# Patient Record
Sex: FEMALE | Race: BLACK OR AFRICAN AMERICAN | NOT HISPANIC OR LATINO | Employment: FULL TIME | URBAN - METROPOLITAN AREA
[De-identification: names, ages, dates, MRNs, and addresses within clinical notes are randomized per-mention and may not be internally consistent; named-entity substitution may affect disease eponyms.]

---

## 2017-01-03 ENCOUNTER — ROUTINE PRENATAL (OUTPATIENT)
Dept: OBSTETRICS AND GYNECOLOGY | Facility: CLINIC | Age: 27
End: 2017-01-03
Payer: MEDICAID

## 2017-01-03 VITALS — DIASTOLIC BLOOD PRESSURE: 62 MMHG | BODY MASS INDEX: 24.25 KG/M2 | SYSTOLIC BLOOD PRESSURE: 99 MMHG | WEIGHT: 145.75 LBS

## 2017-01-03 DIAGNOSIS — Z3A.36 36 WEEKS GESTATION OF PREGNANCY: Primary | ICD-10-CM

## 2017-01-03 PROCEDURE — 99212 OFFICE O/P EST SF 10 MIN: CPT | Mod: S$PBB,TH,, | Performed by: OBSTETRICS & GYNECOLOGY

## 2017-01-03 PROCEDURE — 99999 PR PBB SHADOW E&M-EST. PATIENT-LVL II: CPT | Mod: PBBFAC,,, | Performed by: OBSTETRICS & GYNECOLOGY

## 2017-01-03 PROCEDURE — 99212 OFFICE O/P EST SF 10 MIN: CPT | Mod: PBBFAC,PO | Performed by: OBSTETRICS & GYNECOLOGY

## 2017-01-03 NOTE — PROGRESS NOTES
HERE for routine OB visit at 36 0/7 wks, with NO complaints.  Denies vaginal bleeding, cramping/ OCC ctx, NO  LOF.  + FM.  [facility] BID.   Increase water.  F/U 1 wk.

## 2017-01-03 NOTE — MR AVS SNAPSHOT
[place] - OB/ GYN   [facility]  [place] [address]  Phone: [phone]                  Jose Luis Crowder   1/3/2017 2:00 PM   Routine Prenatal    Description:  Female : 1990   Provider:  Charleen Rizzo MD   Department:  [place] - OB/ GYN           Reason for Visit     Routine Prenatal Visit                To Do List           Future Appointments        Provider Department Dept Phone    1/3/2017 2:00 PM MD Joi Ferguson - OB/ -[phone]    1/10/2017 10:00 AM MD Joi Ferguson - OB/ -[phone]    2017 10:15 AM MD Joi Ferguson - OB/ -[phone]    2017 10:30 AM MD Joi Ferguson - OB/ -[phone]    2017 10:30 AM MD [place] - OB/ -[phone]      Goals (5 Years of Data)     None      [facility]     [facility] On Call Nurse [facility] -  Assistance  Registered nurses in the [facility] On Call Center provide clinical advisement, health education, appointment booking, and other advisory services.  Call for this free service at [phone].             Medications           Message regarding Medications     Verify the changes and/or additions to your medication regime listed below are the same as discussed with your clinician today.  If any of these changes or additions are incorrect, please notify your healthcare provider.             Verify that the below list of medications is an accurate representation of the medications you are currently taking.  If none reported, the list may be blank. If incorrect, please contact your healthcare provider. Carry this list with you in case of emergency.           Current Medications     butalbital-acetaminophen-caffeine -40 mg (FIORICET) -40 mg per tablet Take 1-2 tablets by mouth every 6 (six) hours as needed for Headaches.    CITRANATAL 90 DHA, ALGAL OIL, 90 mg iron-1 mg -50 mg-300 mg Cmpk TK ONE C AND ONE T PO D     mv-ferrous fumarate-Ca-E-FA (VITAFOL) 65-1 mg Tab Take 1 tablet by mouth once daily.    ondansetron (ZOFRAN-ODT) 4 MG TbDL Take 2 tablets (8 mg total) by mouth every 8 (eight) hours.    PNV64-iron cbn,gluc-FA-DSS-dha (CITRANATAL 90 DHA, NEW FORMULA) 90 mg iron-1 mg -50 mg-300 mg Cmpk Take 1 tablet by mouth once daily.    PRENATAL VIT W-CA,FE,FA,<1 MG, (PRENATAL VITAMIN ORAL) Take by mouth.    promethazine (PHENERGAN) 25 MG tablet Take 1 tablet (25 mg total) by mouth every 4 (four) hours.           Clinical Reference Information           Prenatal Vitals     Enc. Date GA Prenatal Vitals Prenatal Pulse Pain Level Urine Albumin/Glucose Edema Presentation Dilation/Effacement/Station    1/3/17 36w0d 99/62 / 66.1 kg (145 lb 11.6 oz)   0 Negative / Negative       12/27/16 35w0d 96/78 / 66.1 kg (145 lb 11.6 oz) 35 cm / 145 / Present  0 Negative / Negative None / None  0    12/15/16 33w2d 90/60 / 64.9 kg (143 lb 1.3 oz)  / 140 / Present  0 Negative / Negative None / None Vertex     11/28/16 30w6d 90/60 / 64.1 kg (141 lb 5 oz) 31 cm / 140 / Present  0 Negative / Negative None / None      10/24/16 25w6d 100/60 / 61.7 kg (136 lb 0.4 oz)  / 150 / Present  0 Negative / Negative None / None      9/29/16 22w2d 90/52 (A) / 60.9 kg (134 lb 4.2 oz)  / 150 / Present  0 Negative / Negative None / None      9/21/16 21w1d Admission Dx: Nausea and vomiting during pregnancy prior to 22 weeks gestation Dept: [facility]    9/19/16 20w6d Admission Dx: Nausea & vomiting Dept: [facility]    9/8/16 19w2d 110/62 / 58 kg (127 lb 13.9 oz)  / 150 / Present  0 Negative / Negative None / None      8/11/16 15w2d 100/62 / 58.2 kg (128 lb 4.9 oz)  / 150  4 Negative / Negative None / None      7/21/16 12w2d 102/62 / 51.2 kg (112 lb 14 oz)  / 150  0 Negative / Negative None / None        Vital Signs - Last Recorded  Most recent update: 1/3/2017  1:25 PM by Sandy Aguilar MA    BP Wt LMP BMI       99/62 66.1 kg (145 lb 11.6 oz) 04/26/2016 24.25 kg/m2        Allergies as of 1/3/2017     No Known Allergies      Immunizations Administered on Date of Encounter - 1/3/2017     None

## 2017-01-06 ENCOUNTER — TELEPHONE (OUTPATIENT)
Dept: OBSTETRICS AND GYNECOLOGY | Facility: CLINIC | Age: 27
End: 2017-01-06

## 2017-01-06 NOTE — TELEPHONE ENCOUNTER
----- Message from Sylvie Pierre sent at 1/6/2017 12:48 PM CST -----  Contact: pt    x_  1st Request  _  2nd Request  _  3rd Request        Who: pt    Why: pt states that she 36 weeks ob and she is vomiting, Constant urination dizziness,dydration and what like  To know what to  do     What Number to Call Back: [phone]    When to Expect a call back: (Before the end of the day)   -- if call after 3:00 call back will be tomorrow.

## 2017-01-06 NOTE — TELEPHONE ENCOUNTER
----- Message from Thierry Saldana sent at 1/6/2017  2:06 PM CST -----  Contact: pt  x_ 1st Request   _ 2nd Request   _ 3rd Request     Who: SHRUTHI JARAMILLO [[id number]]    Why: Pt missed your call is requesting a call back    What Number to Call Back: [phone]    When to Expect a call back: (Before the end of the day)   -- if call after 3:00 call back will be tomorrow.

## 2017-01-06 NOTE — TELEPHONE ENCOUNTER
"Spoke with  prior to tel call. " It sounds like the pt may have a virus. Advise pt to stay hydrated and rest. Any changes report to triage. "    Called pt. Patient provided rec, advised to stay hydrated, rest, try small frequent meals throughout the day to help with N/V sx. Especially crackers. Pt advised virus can take 7-10 days any changes report to L&D. Pt verbalized understanding  "

## 2017-01-06 NOTE — TELEPHONE ENCOUNTER
----- Message from Ericka Snider sent at 1/6/2017  2:17 PM CST -----  Contact: pt   X_  1st Request  _  2nd Request  _  3rd Request    Who:SHRUTHI JARAMILLO [[id number]]    Why: Patient is returning a call     What Number to Call Back: [phone]    When to Expect a call back: (Before the end of the day)   -- if call after 3:00 call back will be tomorrow.

## 2017-01-06 NOTE — TELEPHONE ENCOUNTER
Spoke with patient. Patient thinks she may have a virus. Patient states she is feeling really lightheaded. She has vomiting.Vomiting started yesterday and once today. Feeling extremely lightheaded. Loss of appetite. Having irregular contractions - nothing that she thinks she needs to go to L&D for. Pt states when she came in on Tues  told her she looked a little pale. And she does not feel well at all. She is very concerned, she does not think she is dehydrated she has been drinking a lot of water and power-jimmy/gatorade. Denies any loss of fluids, bleeding. She is uncertain what she should do. Pt advised I will speak with  for rec and give her a CB. Pt verbalized understanding

## 2017-01-10 ENCOUNTER — ROUTINE PRENATAL (OUTPATIENT)
Dept: OBSTETRICS AND GYNECOLOGY | Facility: CLINIC | Age: 27
End: 2017-01-10
Payer: MEDICAID

## 2017-01-10 VITALS — WEIGHT: 147.5 LBS | SYSTOLIC BLOOD PRESSURE: 90 MMHG | DIASTOLIC BLOOD PRESSURE: 60 MMHG | BODY MASS INDEX: 24.54 KG/M2

## 2017-01-10 DIAGNOSIS — Z3A.37 37 WEEKS GESTATION OF PREGNANCY: Primary | ICD-10-CM

## 2017-01-10 PROCEDURE — 99999 PR PBB SHADOW E&M-EST. PATIENT-LVL II: CPT | Mod: PBBFAC,,, | Performed by: OBSTETRICS & GYNECOLOGY

## 2017-01-10 PROCEDURE — 99212 OFFICE O/P EST SF 10 MIN: CPT | Mod: PBBFAC,PO | Performed by: OBSTETRICS & GYNECOLOGY

## 2017-01-10 PROCEDURE — 99213 OFFICE O/P EST LOW 20 MIN: CPT | Mod: TH,S$GLB,, | Performed by: OBSTETRICS & GYNECOLOGY

## 2017-01-10 NOTE — PROGRESS NOTES
HERE for routine OB visit at 37 o/7 wks, with NO complaints.  Denies vaginal bleeding, cramping/ ctx, or LOF.  + FM.  [facility] BID.   Labor precautions given.  F/U weekly

## 2017-01-17 ENCOUNTER — ROUTINE PRENATAL (OUTPATIENT)
Dept: OBSTETRICS AND GYNECOLOGY | Facility: CLINIC | Age: 27
End: 2017-01-17
Payer: MEDICAID

## 2017-01-17 VITALS — DIASTOLIC BLOOD PRESSURE: 62 MMHG | WEIGHT: 145.5 LBS | BODY MASS INDEX: 24.21 KG/M2 | SYSTOLIC BLOOD PRESSURE: 100 MMHG

## 2017-01-17 DIAGNOSIS — Z3A.38 38 WEEKS GESTATION OF PREGNANCY: Primary | ICD-10-CM

## 2017-01-17 PROCEDURE — 99999 PR PBB SHADOW E&M-EST. PATIENT-LVL II: CPT | Mod: PBBFAC,,, | Performed by: OBSTETRICS & GYNECOLOGY

## 2017-01-17 PROCEDURE — 99212 OFFICE O/P EST SF 10 MIN: CPT | Mod: PBBFAC,PO | Performed by: OBSTETRICS & GYNECOLOGY

## 2017-01-17 PROCEDURE — 99213 OFFICE O/P EST LOW 20 MIN: CPT | Mod: TH,S$PBB,, | Performed by: OBSTETRICS & GYNECOLOGY

## 2017-01-17 NOTE — PROGRESS NOTES
HERE for routine OB visit at 38 0/7 wks, with NO complaints.  Denies vaginal bleeding, some  cramping/ ctx, or LOF.  + FM.  [facility] BID. Not sleeping  Discussed induction.  F/U in one week.

## 2017-01-17 NOTE — MR AVS SNAPSHOT
[place] - OB/ GYN   [facility]  [place] [address]  Phone: [phone]                  Jose Luis Crowder   2017 10:15 AM   Routine Prenatal    Description:  Female : 1990   Provider:  Charleen Rizzo MD   Department:  [place] - OB/ GYN                To Do List           Future Appointments        Provider Department Dept Phone    2017 10:15 AM MD Joi Ferguson - OB/ -[phone]    2017 10:30 AM MD Joi Ferguson - OB/ -[phone]    2017 10:30 AM MD Jo[place] Ferguson - OB/ -[phone]      Goals (5 Years of Data)     None      [facility]     [facility] On Call Nurse Care Line -  Assistance  Registered nurses in the [facility] On Call Center provide clinical advisement, health education, appointment booking, and other advisory services.  Call for this free service at [phone].             Medications           Message regarding Medications     Verify the changes and/or additions to your medication regime listed below are the same as discussed with your clinician today.  If any of these changes or additions are incorrect, please notify your healthcare provider.             Verify that the below list of medications is an accurate representation of the medications you are currently taking.  If none reported, the list may be blank. If incorrect, please contact your healthcare provider. Carry this list with you in case of emergency.           Current Medications     butalbital-acetaminophen-caffeine -40 mg (FIORICET) -40 mg per tablet Take 1-2 tablets by mouth every 6 (six) hours as needed for Headaches.    CITRANATAL 90 DHA, ALGAL OIL, 90 mg iron-1 mg -50 mg-300 mg Cmpk TK ONE C AND ONE T PO D    mv-ferrous fumarate-Ca-E-FA (VITAFOL) 65-1 mg Tab Take 1 tablet by mouth once daily.    ondansetron (ZOFRAN-ODT) 4 MG TbDL Take 2 tablets (8 mg total) by mouth every 8 (eight) hours.    PNV64-iron  cbn,gluc-FA-DSS-dha (CITRANATAL 90 DHA, NEW FORMULA) 90 mg iron-1 mg -50 mg-300 mg Cmpk Take 1 tablet by mouth once daily.    PRENATAL VIT W-CA,FE,FA,<1 MG, (PRENATAL VITAMIN ORAL) Take by mouth.    promethazine (PHENERGAN) 25 MG tablet Take 1 tablet (25 mg total) by mouth every 4 (four) hours.           Clinical Reference Information           Prenatal Vitals     Enc. Date GA Prenatal Vitals Prenatal Pulse Pain Level Urine Albumin/Glucose Edema Presentation Dilation/Effacement/Station    1/17/17 38w0d 100/62 / 66 kg (145 lb 8.1 oz)  /  / Present   Negative / Negative       1/10/17 37w0d 90/60 / 66.9 kg (147 lb 7.8 oz) 37 cm / 140 / Present  0 Negative / Negative +1 / +1 Vertex .5 / 50 / -3    1/3/17 36w0d 99/62 / 66.1 kg (145 lb 11.6 oz)   0 Negative / Negative None / None Vertex .5 / 50 / -4    12/27/16 35w0d 96/78 / 66.1 kg (145 lb 11.6 oz) 35 cm / 145 / Present  0 Negative / Negative None / None  0    12/15/16 33w2d 90/60 / 64.9 kg (143 lb 1.3 oz)  / 140 / Present  0 Negative / Negative None / None Vertex     11/28/16 30w6d 90/60 / 64.1 kg (141 lb 5 oz) 31 cm / 140 / Present  0 Negative / Negative None / None      10/24/16 25w6d 100/60 / 61.7 kg (136 lb 0.4 oz)  / 150 / Present  0 Negative / Negative None / None      9/29/16 22w2d 90/52 (A) / 60.9 kg (134 lb 4.2 oz)  / 150 / Present  0 Negative / Negative None / None      9/21/16 21w1d Admission Dx: Nausea and vomiting during pregnancy prior to 22 weeks gestation Dept: [facility] [place]    9/19/16 20w6d Admission Dx: Nausea & vomiting Dept: [facility] [place]    9/8/16 19w2d 110/62 / 58 kg (127 lb 13.9 oz)  / 150 / Present  0 Negative / Negative None / None      8/11/16 15w2d 100/62 / 58.2 kg (128 lb 4.9 oz)  / 150  4 Negative / Negative None / None      7/21/16 12w2d 102/62 / 51.2 kg (112 lb 14 oz)  / 150  0 Negative / Negative None / None        Vital Signs - Last Recorded  Most recent update: 1/17/2017 10:13 AM by Lo Avitia, KARENN    BP Wt LMP BMI       100/62 66 kg  (145 lb 8.1 oz) 04/26/2016 24.21 kg/m2       Allergies as of 1/17/2017     No Known Allergies      Immunizations Administered on Date of Encounter - 1/17/2017     None

## 2017-01-24 ENCOUNTER — ROUTINE PRENATAL (OUTPATIENT)
Dept: OBSTETRICS AND GYNECOLOGY | Facility: CLINIC | Age: 27
End: 2017-01-24
Payer: MEDICAID

## 2017-01-24 VITALS — SYSTOLIC BLOOD PRESSURE: 100 MMHG | DIASTOLIC BLOOD PRESSURE: 58 MMHG | WEIGHT: 147.5 LBS | BODY MASS INDEX: 24.54 KG/M2

## 2017-01-24 DIAGNOSIS — Z3A.39 39 WEEKS GESTATION OF PREGNANCY: Primary | ICD-10-CM

## 2017-01-24 PROCEDURE — 99999 PR PBB SHADOW E&M-EST. PATIENT-LVL II: CPT | Mod: PBBFAC,,, | Performed by: OBSTETRICS & GYNECOLOGY

## 2017-01-24 PROCEDURE — 99212 OFFICE O/P EST SF 10 MIN: CPT | Mod: PBBFAC,PO | Performed by: OBSTETRICS & GYNECOLOGY

## 2017-01-24 PROCEDURE — 99213 OFFICE O/P EST LOW 20 MIN: CPT | Mod: TH,S$PBB,, | Performed by: OBSTETRICS & GYNECOLOGY

## 2017-01-24 NOTE — PROGRESS NOTES
HERE for routine OB visit at 39 0/7 wks, with lower pelvic pain, pressure.   Denies vaginal bleeding, cramping/ ctx, or LOF.  + FM.  [facility] BID.   Wants induction , understands the risks of c section. In distress.  States she wants an induction- discussed process.

## 2017-01-24 NOTE — MR AVS SNAPSHOT
[place] - OB/ GYN   [facility]  [place] [address]  Phone: [phone]                  Jose Luis Crowder   2017 10:30 AM   Routine Prenatal    Description:  Female : 1990   Provider:  Charleen Rizzo MD   Department:  [place] - OB/ GYN           Reason for Visit     Routine Prenatal Visit                To Do List           Future Appointments        Provider Department Dept Phone    2017 10:30 AM MD Jo[place] Ferguson - OB/ -[phone]    2017 10:30 AM MD Jo[place] Ferguson - OB/ -[phone]      Goals (5 Years of Data)     None      [facility] On [facility]     [facility] On Call Nurse Care Line -  Assistance  Registered nurses in the [facility] On Call Center provide clinical advisement, health education, appointment booking, and other advisory services.  Call for this free service at [phone].             Medications           Message regarding Medications     Verify the changes and/or additions to your medication regime listed below are the same as discussed with your clinician today.  If any of these changes or additions are incorrect, please notify your healthcare provider.             Verify that the below list of medications is an accurate representation of the medications you are currently taking.  If none reported, the list may be blank. If incorrect, please contact your healthcare provider. Carry this list with you in case of emergency.           Current Medications     PRENATAL VIT W-CA,FE,FA,<1 MG, (PRENATAL VITAMIN ORAL) Take by mouth.    butalbital-acetaminophen-caffeine -40 mg (FIORICET) -40 mg per tablet Take 1-2 tablets by mouth every 6 (six) hours as needed for Headaches.    CITRANATAL 90 DHA, ALGAL OIL, 90 mg iron-1 mg -50 mg-300 mg Cmpk TK ONE C AND ONE T PO D    mv-ferrous fumarate-Ca-E-FA (VITAFOL) 65-1 mg Tab Take 1 tablet by mouth once daily.    ondansetron (ZOFRAN-ODT) 4 MG TbDL Take 2 tablets (8 mg total) by  mouth every 8 (eight) hours.    PNV64-iron cbn,gluc-FA-DSS-dha (CITRANATAL 90 DHA, NEW FORMULA) 90 mg iron-1 mg -50 mg-300 mg Cmpk Take 1 tablet by mouth once daily.    promethazine (PHENERGAN) 25 MG tablet Take 1 tablet (25 mg total) by mouth every 4 (four) hours.           Clinical Reference Information           Prenatal Vitals     Enc. Date GA Prenatal Vitals Prenatal Pulse Pain Level Urine Albumin/Glucose Edema Presentation Dilation/Effacement/Station    1/24/17 39w0d 100/58 (A)   0 Negative / Negative       1/17/17 38w0d 100/62 / 66 kg (145 lb 8.1 oz) 38 cm /  / Present  0 Negative / Negative +1 / +1 Vertex 1 / 50 / -3    1/10/17 37w0d 90/60 / 66.9 kg (147 lb 7.8 oz) 37 cm / 140 / Present  0 Negative / Negative +1 / +1 Vertex .5 / 50 / -3    1/3/17 36w0d 99/62 / 66.1 kg (145 lb 11.6 oz)   0 Negative / Negative None / None Vertex .5 / 50 / -4    12/27/16 35w0d 96/78 / 66.1 kg (145 lb 11.6 oz) 35 cm / 145 / Present  0 Negative / Negative None / None  0    12/15/16 33w2d 90/60 / 64.9 kg (143 lb 1.3 oz)  / 140 / Present  0 Negative / Negative None / None Vertex     11/28/16 30w6d 90/60 / 64.1 kg (141 lb 5 oz) 31 cm / 140 / Present  0 Negative / Negative None / None      10/24/16 25w6d 100/60 / 61.7 kg (136 lb 0.4 oz)  / 150 / Present  0 Negative / Negative None / None      9/29/16 22w2d 90/52 (A) / 60.9 kg (134 lb 4.2 oz)  / 150 / Present  0 Negative / Negative None / None      9/21/16 21w1d Admission Dx: Nausea and vomiting during pregnancy prior to 22 weeks gestation Dept: [facility]    9/19/16 20w6d Admission Dx: Nausea & vomiting Dept: [facility]    9/8/16 19w2d 110/62 / 58 kg (127 lb 13.9 oz)  / 150 / Present  0 Negative / Negative None / None      8/11/16 15w2d 100/62 / 58.2 kg (128 lb 4.9 oz)  / 150  4 Negative / Negative None / None      7/21/16 12w2d 102/62 / 51.2 kg (112 lb 14 oz)  / 150  0 Negative / Negative None / None        Vital Signs - Last Recorded  Most recent update: 1/24/2017 10:14 AM by  Divya Casiano MA    BP LMP                (!) 100/58 04/26/2016          Allergies as of 1/24/2017     No Known Allergies      Immunizations Administered on Date of Encounter - 1/24/2017     None

## 2017-01-25 PROBLEM — Z3A.39 39 WEEKS GESTATION OF PREGNANCY: Status: ACTIVE | Noted: 2017-01-25

## 2017-01-26 ENCOUNTER — HOSPITAL ENCOUNTER (INPATIENT)
Facility: OTHER | Age: 27
LOS: 3 days | Discharge: HOME | End: 2017-01-29
Attending: OBSTETRICS & GYNECOLOGY | Admitting: OBSTETRICS & GYNECOLOGY
Payer: MEDICAID

## 2017-01-26 DIAGNOSIS — Z3A.39 39 WEEKS GESTATION OF PREGNANCY: Primary | ICD-10-CM

## 2017-01-26 LAB
ABO + RH BLD: NORMAL
BASOPHILS # BLD AUTO: 0.03 K/UL
BASOPHILS NFR BLD: 0.3 %
BLD GP AB SCN CELLS X3 SERPL QL: NORMAL
DIFFERENTIAL METHOD: ABNORMAL
EOSINOPHIL # BLD AUTO: 0.1 K/UL
EOSINOPHIL NFR BLD: 0.9 %
ERYTHROCYTE [DISTWIDTH] IN BLOOD BY AUTOMATED COUNT: 13.3 %
HCT VFR BLD AUTO: 32.9 %
HGB BLD-MCNC: 10.8 G/DL
LYMPHOCYTES # BLD AUTO: 2 K/UL
LYMPHOCYTES NFR BLD: 19.5 %
MCH RBC QN AUTO: 27.6 PG
MCHC RBC AUTO-ENTMCNC: 32.8 %
MCV RBC AUTO: 84 FL
MONOCYTES # BLD AUTO: 1.5 K/UL
MONOCYTES NFR BLD: 14.4 %
NEUTROPHILS # BLD AUTO: 6.5 K/UL
NEUTROPHILS NFR BLD: 62.9 %
PLATELET # BLD AUTO: 276 K/UL
PMV BLD AUTO: 11.4 FL
RBC # BLD AUTO: 3.91 M/UL
WBC # BLD AUTO: 10.4 K/UL

## 2017-01-26 PROCEDURE — 72100002 HC LABOR CARE, 1ST 8 HOURS

## 2017-01-26 PROCEDURE — 25000003 PHARM REV CODE 250: Performed by: OBSTETRICS & GYNECOLOGY

## 2017-01-26 PROCEDURE — 86900 BLOOD TYPING SEROLOGIC ABO: CPT

## 2017-01-26 PROCEDURE — 11000001 HC ACUTE MED/SURG PRIVATE ROOM

## 2017-01-26 PROCEDURE — 86901 BLOOD TYPING SEROLOGIC RH(D): CPT

## 2017-01-26 PROCEDURE — 85025 COMPLETE CBC W/AUTO DIFF WBC: CPT

## 2017-01-26 RX ORDER — TERBUTALINE SULFATE 1 MG/ML
0.25 INJECTION SUBCUTANEOUS
Status: DISCONTINUED | OUTPATIENT
Start: 2017-01-26 | End: 2017-01-29

## 2017-01-26 RX ORDER — METHYLERGONOVINE MALEATE 0.2 MG/ML
200 INJECTION INTRAVENOUS
Status: DISCONTINUED | OUTPATIENT
Start: 2017-01-26 | End: 2017-01-29

## 2017-01-26 RX ORDER — ONDANSETRON 8 MG/1
8 TABLET, ORALLY DISINTEGRATING ORAL EVERY 8 HOURS PRN
Status: DISCONTINUED | OUTPATIENT
Start: 2017-01-26 | End: 2017-01-27

## 2017-01-26 RX ORDER — MISOPROSTOL 200 UG/1
800 TABLET ORAL
Status: DISCONTINUED | OUTPATIENT
Start: 2017-01-26 | End: 2017-01-29

## 2017-01-26 RX ORDER — SODIUM CHLORIDE, SODIUM LACTATE, POTASSIUM CHLORIDE, CALCIUM CHLORIDE 600; 310; 30; 20 MG/100ML; MG/100ML; MG/100ML; MG/100ML
INJECTION, SOLUTION INTRAVENOUS CONTINUOUS
Status: DISCONTINUED | OUTPATIENT
Start: 2017-01-26 | End: 2017-01-29

## 2017-01-26 RX ORDER — MISOPROSTOL 100 MCG
25 TABLET ORAL EVERY 4 HOURS
Status: DISCONTINUED | OUTPATIENT
Start: 2017-01-26 | End: 2017-01-26

## 2017-01-26 RX ORDER — OXYTOCIN/RINGER'S LACTATE 20/1000 ML
41 PLASTIC BAG, INJECTION (ML) INTRAVENOUS ONCE AS NEEDED
Status: ACTIVE | OUTPATIENT
Start: 2017-01-26 | End: 2017-01-26

## 2017-01-26 RX ORDER — CARBOPROST TROMETHAMINE 250 UG/ML
250 INJECTION, SOLUTION INTRAMUSCULAR
Status: DISCONTINUED | OUTPATIENT
Start: 2017-01-26 | End: 2017-01-29

## 2017-01-26 RX ORDER — MISOPROSTOL 100 MCG
25 TABLET ORAL EVERY 4 HOURS
Status: DISPENSED | OUTPATIENT
Start: 2017-01-26 | End: 2017-01-27

## 2017-01-26 RX ORDER — TERBUTALINE SULFATE 1 MG/ML
0.25 INJECTION SUBCUTANEOUS
Status: DISCONTINUED | OUTPATIENT
Start: 2017-01-26 | End: 2017-01-26 | Stop reason: SDUPTHER

## 2017-01-26 RX ADMIN — Medication 25 MCG: at 08:01

## 2017-01-26 RX ADMIN — SODIUM CHLORIDE, SODIUM LACTATE, POTASSIUM CHLORIDE, AND CALCIUM CHLORIDE: .6; .31; .03; .02 INJECTION, SOLUTION INTRAVENOUS at 08:01

## 2017-01-26 NOTE — IP AVS SNAPSHOT
[facility] Location ([facility])  [address] [place], [place] [address]  Phone: [phone]           Patient Discharge Instructions     Our goal is to set you up for success. This packet includes information on your condition, medications, and your home care. It will help you to care for yourself so you don't get sicker and need to go back to the hospital.     Please ask your nurse if you have any questions.        There are many details to remember when preparing to leave the hospital. Here is what you will need to do:    1. Take your medicine. If you are prescribed medications, review your Medication List in the following pages. You may have new medications to  at the pharmacy and others that you'll need to stop taking. Review the instructions for how and when to take your medications. Talk with your doctor or nurses if you are unsure of what to do.     2. Go to your follow-up appointments. Specific follow-up information is listed in the following pages. Your may be contacted by a transition nurse or clinical provider about future appointments. Be sure we have all of the phone numbers to reach you, if needed. Please contact your provider's office if you are unable to make an appointment.     3. Watch for warning signs. Your doctor or nurse will give you detailed warning signs to watch for and when to call for assistance. These instructions may also include educational information about your condition. If you experience any of warning signs to your health, call your doctor.               [facility]  Unless otherwise directed by your provider, please contact [facility], our nurse care line that is available for 24/7 assistance.     [phone] (toll-free)    Registered nurses in the [facility] provide clinical advisement, health education, appointment booking, and other advisory services.                    ** Verify the list of medication(s) below is accurate and up to  date. Carry this with you in case of emergency. If your medications have changed, please notify your healthcare provider.             Medication List      START taking these medications        Additional Info                      ibuprofen 600 MG tablet   Commonly known as:  ADVIL,MOTRIN   Quantity:  30 tablet   Refills:  0   Dose:  600 mg    Last time this was given:  600 mg on 1/29/2017  2:13 AM   Instructions:  Take 1 tablet (600 mg total) by mouth every 6 (six) hours as needed for Pain (cramping).     Begin Date    AM    Noon    PM    Bedtime       oxycodone-acetaminophen 5-325 mg per tablet   Commonly known as:  PERCOCET   Quantity:  30 tablet   Refills:  0   Dose:  1 tablet    Last time this was given:  1 tablet on 1/27/2017 10:14 PM   Instructions:  Take 1 tablet by mouth every 4 (four) hours as needed for Pain.     Begin Date    AM    Noon    PM    Bedtime         CONTINUE taking these medications        Additional Info                      butalbital-acetaminophen-caffeine -40 mg -40 mg per tablet   Commonly known as:  FIORICET   Quantity:  20 tablet   Refills:  0   Dose:  1-2 tablet    Instructions:  Take 1-2 tablets by mouth every 6 (six) hours as needed for Headaches.     Begin Date    AM    Noon    PM    Bedtime       CITRANATAL 90 DHA (ALGAL OIL) 90 mg iron-1 mg -50 mg-300 mg Cmpk   Refills:  12   Generic drug:  PNV72-iron carb,glu-FA-DSS-dha    Instructions:  TK ONE C AND ONE T PO D     Begin Date    AM    Noon    PM    Bedtime       PNV64-iron cbn,gluc-FA-DSS-dha 90 mg iron-1 mg -50 mg-300 mg Cmpk   Commonly known as:  CITRANATAL 90 DHA (NEW FORMULA   Quantity:  30 each   Refills:  12   Dose:  1 tablet    Instructions:  Take 1 tablet by mouth once daily.     Begin Date    AM    Noon    PM    Bedtime         STOP taking these medications     mv-ferrous fumarate-Ca-E-FA 65-1 mg Tab   Commonly known as:  VITAFOL       ondansetron 4 MG Tbdl   Commonly known as:  ZOFRAN-ODT       PRENATAL  VITAMIN ORAL       promethazine 25 MG tablet   Commonly known as:  PHENERGAN            Where to Get Your Medications      These medications were sent to [facility] - [place], [place] - [address]  [address] [facility] [address]     Phone:  [phone]     ibuprofen 600 MG tablet         You can get these medications from any pharmacy     Bring a paper prescription for each of these medications     oxycodone-acetaminophen 5-325 mg per tablet                  Please bring to all follow up appointments:    1. A copy of your discharge instructions.  2. All medicines you are currently taking in their original bottles.  3. Identification and insurance card.    Please arrive 15 minutes ahead of scheduled appointment time.    Please call 24 hours in advance if you must reschedule your appointment and/or time.        Follow-up Information     Follow up with Charleen Rizzo MD. Schedule an appointment as soon as possible for a visit in 6 weeks.    Specialties:  Obstetrics, Obstetrics and Gynecology    Why:  post-partum visit    Contact information:    [address] [place] [address] [phone]          Discharge Instructions     Future Orders    Activity as tolerated     Call MD for:  difficulty breathing or increased cough     Call MD for:  increased confusion or weakness     Call MD for:  persistent dizziness, light-headedness, or visual disturbances     Call MD for:  persistent nausea and vomiting or diarrhea     Call MD for:  severe persistent headache     Call MD for:  severe uncontrolled pain     Call MD for:  temperature >100.4     Call MD for:     Scheduling Instructions:    Vaginal bleeding through one or more pads each hour for 2 hours    Diet general     Questions:    Total calories:      Fat restriction, if any:      Protein restriction, if any:      Na restriction, if any:      Fluid restriction:      Additional  restrictions:      Lifting restrictions     Other restrictions (specify):     Scheduling Instructions:    Pelvic rest until post partum visit. (No sex, no tampons, etc.)        Discharge Instructions       Breastfeeding Discharge Instructions       Feed the baby at the earliest sign of hunger or comfort  o Hands to mouth, sucking motions  o Rooting or searching for something to suck on  o Dont wait for crying - it is a late sign of hunger and comfort.     The feedings may be 8-12 times per 24hrs and will not follow a schedule   Avoid pacifiers and bottles for the first 4 weeks   Alternate the breast you start the feeding with, or start with the breast that feels the fullest   Switch breasts when the baby takes himself off the breast or falls asleep   Keep offering breasts until the baby looks full, no longer gives hunger signs, and stays asleep when placed on his back in the crib   If the baby is sleepy and wont wake for a feeding, put the baby skin-to-skin dressed in a diaper against the mothers bare chest   Sleep near your baby   The baby should be positioned and latched on to the breast correctly  o Chest-to-chest, chin in the breast  o Babys lips are flipped outward  o Babys mouth is stretched open wide like a shout  o Babys sucking should feel like tugging to the mother  - The baby should be drinking at the breast:  o You should hear swallowing or gulping throughout the feeding  o You should see milk on the babys lips when he comes off the breast  o Your breasts should be softer when the baby is finished feeding  o The baby should look relaxed at the end of feedings  o After the 4th day and your milk is in:  o The babys poop should turn bright yellow and be loose, watery, and seedy  o The baby should have at least 3-4 poops the size of the palm of your hand per day  o The baby should have at least 6-8 wet diapers per day  o The urine should be light yellow in color  You should drink when  you are thirsty and eat a healthy diet when you are    hungry.     Take naps to get the rest you need.   Take medications and/or drink alcohol only with permission of your obstetrician    or the babys pediatrician.  You can also call the Infant Risk Center,   ([phone]), Monday-Friday, 8am-5pm Central time, to get the most   up-to-date evidence-based information on the use of medications during   pregnancy and breastfeeding.      The baby should be examined by a pediatrician at 3-5 days of age.  Once your   milk comes in, the baby should be gaining at least ½ - 1oz each day and should be back to birthweight no later than 10-14 days of age.          Community Resources    [facility] Breastfeeding Warmline:  [phone]  Local [facility] clinics: provide incentives and breastpumps to eligible mothers  La Leche League International (LLLI):  mother-to-mother support group website        www."[employer]  Local La Leche League mother-to-mother support groups:        www.EnergenobrendanBlend        La Leche League [facility]         www.arcelia@[employer].com  Dr. Bert Hogan website for latch videos and general information:        www.breastfeedinginc.ca  Infant Risk Center is a call center that provides information about the safety of taking medications while breastfeeding.  Call [phone], M-F, 8am-5pm, CT.  International Lactation Consultant Association provides resources for assistance:        www.ilca.org  [facility] Breastfeeding Coalition provides informationand resources for parents  and the community    http://louisianabreastfeeding.org     [place] mom provides resources for assistance:        www.nolamom.org  Partners for Healthy Babies:  6-158-053-BABY(8178)  [facility] Milk provides a list of breastfeeding services by zip code:        www.[employer] Security[facility] Health CenterViropro.[employer]  [employer]:  [phone] a breastfeeding support group for women of color.            Primary Diagnosis     Your primary diagnosis was:  Normal Vaginal  "Delivery      Admission Information     Date & Time Provider Department CSN    1/26/2017  7:55 PM Charleen Rizzo MD [facility] [phone]      Care Providers     Provider Role Specialty Primary office phone    Charleen Rizzo MD Attending Provider Obstetrics [phone]    Estrella Ho MD Consulting Physician  Obstetrics and Gynecology [phone]      Your Vitals Were     BP Pulse Temp Resp Height Weight    93/55 (Patient Position: Lying, BP Method: Automatic) 78 97.5 °F (36.4 °C) (Oral) 18 5' 4" (1.626 m) 66.2 kg (146 lb)    Last Period SpO2 BMI          04/26/2016 98% 25.06 kg/m2        Recent Lab Values     No lab values to display.      Allergies as of 1/29/2017     No Known Allergies      Advance Directives     An advance directive is a document which, in the event you are no longer able to make decisions for yourself, tells your healthcare team what kind of treatment you do or do not want to receive, or who you would like to make those decisions for you.  If you do not currently have an advance directive, [facility] encourages you to create one.  For more information call:  [phone]), [phone] ([phone]),  or log on to [email or web address].        Language Assistance Services     ATTENTION: Language assistance services are available, free of charge. Please call [phone].      ATENCIÓN: Si habla español, tiene a martinez disposición servicios gratuitos de asistencia lingüística. Llame al [phone].     [facility] Ý: N?u b?n nói Ti?ng Vi?t, có các d?ch v? h? tr? ngôn ng? mi?n phí dành cho b?n. G?i s? [phone].         [facility] complies with applicable Federal civil rights laws and does not discriminate on the basis of race, color, national origin, age, disability, or sex.        "

## 2017-01-27 ENCOUNTER — ANESTHESIA (OUTPATIENT)
Dept: OBSTETRICS AND GYNECOLOGY | Facility: OTHER | Age: 27
End: 2017-01-27
Payer: MEDICAID

## 2017-01-27 ENCOUNTER — ANESTHESIA EVENT (OUTPATIENT)
Dept: OBSTETRICS AND GYNECOLOGY | Facility: OTHER | Age: 27
End: 2017-01-27
Payer: MEDICAID

## 2017-01-27 PROBLEM — Z3A.39 39 WEEKS GESTATION OF PREGNANCY: Status: RESOLVED | Noted: 2017-01-25 | Resolved: 2017-01-27

## 2017-01-27 PROCEDURE — 25000003 PHARM REV CODE 250: Performed by: STUDENT IN AN ORGANIZED HEALTH CARE EDUCATION/TRAINING PROGRAM

## 2017-01-27 PROCEDURE — 72100003 HC LABOR CARE, EA. ADDL. 8 HRS

## 2017-01-27 PROCEDURE — 82803 BLOOD GASES ANY COMBINATION: CPT

## 2017-01-27 PROCEDURE — 3E033VJ INTRODUCTION OF OTHER HORMONE INTO PERIPHERAL VEIN, PERCUTANEOUS APPROACH: ICD-10-PCS | Performed by: OBSTETRICS & GYNECOLOGY

## 2017-01-27 PROCEDURE — 72200006 HC VAGINAL DELIVERY LEVEL III

## 2017-01-27 PROCEDURE — 27200710 HC EPIDURAL INFUSION PUMP SET: Performed by: ANESTHESIOLOGY

## 2017-01-27 PROCEDURE — 72200005 HC VAGINAL DELIVERY LEVEL II

## 2017-01-27 PROCEDURE — 99900035 HC TECH TIME PER 15 MIN (STAT)

## 2017-01-27 PROCEDURE — 10907ZC DRAINAGE OF AMNIOTIC FLUID, THERAPEUTIC FROM PRODUCTS OF CONCEPTION, VIA NATURAL OR ARTIFICIAL OPENING: ICD-10-PCS | Performed by: OBSTETRICS & GYNECOLOGY

## 2017-01-27 PROCEDURE — 63600175 PHARM REV CODE 636 W HCPCS: Performed by: ANESTHESIOLOGY

## 2017-01-27 PROCEDURE — 11000001 HC ACUTE MED/SURG PRIVATE ROOM

## 2017-01-27 PROCEDURE — 25000003 PHARM REV CODE 250: Performed by: OBSTETRICS & GYNECOLOGY

## 2017-01-27 PROCEDURE — 51702 INSERT TEMP BLADDER CATH: CPT

## 2017-01-27 PROCEDURE — 59409 OBSTETRICAL CARE: CPT | Mod: GB,,, | Performed by: OBSTETRICS & GYNECOLOGY

## 2017-01-27 PROCEDURE — 27800517 HC TRAY,EPIDURAL-CONTINUOUS: Performed by: ANESTHESIOLOGY

## 2017-01-27 PROCEDURE — 25000003 PHARM REV CODE 250: Performed by: ANESTHESIOLOGY

## 2017-01-27 PROCEDURE — 59410 OBSTETRICAL CARE: CPT | Mod: AA,,, | Performed by: ANESTHESIOLOGY

## 2017-01-27 PROCEDURE — 62326 NJX INTERLAMINAR LMBR/SAC: CPT | Performed by: ANESTHESIOLOGY

## 2017-01-27 RX ORDER — IBUPROFEN 600 MG/1
600 TABLET ORAL EVERY 6 HOURS
Status: DISCONTINUED | OUTPATIENT
Start: 2017-01-28 | End: 2017-01-29 | Stop reason: HOSPADM

## 2017-01-27 RX ORDER — BUPIVACAINE HYDROCHLORIDE 2.5 MG/ML
INJECTION, SOLUTION EPIDURAL; INFILTRATION; INTRACAUDAL
Status: DISCONTINUED | OUTPATIENT
Start: 2017-01-27 | End: 2017-01-27

## 2017-01-27 RX ORDER — FENTANYL/BUPIVACAINE/NS/PF 2MCG/ML-.1
PLASTIC BAG, INJECTION (ML) INJECTION
Status: DISPENSED
Start: 2017-01-27 | End: 2017-01-27

## 2017-01-27 RX ORDER — DOCUSATE SODIUM 100 MG/1
200 CAPSULE, LIQUID FILLED ORAL 2 TIMES DAILY PRN
Status: DISCONTINUED | OUTPATIENT
Start: 2017-01-27 | End: 2017-01-29 | Stop reason: HOSPADM

## 2017-01-27 RX ORDER — ONDANSETRON 4 MG/1
8 TABLET, ORALLY DISINTEGRATING ORAL EVERY 8 HOURS PRN
Status: DISCONTINUED | OUTPATIENT
Start: 2017-01-27 | End: 2017-01-29 | Stop reason: HOSPADM

## 2017-01-27 RX ORDER — DIPHENHYDRAMINE HCL 25 MG
25 CAPSULE ORAL EVERY 4 HOURS PRN
Status: DISCONTINUED | OUTPATIENT
Start: 2017-01-27 | End: 2017-01-29 | Stop reason: HOSPADM

## 2017-01-27 RX ORDER — ZOLPIDEM TARTRATE 5 MG/1
5 TABLET ORAL NIGHTLY PRN
Status: DISCONTINUED | OUTPATIENT
Start: 2017-01-27 | End: 2017-01-29 | Stop reason: HOSPADM

## 2017-01-27 RX ORDER — FENTANYL CITRATE 50 UG/ML
INJECTION, SOLUTION INTRAMUSCULAR; INTRAVENOUS
Status: DISCONTINUED | OUTPATIENT
Start: 2017-01-27 | End: 2017-01-27

## 2017-01-27 RX ORDER — FENTANYL/BUPIVACAINE/NS/PF 2MCG/ML-.1
PLASTIC BAG, INJECTION (ML) INJECTION
Status: DISPENSED
Start: 2017-01-27 | End: 2017-01-28

## 2017-01-27 RX ORDER — HYDROCORTISONE 25 MG/G
CREAM TOPICAL 3 TIMES DAILY PRN
Status: DISCONTINUED | OUTPATIENT
Start: 2017-01-27 | End: 2017-01-29 | Stop reason: HOSPADM

## 2017-01-27 RX ORDER — OXYCODONE AND ACETAMINOPHEN 5; 325 MG/1; MG/1
1 TABLET ORAL EVERY 4 HOURS PRN
Qty: 30 TABLET | Refills: 0 | Status: SHIPPED | OUTPATIENT
Start: 2017-01-27 | End: 2017-03-20

## 2017-01-27 RX ORDER — FENTANYL/BUPIVACAINE/NS/PF 2MCG/ML-.1
PLASTIC BAG, INJECTION (ML) INJECTION CONTINUOUS
Status: DISCONTINUED | OUTPATIENT
Start: 2017-01-27 | End: 2017-01-29

## 2017-01-27 RX ORDER — BUPIVACAINE HYDROCHLORIDE 2.5 MG/ML
INJECTION, SOLUTION EPIDURAL; INFILTRATION; INTRACAUDAL
Status: DISPENSED
Start: 2017-01-27 | End: 2017-01-28

## 2017-01-27 RX ORDER — OXYCODONE AND ACETAMINOPHEN 5; 325 MG/1; MG/1
1 TABLET ORAL EVERY 4 HOURS PRN
Status: DISCONTINUED | OUTPATIENT
Start: 2017-01-27 | End: 2017-01-29 | Stop reason: HOSPADM

## 2017-01-27 RX ORDER — OXYTOCIN/RINGER'S LACTATE 20/1000 ML
2 PLASTIC BAG, INJECTION (ML) INTRAVENOUS CONTINUOUS
Status: DISCONTINUED | OUTPATIENT
Start: 2017-01-27 | End: 2017-01-29 | Stop reason: HOSPADM

## 2017-01-27 RX ORDER — BUPIVACAINE HYDROCHLORIDE 2.5 MG/ML
INJECTION, SOLUTION EPIDURAL; INFILTRATION; INTRACAUDAL
Status: DISPENSED
Start: 2017-01-27 | End: 2017-01-27

## 2017-01-27 RX ORDER — OXYTOCIN/RINGER'S LACTATE 20/1000 ML
41.65 PLASTIC BAG, INJECTION (ML) INTRAVENOUS CONTINUOUS
Status: ACTIVE | OUTPATIENT
Start: 2017-01-27 | End: 2017-01-28

## 2017-01-27 RX ORDER — FENTANYL CITRATE 50 UG/ML
INJECTION, SOLUTION INTRAMUSCULAR; INTRAVENOUS
Status: DISPENSED
Start: 2017-01-27 | End: 2017-01-28

## 2017-01-27 RX ORDER — ACETAMINOPHEN 325 MG/1
650 TABLET ORAL EVERY 6 HOURS PRN
Status: DISCONTINUED | OUTPATIENT
Start: 2017-01-27 | End: 2017-01-29 | Stop reason: HOSPADM

## 2017-01-27 RX ORDER — FENTANYL CITRATE 50 UG/ML
INJECTION, SOLUTION INTRAMUSCULAR; INTRAVENOUS
Status: DISPENSED
Start: 2017-01-27 | End: 2017-01-27

## 2017-01-27 RX ORDER — DIPHENHYDRAMINE HYDROCHLORIDE 50 MG/ML
25 INJECTION INTRAMUSCULAR; INTRAVENOUS EVERY 4 HOURS PRN
Status: DISCONTINUED | OUTPATIENT
Start: 2017-01-27 | End: 2017-01-29 | Stop reason: HOSPADM

## 2017-01-27 RX ORDER — OXYCODONE AND ACETAMINOPHEN 10; 325 MG/1; MG/1
1 TABLET ORAL EVERY 4 HOURS PRN
Status: DISCONTINUED | OUTPATIENT
Start: 2017-01-27 | End: 2017-01-29 | Stop reason: HOSPADM

## 2017-01-27 RX ORDER — OXYTOCIN/RINGER'S LACTATE 20/1000 ML
2 PLASTIC BAG, INJECTION (ML) INTRAVENOUS CONTINUOUS
Status: DISCONTINUED | OUTPATIENT
Start: 2017-01-27 | End: 2017-01-27

## 2017-01-27 RX ORDER — FENTANYL/BUPIVACAINE/NS/PF 2MCG/ML-.1
PLASTIC BAG, INJECTION (ML) INJECTION CONTINUOUS PRN
Status: DISCONTINUED | OUTPATIENT
Start: 2017-01-27 | End: 2017-01-27

## 2017-01-27 RX ORDER — IBUPROFEN 600 MG/1
600 TABLET ORAL EVERY 6 HOURS PRN
Qty: 30 TABLET | Refills: 0 | Status: SHIPPED | OUTPATIENT
Start: 2017-01-27 | End: 2017-03-20

## 2017-01-27 RX ADMIN — BUPIVACAINE HYDROCHLORIDE 3 ML: 2.5 INJECTION, SOLUTION EPIDURAL; INFILTRATION; INTRACAUDAL; PERINEURAL at 01:01

## 2017-01-27 RX ADMIN — FENTANYL CITRATE 100 MCG: 50 INJECTION, SOLUTION INTRAMUSCULAR; INTRAVENOUS at 02:01

## 2017-01-27 RX ADMIN — OXYCODONE HYDROCHLORIDE AND ACETAMINOPHEN 1 TABLET: 5; 325 TABLET ORAL at 10:01

## 2017-01-27 RX ADMIN — SODIUM CHLORIDE, SODIUM LACTATE, POTASSIUM CHLORIDE, AND CALCIUM CHLORIDE: .6; .31; .03; .02 INJECTION, SOLUTION INTRAVENOUS at 11:01

## 2017-01-27 RX ADMIN — Medication 2 MILLI-UNITS/MIN: at 03:01

## 2017-01-27 RX ADMIN — Medication 5 ML: at 02:01

## 2017-01-27 RX ADMIN — FENTANYL CITRATE 100 MCG: 50 INJECTION, SOLUTION INTRAMUSCULAR; INTRAVENOUS at 01:01

## 2017-01-27 RX ADMIN — Medication 10 ML/HR: at 02:01

## 2017-01-27 RX ADMIN — SODIUM CHLORIDE, SODIUM LACTATE, POTASSIUM CHLORIDE, AND CALCIUM CHLORIDE 1000 ML: .6; .31; .03; .02 INJECTION, SOLUTION INTRAVENOUS at 02:01

## 2017-01-27 NOTE — PROGRESS NOTES
"LABOR NOTE    S:  Complaints: No.  Epidural working:  not applicable      O:   Visit Vitals    /75    Pulse 68    Temp 98.1 °F (36.7 °C) (Core)    Resp 16    Ht 5' 4" (1.626 m)    Wt 66.2 kg (146 lb)    LMP 2016    SpO2 99%    Breastfeeding No    BMI 25.06 kg/m2         FHT: Cat 1 (reassuring) 145 bpm, moderate btbv, + accels, no decels  CTX: q 2 minutes  SVE: 3/80/-2  AROM clear      ASSESSMENT:   26 y.o.  at 39w3d, IOL    FHT reassuring    Active Hospital Problems    Diagnosis  POA    *39 weeks gestation of pregnancy [Z3A.39]  Not Applicable      Resolved Hospital Problems    Diagnosis Date Resolved POA   No resolved problems to display.         PLAN:  Continue Close Maternal/Fetal Monitoring  AROM clear  Pitocin Augmentation per protocol. Currently 4 mU.  Recheck 2 hours or PRN    Latha Harrison M.D.  PGY-2 ObGyn  [phone]  "

## 2017-01-27 NOTE — ANESTHESIA PROCEDURE NOTES
Epidural    Patient location during procedure: OB   Reason for block: primary anesthetic   Diagnosis: IUP    Start time: 1/27/2017 2:00 AM  Timeout: 1/27/2017 2:00 AM  End time: 1/27/2017 2:10 AM  Surgery related to: Vaginal Delivery  Staffing  Anesthesiologist: NEO RODRIGUEZ  Resident/CRNA: LYNDA REEDER  Preanesthetic Checklist  Completed: patient identified, site marked, surgical consent, pre-op evaluation, timeout performed, IV checked, risks and benefits discussed, monitors and equipment checked, anesthesia consent given, hand hygiene performed and patient being monitored  Preparation  Patient position: sitting  Prep: ChloraPrep  Patient monitoring: Pulse Ox  Epidural  Skin Anesthetic: lidocaine 1%  Skin Wheal: 3 mL  Administration type: continuous  Approach: midline  Interspace: L3-4  Injection technique: NAVEEN saline  Needle and Epidural Catheter  Needle type: Tuohy   Needle gauge: 17  Needle length: 3.5 inches  Needle insertion depth: 5.5 cm  Catheter type: springwound  Catheter size: 19 G  Catheter at skin depth: 9.5 cm  Test dose: 3 mL of lidocaine 1.5% with Epi 1-to-200,000  Additional Documentation: incremental injection, negative aspiration for heme and CSF, no paresthesia on injection, no signs/symptoms of IV or SA injection, no significant pain on injection and no significant complaints from patient  Needle localization: anatomical landmarks  Medications:  Bolus administered: 10 mL of 0.125% bupivacaine  Opioid administered: 100 mcg of   fentanyl  Volume per aspiration: 5 mL  Time between aspirations: 5 minutes  Assessment  Upper dermatomal levels - Left: T7  Right: T7  Lower dermatomal level: T7   Dermatomal levels determined by alcohol wipe  Ease of block: easy  Patient's tolerance of the procedure: comfortable throughout block and no complaints

## 2017-01-27 NOTE — H&P
UPDATED HISTORY AND PHYSICAL          Subjective:       Jose Luis Crowder is a 26 y.o.  female with IUP at 39w1d weeks gestation consistent with 8wUS who presents for induction of labor at full term.    This IUP is uncomplicated.  Patient denies regular contractions, denies vaginal bleeding, denies LOF.   Fetal Movement: normal.     PMHx:   Past Medical History   Diagnosis Date    Abnormal Pap smear of cervix yrs ago     repeat pap    Ovarian cyst        PSHx:   Past Surgical History   Procedure Laterality Date    Tonsillectomy, adenoidectomy         All: Review of patient's allergies indicates:  No Known Allergies    Meds:   Prescriptions Prior to Admission   Medication Sig Dispense Refill Last Dose    butalbital-acetaminophen-caffeine -40 mg (FIORICET) -40 mg per tablet Take 1-2 tablets by mouth every 6 (six) hours as needed for Headaches. 20 tablet 0 Not Taking    CITRANATAL 90 DHA, ALGAL OIL, 90 mg iron-1 mg -50 mg-300 mg Cmpk TK ONE C AND ONE T PO D  12 Not Taking    mv-ferrous fumarate-Ca-E-FA (VITAFOL) 65-1 mg Tab Take 1 tablet by mouth once daily. 30 tablet 12 Not Taking    ondansetron (ZOFRAN-ODT) 4 MG TbDL Take 2 tablets (8 mg total) by mouth every 8 (eight) hours. 30 tablet 0 Not Taking    PNV64-iron cbn,gluc-FA-DSS-dha (CITRANATAL 90 DHA, NEW FORMULA) 90 mg iron-1 mg -50 mg-300 mg Cmpk Take 1 tablet by mouth once daily. 30 each 12 Not Taking    PRENATAL VIT W-CA,FE,FA,<1 MG, (PRENATAL VITAMIN ORAL) Take by mouth.   Taking    promethazine (PHENERGAN) 25 MG tablet Take 1 tablet (25 mg total) by mouth every 4 (four) hours. 30 tablet 6 Not Taking       SH:   Social History     Social History    Marital status: Single     Spouse name: N/A    Number of children: N/A    Years of education: N/A     Occupational History    Not on file.     Social History Main Topics    Smoking status: Never Smoker    Smokeless tobacco: Never Used    Alcohol use Yes       Comment: Social - not since + UPT    Drug use: No    Sexual activity: Yes     Partners: Male     Birth control/ protection: Condom     Other Topics Concern    Not on file     Social History Narrative       FH:   Family History   Problem Relation Age of Onset    Diabetes Maternal Grandmother     Diabetes Mother     Breast cancer Neg Hx     Colon cancer Neg Hx     Ovarian cancer Neg Hx        OBHx:   Obstetric History       T0      TAB0   SAB0   E0   M0   L0       # Outcome Date GA Lbr Neftali/2nd Weight Sex Delivery Anes PTL Lv   1 Current                   Objective:           Gen: NAD, A&Ox3  Pulm: LCTAB  Card: RRR without clicks, rubs or murmurs  Abd: FHT present, soft, nondistended, nontender to palpation, gravid uterus palpable c/w term gestation  [place]: 6 pounds, 8 ounces  Extremities: Palpable peripheral pulses, no pedal edema    SVE: 1.5/60/-3    NST: 140 baseline, +moderate BTBV, +pos accelerations, neg decelerations  [place]: irregular  US: vertex presentation,     Lab Review  Blood Type: AB POS  GBBS: negative  Rubella: immune  RPR: NR  HIV: negative  HepB: negative    No results for input(s): WBC, HGB, HCT, MCV, PLT in the last 168 hours.     Assessment:      26 y.o.  female with IUP at 39w1d weeks gestation consistent with 8wUS who presents for induction of labor at full term.     Plan:   1. IUP, full term.  - Risks, benefits, alternatives and possible complications have been discussed in detail with the patient. Consents signed and to chart  - Admit to Labor and Delivery unit  - Miso 25mcg PV to be placed   - Epidural per Anesthesia  - Draw CBC, T&S  - Notify Staff  - Recheck in 4 hrs or PRN    Su Rodriguez MD PGY-3   Ob-Gyn Resident   # [phone]

## 2017-01-27 NOTE — PROGRESS NOTES
"LABOR NOTE    S:  Complaints: No.  Epidural working:  not applicable    O:   Visit Vitals    /81    Pulse 107    Temp 98.2 °F (36.8 °C)    Resp 18    Ht 5' 4" (1.626 m)    Wt 66.2 kg (146 lb)    LMP 2016    SpO2 97%    Breastfeeding No    BMI 25.06 kg/m2     FHT: Cat 2 (reassuring) 130 bpm, moderate btbv, + accels, early decels  CTX: q 2 minutes  SVE: /0    ASSESSMENT:   26 y.o.  at 39w3d, IOL    FHT reassuring    Active Hospital Problems    Diagnosis  POA    *39 weeks gestation of pregnancy [Z3A.39]  Not Applicable      Resolved Hospital Problems    Diagnosis Date Resolved POA   No resolved problems to display.     PLAN:  Continue Close Maternal/Fetal Monitoring  Pitocin Augmentation per protocol  Recheck 2 hours or PRN    Helena Small MD, PhD  OBGYN, PGY-1    "

## 2017-01-27 NOTE — PROGRESS NOTES
"LABOR NOTE    S:  Complaints: No.  Epidural working:  not applicable    O:   Visit Vitals    /82    Pulse 91    Temp 98.8 °F (37.1 °C)    Resp 18    Ht 5' 4" (1.626 m)    Wt 66.2 kg (146 lb)    LMP 2016    SpO2 99%    Breastfeeding No    BMI 25.06 kg/m2     FHT: Cat 2 (reassuring) 130 bpm, moderate btbv, + accels, early decels  CTX: q 2 minutes  SVE: 9.5/90/0    ASSESSMENT:   26 y.o.  at 39w3d, IOL    FHT reassuring    Active Hospital Problems    Diagnosis  POA    *39 weeks gestation of pregnancy [Z3A.39]  Not Applicable      Resolved Hospital Problems    Diagnosis Date Resolved POA   No resolved problems to display.     PLAN:  Continue Close Maternal/Fetal Monitoring  Pitocin Augmentation per protocol  Recheck 2 hours or PRN    Chapincito Linton MD  PGY-1 OB/GYN  [phone]          "

## 2017-01-27 NOTE — PROGRESS NOTES
"LABOR NOTE    S:  Complaints: No.  Epidural working:  not applicable  Feeling contractions.    O:   Visit Vitals    /71    Pulse 90    Temp 97.9 °F (36.6 °C)    Resp 16    Ht 5' 4" (1.626 m)    Wt 66.2 kg (146 lb)    LMP 2016    SpO2 99%    Breastfeeding No    BMI 25.06 kg/m2         FHT: Cat 1 (reassuring) 140 bpm, moderate btbv, + accels, no decels  CTX: q 2 minutes  SVE: 2/80/-3      ASSESSMENT:   26 y.o.  at 39w3d, IOL    FHT reassuring    Active Hospital Problems    Diagnosis  POA    *39 weeks gestation of pregnancy [Z3A.39]  Not Applicable      Resolved Hospital Problems    Diagnosis Date Resolved POA   No resolved problems to display.         PLAN:    Continue Close Maternal/Fetal Monitoring  Begin Pitocin Augmentation per protocol as pt is contraction too frequently for repeat dosing of Cytotec.  Recheck 2 hours or PRN    Latha Harrison M.D.  PGY-2 ObGyn  [phone]  "

## 2017-01-27 NOTE — L&D DELIVERY NOTE
cephalic after approximately 25 minutes of maternal pushing.  Under epidural anesthesia.  Vacuum applied at +2 station. There were two pop offs.  Infant delivered ROT over intact perineum.  Nuchal x1 reduced at introitus.  Cord clamped and cut and bulb suctioning performed.  Infant tolerated the delivery well and was taken by pediatric team for resuscitation.  Umbilical arterial gas and venous blood obtained.  Placenta delivered spontaneously and IV pitocin given.  2nd degree posterior vaginal abrasion repaired with running locking 2-0 vicryl and found to be hemostatic.  Uterine tone noted. No cervical lacerations.  Patient tolerated delivery well.   cc  Staff present for entire procedure.  S/L/N counts correct x2.       Delivery Information for  Michelle Crowder    Birth information:  YOB: 2018   Time of birth: 5:06 PM   Sex: female   Head Delivery Date/Time: 2017  5:06 PM   Delivery type: Vaginal, Vacuum (Extractor)   Gestational Age: 39w3d    Delivery Providers    Delivering clinician:  CELSA SCHULTZ   Other personnel:   Provider Role   BELLA PRINCE, GREGOR EDEN, CHARLES ROJO, ALYSE MCCABE, WAYNE MARIA                   [place] Measurements    Weight:  3090 g Length:  50.8 cm   Head circum.:  34.3 cm Chest circum.:  31.8 cm          [place] Assessment    Living status:  Yes   Apgars    1 Minute:   5 Minute:   10 Minute 15 Minute 20 Minute   Skin Color: 0  1       Heart Rate: 2  2       Reflex Irritability: 1  2       Muscle Tone: 1  2       Respiratory Effort: 1  2       Total: 5  9                  Apgars Assigned By:  RAVEN CURRY          Assisted Delivery Details:    Forceps attempted?:  No   Vacuum extractor attempted?:  Yes   Vacuum type:  flat, Kiwi   Vacuum application location:  Outlet   First attempt time vacuum applied:  2017 9279   First attempt time vacuum removed:  2017 1700   Second attempt time vacuum applied:   2017 170   Second attempt time vacuum removed:  2017   Third attempt time vacuum applied:  2017   Third attempt time vacuum removed:  2017 170   Number of pop offs:  2   Number of pulls with vacuum:  3   Vacuum applied by:  DR SCHULTZ   Failed vacuum delivery?:  No             Shoulder Dystocia    Shoulder dystocia present?:  No                                             Presentation and Position    Presentation: Vertex   Position:         Transverse               Interventions/Resuscitation    Method:  Bulb Suctioning, Blow By Oxygen, Tactile Stimulation        Cord    Vessels:  3 vessels   Complications:  Nuchal   Nuchal Intervention:  reduced   Nuchal Cord Description:  loose nuchal cord   Number of Loops:  1   Delayed Cord Clamping?:  No   Cord Clamped Date/Time:  2017  5:06 PM   Cord Blood Disposition:  Sent with Baby   Gases Sent?:  Yes              Labor Events:       labor: No     Labor Onset Date/Time:         Dilation Complete Date/Time: 2017 16:40     Start Pushing Date/Time: 2017 16:50     Rupture Date/Time:              Rupture type:           Fluid Amount:        Fluid Color:        Fluid Odor:        Membrane Status (PeriCalm): ARM (Artificial Rupture)      Rupture Date/Time (PeriCalm): 2017 05:14:00      Fluid Amount (PeriCalm): Small      Fluid Color (PeriCalm): Clear       steroids: None     Antibiotics given for GBS: No     Induction: amniotomy;oxytocin;misoprostol     Indications for induction:        Augmentation:       Indications for augmentation:       Labor complications: None     Additional complications:          Cervical ripening:                     Delivery:      Episiotomy: None     Indication for Episiotomy:       Perineal Lacerations: 2nd Repaired:  Yes   Periurethral Laceration: none Repaired:     Labial Laceration: none Repaired:     Sulcus Laceration: none Repaired:     Vaginal Laceration: No Repaired:      Cervical Laceration: No Repaired:     Repair suture:       Repair # of packets: 1     Blood loss (ml): 275     Vaginal Sweep Performed: Yes     Surgicount Correct:         Other providers:       Anesthesia    Method:  Epidural              Details (if applicable):  Trial of Labor      Categorization:      Priority:     Indications for :     Incision Type:       Additional  information:  Forceps:    Vacuum:    Breech:    Observed anomalies    Other (Comments):               Chapincito Linton MD  PGY-1 OB/GYN  [phone]

## 2017-01-27 NOTE — PROGRESS NOTES
"LABOR NOTE    S:  Complaints: No.  Epidural working:  not applicable      O:   Visit Vitals    /73    Pulse 81    Temp 98.1 °F (36.7 °C) (Core)    Resp 16    Ht 5' 4" (1.626 m)    Wt 66.2 kg (146 lb)    LMP 2016    SpO2 99%    Breastfeeding No    BMI 25.06 kg/m2         FHT: Cat 2 (reassuring) 145 bpm, moderate btbv, + accels, early decels  CTX: q 2 minutes  SVE: /-1      ASSESSMENT:   26 y.o.  at 39w3d, IOL    FHT reassuring    Active Hospital Problems    Diagnosis  POA    *39 weeks gestation of pregnancy [Z3A.39]  Not Applicable      Resolved Hospital Problems    Diagnosis Date Resolved POA   No resolved problems to display.         PLAN:  Continue Close Maternal/Fetal Monitoring  AROM clear  Pitocin Augmentation per protocol. Currently 4 mU.  Recheck 2 hours or PRN    Chapincito Linton MD  PGY-1 OB/GYN  [phone]        "

## 2017-01-27 NOTE — ANESTHESIA PREPROCEDURE EVALUATION
2018  Jose Luis Crowder is a 26 y.o., female.    OB History    Para Term  AB SAB TAB Ectopic Multiple Living   1               # Outcome Date GA Lbr Neftali/2nd Weight Sex Delivery Anes PTL Lv   1 Current                   Wt Readings from Last 1 Encounters:   17 66.2 kg (146 lb)       BP Readings from Last 3 Encounters:   17 106/63   17 (!) 100/58   17 100/62       Patient Active Problem List   Diagnosis    39 weeks gestation of pregnancy       Past Surgical History   Procedure Laterality Date    Tonsillectomy, adenoidectomy         Social History     Social History    Marital status: Single     Spouse name: N/A    Number of children: N/A    Years of education: N/A     Occupational History    Not on file.     Social History Main Topics    Smoking status: Never Smoker    Smokeless tobacco: Never Used    Alcohol use Yes      Comment: Social - not since + UPT    Drug use: No    Sexual activity: Yes     Partners: Male     Birth control/ protection: Condom     Other Topics Concern    Not on file     Social History Narrative         Chemistry        Component Value Date/Time     2016 1506    K 4.2 2016 1506     2016 1506    CO2 22 (L) 2016 1506    BUN 6 2016 1506    CREATININE 0.6 2016 1506    GLU 70 2016 1506        Component Value Date/Time    CALCIUM 8.9 2016 1506    ALKPHOS 48 (L) 2016 1506    AST 25 2016 1506    ALT 22 2016 1506    BILITOT 0.4 2016 1506            Lab Results   Component Value Date    WBC 10.40 2017    HGB 10.8 (L) 2017    HCT 32.9 (L) 2017    MCV 84 2017     2017       No results for input(s): INR, PROTIME, APTT in the last 72 hours.    Invalid input(s): PT          [facility] Anesthesia Evaluation    I have reviewed the Patient  Summary Reports.    I have reviewed the Nursing Notes.   I have reviewed the Medications.     Review of Systems  Anesthesia Hx:  No problems with previous Anesthesia  Denies Family Hx of Anesthesia complications.   Denies Personal Hx of Anesthesia complications.   Hematology/Oncology:     Oncology Normal     EENT/Dental:EENT/Dental Normal   Cardiovascular:   Exercise tolerance: good Denies Hypertension.     Pulmonary:  Pulmonary Normal    Renal/:  Renal/ Normal     Hepatic/GI:  Hepatic/GI Normal    Neurological:  Neurology Normal    Endocrine:  Endocrine Normal    Psych:  Psychiatric Normal           Physical Exam  General:  Well nourished    Airway/Jaw/Neck:  Airway Findings: Mouth Opening: Normal Mallampati: II  Improves to I with phonation.        Eyes/Ears/Nose:  EYES/EARS/NOSE FINDINGS: Normal   Dental:  DENTAL FINDINGS: Normal   Chest/Lungs:  Chest/Lungs Findings: Normal Respiratory Rate     Heart/Vascular:  Heart Findings: Rate: Normal     Abdomen:  Abdomen Findings: Normal    Musculoskeletal:  Musculoskeletal Findings: Normal   Skin:  Skin Findings: Normal    Mental Status:  Mental Status Findings:  Cooperative         Anesthesia Plan  Type of Anesthesia, risks & benefits discussed:  Anesthesia Type:  epidural  Patient's Preference:   Intra-op Monitoring Plan:   Intra-op Monitoring Plan Comments:   Post Op Pain Control Plan:   Post Op Pain Control Plan Comments:   Induction:   IV  Beta Blocker:  Patient is not currently on a Beta-Blocker (No further documentation required).       Informed Consent: Patient understands risks and agrees with Anesthesia plan.  Questions answered. Anesthesia consent signed with patient.  ASA Score: 2     Day of Surgery Review of History & Physical:    H&P update referred to the provider.     Anesthesia Plan Notes:   26F healthy , term single IUP in labor for epidural analgesia        Ready For Surgery From Anesthesia Perspective.

## 2017-01-27 NOTE — PROGRESS NOTES
"LABOR NOTE    S:  Complaints: No.  Epidural working:  yes      O:   Visit Vitals    /66    Pulse 81    Temp 97.9 °F (36.6 °C)    Resp 16    Ht 5' 4" (1.626 m)    Wt 66.2 kg (146 lb)    LMP 2016    SpO2 100%    Breastfeeding No    BMI 25.06 kg/m2         FHT: Cat 1 (reassuring), reactive  CTX: q 2 minutes  SVE: 2/80/-3.      ASSESSMENT:   26 y.o.  at 39w3d, IOL    FHT reassuring    Active Hospital Problems    Diagnosis  POA    *39 weeks gestation of pregnancy [Z3A.39]  Not Applicable      Resolved Hospital Problems    Diagnosis Date Resolved POA   No resolved problems to display.         PLAN:  Continue Close Maternal/Fetal Monitoring  Pitocin had not been started as pt was boyd too frequently. Now unchanged. Will being pitocin Augmentation per protocol  Recheck 2 hours or PRN    Latha Harrison M.D.  PGY-2 ObGyn  [phone]  "

## 2017-01-28 LAB
ANISOCYTOSIS BLD QL SMEAR: SLIGHT
BASOPHILS # BLD AUTO: ABNORMAL K/UL
BASOPHILS NFR BLD: 0 %
DIFFERENTIAL METHOD: ABNORMAL
EOSINOPHIL # BLD AUTO: ABNORMAL K/UL
EOSINOPHIL NFR BLD: 0 %
ERYTHROCYTE [DISTWIDTH] IN BLOOD BY AUTOMATED COUNT: 13.6 %
HCT VFR BLD AUTO: 34 %
HGB BLD-MCNC: 11 G/DL
LYMPHOCYTES # BLD AUTO: ABNORMAL K/UL
LYMPHOCYTES NFR BLD: 6 %
MCH RBC QN AUTO: 27.4 PG
MCHC RBC AUTO-ENTMCNC: 32.4 %
MCV RBC AUTO: 85 FL
MONOCYTES # BLD AUTO: ABNORMAL K/UL
MONOCYTES NFR BLD: 9 %
NEUTROPHILS NFR BLD: 85 %
PLATELET # BLD AUTO: 244 K/UL
PLATELET BLD QL SMEAR: ABNORMAL
PMV BLD AUTO: 10.9 FL
RBC # BLD AUTO: 4.02 M/UL
WBC # BLD AUTO: 24.73 K/UL

## 2017-01-28 PROCEDURE — 25000003 PHARM REV CODE 250: Performed by: STUDENT IN AN ORGANIZED HEALTH CARE EDUCATION/TRAINING PROGRAM

## 2017-01-28 PROCEDURE — 85007 BL SMEAR W/DIFF WBC COUNT: CPT

## 2017-01-28 PROCEDURE — 36415 COLL VENOUS BLD VENIPUNCTURE: CPT

## 2017-01-28 PROCEDURE — 11000001 HC ACUTE MED/SURG PRIVATE ROOM

## 2017-01-28 PROCEDURE — 85027 COMPLETE CBC AUTOMATED: CPT

## 2017-01-28 RX ORDER — SIMETHICONE 80 MG
1 TABLET,CHEWABLE ORAL 3 TIMES DAILY PRN
Status: DISCONTINUED | OUTPATIENT
Start: 2017-01-28 | End: 2017-01-29 | Stop reason: HOSPADM

## 2017-01-28 RX ADMIN — SIMETHICONE CHEW TAB 80 MG 80 MG: 80 TABLET ORAL at 10:01

## 2017-01-28 RX ADMIN — IBUPROFEN 600 MG: 600 TABLET, FILM COATED ORAL at 08:01

## 2017-01-28 RX ADMIN — OXYCODONE HYDROCHLORIDE AND ACETAMINOPHEN 1 TABLET: 10; 325 TABLET ORAL at 02:01

## 2017-01-28 RX ADMIN — IBUPROFEN 600 MG: 600 TABLET, FILM COATED ORAL at 01:01

## 2017-01-28 RX ADMIN — DOCUSATE SODIUM 200 MG: 100 CAPSULE, LIQUID FILLED ORAL at 10:01

## 2017-01-28 RX ADMIN — IBUPROFEN 600 MG: 600 TABLET, FILM COATED ORAL at 02:01

## 2017-01-28 NOTE — LACTATION NOTE
LC did discharge lactation teaching and reviewed Mother Baby Care guide and lactation packet. LC answered all questions. Mother has  phone number to call for questions after DC.   Mother may refer to the After Visit Summary for lactation instructions. Baby nursing in cross chest. Some swallows heard. Will see in am.

## 2017-01-28 NOTE — PROGRESS NOTES
Reviewed media block rules with pt and mother. They verbalized understanding. Pt requested that FOB be allowed to come on property to sign birth certificate.  [facility] stated that he should call prior to coming and would only be allowed to sign documents downstairs. FOB will not be allowed to visit with pt or baby during hosp. Stay. Pt verbalized understanding.

## 2017-01-28 NOTE — PLAN OF CARE
Problem: Patient Care Overview  Goal: Plan of Care Review  Outcome: Ongoing (interventions implemented as appropriate)  No events overnight. Temperature wnl. Breastfeeding education ongoing. Baby tolerating breastfeedings. Pain controlled prn medications. Pt voiding and ambulating independtly. Pt's mom @ bedside. Plan of care reviewed with pt. Will continue to monitor.

## 2017-01-28 NOTE — PLAN OF CARE
Problem: Patient Care Overview  Goal: Plan of Care Review  Outcome: Ongoing (interventions implemented as appropriate)  Lactation Note:Mother will nurse on cue until content, 8 to 10 times.  Mother will ensure deep latch and observe for signs of milk transfer. Mom will monitor baby's 24 hour diaper count. Mom will record feedings and output in the 24 hour breastfeeding diary. Mom will call lactation nurse if she has any questions or concerns.

## 2017-01-28 NOTE — ANESTHESIA POSTPROCEDURE EVALUATION
"Anesthesia Post Evaluation    Patient: Jose Luis Crowder    Procedure(s) Performed: * No procedures listed *    Final Anesthesia Type: epidural  Patient location during evaluation: labor & delivery  Patient participation: Yes- Able to Participate  Level of consciousness: awake and alert  Post-procedure vital signs: reviewed and stable  Pain management: adequate  Airway patency: patent  PONV status at discharge: No PONV  Anesthetic complications: no      Cardiovascular status: blood pressure returned to baseline  Respiratory status: unassisted  Hydration status: euvolemic  Follow-up not needed.        Visit Vitals    BP (!) 94/57 (Patient Position: Sitting, BP Method: Automatic)    Pulse 73    Temp 36.6 °C (97.8 °F) (Oral)    Resp 18    Ht 5' 4" (1.626 m)    Wt 66.2 kg (146 lb)    LMP 04/26/2016    SpO2 98%    Breastfeeding Yes    BMI 25.06 kg/m2       Pain/Vicneta Score: Pain Rating Prior to Med Admin: 4 (1/28/2017  1:37 PM)  Pain Rating Post Med Admin: 0 (1/28/2017  8:45 AM)      "

## 2017-01-28 NOTE — PROGRESS NOTES
01/28/17 1300   Infant Information   Infant's Name Lucho   Maternal Infant Assessment   Breast Density soft   Areola elastic   Nipple(s) everted   Infant Assessment   Sucking Reflex present   Rooting Reflex present   Swallow Reflex present   LATCH Score   Latch 2-->grasps breast, tongue down, lips flanged, rhythmic sucking   Audible Swallowing 1-->a few with stimulation   Type Of Nipple 2-->everted (after stimulation)   Comfort (Breast/Nipple) 2-->soft/nontender   Hold (Positioning) 1-->minimal assist, teach one side: mother does other, staff holds   Score (less than 7 for 2/more consecutive times, consult Lactation Consultant) 8   Maternal Infant Feeding   Maternal Emotional State relaxed;independent   Infant Positioning cross-cradle   Time Spent (min) 15-30 min   Feeding Infant   Feeding Readiness Cues cooing   Lactation Referrals   Lactation Consult Breastfeeding assessment;Initial assessment   Lactation Interventions   Attachment Promotion breastfeeding assistance provided;counseling provided;rooming-in promoted;role responsibility promoted;skin-to-skin contact encouraged   Breast Care: Breastfeeding frequency of feedings adjusted   Breastfeeding Assistance assisted with positioning;infant latch-on verified;feeding cue recognition promoted;feeding on demand promoted;feeding session observed   Maternal Breastfeeding Support infant-mother separation minimized

## 2017-01-28 NOTE — PROGRESS NOTES
POSTPARTUM PROGRESS NOTE     Jose Luis Crowder is a 26 y.o. female PPD #1 status post VAVD at 39w3d in a pregnancy that was uncomplicated.     Patient is doing well this morning. She denies nausea, vomiting, fever or chills this AM. She did have fever x1 of 100.7 yesterday approx 1.5 hrs after delivery, but no further fevers. Patient reports mild abdominal pain that is well relieved by oral pain medications. Lochia is mild and stable. Patient is voiding without difficulty and ambulating with no difficulty. She has passed flatus, and has not had BM.  Patient does plan to breast and bottle feed.      Objective:       Temp:  [98.1 °F (36.7 °C)-100.7 °F (38.2 °C)] 99 °F (37.2 °C)  Pulse:  [] 101  Resp:  [16-18] 18  SpO2:  [88 %-100 %] 98 %  BP: ()/(52-91) 111/62    General:   alert, appears stated age, cooperative and no distress   Lungs:   clear to auscultation bilaterally   Heart:   regular rate and rhythm, S1, S2 normal, no murmur, click, rub or gallop   Abdomen:  soft, non-tender; bowel sounds normal; no masses,  no organomegaly   Uterus:  firm located at the umblicus. non-tender   Extremities: peripheral pulses normal, no pedal edema, no clubbing or cyanosis     Lab Review  Recent Results (from the past 4 hour(s))   CBC auto differential    Collection Time: 17  6:11 AM   Result Value Ref Range    WBC 24.73 (H) 3.90 - 12.70 K/uL    RBC 4.02 4.00 - 5.40 M/uL    Hemoglobin 11.0 (L) 12.0 - 16.0 g/dL    Hematocrit 34.0 (L) 37.0 - 48.5 %    MCV 85 82 - 98 fL    MCH 27.4 27.0 - 31.0 pg    MCHC 32.4 32.0 - 36.0 %    RDW 13.6 11.5 - 14.5 %    Platelets 244 150 - 350 K/uL    MPV 10.9 9.2 - 12.9 fL       I/O    Intake/Output Summary (Last 24 hours) at 17 8882  Last data filed at 17   Gross per 24 hour   Intake           4346.2 ml   Output             1525 ml   Net           2821.2 ml        Assessment:     Patient Active Problem List   Diagnosis    39 weeks gestation of pregnancy      (spontaneous vaginal delivery)        Plan:   1. Postpartum care:  - Patient doing well. Continue routine management and advances.  - Continue PO pain meds. Pain well controlled.  - Heme: H/h 10/32 >11/34. VSS, asymptomatic.  - Encourage ambulation  - Contraception: defer to postpartum visit   - Lactation : breast and bottle feeding, consult lactation prn  - Rh pos    2. Fever x1  - Fever x1 shortly after delivery, no further fevers since then  - Fundus non-tender, no s/s of endometritis  - Continue to monitor    Dispo: As patient meets milestones, will plan to discharge PPD#2.    Meaghan Aguillon

## 2017-01-28 NOTE — DISCHARGE INSTRUCTIONS
Breastfeeding Discharge Instructions       Feed the baby at the earliest sign of hunger or comfort  o Hands to mouth, sucking motions  o Rooting or searching for something to suck on  o Dont wait for crying - it is a late sign of hunger and comfort.     The feedings may be 8-12 times per 24hrs and will not follow a schedule   Avoid pacifiers and bottles for the first 4 weeks   Alternate the breast you start the feeding with, or start with the breast that feels the fullest   Switch breasts when the baby takes himself off the breast or falls asleep   Keep offering breasts until the baby looks full, no longer gives hunger signs, and stays asleep when placed on his back in the crib   If the baby is sleepy and wont wake for a feeding, put the baby skin-to-skin dressed in a diaper against the mothers bare chest   Sleep near your baby   The baby should be positioned and latched on to the breast correctly  o Chest-to-chest, chin in the breast  o Babys lips are flipped outward  o Babys mouth is stretched open wide like a shout  o Babys sucking should feel like tugging to the mother  - The baby should be drinking at the breast:  o You should hear swallowing or gulping throughout the feeding  o You should see milk on the babys lips when he comes off the breast  o Your breasts should be softer when the baby is finished feeding  o The baby should look relaxed at the end of feedings  o After the 4th day and your milk is in:  o The babys poop should turn bright yellow and be loose, watery, and seedy  o The baby should have at least 3-4 poops the size of the palm of your hand per day  o The baby should have at least 6-8 wet diapers per day  o The urine should be light yellow in color  You should drink when you are thirsty and eat a healthy diet when you are    hungry.     Take naps to get the rest you need.   Take medications and/or drink alcohol only with permission of your obstetrician    or the babys  pediatrician.  You can also call the Infant Risk Center,   ([phone]), Monday-Friday, 8am-5pm Central time, to get the most   up-to-date evidence-based information on the use of medications during   pregnancy and breastfeeding.      The baby should be examined by a pediatrician at 3-5 days of age.  Once your   milk comes in, the baby should be gaining at least ½ - 1oz each day and should be back to birthweight no later than 10-14 days of age.          Community Resources    [facility] Breastfeeding Warmline:  840.698.5181  Local [facility] clinics: provide incentives and breastpumps to eligible mothers  La Leche League International (LLLI):  mother-to-mother support group website        www.FID3.NTQ-Data  Local La Leche League mother-to-mother support groups:        www.[employer].Tapit        La Leche League [facility]         www.arcelia@[employer].com  Dr. Bert Hogan website for latch videos and general information:        www.breastfeedinginc.ca  Infant Risk Center is a call center that provides information about the safety of taking medications while breastfeeding.  Call 7-155-467-4134, M-F, 8am-5pm, [place].  International Lactation Consultant Association provides resources for assistance:        www.ilca.org  [place] Breastfeeding Coalition provides informationand resources for parents  and the community    http://[facility] Illbreastfeeding.org     [place] mom provides resources for assistance:        www.nolamom.org  [employer] Healthy Babies:  8-083-157-BABY(6976)  [facility] provides a list of breastfeeding services by zip code:        www.[facility] Medical CenterHandprint.NTQ-Data  [employer]:  [phone] a breastfeeding support group for women of color.

## 2017-01-28 NOTE — PLAN OF CARE
Problem: Patient Care Overview  Goal: Plan of Care Review  Outcome: Ongoing (interventions implemented as appropriate)  VSS. Pt afebrile. Denies pain at this time. Uterus firm and midline. Lochia rubra, moderate. Pt ambulated to the bathroom with assistance. Pt unable to void. In and out catheterization performed, 500 ml urine removed from bladder. Pt transferred to MBU via wheelchair. Pts mother and sister at bedside providing support. Pt appears to be bonding appropriately with infant. Plan of care reviewed with pt. Pt had no further questions at the time. FOB not allowed at the hospital tonight. Pt aware and stated that she understands and FOB knows he is not allowed at the hospital. Media block and pt situation passed on to MBU nurse. No signs of distress.

## 2017-01-29 VITALS
SYSTOLIC BLOOD PRESSURE: 106 MMHG | OXYGEN SATURATION: 99 % | DIASTOLIC BLOOD PRESSURE: 72 MMHG | WEIGHT: 146 LBS | HEART RATE: 82 BPM | HEIGHT: 64 IN | BODY MASS INDEX: 24.92 KG/M2 | TEMPERATURE: 98 F | RESPIRATION RATE: 18 BRPM

## 2017-01-29 PROCEDURE — 72100003 HC LABOR CARE, EA. ADDL. 8 HRS

## 2017-01-29 PROCEDURE — 25000003 PHARM REV CODE 250: Performed by: STUDENT IN AN ORGANIZED HEALTH CARE EDUCATION/TRAINING PROGRAM

## 2017-01-29 PROCEDURE — 27201127 HC VACUUM EXTRACTOR

## 2017-01-29 RX ADMIN — OXYCODONE HYDROCHLORIDE AND ACETAMINOPHEN 1 TABLET: 10; 325 TABLET ORAL at 04:01

## 2017-01-29 RX ADMIN — IBUPROFEN 600 MG: 600 TABLET, FILM COATED ORAL at 07:01

## 2017-01-29 RX ADMIN — IBUPROFEN 600 MG: 600 TABLET, FILM COATED ORAL at 04:01

## 2017-01-29 RX ADMIN — IBUPROFEN 600 MG: 600 TABLET, FILM COATED ORAL at 02:01

## 2017-01-29 RX ADMIN — OXYCODONE HYDROCHLORIDE AND ACETAMINOPHEN 1 TABLET: 10; 325 TABLET ORAL at 03:01

## 2017-01-29 NOTE — PROGRESS NOTES
POSTPARTUM PROGRESS NOTE     Jose Luis Crowder is a 26 y.o. female PPD #2 status post VAVD at 39w3d in a pregnancy that was uncomplicated.     Patient is doing well this morning. She denies nausea, vomiting, fever or chills this AM. Patient reports mild abdominal pain that is well relieved by oral pain medications. Lochia is mild and stable. Patient is voiding without difficulty and ambulating with no difficulty. She has passed flatus, and has not had BM.  Patient does plan to breast and bottle feed.      Objective:       Temp:  [97.5 °F (36.4 °C)-97.8 °F (36.6 °C)] 97.5 °F (36.4 °C)  Pulse:  [73-79] 78  Resp:  [18] 18  SpO2:  [98 %] 98 %  BP: ()/(55-66) 93/55    General:   alert, appears stated age, cooperative and no distress   Lungs:   clear to auscultation bilaterally   Heart:   regular rate and rhythm, S1, S2 normal, no murmur, click, rub or gallop   Abdomen:  soft, non-tender; bowel sounds normal; no masses,  no organomegaly   Uterus:  firm located at the umblicus. non-tender   Extremities: peripheral pulses normal, no pedal edema, no clubbing or cyanosis     Lab Review  No results found for this or any previous visit (from the past 4 hour(s)).    I/O  No intake or output data in the 24 hours ending 17 0537     Assessment:     Patient Active Problem List   Diagnosis     (spontaneous vaginal delivery)        Plan:   1. Postpartum care:  - Patient doing well. Continue routine management and advances.  - Continue PO pain meds. Pain well controlled.  - Heme: H/h 10/32 >. VSS, asymptomatic.  - Encourage ambulation  - Contraception: defer to postpartum visit   - Lactation : breast and bottle feeding, consult lactation prn  - Rh pos    2. Fever x1  - Fever x1 shortly after delivery, no further fevers since then  - Fundus non-tender, no s/s of endometritis  - Continue to monitor    Dispo: As patient meets milestones, will plan to discharge today after staff rounds    Latha Harrison      Patient seen  and examined.  Agree with resident assessment and plan.  Patient cleared for dc home  Pelon Mead Iv

## 2017-01-29 NOTE — PLAN OF CARE
Problem: Patient Care Overview  Goal: Plan of Care Review  Outcome: Outcome(s) achieved Date Met:  01/29/17  VSS. Ambulating and voiding without difficulty. Fundus is firm and midline. Vaginal  Bleeding is small. Tolerating a regular diet. Pain controlled with oral pain medication. Mother baby care guide reviewed on previous shift. Additional questions answered this shift. Ok to d/c home per MD order. Discharge instructions reviewed with patient. Patient verbalizes understanding. Security notified of patient's discharge. Arrangements made for security to escort patient, infant, and transport off unit.

## 2017-01-29 NOTE — DISCHARGE SUMMARY
Delivery Discharge Summary  Obstetrics      Primary OB Clinician: KADY Rizzo    Admission date: 2017  Discharge date: 2017    Disposition: To home, self care    Admit Dx:      Patient Active Problem List   Diagnosis     (spontaneous vaginal delivery)     Discharge Dx:    Patient Active Problem List   Diagnosis     (spontaneous vaginal delivery)       Procedure:  with vacuum extraction    Hospital Course:  Jose Luis Crowder is a 26 y.o. now , PPD #2 who was admitted on 2017 at 39w1d for scheduled IOL. On initial assessment, vital signs were stable and physical exam was normal. Infant was in cephalic presentation. Patient was subsequently admitted to labor and delivery unit with signed consents. Labor course was managed with cytotec, pitocin, AROM.  Patient delivered a single viable  female. Please see delivery note for further details. Pt was in stable condition post delivery and was transferred to the Mother-Baby Unit. Her postpartum course was uncomplicated. On discharge day, patient's pain is controlled with oral pain medications. Pt is tolerating ambulation without SOB or CP, and PO diet without N/V. Reports lochia is mild. Denies any HA, vision changes, F/C, LE swelling. Denies any breast pain/soreness.  Pt in stable condition and ready for discharge. She has been instructed to continue pain medications as needed and to follow up in the OB clinic in 6 weeks with her obstetrics provider.    Pertinent studies:  Postpartum CBC  Lab Results   Component Value Date    WBC 24.73 (H) 2017    HGB 11.0 (L) 2017    HCT 34.0 (L) 2017    MCV 85 2017     2017     Tubal Ligation: n/a  Feeding Method: both breast and bottle  Rh Immune Globulin Given(AB POS): N/A  Rubella Vaccine Given: N/A  Tdap Vaccine Given: N/A    Delivery:    Episiotomy: None   Lacerations: 2nd   Repair suture:     Repair # of packets: 1   Blood loss (ml): 275     Birth information:  Date  of birth: 2018   Time of birth: 5:06 PM   Sex: female   Delivery type: Vaginal, Vacuum (Extractor)   Gestational Age: 39w3d    Delivery Clinician:      Other providers:       Additional  information:  Forceps:    Vacuum:    Breech:    Observed anomalies      Living?:           APGARS  One minute Five minutes Ten minutes   Skin color:         Heart rate:         Grimace:         Muscle tone:         Breathing:         Totals: 5  9        Placenta: Delivered:       appearance      Patient Instructions:   Current Discharge Medication List      START taking these medications    Details   ibuprofen (ADVIL,MOTRIN) 600 MG tablet Take 1 tablet (600 mg total) by mouth every 6 (six) hours as needed for Pain (cramping).  Qty: 30 tablet, Refills: 0    Associated Diagnoses:  (spontaneous vaginal delivery)      oxycodone-acetaminophen (PERCOCET) 5-325 mg per tablet Take 1 tablet by mouth every 4 (four) hours as needed for Pain.  Qty: 30 tablet, Refills: 0    Associated Diagnoses:  (spontaneous vaginal delivery)         CONTINUE these medications which have NOT CHANGED    Details   butalbital-acetaminophen-caffeine -40 mg (FIORICET) -40 mg per tablet Take 1-2 tablets by mouth every 6 (six) hours as needed for Headaches.  Qty: 20 tablet, Refills: 0    Associated Diagnoses: Intractable episodic headache, unspecified headache type      CITRANATAL 90 DHA, ALGAL OIL, 90 mg iron-1 mg -50 mg-300 mg Cmpk TK ONE C AND ONE T PO D  Refills: 12      PNV64-iron cbn,gluc-FA-DSS-dha (CITRANATAL 90 DHA, NEW FORMULA) 90 mg iron-1 mg -50 mg-300 mg Cmpk Take 1 tablet by mouth once daily.  Qty: 30 each, Refills: 12    Associated Diagnoses: Encounter for contraceptive management, unspecified contraceptive encounter type         STOP taking these medications       mv-ferrous fumarate-Ca-E-FA (VITAFOL) 65-1 mg Tab Comments:   Reason for Stopping:         ondansetron (ZOFRAN-ODT) 4 MG TbDL Comments:   Reason for Stopping:          PRENATAL VIT W-CA,FE,FA,<1 MG, (PRENATAL VITAMIN ORAL) Comments:   Reason for Stopping:         promethazine (PHENERGAN) 25 MG tablet Comments:   Reason for Stopping:                 Discharge Procedure Orders  Diet general     Activity as tolerated     Other restrictions (specify):   Scheduling Instructions: Pelvic rest until post partum visit. (No sex, no tampons, etc.)     Lifting restrictions     Call MD for:  temperature >100.4     Call MD for:  persistent nausea and vomiting or diarrhea     Call MD for:  severe uncontrolled pain     Call MD for:  difficulty breathing or increased cough     Call MD for:  severe persistent headache     Call MD for:  persistent dizziness, light-headedness, or visual disturbances     Call MD for:  increased confusion or weakness     Call MD for:   Scheduling Instructions: Vaginal bleeding through one or more pads each hour for 2 hours       Latha Harrison M.D.  PGY-2 ObGyn  [phone]

## 2017-01-29 NOTE — PLAN OF CARE
Problem: Patient Care Overview  Goal: Plan of Care Review  Outcome: Ongoing (interventions implemented as appropriate)  No events overnight. VSS.  Mom breastfeeding independently.  Baby tolerating breast feedings and formula feedings. Pain controlled PO pain medications. Pt voiding and ambulating independently without difficulty. Mom consented for baby to receive Hep B shot. Hep B shot given. Pt's mom @ bedside. Plan of care reviewed with pt. Will continue to monitor.

## 2017-01-31 LAB
ALLENS TEST: ABNORMAL
HCO3 UR-SCNC: 19.4 MMOL/L (ref 24–28)
PCO2 BLDA: 46.4 MMHG (ref 35–45)
PH SMN: 7.23 [PH] (ref 7.35–7.45)
PO2 BLDA: 22 MMHG (ref 80–100)
POC BE: -8 MMOL/L
POC SATURATED O2: 28 % (ref 95–100)
SAMPLE: ABNORMAL
SITE: ABNORMAL

## 2017-02-03 ENCOUNTER — TELEPHONE (OUTPATIENT)
Dept: OBSTETRICS AND GYNECOLOGY | Facility: CLINIC | Age: 27
End: 2017-02-03

## 2017-02-03 NOTE — TELEPHONE ENCOUNTER
Spoke with patient. Patient states Ibuprofen and Percocet was making her break out in hives. She originally thought it was just the Percocet but when she took just Ibuprofen she still was breaking out in HIVES. Pt placed on brief hold spoke with  whom rec pt STOP all meds. Start OTC Motrin 800mg every 8 hours, Benadryl. Provided rec to pt. Pt verbalized understanding, pt states she has been taking Benadryl but she is still all broken out. Pt advised PER  to try Benadryl cream, can try Claritin instead to help with sx. Pt verbalized understanding

## 2017-02-03 NOTE — TELEPHONE ENCOUNTER
----- Message from Ericka Snider sent at 2/3/2017  8:52 AM CST -----  Contact: pt   X_  1st Request  _  2nd Request  _  3rd Request    Who:SHRUTHI JARAMILLO [[id number]]    Why: Patient 1 weeks postpartum states she is having an allergic reaction (breaking out in hives) to the medication she was prescribed.... Please advise     What Number to Call Back: [phone]    When to Expect a call back: (Before the end of the day)   -- if call after 3:00 call back will be tomorrow.

## 2017-02-07 ENCOUNTER — TELEPHONE (OUTPATIENT)
Dept: OBSTETRICS AND GYNECOLOGY | Facility: CLINIC | Age: 27
End: 2017-02-07

## 2017-02-07 ENCOUNTER — OFFICE VISIT (OUTPATIENT)
Dept: INTERNAL MEDICINE | Facility: CLINIC | Age: 27
End: 2017-02-07
Payer: MEDICAID

## 2017-02-07 VITALS
BODY MASS INDEX: 23.74 KG/M2 | HEART RATE: 60 BPM | WEIGHT: 142.63 LBS | SYSTOLIC BLOOD PRESSURE: 132 MMHG | DIASTOLIC BLOOD PRESSURE: 90 MMHG

## 2017-02-07 DIAGNOSIS — J06.9 ACUTE URI: Primary | ICD-10-CM

## 2017-02-07 PROCEDURE — 99999 PR PBB SHADOW E&M-EST. PATIENT-LVL III: CPT | Mod: PBBFAC,,, | Performed by: INTERNAL MEDICINE

## 2017-02-07 PROCEDURE — 99213 OFFICE O/P EST LOW 20 MIN: CPT | Mod: PBBFAC | Performed by: INTERNAL MEDICINE

## 2017-02-07 PROCEDURE — 99213 OFFICE O/P EST LOW 20 MIN: CPT | Mod: S$PBB,,, | Performed by: INTERNAL MEDICINE

## 2017-02-07 NOTE — PROGRESS NOTES
Subjective:       Patient ID: Jose Luis Crowder is a 26 y.o. female.    Chief Complaint: URI; Nasal Congestion; and Cough    HPI Comments:   Pt here for urgent care.      She is c/o URI sx for 3d.  She is having runny nose, congestion, B ear discomfort, dry cough.  No relief w/Claritin or chlorpheniramine.  No fever.  No body aches.      She has a  baby.  She is not breastfeeding.          URI    Associated symptoms include congestion, coughing and rhinorrhea.   Cough   Associated symptoms include rhinorrhea.     Review of Systems   HENT: Positive for congestion and rhinorrhea.    Respiratory: Positive for cough.        Objective:       Vitals:    17 1410   BP: (!) 132/90   BP Location: Left arm   Patient Position: Sitting   BP Method: Manual   Pulse: 60   Weight: 64.7 kg (142 lb 10.2 oz)     Physical Exam   Constitutional: She appears well-developed and well-nourished. No distress.   HENT:   Head: Normocephalic and atraumatic.   Right Ear: Tympanic membrane, external ear and ear canal normal.   Left Ear: Tympanic membrane, external ear and ear canal normal.   Mouth/Throat: Uvula is midline, oropharynx is clear and moist and mucous membranes are normal. No oropharyngeal exudate or posterior oropharyngeal erythema.   Eyes: Conjunctivae and EOM are normal. Pupils are equal, round, and reactive to light.   Neck: Normal range of motion. Neck supple.   Cardiovascular: Normal rate, regular rhythm and normal heart sounds.  Exam reveals no gallop and no friction rub.    No murmur heard.  Pulmonary/Chest: Effort normal and breath sounds normal. No respiratory distress. She has no wheezes. She has no rhonchi. She has no rales.   Lymphadenopathy:     She has no cervical adenopathy.   Skin: She is not diaphoretic.       Assessment:       1. Acute URI        Plan:           URI - Presumably viral.  OTC meds PRN.

## 2017-02-07 NOTE — TELEPHONE ENCOUNTER
Spoke with patient. Patient states she is currently in the ER for her sx. She thinks she may have a sinus infection. She has tried Claritin, Zyrtec etc nothing helps. Patient states she could not get an appt with her PCP. Patient advised we can possibly get her in today to see a pcp for an urgent care appt, here in [place] instead of waiting in the ER. Pt states she would like that if possible. Advised pt I will give her a CB with a response.    Spoke with Sariah Syed, pt will be able to get an appt today at 2:20pm today at [facility] location, accepted appt and advised I will give the pt a call to see if this is okay for her.    Called pt, pt states she will take that appt today. Pt aware time/location/will review appt online via [facility]

## 2017-02-07 NOTE — TELEPHONE ENCOUNTER
----- Message from Martine Gu sent at 2/7/2017 10:12 AM CST -----  Contact: self  PP Patient is wanting a call back to discuss what kind of OTC medicine to take. Patient is having cold-like symptoms, runny nose, itchy eyes and ear aches. Patient declines breastfeeding and stated she stopped because she was sick. Patient can be reached at [phone].

## 2017-02-07 NOTE — PATIENT INSTRUCTIONS
Your test results will be communicated to you via: [facility], Telephone or Letter.  If you have not received your test results within one week. Please contact the clinic.    Options of things you can take for an upper respiratory infection:    -acetaminohpen 1000mg three times per day    -naproxen 1-2 tablets, twice daily (or Ibuprofen 1-2 tablets three times daily - these are interchangeable)    -pseudoephedrine, either 30mg every 4 hours as needed or 120mg every 12 hours as needed for nasal congestion (phenylephrine does not work for congestion)    -saline nasal spray or Neti pot as needed for nasal congestion    -cough suppressant containing dextromethorphan (Delsym or Robitussin)    It is safe to take these different medications together.

## 2017-02-07 NOTE — MR AVS SNAPSHOT
[facility]  [address]  [place] [address]  Phone: [phone]  Fax: [phone]                  Jose Luis Crowder   2017 2:20 PM   Office Visit    Description:  Female : 1990   Provider:  Vivek Velasquez MD   Department:  [facility]           Reason for Visit     URI     Nasal Congestion     Cough           Diagnoses this Visit        Comments    Acute URI    -  Primary            To Do List           Future Appointments        Provider Department Dept Phone    3/20/2017 9:30 AM Charleen Rizzo MD [facility] [address] [phone]      Goals (5 Years of Data)     None      Follow-Up and Disposition     Return if symptoms worsen or fail to improve.      [facility]     [facility] On Call Nurse Care Line -  Assistance  Registered nurses in the [facility] On Call Center provide clinical advisement, health education, appointment booking, and other advisory services.  Call for this free service at [phone].             Medications           Message regarding Medications     Verify the changes and/or additions to your medication regime listed below are the same as discussed with your clinician today.  If any of these changes or additions are incorrect, please notify your healthcare provider.             Verify that the below list of medications is an accurate representation of the medications you are currently taking.  If none reported, the list may be blank. If incorrect, please contact your healthcare provider. Carry this list with you in case of emergency.           Current Medications     butalbital-acetaminophen-caffeine -40 mg (FIORICET) -40 mg per tablet Take 1-2 tablets by mouth every 6 (six) hours as needed for Headaches.    CITRANATAL 90 DHA, ALGAL OIL, 90 mg iron-1 mg -50 mg-300 mg Cmpk TK ONE C AND ONE T PO D    ibuprofen (ADVIL,MOTRIN) 600 MG tablet Take 1 tablet (600 mg total) by mouth every 6 (six) hours as needed  for Pain (cramping).    oxycodone-acetaminophen (PERCOCET) 5-325 mg per tablet Take 1 tablet by mouth every 4 (four) hours as needed for Pain.    PNV64-iron cbn,gluc-FA-DSS-dha (CITRANATAL 90 DHA, NEW FORMULA) 90 mg iron-1 mg -50 mg-300 mg Cmpk Take 1 tablet by mouth once daily.           Clinical Reference Information           Prenatal Vitals     Enc. Date GA Prenatal Vitals Prenatal Pulse Pain Level Urine Albumin/Glucose Edema Presentation Dilation/Effacement/Station    2/7/17 39w3d 132/90 (A) / 64.7 kg (142 lb 10.2 oz)  60         1/27/17 39w3d Admission Dept: [facility] L&D    1/26/17 39w2d Admission Dx: 39 weeks gestation of pregnancy Dept: [facility]    1/24/17 39w0d 100/58 (A) / 66.9 kg (147 lb 7.8 oz) 39 cm / 140 / Present  0 Negative / Negative +1 / +1  1.5 / 60 / -3    1/17/17 38w0d 100/62 / 66 kg (145 lb 8.1 oz) 38 cm /  / Present  0 Negative / Negative +1 / +1 Vertex 1 / 50 / -3    1/10/17 37w0d 90/60 / 66.9 kg (147 lb 7.8 oz) 37 cm / 140 / Present  0 Negative / Negative +1 / +1 Vertex .5 / 50 / -3    1/3/17 36w0d 99/62 / 66.1 kg (145 lb 11.6 oz)   0 Negative / Negative None / None Vertex .5 / 50 / -4    12/27/16 35w0d 96/78 / 66.1 kg (145 lb 11.6 oz) 35 cm / 145 / Present  0 Negative / Negative None / None  0    12/15/16 33w2d 90/60 / 64.9 kg (143 lb 1.3 oz)  / 140 / Present  0 Negative / Negative None / None Vertex     11/28/16 30w6d 90/60 / 64.1 kg (141 lb 5 oz) 31 cm / 140 / Present  0 Negative / Negative None / None      10/24/16 25w6d 100/60 / 61.7 kg (136 lb 0.4 oz)  / 150 / Present  0 Negative / Negative None / None      9/29/16 22w2d 90/52 (A) / 60.9 kg (134 lb 4.2 oz)  / 150 / Present  0 Negative / Negative None / None      9/21/16 21w1d Admission Dx: Nausea and vomiting during pregnancy prior to 22 weeks gestation Dept: [facility] OB ASSE    9/19/16 20w6d Admission Dx: Nausea & vomiting Dept: [facility] OB ASSE    9/8/16 19w2d 110/62 / 58 kg (127 lb 13.9 oz)  / 150 / Present  0 Negative / Negative None /  "None      8/11/16 15w2d 100/62 / 58.2 kg (128 lb 4.9 oz)  / 150  4 Negative / Negative None / None      7/21/16 12w2d 102/62 / 51.2 kg (112 lb 14 oz)  / 150  0 Negative / Negative None / None         Number of babies: 1   Height: 5' 4" (1.626 m)       Your Vitals Were     BP Pulse Weight Last Period BMI    132/90 (BP Location: Left arm, Patient Position: Sitting, BP Method: Manual) 60 64.7 kg (142 lb 10.2 oz) 04/26/2016 23.74 kg/m2      Blood Pressure          Most Recent Value    BP  (!)  132/90      Allergies as of 2/7/2017     No Known Allergies      Immunizations Administered on Date of Encounter - 2/7/2017     None      Instructions    Your test results will be communicated to you via: [facility], Telephone or Letter.  If you have not received your test results within one week. Please contact the clinic.    Options of things you can take for an upper respiratory infection:    -acetaminohpen 1000mg three times per day    -naproxen 1-2 tablets, twice daily (or Ibuprofen 1-2 tablets three times daily - these are interchangeable)    -pseudoephedrine, either 30mg every 4 hours as needed or 120mg every 12 hours as needed for nasal congestion (phenylephrine does not work for congestion)    -saline nasal spray or Neti pot as needed for nasal congestion    -cough suppressant containing dextromethorphan (Delsym or Robitussin)    It is safe to take these different medications together.           Language Assistance Services     ATTENTION: Language assistance services are available, free of charge. Please call [phone].      ATENCIÓN: Si habla patience, tiene a martinez disposición servicios gratuitos de asistencia lingüística. Llame al [phone].     [facility] Ý: N?u b?n nói Ti?ng Vi?t, có các d?ch v? h? tr? ngôn ng? mi?n phí dành cho b?n. G?i s? [phone].         [facility] complies with applicable Federal civil rights laws and does not discriminate on the basis of race, color, national origin, age, " disability, or sex.

## 2017-03-20 ENCOUNTER — POSTPARTUM VISIT (OUTPATIENT)
Dept: OBSTETRICS AND GYNECOLOGY | Facility: CLINIC | Age: 27
End: 2017-03-20
Payer: MEDICAID

## 2017-03-20 VITALS
BODY MASS INDEX: 21.67 KG/M2 | SYSTOLIC BLOOD PRESSURE: 110 MMHG | DIASTOLIC BLOOD PRESSURE: 60 MMHG | HEIGHT: 65 IN | WEIGHT: 130.06 LBS

## 2017-03-20 DIAGNOSIS — Z30.9 ENCOUNTER FOR CONTRACEPTIVE MANAGEMENT, UNSPECIFIED CONTRACEPTIVE ENCOUNTER TYPE: ICD-10-CM

## 2017-03-20 PROCEDURE — 0503F POSTPARTUM CARE VISIT: CPT | Mod: ,,, | Performed by: OBSTETRICS & GYNECOLOGY

## 2017-03-20 PROCEDURE — 99999 PR PBB SHADOW E&M-EST. PATIENT-LVL II: CPT | Mod: PBBFAC,,, | Performed by: OBSTETRICS & GYNECOLOGY

## 2017-03-20 PROCEDURE — 99212 OFFICE O/P EST SF 10 MIN: CPT | Mod: PBBFAC | Performed by: OBSTETRICS & GYNECOLOGY

## 2017-03-20 RX ORDER — NORETHINDRONE ACETATE AND ETHINYL ESTRADIOL 1MG-20(21)
1 KIT ORAL DAILY
Qty: 28 TABLET | Refills: 11 | Status: SHIPPED | OUTPATIENT
Start: 2017-03-20 | End: 2017-06-26

## 2017-03-20 NOTE — MR AVS SNAPSHOT
[facility] [address]  [address]  [facility] [address]  Phone: [phone]  Fax: [phone]                  Jose Luis Crowder   3/20/2017 9:30 AM   Postpartum Visit    Description:  Female : 1990   Provider:  Charleen Rizzo MD   Department:  [facility] [address]           Reason for Visit     Postpartum Follow-up                To Do List           Goals (5 Years of Data)     None      [facility]     [facility] On Call Nurse [facility] Line -  Assistance  Registered nurses in the [facility] On Call Center provide clinical advisement, health education, appointment booking, and other advisory services.  Call for this free service at [phone].             Medications           Message regarding Medications     Verify the changes and/or additions to your medication regime listed below are the same as discussed with your clinician today.  If any of these changes or additions are incorrect, please notify your healthcare provider.        STOP taking these medications     butalbital-acetaminophen-caffeine -40 mg (FIORICET) -40 mg per tablet Take 1-2 tablets by mouth every 6 (six) hours as needed for Headaches.    CITRANATAL 90 DHA, ALGAL OIL, 90 mg iron-1 mg -50 mg-300 mg Cmpk TK ONE C AND ONE T PO D    ibuprofen (ADVIL,MOTRIN) 600 MG tablet Take 1 tablet (600 mg total) by mouth every 6 (six) hours as needed for Pain (cramping).    oxycodone-acetaminophen (PERCOCET) 5-325 mg per tablet Take 1 tablet by mouth every 4 (four) hours as needed for Pain.    PNV64-iron cbn,gluc-FA-DSS-dha (CITRANATAL 90 DHA, NEW FORMULA) 90 mg iron-1 mg -50 mg-300 mg Cmpk Take 1 tablet by mouth once daily.           Verify that the below list of medications is an accurate representation of the medications you are currently taking.  If none reported, the list may be blank. If incorrect, please contact your healthcare provider. Carry this list with you in case of emergency.           Current  "Medications            Clinical Reference Information           Prenatal Vitals     Enc. Date GA Prenatal Vitals Prenatal Pulse Pain Level Urine Albumin/Glucose Edema Presentation Dilation/Effacement/Station    3/20/17 39w3d 110/60 / 59 kg (130 lb 1.1 oz)           1/27/17 39w3d Admission Dept: [facility] L&D    1/26/17 39w2d Admission Dx: 39 weeks gestation of pregnancy Dept: [facility] MOMBABY    1/24/17 39w0d 100/58 (A) / 66.9 kg (147 lb 7.8 oz) 39 cm / 140 / Present  0 Negative / Negative +1 / +1  1.5 / 60 / -3    1/17/17 38w0d 100/62 / 66 kg (145 lb 8.1 oz) 38 cm /  / Present  0 Negative / Negative +1 / +1 Vertex 1 / 50 / -3    1/10/17 37w0d 90/60 / 66.9 kg (147 lb 7.8 oz) 37 cm / 140 / Present  0 Negative / Negative +1 / +1 Vertex .5 / 50 / -3    1/3/17 36w0d 99/62 / 66.1 kg (145 lb 11.6 oz)   0 Negative / Negative None / None Vertex .5 / 50 / -4    12/27/16 35w0d 96/78 / 66.1 kg (145 lb 11.6 oz) 35 cm / 145 / Present  0 Negative / Negative None / None  0    12/15/16 33w2d 90/60 / 64.9 kg (143 lb 1.3 oz)  / 140 / Present  0 Negative / Negative None / None Vertex     11/28/16 30w6d 90/60 / 64.1 kg (141 lb 5 oz) 31 cm / 140 / Present  0 Negative / Negative None / None      10/24/16 25w6d 100/60 / 61.7 kg (136 lb 0.4 oz)  / 150 / Present  0 Negative / Negative None / None      9/29/16 22w2d 90/52 (A) / 60.9 kg (134 lb 4.2 oz)  / 150 / Present  0 Negative / Negative None / None      9/21/16 21w1d Admission Dx: Nausea and vomiting during pregnancy prior to 22 weeks gestation Dept: [facility] OB ASSE    9/19/16 20w6d Admission Dx: Nausea & vomiting Dept: [facility] OB ASSE    9/8/16 19w2d 110/62 / 58 kg (127 lb 13.9 oz)  / 150 / Present  0 Negative / Negative None / None      8/11/16 15w2d 100/62 / 58.2 kg (128 lb 4.9 oz)  / 150  4 Negative / Negative None / None      7/21/16 12w2d 102/62 / 51.2 kg (112 lb 14 oz)  / 150  0 Negative / Negative None / None         Number of babies: 1   Height: 5' 5" (1.651 m)       Your Vitals Were     BP " "Height Weight Last Period BMI    110/60 5' 5" (1.651 m) 59 kg (130 lb 1.1 oz) 04/26/2016 21.64 kg/m2      Allergies as of 3/20/2017     No Known Allergies      Immunizations Administered on Date of Encounter - 3/20/2017     None      Language Assistance Services     ATTENTION: Language assistance services are available, free of charge. Please call [phone].      ATENCIÓN: Si habla español, tiene a martinez disposición servicios gratuitos de asistencia lingüística. Llame al [phone].     [facility] Ý: N?u b?n nói Ti?ng Vi?t, có các d?ch v? h? tr? ngôn ng? mi?n phí dành cho b?n. G?i s? [phone].         [facility] [address] complies with applicable Federal civil rights laws and does not discriminate on the basis of race, color, national origin, age, disability, or sex.        "

## 2017-03-20 NOTE — PROGRESS NOTES
"CC: Post-partum follow-up    Jose Luis Crowder is a 26 y.o. female  who presents for post-partum visit.  She is S/P a  17.  .  She and the baby are doing well.  No pain.  No fever.   No bowel / bladder complaints.    Delivery Date: 2017  Delivery MD: MARION Espinoza"  Breast Feeding: NO  Depression: NO  Contraception: oral contraceptives (estrogen/progesterone)    Pregnancy was complicated by:  NONE    /60  Ht 5' 5" (1.651 m)  Wt 59 kg (130 lb 1.1 oz)  LMP 2016  Breastfeeding? No  BMI 21.64 kg/m2    ROS:  GENERAL: No fever, chills, fatigability.  VULVAR: No pain, no lesions and no itching.  VAGINAL: No relaxation, no itching, no discharge, no abnormal bleeding and no lesions.  ABDOMEN: No abdominal pain. Denies nausea. Denies vomiting. No diarrhea. No constipation  BREAST: Denies pain. No lumps.  URINARY: No incontinence, no nocturia, no frequency and no dysuria.  CARDIOVASCULAR: No chest pain. No shortness of breath. No leg cramps.  NEUROLOGICAL: No headaches. No vision changes.    PHYSICAL EXAM:  Exam chaperoned by nurse  ABDOMEN:  Soft, non-tender, non-distended  VULVA:  Normal, no lesions  CERVIX:  Without lesions, polyps or tenderness.  UTERUS:  Normal size, shape, consistency, no mass or tenderness.  ADNEXA:  Normal in size without mass or tenderness    IMP:  Doing well S/P   Instructions / precautions reviewed  Contraceptive counseling    Rx JUNEL Rx    PLAN:  May resume normal activities  Return:  PRN          "

## 2017-03-27 ENCOUNTER — HOSPITAL ENCOUNTER (EMERGENCY)
Facility: OTHER | Age: 27
Discharge: HOME | End: 2017-03-27
Attending: EMERGENCY MEDICINE
Payer: MEDICAID

## 2017-03-27 VITALS
DIASTOLIC BLOOD PRESSURE: 79 MMHG | OXYGEN SATURATION: 98 % | TEMPERATURE: 98 F | RESPIRATION RATE: 18 BRPM | HEART RATE: 62 BPM | BODY MASS INDEX: 22.47 KG/M2 | HEIGHT: 64 IN | WEIGHT: 131.63 LBS | SYSTOLIC BLOOD PRESSURE: 112 MMHG

## 2017-03-27 DIAGNOSIS — S29.019A THORACIC MYOFASCIAL STRAIN, INITIAL ENCOUNTER: ICD-10-CM

## 2017-03-27 DIAGNOSIS — S29.011A STRAIN OF CHEST WALL, INITIAL ENCOUNTER: Primary | ICD-10-CM

## 2017-03-27 DIAGNOSIS — R07.89 CHEST WALL PAIN: ICD-10-CM

## 2017-03-27 LAB
B-HCG UR QL: NEGATIVE
CTP QC/QA: YES

## 2017-03-27 PROCEDURE — 93010 ELECTROCARDIOGRAM REPORT: CPT | Mod: ,,, | Performed by: INTERNAL MEDICINE

## 2017-03-27 PROCEDURE — 99284 EMERGENCY DEPT VISIT MOD MDM: CPT | Mod: 25

## 2017-03-27 PROCEDURE — 93005 ELECTROCARDIOGRAM TRACING: CPT

## 2017-03-27 PROCEDURE — 25000003 PHARM REV CODE 250: Performed by: EMERGENCY MEDICINE

## 2017-03-27 PROCEDURE — 81025 URINE PREGNANCY TEST: CPT | Performed by: EMERGENCY MEDICINE

## 2017-03-27 RX ORDER — IBUPROFEN 600 MG/1
600 TABLET ORAL EVERY 6 HOURS PRN
Qty: 20 TABLET | Refills: 0 | Status: SHIPPED | OUTPATIENT
Start: 2017-03-27 | End: 2017-04-01

## 2017-03-27 RX ORDER — IBUPROFEN 400 MG/1
800 TABLET ORAL
Status: COMPLETED | OUTPATIENT
Start: 2017-03-27 | End: 2017-03-27

## 2017-03-27 RX ORDER — CYCLOBENZAPRINE HCL 10 MG
10 TABLET ORAL 3 TIMES DAILY PRN
Qty: 15 TABLET | Refills: 0 | Status: SHIPPED | OUTPATIENT
Start: 2017-03-27 | End: 2017-04-01

## 2017-03-27 RX ADMIN — IBUPROFEN 800 MG: 400 TABLET, FILM COATED ORAL at 06:03

## 2017-03-27 NOTE — ED NOTES
Pt c/o muscle pain to the chest and back x 4 days w/ sharp pain upon deep inspiration and lying flat. NO trauma. Pt is A & O X 3, denies SOB and skin is warm and dry w/ pink mucosa. VS. CORTEZ x 3mm. BBS- CTA. Abd- SNT. PSM x 4 exts.

## 2017-03-27 NOTE — ED PROVIDER NOTES
Encounter Date: 3/27/2017    SCRIBE #1 NOTE: I, Viola Rollins, am scribing for, and in the presence of, Dr. Chase.       History     Chief Complaint   Patient presents with    chest wall pain     and back pain x 4 days, increased pain upon deep inspiration and lying down, denies SOB, no card hx     Review of patient's allergies indicates:  No Known Allergies  HPI Comments: Time seen by provider: 6:13 AM    This is a 26 y.o. female who presents with complaint of chest pain.  This is described as mid-sternal location chest wall pain that has persisted for the past 4 days.  The patient describes her pain as a constant, sharp sensation that she rates a 6/10 in severity.  She also endorses constipation, cough, and midd-thoracic back pain, which she rates a 9/10 in severity.  She denies any known inciting event of trauma - symptoms began gradually.  The patient mentions that she attempted to alleviate her discomfort with a heating pad, which provided no significant relief, but has attempted no pain medications.    She reports constipation - states that her last BM was this morning, which she attributes to the Gas X she took yesterday.  She denies  fever, chills, nasal congestion, rhinorrhea, sore throat, SOB, palpitations, leg swelling, abdominal pain, N/V/D, dysuria, changes in urinary frequency or urgency, difficulty urinating, hematuria, rashes, or skin lesions.   She reports no major medical problems or recent surgeries.  She admits to taking birth control.  She denies any recent travel.    The history is provided by the patient.     Past Medical History:   Diagnosis Date    Abnormal Pap smear of cervix yrs ago    repeat pap    Ovarian cyst      Past Surgical History:   Procedure Laterality Date    TONSILLECTOMY, ADENOIDECTOMY       Family History   Problem Relation Age of Onset    Diabetes Maternal Grandmother     Diabetes Mother     Breast cancer Neg Hx     Colon cancer Neg Hx     Ovarian cancer Neg Hx   "    Social History   Substance Use Topics    Smoking status: Never Smoker    Smokeless tobacco: Never Used    Alcohol use Yes      Comment: Social - not since + UPT     Review of Systems   Constitutional: Negative for chills and fever.   HENT: Negative for congestion, facial swelling, rhinorrhea and sore throat.    Respiratory: Negative for cough and shortness of breath.         Positive for mid-sternal chest wall pain.   Cardiovascular: Positive for chest pain. Negative for palpitations and leg swelling.   Gastrointestinal: Positive for constipation. Negative for abdominal pain, diarrhea, nausea and vomiting.   Endocrine: Negative for polyuria.   Genitourinary: Negative for difficulty urinating, dysuria, frequency, hematuria and urgency.   Musculoskeletal: Positive for back pain (mid-thoracic). Negative for myalgias.   Skin: Negative for rash.   Neurological: Negative for weakness and headaches.   Psychiatric/Behavioral: Negative for behavioral problems and confusion.       Physical Exam   Initial Vitals   BP Pulse Resp Temp SpO2   03/27/17 0602 03/27/17 0602 03/27/17 0602 03/27/17 0602 03/27/17 0602   119/85 67 20 98 °F (36.7 °C) 98 %     Vitals:    03/27/17 0602   BP: 119/85   Pulse: 67   Resp: 20   Temp: 98 °F (36.7 °C)   TempSrc: Oral   SpO2: 98%   Weight: 59.7 kg (131 lb 9.8 oz)   Height: 5' 4" (1.626 m)       Physical Exam    Nursing note and vitals reviewed.  Constitutional: She appears well-developed and well-nourished. She is not diaphoretic. No distress.   HENT:   Head: Normocephalic and atraumatic.   Right Ear: External ear normal.   Left Ear: External ear normal.   Mouth/Throat: Oropharynx is clear and moist. No oropharyngeal exudate.   Eyes: EOM are normal. Pupils are equal, round, and reactive to light. Right eye exhibits no discharge. Left eye exhibits no discharge.   Neck: Normal range of motion.   Cardiovascular: Normal rate, regular rhythm and normal heart sounds. Exam reveals no gallop and no " friction rub.    No murmur heard.  Pulmonary/Chest: Breath sounds normal. No respiratory distress. She has no wheezes. She has no rhonchi. She has no rales. She exhibits tenderness (tenderness to palpation over lower sternum and xiphoid process.).   Abdominal: Soft. Bowel sounds are normal. There is no tenderness. There is no rebound and no guarding.   Musculoskeletal: Normal range of motion. She exhibits no edema or tenderness.   Midline and bilateral paraspinal mid-thoracic tenderness to palpation.  No crepitus, step offs, or deformities.  No lower extremity edema. No calf tenderness.    Neurological: She is alert and oriented to person, place, and time. She has normal strength. No cranial nerve deficit or sensory deficit.   Cranial nerves II-XII intact. Strength 5/5 throughout. Sensation intact to light touch throughout.     Skin: Skin is warm and dry. No rash and no abscess noted. No erythema. No pallor.   Psychiatric: She has a normal mood and affect. Her behavior is normal. Judgment and thought content normal.         ED Course   Procedures  Labs Reviewed   POCT URINE PREGNANCY - Normal     Imaging Results         X-Ray Chest PA And Lateral (Final result) Result time:  03/27/17 07:05:54    Final result by Wayne Suresh MD (03/27/17 07:05:54)    Impression:      No radiographic evidence of acute intra-thoracic process.      Electronically signed by: WAYNE SURESH  Date:     03/27/17  Time:    07:05     Narrative:    Comparison: None available    Technique: PA and lateral radiographs of the chest.    Findings: The cardiomediastinal silhouette is within normal limits. The visualized airway is unremarkable.  The lungs appear symmetrically aerated without definite focal alveolar consolidation. No large pleural effusion or pneumothorax is appreciated. Visualized osseous structures appear intact.              EKG Readings: (Independently Interpreted)   Initial Reading: No STEMI.   Sinus bradycardia at rate of  57 with no STEMI and no abnormal T wave inversions.         X-Rays:   Independently Interpreted Readings:   Chest X-Ray: X-Ray Chest PA And Lateral: No infiltrate, effusion, pneumothorax, or acute process.      Medical Decision Making:   Clinical Tests:   Lab Tests: Ordered and Reviewed  Radiological Study: Reviewed and Ordered  Medical Tests: Ordered and Reviewed  ED Management:  Urgent evaluation a 26-year-old female with complaint of chest and back pain ×4 days, no trauma.  Vital signs are benign, afebrile.  Physical exam reveals reproducible tenderness to the midthoracic area as well as lower sternum.  There is no overlying skin change.  There is no neurologic deficit to suggest cauda equina syndrome or cord compression.  I considered possibility of pulmonary embolus, but patient is not tachycardic nor tachypnea, and denies any shortness of breath or risk factor - her only risk factor is use of birth control.  I doubt pulmonary embolus.  There is no acute finding on chest x-ray or EKG.  Pain seems very reproducible and musculoskeletal by exam.  I suspect chest wall strain, thoracic strain.  We'll treat with analgesics and muscle relaxers, and close follow-up with PCP.  She is advised to return for any new or worsening symptoms.            Scribe Attestation:   Scribe #1: I performed the above scribed service and the documentation accurately describes the services I performed. I attest to the accuracy of the note.    Attending Attestation:           Physician Attestation for Scribe:  Physician Attestation Statement for Scribe #1: I, Dr. Chase, reviewed documentation, as scribed by Viola Rollins in my presence, and it is both accurate and complete.                 ED Course     Clinical Impression:     1. Strain of chest wall, initial encounter    2. Chest wall pain    3. Thoracic myofascial strain, initial encounter                Divya Chase MD  03/27/17 0970

## 2017-03-27 NOTE — ED AVS SNAPSHOT
[facility]  [address] [facility] [address]               Jose Luis Crowder   3/27/2017  6:04 AM   ED    Description:  Female : 1990   Department:  [facility]           Your Care was Coordinated By:     Provider Role From To    Divya Chase MD Attending Provider 17 0612 --      Reason for Visit     chest wall pain           Diagnoses this Visit        Comments    Strain of chest wall, initial encounter    -  Primary     Chest wall pain         Thoracic myofascial strain, initial encounter           ED Disposition     None           To Do List           Follow-up Information     Schedule an appointment as soon as possible for a visit with [facility].    Specialties:  Behavioral Health, Psychiatry    Contact information:    [address]  [facility] [address]  [phone]          Follow up with [facility].    Specialty:  Emergency Medicine    Why:  As needed, If symptoms worsen    Contact information:    [address]  [facility] [address] [phone]       These Medications        Disp Refills Start End    ibuprofen (ADVIL,MOTRIN) 600 MG tablet 20 tablet 0 3/27/2017     Take 1 tablet (600 mg total) by mouth every 6 (six) hours as needed for Pain. - Oral    Pharmacy: [facility] [address] [place] [address] - [address] AT [address] Ph #: [phone]       cyclobenzaprine (FLEXERIL) 10 MG tablet 15 tablet 0 3/27/2017 2017    Take 1 tablet (10 mg total) by mouth 3 (three) times daily as needed for Muscle spasms. - Oral    Pharmacy: [facility] [address] [place] [address] - [address] AT [address] Ph #: [phone]         [facility]     [facility] Nurse Care Line -  Assistance  Registered nurses in the [facility] provide clinical advisement, health education, appointment booking,  "and other advisory services.  Call for this free service at [phone].             Medications           Message regarding Medications     Verify the changes and/or additions to your medication regime listed below are the same as discussed with your clinician today.  If any of these changes or additions are incorrect, please notify your healthcare provider.        START taking these NEW medications        Refills    ibuprofen (ADVIL,MOTRIN) 600 MG tablet 0    Sig: Take 1 tablet (600 mg total) by mouth every 6 (six) hours as needed for Pain.    Class: Print    Route: Oral    cyclobenzaprine (FLEXERIL) 10 MG tablet 0    Sig: Take 1 tablet (10 mg total) by mouth 3 (three) times daily as needed for Muscle spasms.    Class: Print    Route: Oral      These medications were administered today        Dose Freq    ibuprofen tablet 800 mg 800 mg ED 1 Time    Sig: Take 2 tablets (800 mg total) by mouth ED 1 Time.    Class: Normal    Route: Oral           Verify that the below list of medications is an accurate representation of the medications you are currently taking.  If none reported, the list may be blank. If incorrect, please contact your healthcare provider. Carry this list with you in case of emergency.           Current Medications     norethindrone-ethinyl estradiol (JUNEL FE 1/20, 28,) 1 mg-20 mcg (21)/75 mg (7) per tablet Take 1 tablet by mouth once daily.    cyclobenzaprine (FLEXERIL) 10 MG tablet Take 1 tablet (10 mg total) by mouth 3 (three) times daily as needed for Muscle spasms.    ibuprofen (ADVIL,MOTRIN) 600 MG tablet Take 1 tablet (600 mg total) by mouth every 6 (six) hours as needed for Pain.           Clinical Reference Information           Your Vitals Were     BP Pulse Temp Resp Height Weight    119/85 (BP Location: Left arm, Patient Position: Sitting) 67 98 °F (36.7 °C) (Oral) 20 5' 4" (1.626 m) 59.7 kg (131 lb 9.8 oz)    Last Period SpO2 BMI          04/26/2016 98% 22.59 kg/m2        Allergies as " of 3/27/2017     No Known Allergies      Immunizations Administered on Date of Encounter - 3/27/2017     None      ED Micro, Lab, POCT     Start Ordered       Status Ordering Provider    03/27/17 0605 03/27/17 0604  POCT urine pregnancy  Once      Final result       ED Imaging Orders     Start Ordered       Status Ordering Provider    03/27/17 0621 03/27/17 0621  X-Ray Chest PA And Lateral  1 time imaging      Final result       Discharge References/Attachments     CHEST WALL PAIN, COSTOCHONDRITIS (ENGLISH)    BACK PAIN (ACUTE OR CHRONIC) (ENGLISH)       [facility] complies with applicable Federal civil rights laws and does not discriminate on the basis of race, color, national origin, age, disability, or sex.        Language Assistance Services     ATTENTION: Language assistance services are available, free of charge. Please call [phone].      ATENCIÓN: Si habla español, tiene a martinez disposición servicios gratuitos de asistencia lingüística. Llame al [phone].     CHÚ Ý: N?u b?n nói Ti?ng Vi?t, có các d?ch v? h? tr? ngôn ng? mi?n phí dành cho b?n. G?i s? [phone].

## 2017-03-30 ENCOUNTER — HOSPITAL ENCOUNTER (EMERGENCY)
Facility: OTHER | Age: 27
Discharge: HOME | End: 2017-03-31
Attending: EMERGENCY MEDICINE
Payer: MEDICAID

## 2017-03-30 DIAGNOSIS — R07.9 CHEST PAIN: ICD-10-CM

## 2017-03-30 LAB
ALBUMIN SERPL BCP-MCNC: 3.6 G/DL
ALP SERPL-CCNC: 80 U/L
ALT SERPL W/O P-5'-P-CCNC: 7 U/L
ANION GAP SERPL CALC-SCNC: 7 MMOL/L
AST SERPL-CCNC: 16 U/L
B-HCG UR QL: NEGATIVE
BASOPHILS # BLD AUTO: 0.01 K/UL
BASOPHILS NFR BLD: 0.1 %
BILIRUB SERPL-MCNC: 0.3 MG/DL
BUN SERPL-MCNC: 8 MG/DL
CALCIUM SERPL-MCNC: 8.9 MG/DL
CHLORIDE SERPL-SCNC: 108 MMOL/L
CO2 SERPL-SCNC: 25 MMOL/L
CREAT SERPL-MCNC: 0.8 MG/DL
CTP QC/QA: YES
D DIMER PPP IA.FEU-MCNC: 0.76 MG/L FEU
DIFFERENTIAL METHOD: ABNORMAL
EOSINOPHIL # BLD AUTO: 0.1 K/UL
EOSINOPHIL NFR BLD: 1.1 %
ERYTHROCYTE [DISTWIDTH] IN BLOOD BY AUTOMATED COUNT: 15.6 %
EST. GFR  (AFRICAN AMERICAN): >60 ML/MIN/1.73 M^2
EST. GFR  (NON AFRICAN AMERICAN): >60 ML/MIN/1.73 M^2
GLUCOSE SERPL-MCNC: 91 MG/DL
HCT VFR BLD AUTO: 33.1 %
HGB BLD-MCNC: 10.3 G/DL
LYMPHOCYTES # BLD AUTO: 2.6 K/UL
LYMPHOCYTES NFR BLD: 35.2 %
MCH RBC QN AUTO: 25.8 PG
MCHC RBC AUTO-ENTMCNC: 31.1 %
MCV RBC AUTO: 83 FL
MONOCYTES # BLD AUTO: 0.9 K/UL
MONOCYTES NFR BLD: 12.6 %
NEUTROPHILS # BLD AUTO: 3.7 K/UL
NEUTROPHILS NFR BLD: 51 %
PLATELET # BLD AUTO: 261 K/UL
PMV BLD AUTO: 12.3 FL
POTASSIUM SERPL-SCNC: 4.2 MMOL/L
PROT SERPL-MCNC: 7.3 G/DL
RBC # BLD AUTO: 3.99 M/UL
SODIUM SERPL-SCNC: 140 MMOL/L
TROPONIN I SERPL DL<=0.01 NG/ML-MCNC: 0.02 NG/ML
WBC # BLD AUTO: 7.24 K/UL

## 2017-03-30 PROCEDURE — 85025 COMPLETE CBC W/AUTO DIFF WBC: CPT

## 2017-03-30 PROCEDURE — 96361 HYDRATE IV INFUSION ADD-ON: CPT

## 2017-03-30 PROCEDURE — 93005 ELECTROCARDIOGRAM TRACING: CPT

## 2017-03-30 PROCEDURE — 93010 ELECTROCARDIOGRAM REPORT: CPT | Mod: ,,, | Performed by: INTERNAL MEDICINE

## 2017-03-30 PROCEDURE — 96374 THER/PROPH/DIAG INJ IV PUSH: CPT

## 2017-03-30 PROCEDURE — 25000003 PHARM REV CODE 250: Performed by: EMERGENCY MEDICINE

## 2017-03-30 PROCEDURE — 63600175 PHARM REV CODE 636 W HCPCS: Performed by: EMERGENCY MEDICINE

## 2017-03-30 PROCEDURE — 85379 FIBRIN DEGRADATION QUANT: CPT

## 2017-03-30 PROCEDURE — 84484 ASSAY OF TROPONIN QUANT: CPT

## 2017-03-30 PROCEDURE — 80053 COMPREHEN METABOLIC PANEL: CPT

## 2017-03-30 PROCEDURE — 81025 URINE PREGNANCY TEST: CPT | Performed by: EMERGENCY MEDICINE

## 2017-03-30 PROCEDURE — 99284 EMERGENCY DEPT VISIT MOD MDM: CPT | Mod: 25

## 2017-03-30 RX ORDER — KETOROLAC TROMETHAMINE 30 MG/ML
30 INJECTION, SOLUTION INTRAMUSCULAR; INTRAVENOUS
Status: COMPLETED | OUTPATIENT
Start: 2017-03-30 | End: 2017-03-30

## 2017-03-30 RX ORDER — SODIUM CHLORIDE 9 MG/ML
1000 INJECTION, SOLUTION INTRAVENOUS
Status: COMPLETED | OUTPATIENT
Start: 2017-03-30 | End: 2017-03-30

## 2017-03-30 RX ADMIN — IOHEXOL 75 ML: 350 INJECTION, SOLUTION INTRAVENOUS at 11:03

## 2017-03-30 RX ADMIN — KETOROLAC TROMETHAMINE 30 MG: 30 INJECTION, SOLUTION INTRAMUSCULAR at 09:03

## 2017-03-30 RX ADMIN — SODIUM CHLORIDE 1000 ML: 0.9 INJECTION, SOLUTION INTRAVENOUS at 09:03

## 2017-03-30 NOTE — ED AVS SNAPSHOT
[facility]  [address]  [facility] [address]               Jose Luis Crowder   3/30/2017  9:03 PM   ED    Description:  Female : 1990   Department:  [facility]           Your Care was Coordinated By:     Provider Role From To    Artemio Krueger MD Attending Provider 17 8601 --      Reason for Visit     Back Pain     Pleurisy           Diagnoses this Visit        Comments    Chest pain           ED Disposition     None           To Do List           Follow-up Information     Follow up with [facility]. Schedule an appointment as soon as possible for a visit in 3 days.    Contact information:    [address]  [place] [address]  [phone]          Follow up with [facility].    Specialty:  Emergency Medicine    Why:  If symptoms worsen    Contact information:    [address]  [facility] [id number] [phone]      [facility]     [facility] Nurse Care Line -  Assistance  Unless otherwise directed by your provider, please contact [facility], our nurse care line that is available for  assistance.     Registered nurses in the [facility] provide: appointment scheduling, clinical advisement, health education, and other advisory services.  Call: [phone] (toll free)               Medications           Message regarding Medications     Verify the changes and/or additions to your medication regime listed below are the same as discussed with your clinician today.  If any of these changes or additions are incorrect, please notify your healthcare provider.        These medications were administered today        Dose Freq    ketorolac injection 30 mg 30 mg ED 1 Time    Sig: Inject 30 mg into the vein ED 1 Time.    Class: Normal    Route: Intravenous    0.9%  NaCl infusion 1,000 mL ED 1 Time    Sig: Inject 1,000 mLs into the vein ED 1 Time.    Class:  "Normal    Route: Intravenous    omnipaque 350 iohexol 350 mg iodine/mL      Notes to Pharmacy: Created by cabinet override    omnipaque 350 iohexol 75 mL 75 mL IMG once as needed    Starting on: 3/31/2017    Sig: Inject 75 mLs into the vein ONCE PRN for contrast.    Class: Normal    Route: Intravenous           Verify that the below list of medications is an accurate representation of the medications you are currently taking.  If none reported, the list may be blank. If incorrect, please contact your healthcare provider. Carry this list with you in case of emergency.           Current Medications     cyclobenzaprine (FLEXERIL) 10 MG tablet Take 1 tablet (10 mg total) by mouth 3 (three) times daily as needed for Muscle spasms.    norethindrone-ethinyl estradiol (JUNEL FE 1/20, 28,) 1 mg-20 mcg (21)/75 mg (7) per tablet Take 1 tablet by mouth once daily.    ibuprofen (ADVIL,MOTRIN) 600 MG tablet Take 1 tablet (600 mg total) by mouth every 6 (six) hours as needed for Pain.    omnipaque 350 iohexol 350 mg iodine/mL            Clinical Reference Information           Your Vitals Were     BP Pulse Temp Resp Height Weight    118/84 60 98.3 °F (36.8 °C) (Oral) 18 5' 4" (1.626 m) 58.5 kg (129 lb)    Last Period SpO2 BMI          04/26/2016 100% 22.14 kg/m2        Allergies as of 3/30/2017     No Known Allergies      Immunizations Administered on Date of Encounter - 3/30/2017     None      ED Micro, Lab, POCT     Start Ordered       Status Ordering Provider    03/30/17 2124 03/30/17 2124  CBC auto differential  STAT      Final result     03/30/17 2124 03/30/17 2124  Comprehensive metabolic panel  STAT      Final result     03/30/17 2124 03/30/17 2124  Troponin I  STAT      Final result     03/30/17 2124 03/30/17 2124  D dimer, quantitative  STAT      Final result     03/30/17 2050 03/30/17 2049  POCT urine pregnancy  Once      Final result       ED Imaging Orders     Start Ordered       Status Ordering Provider    03/30/17 " 2220 03/30/17 2223  CTA Chest Non-Coronary (PE Study)  1 time imaging      Final result       Discharge References/Attachments     CHEST PAIN, UNCERTAIN CAUSE (ENGLISH)    PLEURISY (ENGLISH)       [facility] complies with applicable Federal civil rights laws and does not discriminate on the basis of race, color, national origin, age, disability, or sex.        Language Assistance Services     ATTENTION: Language assistance services are available, free of charge. Please call [phone].      ATENCIÓN: Si habla español, tiene a martinez disposición servicios gratuitos de asistencia lingüística. Llame al [phone].     CHÚ Ý: N?u b?n nói Ti?ng Vi?t, có các d?ch v? h? tr? ngôn ng? mi?n phí dành cho b?n. G?i s? [phone].

## 2017-03-31 VITALS
SYSTOLIC BLOOD PRESSURE: 118 MMHG | RESPIRATION RATE: 18 BRPM | HEART RATE: 60 BPM | OXYGEN SATURATION: 100 % | BODY MASS INDEX: 22.02 KG/M2 | HEIGHT: 64 IN | DIASTOLIC BLOOD PRESSURE: 84 MMHG | WEIGHT: 129 LBS | TEMPERATURE: 98 F

## 2017-03-31 PROCEDURE — 25500020 PHARM REV CODE 255: Performed by: EMERGENCY MEDICINE

## 2017-03-31 NOTE — ED PROVIDER NOTES
Encounter Date: 3/30/2017    SCRIBE #1 NOTE: I, Esther Jean , am scribing for, and in the presence of, Dr. Krueger.       History     Chief Complaint   Patient presents with    Back Pain     pt reports she was seen here 3 days ago for back and chest pain and reports the medication she was given is not helping    Pleurisy     pt c/o pain when taking a deep breath and laying down     Review of patient's allergies indicates:  No Known Allergies  HPI Comments: Time seen by provider: 9:15 PM    This is a 26 y.o. female who presents with complaint of back pain. Symptoms began three days ago. Pt reports chest discomfort, but denies fever, chills, nausea, vomiting, abdominal pain, SOB, cough, leg swelling, or myalgias. Chest discomfort is described as constant, moderate, and radiating to the mid-thoracic region. Pain worsens with deep inspiration and laying down. She reports taking Ibuprofen and Flexeril with little relief, and is compliant with birth control (p.o.). Episode is consistent with symptoms she experienced secondary to flatulence during pregnancy. Pt was diagnosed with pleurisy when seen in the ED three days ago.       The history is provided by the patient.     Past Medical History:   Diagnosis Date    Abnormal Pap smear of cervix yrs ago    repeat pap    Ovarian cyst      Past Surgical History:   Procedure Laterality Date    TONSILLECTOMY, ADENOIDECTOMY       Family History   Problem Relation Age of Onset    Diabetes Maternal Grandmother     Diabetes Mother     Breast cancer Neg Hx     Colon cancer Neg Hx     Ovarian cancer Neg Hx      Social History   Substance Use Topics    Smoking status: Never Smoker    Smokeless tobacco: Never Used    Alcohol use Yes      Comment: Social      Review of Systems   Constitutional: Negative for chills and fever.   HENT: Negative for congestion and sore throat.    Eyes: Negative for redness and visual disturbance.   Respiratory: Negative for cough and shortness of  breath.    Cardiovascular: Positive for chest pain. Negative for palpitations and leg swelling.   Gastrointestinal: Negative for abdominal pain, diarrhea, nausea and vomiting.   Genitourinary: Negative for dysuria.   Musculoskeletal: Positive for back pain. Negative for myalgias.   Skin: Negative for rash.   Neurological: Negative for weakness and headaches.   Psychiatric/Behavioral: Negative for confusion.       Physical Exam   Initial Vitals   BP Pulse Resp Temp SpO2   03/30/17 2047 03/30/17 2047 03/30/17 2047 03/30/17 2047 03/30/17 2047   128/91 77 18 98.3 °F (36.8 °C) 100 %     Physical Exam    Nursing note and vitals reviewed.  Constitutional: She appears well-developed and well-nourished. She is not diaphoretic. No distress.   HENT:   Head: Normocephalic and atraumatic.   Right Ear: External ear normal.   Left Ear: External ear normal.   Eyes: Conjunctivae and EOM are normal. Pupils are equal, round, and reactive to light. Right eye exhibits no discharge. Left eye exhibits no discharge. No scleral icterus.   Neck: Normal range of motion. Neck supple.   Cardiovascular: Normal rate, regular rhythm, normal heart sounds and intact distal pulses. Exam reveals no gallop and no friction rub.    No murmur heard.  Pulmonary/Chest: Breath sounds normal. No stridor. No respiratory distress. She has no wheezes. She has no rhonchi. She has no rales. She exhibits tenderness.   Sternal tenderness. No skin changes. No palpable deformity.   Abdominal: Soft. She exhibits no distension. There is no tenderness. There is no rebound and no guarding.   Musculoskeletal: Normal range of motion. She exhibits tenderness. She exhibits no edema.   Bilateral paraspinal tenderness to palpation to the thoracic region.    Neurological: She is alert and oriented to person, place, and time. She has normal strength. No cranial nerve deficit.   Skin: Skin is warm and dry. No rash noted. No erythema. No pallor.   No lesions.   Psychiatric: She has  a normal mood and affect. Her behavior is normal. Judgment and thought content normal.         ED Course   Procedures  Labs Reviewed   COMPREHENSIVE METABOLIC PANEL - Abnormal; Notable for the following:        Result Value    ALT 7 (*)     Anion Gap 7 (*)     All other components within normal limits   D DIMER, QUANTITATIVE - Abnormal; Notable for the following:     D-Dimer 0.76 (*)     All other components within normal limits   CBC W/ AUTO DIFFERENTIAL   TROPONIN I   POCT URINE PREGNANCY     EKG Readings: (Independently Interpreted)   Initial Reading: No STEMI.   Normal sinus rhythm at rate of 62 bpm.           Medical Decision Making:   Clinical Tests:   Lab Tests: Ordered and Reviewed  Medical Tests: Ordered and Reviewed  ED Management:  Well-appearing patient presents for reevaluation of her chest and back pain.  I have reviewed her chart and so days ago.  Diagnosed with chest wall strain versus pleurisy.  She reports no improvement with ibuprofen and Flexeril and she is concerned something else is occurring.  She is 2 months postpartum.  Is on birth control.  Increased risk for PE.  D-dimer positive.  CTA obtained.  Negative for embolism or other acute on rallies.  If her blood work within normal limits.  EKG has no ischemic changes.  I have reaffirmed initial diagnosis and encouraged her to continue symptomatic therapy and follow-up with primary care and OB/GYN returning here if worse.    I did have an extensive talk regarding signs to return for and need for follow up. Patient expressed understanding and will monitor symptoms closely and follow-up as needed.    ALONDRA Krueger M.D.  03/31/2017  1:32 AM              Scribe Attestation:   Scribe #1: I performed the above scribed service and the documentation accurately describes the services I performed. I attest to the accuracy of the note.    Attending Attestation:           Physician Attestation for Scribe:  Physician Attestation Statement for Cruz  #1: I, Dr. Krueger, reviewed documentation, as scribed by Esther Jean  in my presence, and it is both accurate and complete.                 ED Course     Clinical Impression:     1. Chest pain                Artemio Krueger MD  03/31/17 0132

## 2017-03-31 NOTE — ED NOTES
Pain around ribs where bra falls across chest and wrapping around back. Pt has 2 month old baby at home also states that the pain is worse with inspiration and lying down.  LOC: Pt is awake alert and aware of environment, oriented X3 and speaking appropriately  Appearance: Pt is in no acute distress, Pt is well groomed and clean  Skin: skin is warm and dry with normal turgor, mucus membranes are moist and pink, skin is intact with no bruising or breakdown  Muskuloskeletal: Pt moves all extremities well, there is no obvious swelling or deformities noted, pulses are intact.  Respiratory: Airway is open and patent, respirations are spontaneous and even.  Cardiac:no edema and cap refill is <3sec  Abdomen: soft, non-tender and non-distended  Neuro: Pt follows commands easily and has no obvious deficits

## 2017-04-01 ENCOUNTER — HOSPITAL ENCOUNTER (EMERGENCY)
Facility: OTHER | Age: 27
Discharge: HOME | End: 2017-04-01
Attending: EMERGENCY MEDICINE
Payer: MEDICAID

## 2017-04-01 VITALS
TEMPERATURE: 98 F | WEIGHT: 129 LBS | OXYGEN SATURATION: 100 % | BODY MASS INDEX: 22.02 KG/M2 | HEIGHT: 64 IN | DIASTOLIC BLOOD PRESSURE: 85 MMHG | SYSTOLIC BLOOD PRESSURE: 135 MMHG | RESPIRATION RATE: 18 BRPM | HEART RATE: 85 BPM

## 2017-04-01 DIAGNOSIS — N39.0 URINARY TRACT INFECTION WITH HEMATURIA, SITE UNSPECIFIED: Primary | ICD-10-CM

## 2017-04-01 DIAGNOSIS — R31.9 URINARY TRACT INFECTION WITH HEMATURIA, SITE UNSPECIFIED: Primary | ICD-10-CM

## 2017-04-01 DIAGNOSIS — K75.9 HEPATITIS: ICD-10-CM

## 2017-04-01 LAB
ALBUMIN SERPL BCP-MCNC: 3.6 G/DL
ALP SERPL-CCNC: 129 U/L
ALT SERPL W/O P-5'-P-CCNC: 69 U/L
ANION GAP SERPL CALC-SCNC: 5 MMOL/L
AST SERPL-CCNC: 202 U/L
B-HCG UR QL: NEGATIVE
BACTERIA #/AREA URNS HPF: ABNORMAL /HPF
BASOPHILS # BLD AUTO: 0.01 K/UL
BASOPHILS NFR BLD: 0.1 %
BILIRUB SERPL-MCNC: 0.6 MG/DL
BILIRUB UR QL STRIP: NEGATIVE
BUN SERPL-MCNC: 7 MG/DL
C TRACH DNA SPEC QL NAA+PROBE: NOT DETECTED
CALCIUM SERPL-MCNC: 9 MG/DL
CHLORIDE SERPL-SCNC: 109 MMOL/L
CK SERPL-CCNC: 75 U/L
CLARITY UR: CLEAR
CO2 SERPL-SCNC: 27 MMOL/L
COLOR UR: YELLOW
CREAT SERPL-MCNC: 0.7 MG/DL
CTP QC/QA: YES
DIFFERENTIAL METHOD: ABNORMAL
EOSINOPHIL # BLD AUTO: 0 K/UL
EOSINOPHIL NFR BLD: 0.2 %
ERYTHROCYTE [DISTWIDTH] IN BLOOD BY AUTOMATED COUNT: 15.5 %
EST. GFR  (AFRICAN AMERICAN): >60 ML/MIN/1.73 M^2
EST. GFR  (NON AFRICAN AMERICAN): >60 ML/MIN/1.73 M^2
GLUCOSE SERPL-MCNC: 107 MG/DL
GLUCOSE UR QL STRIP: NEGATIVE
HCT VFR BLD AUTO: 35.5 %
HGB BLD-MCNC: 11.2 G/DL
HGB UR QL STRIP: NEGATIVE
KETONES UR QL STRIP: NEGATIVE
LEUKOCYTE ESTERASE UR QL STRIP: ABNORMAL
LIPASE SERPL-CCNC: 37 U/L
LYMPHOCYTES # BLD AUTO: 1.6 K/UL
LYMPHOCYTES NFR BLD: 15.8 %
MCH RBC QN AUTO: 25.9 PG
MCHC RBC AUTO-ENTMCNC: 31.5 %
MCV RBC AUTO: 82 FL
MICROSCOPIC COMMENT: ABNORMAL
MONOCYTES # BLD AUTO: 1 K/UL
MONOCYTES NFR BLD: 10.4 %
N GONORRHOEA DNA SPEC QL NAA+PROBE: NOT DETECTED
NEUTROPHILS # BLD AUTO: 7.2 K/UL
NEUTROPHILS NFR BLD: 73.3 %
NITRITE UR QL STRIP: NEGATIVE
NON-SQ EPI CELLS #/AREA URNS HPF: 2 /HPF
PH UR STRIP: 8.5 [PH] (ref 5–8)
PLATELET # BLD AUTO: 250 K/UL
PMV BLD AUTO: 11.7 FL
POTASSIUM SERPL-SCNC: 4.2 MMOL/L
PROT SERPL-MCNC: 7.4 G/DL
PROT UR QL STRIP: NEGATIVE
RBC # BLD AUTO: 4.32 M/UL
RBC #/AREA URNS HPF: 2 /HPF (ref 0–4)
SODIUM SERPL-SCNC: 141 MMOL/L
SP GR UR STRIP: 1.02 (ref 1–1.03)
SQUAMOUS #/AREA URNS HPF: 6 /HPF
URN SPEC COLLECT METH UR: ABNORMAL
UROBILINOGEN UR STRIP-ACNC: ABNORMAL EU/DL
WBC # BLD AUTO: 9.83 K/UL
WBC #/AREA URNS HPF: 20 /HPF (ref 0–5)
WBC CLUMPS URNS QL MICRO: ABNORMAL

## 2017-04-01 PROCEDURE — 81025 URINE PREGNANCY TEST: CPT | Performed by: EMERGENCY MEDICINE

## 2017-04-01 PROCEDURE — 81000 URINALYSIS NONAUTO W/SCOPE: CPT

## 2017-04-01 PROCEDURE — 85025 COMPLETE CBC W/AUTO DIFF WBC: CPT

## 2017-04-01 PROCEDURE — 80074 ACUTE HEPATITIS PANEL: CPT

## 2017-04-01 PROCEDURE — 96374 THER/PROPH/DIAG INJ IV PUSH: CPT

## 2017-04-01 PROCEDURE — 82550 ASSAY OF CK (CPK): CPT

## 2017-04-01 PROCEDURE — 87591 N.GONORRHOEAE DNA AMP PROB: CPT

## 2017-04-01 PROCEDURE — 63600175 PHARM REV CODE 636 W HCPCS: Performed by: EMERGENCY MEDICINE

## 2017-04-01 PROCEDURE — 83690 ASSAY OF LIPASE: CPT

## 2017-04-01 PROCEDURE — 99284 EMERGENCY DEPT VISIT MOD MDM: CPT | Mod: 25

## 2017-04-01 PROCEDURE — 36415 COLL VENOUS BLD VENIPUNCTURE: CPT

## 2017-04-01 PROCEDURE — 80053 COMPREHEN METABOLIC PANEL: CPT

## 2017-04-01 RX ORDER — IBUPROFEN 600 MG/1
600 TABLET ORAL EVERY 6 HOURS PRN
Qty: 20 TABLET | Refills: 0 | Status: SHIPPED | OUTPATIENT
Start: 2017-04-01 | End: 2017-06-26

## 2017-04-01 RX ORDER — KETOROLAC TROMETHAMINE 30 MG/ML
15 INJECTION, SOLUTION INTRAMUSCULAR; INTRAVENOUS
Status: COMPLETED | OUTPATIENT
Start: 2017-04-01 | End: 2017-04-01

## 2017-04-01 RX ORDER — TRAMADOL HYDROCHLORIDE 50 MG/1
50 TABLET ORAL EVERY 6 HOURS PRN
Qty: 12 TABLET | Refills: 0 | Status: SHIPPED | OUTPATIENT
Start: 2017-04-01 | End: 2017-04-11

## 2017-04-01 RX ORDER — CIPROFLOXACIN 500 MG/1
500 TABLET ORAL 2 TIMES DAILY
Qty: 20 TABLET | Refills: 0 | Status: SHIPPED | OUTPATIENT
Start: 2017-04-01 | End: 2017-04-11

## 2017-04-01 RX ADMIN — KETOROLAC TROMETHAMINE 15 MG: 30 INJECTION, SOLUTION INTRAMUSCULAR at 03:04

## 2017-04-01 NOTE — ED PROVIDER NOTES
Encounter Date: 4/1/2017    SCRIBE #1 NOTE: I, Alirio Cabrales, am scribing for, and in the presence of, Dr. Chase.       History     Chief Complaint   Patient presents with    Back Pain     pt reports persistent thoracic back pain not relieved with muscle relaxants and also c/o vomiting x 5 episodes tonight     Review of patient's allergies indicates:  No Known Allergies  HPI Comments: Time seen by provider: 3:08 AM    This is a 26 y.o. female who presents to the ED with a chief complaint of RUQ and mid back pain. She reports that it has been since onset a week ago. She states that it is worse with lying flat. She notes that nothing alleviates her pain. She describes it as sharp and rated at a 9/10 in severity, but appears quite comfortable. The patient notes no change with eating or drinking. She reports that she has been seen for this here in the past and was given flexeril. She notes no relief with use of the flexeril.     She reports recent chills and a vomiting episode today. The patient denies any cough, SOB, rashes, skin lesions, fever, dysuria, difficulty urinating, vaginal bleeding, vaginal discharge, or leg pain or swelling.     She reports that she gave birth two months ago. No past major medical hx or known allergies reported. The patient denies any drug or tobacco use.    The history is provided by the patient.     Past Medical History:   Diagnosis Date    Abnormal Pap smear of cervix yrs ago    repeat pap    Ovarian cyst      Past Surgical History:   Procedure Laterality Date    TONSILLECTOMY, ADENOIDECTOMY       Family History   Problem Relation Age of Onset    Diabetes Maternal Grandmother     Diabetes Mother     Breast cancer Neg Hx     Colon cancer Neg Hx     Ovarian cancer Neg Hx      Social History   Substance Use Topics    Smoking status: Never Smoker    Smokeless tobacco: Never Used    Alcohol use Yes      Comment: Social      Review of Systems   Constitutional: Positive for chills.  Negative for fever.   HENT: Negative for sore throat.    Eyes: Negative for visual disturbance.   Respiratory: Negative for cough and shortness of breath.    Cardiovascular: Negative for chest pain, palpitations and leg swelling.   Gastrointestinal: Positive for abdominal pain and vomiting. Negative for diarrhea and nausea.   Genitourinary: Negative for difficulty urinating, dysuria, vaginal bleeding and vaginal discharge.   Musculoskeletal: Positive for back pain. Negative for joint swelling, neck pain and neck stiffness.   Skin: Negative for rash.   Neurological: Negative for weakness, numbness and headaches.   Hematological: Does not bruise/bleed easily.   Psychiatric/Behavioral: Negative for confusion.       Physical Exam   Initial Vitals   BP Pulse Resp Temp SpO2   04/01/17 0058 04/01/17 0058 04/01/17 0058 04/01/17 0058 04/01/17 0058   116/70 69 18 97.9 °F (36.6 °C) 100 %     Physical Exam    Nursing note and vitals reviewed.  Constitutional: She appears well-developed and well-nourished. She is not diaphoretic. No distress.   HENT:   Head: Normocephalic and atraumatic.   Right Ear: External ear normal.   Left Ear: External ear normal.   Mouth/Throat: Oropharynx is clear and moist.   Eyes: Conjunctivae and EOM are normal. Pupils are equal, round, and reactive to light. Right eye exhibits no discharge. Left eye exhibits no discharge.   Neck: Normal range of motion.   Cardiovascular: Normal rate, regular rhythm and normal heart sounds. Exam reveals no gallop and no friction rub.    No murmur heard.  Pulmonary/Chest: Breath sounds normal. No respiratory distress. She has no wheezes. She has no rhonchi. She has no rales.   Abdominal: Soft. There is no rebound and no guarding.   Epigastric and RUQ TTP. Negative Meade's sign.   Musculoskeletal: Normal range of motion. She exhibits no edema or tenderness.   Neurological: She is alert and oriented to person, place, and time. She has normal strength.   Skin: Skin is  warm and dry. No rash and no abscess noted. No erythema. No pallor.   Psychiatric: She has a normal mood and affect. Her behavior is normal. Judgment and thought content normal.         ED Course   Procedures  Labs Reviewed   CBC W/ AUTO DIFFERENTIAL - Abnormal; Notable for the following:        Result Value    Hemoglobin 11.2 (*)     Hematocrit 35.5 (*)     MCH 25.9 (*)     MCHC 31.5 (*)     RDW 15.5 (*)     Gran% 73.3 (*)     Lymph% 15.8 (*)     All other components within normal limits   COMPREHENSIVE METABOLIC PANEL - Abnormal; Notable for the following:      (*)     ALT 69 (*)     Anion Gap 5 (*)     All other components within normal limits   URINALYSIS - Abnormal; Notable for the following:     Urobilinogen, UA 2.0-3.0 (*)     Leukocytes, UA 1+ (*)     All other components within normal limits   URINALYSIS MICROSCOPIC - Abnormal; Notable for the following:     WBC, UA 20 (*)     WBC Clumps, UA Occasional (*)     Non-Squam Epith 2 (*)     All other components within normal limits   C. TRACHOMATIS/N. GONORRHOEAE BY AMP DNA   C. TRACHOMATIS/N. GONORRHOEAE BY AMP DNA   LIPASE   CK   HEPATITIS PANEL, ACUTE   HEPATITIS PANEL, ACUTE   POCT URINE PREGNANCY        Imaging Results         US Abdomen Limited (Final result) Result time:  04/01/17 04:28:20    Final result by Marvin Arciniega MD (04/01/17 04:28:20)    Impression:        Cholelithiasis with mildly thickened gallbladder wall and trace pericholecystic fluid. No sonographic Meade's sign reported by the sonographer. Consider HIDA scan to evaluate for acute cholecystitis.      Electronically signed by: MARVIN ARCINIEGA MD  Date:     04/01/17  Time:    04:28     Narrative:    Comparison: None.    Technique:  Limited right upper quadrant ultrasound performed.    Findings:    The visualized pancreas is unremarkable.  The gallbladder is well distended and contains numerous small, mobile echogenic foci indicating cholelithiasis. There is mild gallbladder wall  thickening measuring 4.3 mm and trace pericholecystic fluid noted There is no hyperemia of the gallbladder wall or sonographic Meade's sign reported.  The common bile duct is not enlarged and measures 2.0 mm in diameter. Hepatic parenchyma included on this exam is unremarkable relative to echogenicity of the adjacent right renal cortex.                 Medical Decision Making:   ED Management:  Emergent evaluation a 26-year-old female with complaint of abdominal pain and back pain and the single episode of vomiting today.  Vital signs are benign, afebrile.  On exam she had mild epigastric and right upper quadrant tenderness.  There is a negative Meade sign.  She was treated with Toradol with modest improvement.  Labs showed mild elevation in AST and ALTs.  Due to location of pain, was concern for possible cholecystitis.  Ultrasound showed no gallbladder wall thickening or ductal dilatation.  Hepatitis panel was sent, shows no viral hepatitis.  I also considered possibility of Rgyi-Rbsu-Xucppo syndrome, but gonorrhea and chlamydia are negative.  Urinalysis does show evidence of UTI.  Given back pain, we'll treat as pyelonephritis.  She was treated with high-dose Cipro, discharged with 10 day course of high-dose Cipro, tramadol and ibuprofen for pain.  I encouraged close follow-up with PCP or return for any new or worsening symptoms.            Scribe Attestation:   Scribe #1: I performed the above scribed service and the documentation accurately describes the services I performed. I attest to the accuracy of the note.    Attending Attestation:           Physician Attestation for Scribe:  Physician Attestation Statement for Scribe #1: I, Dr. Chase, reviewed documentation, as scribed by Alirio Cabrales in my presence, and it is both accurate and complete.                 ED Course     Clinical Impression:     1. Urinary tract infection with hematuria, site unspecified    2. Hepatitis                Divya Chase,  MD  04/09/17 3319

## 2017-04-01 NOTE — ED AVS SNAPSHOT
[facility]  [address]  [facility] [address]               Jose Luis Crowder   2017  1:32 AM   ED    Description:  Female : 1990   Department:  [facility]           Your Care was Coordinated By:     Provider Role From To    Divya Chase MD Attending Provider 17 0221 --      Reason for Visit     Back Pain           Diagnoses this Visit        Comments    Urinary tract infection with hematuria, site unspecified    -  Primary     Hepatitis           ED Disposition     ED Disposition Condition Comment    Discharge             To Do List           Follow-up Information     Schedule an appointment as soon as possible for a visit with [facility].    Specialties:  Behavioral Health, Psychiatry    Why:  for close follow up - or your regular doctor if you have one    Contact information:    [address]  [facility] [address]  [phone]          Follow up with [facility].    Specialty:  Emergency Medicine    Why:  As needed, If symptoms worsen    Contact information:    [address]  [facility] [address] [phone]       These Medications        Disp Refills Start End    ibuprofen (ADVIL,MOTRIN) 600 MG tablet 20 tablet 0 2017     Take 1 tablet (600 mg total) by mouth every 6 (six) hours as needed for Pain. - Oral    Pharmacy: [facility] [address] [place] [address] - [address] Ph #: [phone]       tramadol (ULTRAM) 50 mg tablet 12 tablet 0 2017    Take 1 tablet (50 mg total) by mouth every 6 (six) hours as needed for Pain. - Oral    Pharmacy: [facility] [employer] [address] [place] [address] - [address] Ph #: [phone]       ciprofloxacin HCl (CIPRO) 500 MG tablet 20 tablet 0 2017    Take 1 tablet (500 mg total) by mouth 2 (two) times daily. - Oral     Pharmacy: [facility] [address] [place], [place] - [address] AT [address]  #: [phone]         [facility] On Call     [facility] On Call Nurse Care Line - 24/7 Assistance  Unless otherwise directed by your provider, please contact [facility], our nurse care line that is available for 24/7 assistance.     Registered nurses in the [facility] provide: appointment scheduling, clinical advisement, health education, and other advisory services.  Call: [phone] (toll free)               Medications           Message regarding Medications     Verify the changes and/or additions to your medication regime listed below are the same as discussed with your clinician today.  If any of these changes or additions are incorrect, please notify your healthcare provider.        START taking these NEW medications        Refills    tramadol (ULTRAM) 50 mg tablet 0    Sig: Take 1 tablet (50 mg total) by mouth every 6 (six) hours as needed for Pain.    Class: Print    Route: Oral    ciprofloxacin HCl (CIPRO) 500 MG tablet 0    Sig: Take 1 tablet (500 mg total) by mouth 2 (two) times daily.    Class: Print    Route: Oral      These medications were administered today        Dose Freq    ketorolac injection 15 mg 15 mg ED 1 Time    Sig: Inject 15 mg into the vein ED 1 Time.    Class: Normal    Route: Intravenous           Verify that the below list of medications is an accurate representation of the medications you are currently taking.  If none reported, the list may be blank. If incorrect, please contact your healthcare provider. Carry this list with you in case of emergency.           Current Medications     cyclobenzaprine (FLEXERIL) 10 MG tablet Take 1 tablet (10 mg total) by mouth 3 (three) times daily as needed for Muscle spasms.    norethindrone-ethinyl estradiol (JUNEL FE 1/20, 28,) 1 mg-20 mcg (21)/75 mg (7) per tablet Take 1 tablet by mouth once daily.    ciprofloxacin  "HCl (CIPRO) 500 MG tablet Take 1 tablet (500 mg total) by mouth 2 (two) times daily.    ibuprofen (ADVIL,MOTRIN) 600 MG tablet Take 1 tablet (600 mg total) by mouth every 6 (six) hours as needed for Pain.    tramadol (ULTRAM) 50 mg tablet Take 1 tablet (50 mg total) by mouth every 6 (six) hours as needed for Pain.           Clinical Reference Information           Your Vitals Were     BP Pulse Temp Resp Height Weight    116/70 69 97.9 °F (36.6 °C) (Oral) 18 5' 4" (1.626 m) 58.5 kg (129 lb)    Last Period SpO2 BMI          04/26/2016 100% 22.14 kg/m2        Allergies as of 4/1/2017     No Known Allergies      Immunizations Administered on Date of Encounter - 4/1/2017     None      ED Micro, Lab, POCT     Start Ordered       Status Ordering Provider    04/01/17 0452 04/01/17 0451  Hepatitis panel, acute  Add-on      Completed     04/01/17 0436 04/01/17 0435  C. trachomatis/N. gonorrhoeae by AMP DNA Urine  Add-on      Completed     04/01/17 0349 04/01/17 0348  POCT urine pregnancy  Once      Final result     04/01/17 0317 04/01/17 0316  CBC auto differential  STAT      Final result     04/01/17 0317 04/01/17 0316  Comprehensive metabolic panel  STAT      Final result     04/01/17 0317 04/01/17 0316  Lipase  STAT      Final result     04/01/17 0317 04/01/17 0316  CPK  STAT      Final result     04/01/17 0317 04/01/17 0316  Urinalysis  STAT      Final result     04/01/17 0316 04/01/17 0316  Urinalysis Microscopic  Once      Final result       ED Imaging Orders     Start Ordered       Status Ordering Provider    04/01/17 0317 04/01/17 0316  US Abdomen Limited  1 time imaging      Final result       Discharge References/Attachments     PYELONEPHRITIS, FEMALE (ADULT) (ENGLISH)    HEPATITIS, CAUSE UNKNOWN (TEST PENDING) (ENGLISH)       [facility] complies with applicable Federal civil rights laws and does not discriminate on the basis of race, color, national origin, age, disability, or sex.      "   Language Assistance Services     ATTENTION: Language assistance services are available, free of charge. Please call [phone].      ATENCIÓN: Si habla lesañol, tiene a martinez disposición servicios gratuitos de asistencia lingüística. Llame al [phone].     CHÚ Ý: N?u b?n nói Ti?ng Vi?t, có các d?ch v? h? tr? ngôn ng? mi?n phí dành cho b?n. G?i s? [phone].

## 2017-04-03 LAB
HAV IGM SERPL QL IA: NEGATIVE
HBV CORE IGM SERPL QL IA: NEGATIVE
HBV SURFACE AG SERPL QL IA: NEGATIVE
HCV AB SERPL QL IA: NEGATIVE

## 2017-04-19 ENCOUNTER — LAB VISIT (OUTPATIENT)
Dept: LAB | Facility: OTHER | Age: 27
End: 2017-04-19
Attending: INTERNAL MEDICINE
Payer: MEDICAID

## 2017-04-19 ENCOUNTER — OFFICE VISIT (OUTPATIENT)
Dept: INTERNAL MEDICINE | Facility: CLINIC | Age: 27
End: 2017-04-19
Payer: MEDICAID

## 2017-04-19 VITALS
SYSTOLIC BLOOD PRESSURE: 96 MMHG | HEART RATE: 75 BPM | OXYGEN SATURATION: 96 % | WEIGHT: 129 LBS | DIASTOLIC BLOOD PRESSURE: 65 MMHG | HEIGHT: 64 IN | BODY MASS INDEX: 22.02 KG/M2

## 2017-04-19 DIAGNOSIS — R10.11 RUQ PAIN: ICD-10-CM

## 2017-04-19 DIAGNOSIS — K80.20 CALCULUS OF GALLBLADDER WITHOUT CHOLECYSTITIS WITHOUT OBSTRUCTION: ICD-10-CM

## 2017-04-19 DIAGNOSIS — R10.9 BILATERAL FLANK PAIN: ICD-10-CM

## 2017-04-19 DIAGNOSIS — M54.6 ACUTE BILATERAL THORACIC BACK PAIN: Primary | ICD-10-CM

## 2017-04-19 LAB
ALBUMIN SERPL BCP-MCNC: 3.7 G/DL
ALP SERPL-CCNC: 79 U/L
ALT SERPL W/O P-5'-P-CCNC: 11 U/L
AMYLASE SERPL-CCNC: 69 U/L
ANION GAP SERPL CALC-SCNC: 9 MMOL/L
AST SERPL-CCNC: 16 U/L
BILIRUB SERPL-MCNC: 0.1 MG/DL
BILIRUB UR QL STRIP: NEGATIVE
BUN SERPL-MCNC: 7 MG/DL
CALCIUM SERPL-MCNC: 8.9 MG/DL
CHLORIDE SERPL-SCNC: 106 MMOL/L
CLARITY UR: CLEAR
CO2 SERPL-SCNC: 26 MMOL/L
COLOR UR: YELLOW
CREAT SERPL-MCNC: 0.8 MG/DL
EST. GFR  (AFRICAN AMERICAN): >60 ML/MIN/1.73 M^2
EST. GFR  (NON AFRICAN AMERICAN): >60 ML/MIN/1.73 M^2
GLUCOSE SERPL-MCNC: 78 MG/DL
GLUCOSE UR QL STRIP: NEGATIVE
HGB UR QL STRIP: NEGATIVE
KETONES UR QL STRIP: NEGATIVE
LEUKOCYTE ESTERASE UR QL STRIP: NEGATIVE
LIPASE SERPL-CCNC: 52 U/L
NITRITE UR QL STRIP: NEGATIVE
PH UR STRIP: 7 [PH] (ref 5–8)
POTASSIUM SERPL-SCNC: 3.7 MMOL/L
PROT SERPL-MCNC: 7.3 G/DL
PROT UR QL STRIP: NEGATIVE
SODIUM SERPL-SCNC: 141 MMOL/L
SP GR UR STRIP: 1.02 (ref 1–1.03)
URN SPEC COLLECT METH UR: NORMAL
UROBILINOGEN UR STRIP-ACNC: NEGATIVE EU/DL

## 2017-04-19 PROCEDURE — 99214 OFFICE O/P EST MOD 30 MIN: CPT | Mod: S$PBB,,, | Performed by: INTERNAL MEDICINE

## 2017-04-19 PROCEDURE — 80053 COMPREHEN METABOLIC PANEL: CPT

## 2017-04-19 PROCEDURE — 82150 ASSAY OF AMYLASE: CPT

## 2017-04-19 PROCEDURE — 83690 ASSAY OF LIPASE: CPT

## 2017-04-19 PROCEDURE — 36415 COLL VENOUS BLD VENIPUNCTURE: CPT

## 2017-04-19 PROCEDURE — 99999 PR PBB SHADOW E&M-EST. PATIENT-LVL III: CPT | Mod: PBBFAC,,, | Performed by: INTERNAL MEDICINE

## 2017-04-19 NOTE — PROGRESS NOTES
Subjective:       Patient ID: Jose Luis Crowder is a 26 y.o. female.    Chief Complaint: Back Pain and Flank Pain    HPI Comments: Pt c/o pain in across mid back that has been present for 3 wks. No known inciting event or trauma. Pain does not radiate. No associated n/v but does feel chest hurts across the front both sides. No associated sob. No relation to exertion. Worse with laying down and sitting up and moving around makes it better. No bonnie abd pain. She stopped breast feeding 2 mos ago and is 3 mos postpartum. She went to ED 4x for this here at [facility]. Records reviewed. Most recently was given cipro for presumed UTI which she completed but no improvement in pain since completing. However, she says that she had no pain while taking cipro. She had CTA chest that was neg for PE. She did have elevated LFTs and u/s showed some gallbladder wall thickening but no clear cholecystitis. She did have cholelithiasis.     Back Pain   Pertinent negatives include no abdominal pain, chest pain, dysuria, fever, numbness or weakness.   Flank Pain   Pertinent negatives include no abdominal pain, chest pain, dysuria, fever, numbness or weakness.     Review of Systems   Constitutional: Negative for fever.   Respiratory: Negative for shortness of breath.    Cardiovascular: Negative for chest pain, palpitations and leg swelling.   Gastrointestinal: Negative for abdominal pain, blood in stool, constipation, diarrhea, nausea and vomiting.   Genitourinary: Positive for flank pain. Negative for dysuria, frequency, vaginal bleeding, vaginal discharge and vaginal pain.   Musculoskeletal: Positive for back pain.   Neurological: Negative for weakness and numbness.   Psychiatric/Behavioral: Negative for dysphoric mood.       Objective:      Physical Exam   Constitutional: She is oriented to person, place, and time. She appears well-developed and well-nourished.   Neck: Neck supple. No thyromegaly present.   Cardiovascular: Normal rate, regular  rhythm and normal heart sounds.    Pulmonary/Chest: Effort normal and breath sounds normal.   Abdominal: Soft. Bowel sounds are normal. She exhibits no distension and no mass. There is tenderness. There is no rebound and no guarding.   Mild RUQ tenderness. Neg francis's   Musculoskeletal: She exhibits no edema.   Lymphadenopathy:     She has no cervical adenopathy.   Neurological: She is alert and oriented to person, place, and time.   Psychiatric: She has a normal mood and affect. Her behavior is normal.       Assessment:       1. Acute bilateral thoracic back pain    2. Bilateral flank pain    3. RUQ pain    4. Calculus of gallbladder without cholecystitis without obstruction        Plan:       1. Recheck UA and labs  2. Some concern for cholelithiasis vs cholecystitis; await repeat labs; will go ahead with gen surg referral for eval; consider HIDA  3. ED and RTC prompts d/w pt and she understood

## 2017-04-26 ENCOUNTER — OFFICE VISIT (OUTPATIENT)
Dept: SURGERY | Facility: CLINIC | Age: 27
End: 2017-04-26
Payer: MEDICAID

## 2017-04-26 ENCOUNTER — TELEPHONE (OUTPATIENT)
Dept: SURGERY | Facility: CLINIC | Age: 27
End: 2017-04-26

## 2017-04-26 ENCOUNTER — PATIENT MESSAGE (OUTPATIENT)
Dept: SURGERY | Facility: CLINIC | Age: 27
End: 2017-04-26

## 2017-04-26 VITALS
DIASTOLIC BLOOD PRESSURE: 69 MMHG | WEIGHT: 130 LBS | HEIGHT: 64 IN | SYSTOLIC BLOOD PRESSURE: 95 MMHG | BODY MASS INDEX: 22.2 KG/M2 | HEART RATE: 67 BPM | TEMPERATURE: 98 F

## 2017-04-26 DIAGNOSIS — K80.50 BILIARY COLIC: Primary | ICD-10-CM

## 2017-04-26 PROCEDURE — 99214 OFFICE O/P EST MOD 30 MIN: CPT | Mod: PBBFAC | Performed by: SURGERY

## 2017-04-26 PROCEDURE — 99999 PR PBB SHADOW E&M-EST. PATIENT-LVL IV: CPT | Mod: PBBFAC,,, | Performed by: SURGERY

## 2017-04-26 PROCEDURE — 99203 OFFICE O/P NEW LOW 30 MIN: CPT | Mod: S$PBB,,, | Performed by: SURGERY

## 2017-04-26 NOTE — MR AVS SNAPSHOT
[facility] - General Surgery  [address]  [facility] [address]  Phone: [phone]                  Jose Luis Crowder   2017 9:30 AM   Office Visit    Description:  Female : 1990   Provider:  Stone Kay MD   Department:  [facility] - General Surgery           Reason for Visit     Consult     RUQ Pain                To Do List           Goals (5 Years of Data)     None      [facility]     [facility] Care Line -  Assistance  Unless otherwise directed by your provider, please contact [facility], our nurse care line that is available for  assistance.     Registered nurses in the [facility] provide: appointment scheduling, clinical advisement, health education, and other advisory services.  Call: [phone] (toll free)               Medications           Message regarding Medications     Verify the changes and/or additions to your medication regime listed below are the same as discussed with your clinician today.  If any of these changes or additions are incorrect, please notify your healthcare provider.             Verify that the below list of medications is an accurate representation of the medications you are currently taking.  If none reported, the list may be blank. If incorrect, please contact your healthcare provider. Carry this list with you in case of emergency.           Current Medications     ibuprofen (ADVIL,MOTRIN) 600 MG tablet Take 1 tablet (600 mg total) by mouth every 6 (six) hours as needed for Pain.    norethindrone-ethinyl estradiol (JUNEL FE 1/20, 28,) 1 mg-20 mcg (21)/75 mg (7) per tablet Take 1 tablet by mouth once daily.           Clinical Reference Information           Prenatal Vitals     Enc. Date GA Prenatal Vitals Prenatal Pulse Pain Level Urine Albumin/Glucose Edema Presentation Dilation/Effacement/Station    17 39w3d 95/69 / 59 kg (130 lb)  67         3/20/17 39w3d 110/60 / 59 kg (130 lb 1.1 oz)            "1/27/17 39w3d Admission Dept: [facility] L&D    1/26/17 39w2d Admission Dx: 39 weeks gestation of pregnancy Dept: [facility] CAESAR    1/24/17 39w0d 100/58 (A) / 66.9 kg (147 lb 7.8 oz) 39 cm / 140 / Present  0 Negative / Negative +1 / +1  1.5 / 60 / -3    1/17/17 38w0d 100/62 / 66 kg (145 lb 8.1 oz) 38 cm /  / Present  0 Negative / Negative +1 / +1 Vertex 1 / 50 / -3    1/10/17 37w0d 90/60 / 66.9 kg (147 lb 7.8 oz) 37 cm / 140 / Present  0 Negative / Negative +1 / +1 Vertex .5 / 50 / -3    1/3/17 36w0d 99/62 / 66.1 kg (145 lb 11.6 oz)   0 Negative / Negative None / None Vertex .5 / 50 / -4    12/27/16 35w0d 96/78 / 66.1 kg (145 lb 11.6 oz) 35 cm / 145 / Present  0 Negative / Negative None / None  0    12/15/16 33w2d 90/60 / 64.9 kg (143 lb 1.3 oz)  / 140 / Present  0 Negative / Negative None / None Vertex     11/28/16 30w6d 90/60 / 64.1 kg (141 lb 5 oz) 31 cm / 140 / Present  0 Negative / Negative None / None      10/24/16 25w6d 100/60 / 61.7 kg (136 lb 0.4 oz)  / 150 / Present  0 Negative / Negative None / None      9/29/16 22w2d 90/52 (A) / 60.9 kg (134 lb 4.2 oz)  / 150 / Present  0 Negative / Negative None / None      9/21/16 21w1d Admission Dx: Nausea and vomiting during pregnancy prior to 22 weeks gestation Dept: [facility] OB ASSE    9/19/16 20w6d Admission Dx: Nausea & vomiting Dept: [facility] OB ASSE    9/8/16 19w2d 110/62 / 58 kg (127 lb 13.9 oz)  / 150 / Present  0 Negative / Negative None / None      8/11/16 15w2d 100/62 / 58.2 kg (128 lb 4.9 oz)  / 150  4 Negative / Negative None / None      7/21/16 12w2d 102/62 / 51.2 kg (112 lb 14 oz)  / 150  0 Negative / Negative None / None         Number of babies: 1   Height: 5' 5" (1.651 m)       Your Vitals Were     BP Pulse Temp Height Weight Last Period    95/69 67 98.2 °F (36.8 °C) (Oral) 5' 4" (1.626 m) 59 kg (130 lb) 04/26/2016    BMI                22.31 kg/m2          Blood Pressure          Most Recent Value    BP  95/69      Allergies as of 4/26/2017     No Known Allergies "      Immunizations Administered on Date of Encounter - 4/26/2017     None      Instructions      Gallstones with Biliary Colic    You have abdominal pain due to irritation and spasm of the gallbladder. This is called biliary colic. The gallbladder is a small sac under the liver, which stores and releases a fluid that aids in the digestion of fat. A collection of crystals may form stones inside the gallbladder (gallstones). Gallstones can cause the gallbladder to spasm. If they block the duct out of the gallbladder, they can cause pain and even an infection.   A number of factors increase the risk for having gallstones:  · Being female  · Being severely overweight (obese)  · Older age  · Losing or gaining weight quickly  · Eating a high-calorie diet  · Being pregnant  · Taking hormone therapy  · Having diabetes  Home care  · Rest in bed.  · Drink only clear liquids until you feel better.  · You may have been prescribed medicine for pain or nausea. Take these as directed.  · Fat in your diet makes the gallbladder contract and may cause increased pain. Avoid foods that are high in fat (such as full-fat dairy, fried foods, and fatty meats) for at least two days.  · If you are overweight, talk to your healthcare provider about losing weight.  Follow-up care  Follow up with your healthcare provider or as advised. There is a chance that you will have another episode of pain from your gallstones at some point. Removal of the gallbladder is an option to prevent this. Talk with your healthcare provider about your treatment options.  When to seek medical advice  Call your healthcare provider if any of the following occur:  · Worsening pain or pain lasting for longer than 6 hours  · Pain moving to the right lower abdomen  · Repeated vomiting  · Swollen abdomen  · Fever of 100.4ºF (38ºC) or higher, or as directed by your healthcare provider  · Very dark urine, light colored stools, or yellow color of the skin or eyes  · Chest,  arm, back, neck or jaw pain  Date Last Reviewed: 6/18/2015  © 7223-3521 [employer]. [address] [place] [address], [place] [address]. All rights reserved. This information is not intended as a substitute for professional medical care. Always follow your healthcare professional's instructions.        Cholecystectomy     Clips close off the duct connecting the gallbladder to the bile duct. The gallbladder is then removed.     Youve had painful attacks caused by gallstones. To treat the problem, your healthcare provider wants to remove your gallbladder. This surgery is called cholecystectomy. Removing the gallbladder can relieve pain. It will also prevent future attacks. You can live a healthy life without your gallbladder. You may also be able to go back to eating foods you enjoyed before your gallbladder problems started.  Before your surgery  Be prepared:  · Tell your provider what medicines you take. Include those bought over the counter. Also include herbs or supplements. Be sure to mention if you take prescription blood thinners. This includes warfarin, clopidogrel, and aspirin.  · Have any tests your provider asks for, such as blood tests.  · Dont eat or drink after midnight, the night before your surgery. This includes water, coffee, and mints. However, you may need to take some medicine with sips of water--talk with your healthcare provider.  The day of surgery  When you arrive, you will prepare for surgery:  · An IV line will be put into a vein in your arm or hand. This gives you fluids and medicine.  · An anesthesiologist will talk with you about anesthesia. This is medicine used to prevent pain. You will receive general anesthesia. This puts you into a state like deep sleep through the procedure.  During surgery  There are 2 methods for removing the gallbladder. Your healthcare provider will choose which method is best for you:  · Laparoscopic cholecystectomy. This is most common. During surgery,  2 to 4 small incisions are made. A thin tube with a camera is used. This is called a laparoscope. The scope is put through one of the incisions. It sends images to a video screen. Surgical tools are put through other incisions. The gallbladder is removed using the scope and these tools.  · Open cholecystectomy. One larger incision is made. The surgeon sees and works through this incision. Open surgery is most often used when scarring or other factors make it a better choice for you.  In some cases, safety requires a change from laparoscopic to open surgery during the procedure.  After surgery  You will be sent to a room to wake up from the anesthesia. You will likely go home the same day. In some cases, an overnight stay is needed. If you had open cholecystectomy, you may need to stay in the hospital for a few days. When you are released to go home, have a family member or friend ready to drive you.  Risks and possible complications of gallbladder surgery  All surgeries have risks. The risks of gallbladder surgery include:  · Bleeding  · Infection  · Injury to the common bile duct or nearby organs  · Blood clots in the legs  · Bile leaks  · Hernia at incision site  · Pnemonia   Date Last Reviewed: 7/1/2016  © 3583-7235 [employer]. [address] [place] [address], [place] [address]. All rights reserved. This information is not intended as a substitute for professional medical care. Always follow your healthcare professional's instructions.        Having Laparoscopic Cholecystectomy  Small incisions are made in your belly (abdomen). The scope is put through one of the incisions. Surgical tools are put through other incisions.  Small clips are used to close off the connection between the gallbladder and the bile duct. The gallbladder is then detached from the liver.  The gallbladder is removed through one of the incisions. Bile still flows from the liver to the small intestine.  When the surgery is done, all  tools are removed. Incisions are closed with stitches (sutures) or staples. Sometimes, a laparoscopic surgery may need to be changed to an open surgery. This method uses one large incision. This change may happen because of scar tissue, unusual anatomy, or for some other reason.     Possible incision sites.   Youve had painful attacks caused by gallstones. Because of this, you are having surgery to remove your gallbladder. This is called cholecystectomy. A method called laparoscopy will be used. This allows surgery to be done through a few small cuts (incisions).  Before your surgery  · Tell your provider what medicines you take. This includes prescription medicines, over-the-counter medicines, street drugs, herbs, vitamins, and other supplements. Be sure to mention if you take prescription blood thinners. This includes warfarin, clopidogrel, ibuprofen, and aspirin.  · If you drink alcohol, tell your provider how much you drink. This is very important if you are a heavy drinker or have had alcohol withdrawal symptoms in the past. Alcohol withdrawal can be dangerous. But the symptoms can be safely managed if your healthcare provider knows your alcohol history.  · Have any tests your provider asks for, such as blood tests.  · Dont eat or drink after midnight the night before your surgery. This includes water, coffee, and mints.  The day of surgery  · Your provider may have you take your normal medicine with a sip of water. Check with your provider.   When you arrive, you will prepare for surgery:  · An IV (intravenous) line will be put into a vein in your arm or hand. This gives you fluids and medicine.  · An anesthesiologist will talk with you about anesthesia. This is medicine used to prevent pain. You will receive general anesthesia. This puts you into a deep sleep-like state through the procedure.  During laparoscopic surgery  For this surgery, a thin tube with a tiny camera is used. This is called  a laparoscope. The scope sends images from inside your body to a video screen. This lets the surgeon view and work on your gallbladder:  · Small incisions are made in your belly (abdomen). The scope is put through one of the incisions. Surgical tools are put through other incisions.  · Small clips are used to close off the connection between the gallbladder and the bile duct. The gallbladder is then detached from the liver.  · The gallbladder is removed through one of the incisions. Bile still flows from the liver to the small intestine.  · When the surgery is done, all tools are removed. Incisions are closed with stitches (sutures) or staples. Sometimes, a laparoscopic surgery may need to be changed to an open surgery. This method uses one large incision. This change may happen because of scar tissue, unusual anatomy, or for some other reason.  After surgery  You will be sent to a room to wake up from the anesthesia. You will likely go home the same day. In some cases, an overnight stay is needed. When you are released to go home, have a family member or friend ready to drive you.  Risks and possible complications of gallbladder surgery  All surgeries have risks. The risks of gallbladder surgery include:  · Bleeding  · Infection  · Injury to the common bile duct or nearby organs  · Blood clots in the legs  · Bile leaks  · Hernia at incision site  · Pneumonia   Date Last Reviewed: 7/1/2016  © 5710-8539 [employer]. [address] [place] [address]. All rights reserved. This information is not intended as a substitute for professional medical care. Always follow your healthcare professional's instructions.        After Gallbladder Surgery  You can usually go home the same day as your surgery. In some cases, you may need to stay overnight. After open laparoscopic surgery, you will usually need to stay in the hospital for 2 to 5 days. Once youre at home, be sure to follow all your healthcare  providers instructions.  In the hospital  Bandages will cover your incisions and you may have special boots on your legs to prevent blood clots. To aid recovery, youll be asked to get up and move as soon as possible. You may also be asked to use a device that helps promote deep breathing to keep your lungs clear.  At home  You can get back to your normal routine as soon as you feel able. To speed healing:  · Take any prescribed pain medicines as directed.  · Follow your healthcare providers instructions about bathing and caring for your incisions.  · Walk and move around as often as possible, but follow your healthcare provider's instructions on how much weight you can lift.   · Ask your healthcare provider about driving and going back to work. This is often about 5 to 10 days after surgery.  Eating normally again  Removing the gallbladder doesnt mean you have to be on a special diet. But you may want to start with light meals. It can also take a few weeks for your digestion to adjust. You may have indigestion, loose stools, or diarrhea. This is common and should go away in time.  Following up  Keep follow-up appointments during your recovery. These allow your healthcare provider to check your progress and answer any questions. Be sure to mention if you have any new symptoms. Also mention if you have diarrhea that doesnt go away.     Call your healthcare provider  Contact your healthcare provider if you have any of the following:  · Fever of 100.4º F (38.0ºC) or higher, or as directed by your healthcare provider  · Chills  · Increasing pain, redness, or drainage at an incision site  · Vomiting or nausea that lasts more than 12 hours  · Prolonged diarrhea  · Belly pain  · Leg swelling or trouble breathing  · Difficulty urinating  · Bleeding from the rectum   Date Last Reviewed: 7/1/2016  © 8153-6566 [employer]. [address] [place] [address], [place] [address]. All rights reserved. This information is not  intended as a substitute for professional medical care. Always follow your healthcare professional's instructions.             Language Assistance Services     ATTENTION: Language assistance services are available, free of charge. Please call [phone].      ATENCIÓN: Si sulma morin, tiene a martinez disposición servicios gratuitos de asistencia lingüística. Llame al [phone].     CHÚ Ý: N?u b?n nói Ti?ng Vi?t, có các d?ch v? h? tr? ngôn ng? mi?n phí dành cho b?n. G?i s? [phone].         [facility] complies with applicable Federal civil rights laws and does not discriminate on the basis of race, color, national origin, age, disability, or sex.

## 2017-04-26 NOTE — PATIENT INSTRUCTIONS
Gallstones with Biliary Colic    You have abdominal pain due to irritation and spasm of the gallbladder. This is called biliary colic. The gallbladder is a small sac under the liver, which stores and releases a fluid that aids in the digestion of fat. A collection of crystals may form stones inside the gallbladder (gallstones). Gallstones can cause the gallbladder to spasm. If they block the duct out of the gallbladder, they can cause pain and even an infection.   A number of factors increase the risk for having gallstones:  · Being female  · Being severely overweight (obese)  · Older age  · Losing or gaining weight quickly  · Eating a high-calorie diet  · Being pregnant  · Taking hormone therapy  · Having diabetes  Home care  · Rest in bed.  · Drink only clear liquids until you feel better.  · You may have been prescribed medicine for pain or nausea. Take these as directed.  · Fat in your diet makes the gallbladder contract and may cause increased pain. Avoid foods that are high in fat (such as full-fat dairy, fried foods, and fatty meats) for at least two days.  · If you are overweight, talk to your healthcare provider about losing weight.  Follow-up care  Follow up with your healthcare provider or as advised. There is a chance that you will have another episode of pain from your gallstones at some point. Removal of the gallbladder is an option to prevent this. Talk with your healthcare provider about your treatment options.  When to seek medical advice  Call your healthcare provider if any of the following occur:  · Worsening pain or pain lasting for longer than 6 hours  · Pain moving to the right lower abdomen  · Repeated vomiting  · Swollen abdomen  · Fever of 100.4ºF (38ºC) or higher, or as directed by your healthcare provider  · Very dark urine, light colored stools, or yellow color of the skin or eyes  · Chest, arm, back, neck or jaw pain  Date Last Reviewed: 6/18/2015  © 4278-6846 [employer],  [employer]. [address] [place] [address]. All rights reserved. This information is not intended as a substitute for professional medical care. Always follow your healthcare professional's instructions.        Cholecystectomy     Clips close off the duct connecting the gallbladder to the bile duct. The gallbladder is then removed.     Youve had painful attacks caused by gallstones. To treat the problem, your healthcare provider wants to remove your gallbladder. This surgery is called cholecystectomy. Removing the gallbladder can relieve pain. It will also prevent future attacks. You can live a healthy life without your gallbladder. You may also be able to go back to eating foods you enjoyed before your gallbladder problems started.  Before your surgery  Be prepared:  · Tell your provider what medicines you take. Include those bought over the counter. Also include herbs or supplements. Be sure to mention if you take prescription blood thinners. This includes warfarin, clopidogrel, and aspirin.  · Have any tests your provider asks for, such as blood tests.  · Dont eat or drink after midnight, the night before your surgery. This includes water, coffee, and mints. However, you may need to take some medicine with sips of water--talk with your healthcare provider.  The day of surgery  When you arrive, you will prepare for surgery:  · An IV line will be put into a vein in your arm or hand. This gives you fluids and medicine.  · An anesthesiologist will talk with you about anesthesia. This is medicine used to prevent pain. You will receive general anesthesia. This puts you into a state like deep sleep through the procedure.  During surgery  There are 2 methods for removing the gallbladder. Your healthcare provider will choose which method is best for you:  · Laparoscopic cholecystectomy. This is most common. During surgery, 2 to 4 small incisions are made. A thin tube with a camera is used. This is called a  laparoscope. The scope is put through one of the incisions. It sends images to a video screen. Surgical tools are put through other incisions. The gallbladder is removed using the scope and these tools.  · Open cholecystectomy. One larger incision is made. The surgeon sees and works through this incision. Open surgery is most often used when scarring or other factors make it a better choice for you.  In some cases, safety requires a change from laparoscopic to open surgery during the procedure.  After surgery  You will be sent to a room to wake up from the anesthesia. You will likely go home the same day. In some cases, an overnight stay is needed. If you had open cholecystectomy, you may need to stay in the hospital for a few days. When you are released to go home, have a family member or friend ready to drive you.  Risks and possible complications of gallbladder surgery  All surgeries have risks. The risks of gallbladder surgery include:  · Bleeding  · Infection  · Injury to the common bile duct or nearby organs  · Blood clots in the legs  · Bile leaks  · Hernia at incision site  · Pnemonia   Date Last Reviewed: 7/1/2016 © 2000-2016 [employer]. [address] [place] [address]. All rights reserved. This information is not intended as a substitute for professional medical care. Always follow your healthcare professional's instructions.        Having Laparoscopic Cholecystectomy  Small incisions are made in your belly (abdomen). The scope is put through one of the incisions. Surgical tools are put through other incisions.  Small clips are used to close off the connection between the gallbladder and the bile duct. The gallbladder is then detached from the liver.  The gallbladder is removed through one of the incisions. Bile still flows from the liver to the small intestine.  When the surgery is done, all tools are removed. Incisions are closed with stitches (sutures) or staples. Sometimes, a  laparoscopic surgery may need to be changed to an open surgery. This method uses one large incision. This change may happen because of scar tissue, unusual anatomy, or for some other reason.     Possible incision sites.   Youve had painful attacks caused by gallstones. Because of this, you are having surgery to remove your gallbladder. This is called cholecystectomy. A method called laparoscopy will be used. This allows surgery to be done through a few small cuts (incisions).  Before your surgery  · Tell your provider what medicines you take. This includes prescription medicines, over-the-counter medicines, street drugs, herbs, vitamins, and other supplements. Be sure to mention if you take prescription blood thinners. This includes warfarin, clopidogrel, ibuprofen, and aspirin.  · If you drink alcohol, tell your provider how much you drink. This is very important if you are a heavy drinker or have had alcohol withdrawal symptoms in the past. Alcohol withdrawal can be dangerous. But the symptoms can be safely managed if your healthcare provider knows your alcohol history.  · Have any tests your provider asks for, such as blood tests.  · Dont eat or drink after midnight the night before your surgery. This includes water, coffee, and mints.  The day of surgery  · Your provider may have you take your normal medicine with a sip of water. Check with your provider.   When you arrive, you will prepare for surgery:  · An IV (intravenous) line will be put into a vein in your arm or hand. This gives you fluids and medicine.  · An anesthesiologist will talk with you about anesthesia. This is medicine used to prevent pain. You will receive general anesthesia. This puts you into a deep sleep-like state through the procedure.  During laparoscopic surgery  For this surgery, a thin tube with a tiny camera is used. This is called a laparoscope. The scope sends images from inside your body to a video screen. This lets the surgeon  view and work on your gallbladder:  · Small incisions are made in your belly (abdomen). The scope is put through one of the incisions. Surgical tools are put through other incisions.  · Small clips are used to close off the connection between the gallbladder and the bile duct. The gallbladder is then detached from the liver.  · The gallbladder is removed through one of the incisions. Bile still flows from the liver to the small intestine.  · When the surgery is done, all tools are removed. Incisions are closed with stitches (sutures) or staples. Sometimes, a laparoscopic surgery may need to be changed to an open surgery. This method uses one large incision. This change may happen because of scar tissue, unusual anatomy, or for some other reason.  After surgery  You will be sent to a room to wake up from the anesthesia. You will likely go home the same day. In some cases, an overnight stay is needed. When you are released to go home, have a family member or friend ready to drive you.  Risks and possible complications of gallbladder surgery  All surgeries have risks. The risks of gallbladder surgery include:  · Bleeding  · Infection  · Injury to the common bile duct or nearby organs  · Blood clots in the legs  · Bile leaks  · Hernia at incision site  · Pneumonia   Date Last Reviewed: 7/1/2016  © 9647-8158 [employer], [employer]. [address] [place] [address], [place] [address]. All rights reserved. This information is not intended as a substitute for professional medical care. Always follow your healthcare professional's instructions.        After Gallbladder Surgery  You can usually go home the same day as your surgery. In some cases, you may need to stay overnight. After open laparoscopic surgery, you will usually need to stay in the hospital for 2 to 5 days. Once youre at home, be sure to follow all your healthcare providers instructions.  In the hospital  Bandages will cover your incisions and you may have special  boots on your legs to prevent blood clots. To aid recovery, youll be asked to get up and move as soon as possible. You may also be asked to use a device that helps promote deep breathing to keep your lungs clear.  At home  You can get back to your normal routine as soon as you feel able. To speed healing:  · Take any prescribed pain medicines as directed.  · Follow your healthcare providers instructions about bathing and caring for your incisions.  · Walk and move around as often as possible, but follow your healthcare provider's instructions on how much weight you can lift.   · Ask your healthcare provider about driving and going back to work. This is often about 5 to 10 days after surgery.  Eating normally again  Removing the gallbladder doesnt mean you have to be on a special diet. But you may want to start with light meals. It can also take a few weeks for your digestion to adjust. You may have indigestion, loose stools, or diarrhea. This is common and should go away in time.  Following up  Keep follow-up appointments during your recovery. These allow your healthcare provider to check your progress and answer any questions. Be sure to mention if you have any new symptoms. Also mention if you have diarrhea that doesnt go away.     Call your healthcare provider  Contact your healthcare provider if you have any of the following:  · Fever of 100.4º F (38.0ºC) or higher, or as directed by your healthcare provider  · Chills  · Increasing pain, redness, or drainage at an incision site  · Vomiting or nausea that lasts more than 12 hours  · Prolonged diarrhea  · Belly pain  · Leg swelling or trouble breathing  · Difficulty urinating  · Bleeding from the rectum   Date Last Reviewed: 7/1/2016  © 5085-0505 [employer]. [address] [place] [address], [place] [address]. All rights reserved. This information is not intended as a substitute for professional medical care. Always follow your healthcare professional's  instructions.

## 2017-04-26 NOTE — TELEPHONE ENCOUNTER
----- Message from Meagan Willard sent at 4/26/2017 12:20 PM CDT -----  Contact: Self  Pt is calling requesting to have her surgery date changed because it will not work for her.    She can be reached at [phone].    Thank You.

## 2017-04-26 NOTE — PROGRESS NOTES
History & Physical    SUBJECTIVE:     History of Present Illness:  Patient is a 26 y.o. female presents for evaluation of right upper quadrant/epigastric pain radiating to her back for the past 3 weeks. Denies any f/c, SOB or CP. Had some nausea when pain initially started, now able to tolerate food but has only been eating mashed potatoes not to aggravate pain. Pain not associated with eating/drinking. Having normal bowel function. Denies any change in BM/urination.     Went to ED, US demonstrated gallstones and mild wall thickening; was placed on abx as pain did not improve.       Chief Complaint   Patient presents with    Consult    RUQ Pain       Review of patient's allergies indicates:  No Known Allergies    Current Outpatient Prescriptions   Medication Sig Dispense Refill    ibuprofen (ADVIL,MOTRIN) 600 MG tablet Take 1 tablet (600 mg total) by mouth every 6 (six) hours as needed for Pain. 20 tablet 0    norethindrone-ethinyl estradiol (JUNEL FE 1/20, 28,) 1 mg-20 mcg (21)/75 mg (7) per tablet Take 1 tablet by mouth once daily. 28 tablet 11     No current facility-administered medications for this visit.        Past Medical History:   Diagnosis Date    Abnormal Pap smear of cervix yrs ago    repeat pap    Ovarian cyst      Past Surgical History:   Procedure Laterality Date    TONSILLECTOMY, ADENOIDECTOMY       Family History   Problem Relation Age of Onset    Diabetes Maternal Grandmother     Diabetes Mother     Breast cancer Neg Hx     Colon cancer Neg Hx     Ovarian cancer Neg Hx      Social History   Substance Use Topics    Smoking status: Never Smoker    Smokeless tobacco: Never Used    Alcohol use Yes      Comment: Social         Review of Systems:  Review of Systems   Constitutional: Positive for appetite change. Negative for chills and fever.   HENT: Negative for trouble swallowing.    Eyes: Negative for visual disturbance.   Respiratory: Negative for shortness of breath.   "  Cardiovascular: Negative for chest pain and palpitations.   Gastrointestinal: Positive for abdominal pain. Negative for abdominal distention, constipation, diarrhea, nausea and vomiting.   Endocrine: Negative for polyuria.   Genitourinary: Negative for dysuria.   Neurological: Negative for dizziness and light-headedness.       OBJECTIVE:     Vital Signs (Most Recent)  Temp: 98.2 °F (36.8 °C) (04/26/17 0953)  Pulse: 67 (04/26/17 0953)  BP: 95/69 (04/26/17 0953)  5' 4" (1.626 m)  59 kg (130 lb)     Physical Exam:  Physical Exam   Constitutional: She is oriented to person, place, and time. She appears well-developed and well-nourished.   Cardiovascular: Normal rate and regular rhythm.    Pulmonary/Chest: Effort normal and breath sounds normal.   Abdominal: Soft. Bowel sounds are normal. She exhibits no distension.   Minimal tenderness in RUQ and epigastric region. Negative francis sign.   Neurological: She is alert and oriented to person, place, and time.   Skin: Skin is warm and dry.       Laboratory  CBC: Reviewed  CMP: Reviewed    Diagnostic Results:  US reviewed: cholelithiasis and mild wall thickening of gallbladder.   ASSESSMENT/PLAN:   27 yo female with symptomatic cholelithiasis.    PLAN:Plan     -to OR for lap cholecystectomy  -consents obtained in clinic    Noah De La Fuente PGY5  "

## 2017-04-26 NOTE — LETTER
April 26, 2017      Brandon Melara MD  [address]  [facility] [address]           [facility] - General Surgery  [address]  [facility] [address]  Phone: [phone]          Patient: Jose Luis Crowder   MR Number: [id number]   YOB: 1990   Date of Visit: 4/26/2017       Dear Dr. Brandon Melara:    Thank you for referring Jose Luis Crowder to me for evaluation. Attached you will find relevant portions of my assessment and plan of care.    If you have questions, please do not hesitate to call me. I look forward to following Jose Luis Crowder along with you.    Sincerely,    Stone Kay MD    Enclosure  CC:  No Recipients    If you would like to receive this communication electronically, please contact [email or web address] or [phone] to request more information on [employer] Link access.    For providers and/or their staff who would like to refer a patient to [facility], please contact us through our one-stop-shop provider referral line, [facility], at [phone].    If you feel you have received this communication in error or would no longer like to receive these types of communications, please e-mail [email or web address]

## 2017-04-28 ENCOUNTER — HOSPITAL ENCOUNTER (OUTPATIENT)
Facility: OTHER | Age: 27
Discharge: HOME | End: 2017-05-04
Attending: EMERGENCY MEDICINE | Admitting: EMERGENCY MEDICINE
Payer: MEDICAID

## 2017-04-28 DIAGNOSIS — D50.9 IRON DEFICIENCY ANEMIA, UNSPECIFIED IRON DEFICIENCY ANEMIA TYPE: ICD-10-CM

## 2017-04-28 DIAGNOSIS — R93.5 ABNORMAL ABDOMINAL ULTRASOUND: ICD-10-CM

## 2017-04-28 DIAGNOSIS — R10.11 RUQ PAIN: Primary | ICD-10-CM

## 2017-04-28 DIAGNOSIS — K80.00 CHOLELITHIASIS WITH ACUTE CHOLECYSTITIS: ICD-10-CM

## 2017-04-28 DIAGNOSIS — K81.9 CHOLECYSTITIS: ICD-10-CM

## 2017-04-28 DIAGNOSIS — K80.00 CALCULUS OF GALLBLADDER WITH ACUTE CHOLECYSTITIS WITHOUT OBSTRUCTION: ICD-10-CM

## 2017-04-28 PROCEDURE — 96374 THER/PROPH/DIAG INJ IV PUSH: CPT

## 2017-04-28 PROCEDURE — 96375 TX/PRO/DX INJ NEW DRUG ADDON: CPT

## 2017-04-28 PROCEDURE — 99285 EMERGENCY DEPT VISIT HI MDM: CPT | Mod: 25

## 2017-04-28 NOTE — LETTER
May 3, 2017            [address]  [place] [address]  Phone: [phone]  Fax: [phone] May 3, 2017                     To Whom It May Concern:    Due to a family emergency please excuse Ms. Negrete from work for Tuesday 5/2/17 and Wednesday 5/3/17.    If you have any questions or concerns, please don't hesitate to call.    Sincerely,        Janey Zuñiga RN

## 2017-04-28 NOTE — IP AVS SNAPSHOT
[facility] Location ([facility])  [address] [place], [place] [id number]  Phone: [phone]           Patient Discharge Instructions   Our goal is to set you up for success. This packet includes information on your condition, medications, and your home care.  It will help you care for yourself to prevent having to return to the hospital.     Please ask your nurse if you have any questions.      There are many details to remember when preparing to leave the hospital. Here is what you will need to do:    1. Take your medicine. If you are prescribed medications, review your Medication List on the following pages. You may have new medications to  at the pharmacy and others that you'll need to stop taking. Review the instructions for how and when to take your medications. Talk with your doctor or nurses if you are unsure of what to do.     2. Go to your follow-up appointments. Specific follow-up information is listed in the following pages. Your may be contacted by a nurse or clinical provider about future appointments. Be sure we have all of the phone numbers to reach you. Please contact your provider's office if you are unable to make an appointment.     3. Watch for warning signs. Your doctor or nurse will give you detailed warning signs to watch for and when to call for assistance. These instructions may also include educational information about your condition. If you experience any of warning signs to your health, call your doctor.               ** Verify the list of medication(s) below is accurate and up to date. Carry this with you in case of emergency. If your medications have changed, please notify your healthcare provider.             Medication List      START taking these medications        Additional Info                      ferrous sulfate 325 (65 FE) MG EC tablet   Refills:  0   Dose:  325 mg    Last time this was given:  325 mg on 5/4/2017  9:27 AM   Instructions:  Take 1 tablet (325 mg  total) by mouth once daily.     Begin Date    AM    Noon    PM    Bedtime         CONTINUE taking these medications        Additional Info                      ibuprofen 600 MG tablet   Commonly known as:  ADVIL,MOTRIN   Quantity:  20 tablet   Refills:  0   Dose:  600 mg    Instructions:  Take 1 tablet (600 mg total) by mouth every 6 (six) hours as needed for Pain.     Begin Date    AM    Noon    PM    Bedtime       norethindrone-ethinyl estradiol 1 mg-20 mcg (21)/75 mg (7) per tablet   Commonly known as:  JUNEL FE 1/20 (28)   Quantity:  28 tablet   Refills:  11   Dose:  1 tablet    Instructions:  Take 1 tablet by mouth once daily.     Begin Date    AM    Noon    PM    Bedtime       tramadol 50 mg tablet   Commonly known as:  ULTRAM   Quantity:  15 tablet   Refills:  0   Dose:  50 mg    Instructions:  Take 1 tablet (50 mg total) by mouth every 6 (six) hours as needed for Pain.     Begin Date    AM    Noon    PM    Bedtime            Where to Get Your Medications      You can get these medications from any pharmacy     Bring a paper prescription for each of these medications     tramadol 50 mg tablet       You don't need a prescription for these medications     ferrous sulfate 325 (65 FE) MG EC tablet                  Please bring to all follow up appointments:    1. A copy of your discharge instructions.  2. All medicines you are currently taking in their original bottles.  3. Identification and insurance card.    Please arrive 15 minutes ahead of scheduled appointment time.    Please call 24 hours in advance if you must reschedule your appointment and/or time.        Your Future Surgeries/Procedures     May 18, 2017   Surgery with Stone Kay MD   [facility] ([facility])    [address] [place] [address] [phone]              Follow-up Information     Follow up with Vega Chowdary Jr, MD. Schedule an appointment as soon as possible for a visit in   days.    Specialties:  Vascular Surgery, General Surgery    Contact information:    [address]  [address]  [facility] [address]  [phone]          Follow up with [facility].    Contact information:    [address]  [facility] [address]  [phone]          Discharge Instructions     Future Orders    Activity as tolerated     Call MD for:  difficulty breathing or increased cough     Call MD for:  increased confusion or weakness     Call MD for:  persistent dizziness, light-headedness, or visual disturbances     Call MD for:  persistent nausea and vomiting or diarrhea     Call MD for:  redness, tenderness, or signs of infection (pain, swelling, redness, odor or green/yellow discharge around incision site)     Call MD for:  severe persistent headache     Call MD for:  severe uncontrolled pain     Call MD for:  temperature >100.4     Call MD for:  worsening rash     Diet general     Questions:    Total calories:      Fat restriction, if any:      Protein restriction, if any:      Na restriction, if any:      Fluid restriction:      Additional restrictions:          Discharge Instructions       Dr. Vega Chowdary  [address]  [place] [address] [phone]              POST OP INSTRUCTIONS    1.  On the second day following your surgery, carefully remove your dressing.  In most instances, you will see that your incision does not have any visible stitches but does have small white pieces of tape across it.  These are called steri-strips.  These should not be removed until they become loosened, usually in 7 - 10 days.  (On occasion, regular stitches are used, and if so, they should be removed in the office within 6 - 9 days.)  If you desire, you can replace your dressing with clean gauze pads and tape, but this is not necessary in most instances.      2.  Keep your incision dry for 2 days.  If you have surgery on Monday, keep incision dry until Wednesday, etc.   "At that time, it will be okay to shower, but do not soak the incision (bath, swimming pool) for 2 weeks.      3.  Unless otherwise stated, you should make an appointment to have your incision checked in approximately 10 days -2 weeks.  In most cases, by this time, you may feel a firm ridge under the incision.  This is entirely normal and will resolve within a couple of months.      4.  Finally, if there are any unexpected problems, contact Dr. Chowdary at [phone].            Thank you for choosing [facility] as your Health Care Provider. [facility] strives to provide the best healthcare available to you. In the next few days you may receive a Survey, either by mail or email,  asking you to rate our care that was provided to you during your stay.  Please return the survey to us, as your feedback is important. We aim to meet your expectations of safe, quality health care.    From your [facility] Team.    Discharge References/Attachments     PAIN, COMPLEMENTARY CARE FOR (ENGLISH)    PAIN RESPONSE, UNDERSTANDING (ENGLISH)    PAIN MANAGEMENT AFTER SURGERY (ENGLISH)    4 STEPS FOR EATING HEALTHIER (ENGLISH)    CHOLECYSTECTOMY (LAPAROSCOPIC), DISCHARGE INSTRUCTIONS (ENGLISH)        Primary Diagnosis     Your primary diagnosis was:  Calculus Of Gallbladder With Acute Cholecystitis      Admission Information     Date & Time Provider Department CSN    4/28/2017 11:53 PM Jose Saldana MD [facility] [id number]      Care Providers     Provider Role Specialty Primary office phone    Jose Saldana MD Attending Provider Hospitalist [phone]    Vega Chowdary Jr., MD Consulting Physician  Vascular Surgery [phone]    Vega Chowdary Jr., MD Surgeon  Vascular Surgery [phone]      Your Vitals Were     BP Pulse Temp Resp Height Weight    102/66 (BP Location: Left arm, Patient Position: Lying, BP Method: Automatic) 57 98.3 °F (36.8 °C) (Oral) 16 5' 4" (1.626 m) 61 kg (134 lb 7.7 oz) "    Last Period SpO2 BMI          04/26/2016 99% 23.08 kg/m2        Recent Lab Values     No lab values to display.      Pending Labs     Order Current Status    Specimen to Pathology - Surgery In process      Allergies as of 5/4/2017     No Known Allergies      [facility]     [facility] Nurse Care Line - 24/7 Assistance  Unless otherwise directed by your provider, please contact [facility], our nurse care line that is available for 24/7 assistance.     Registered nurses in the [facility] provide clinical advisement, health education, appointment booking, and other advisory services.  Call for this free service at [phone].        Advance Directives     An advance directive is a document which, in the event you are no longer able to make decisions for yourself, tells your healthcare team what kind of treatment you do or do not want to receive, or who you would like to make those decisions for you.  If you do not currently have an advance directive, [facility] encourages you to create one.  For more information call:  [phone]), [phone] ([phone]),  or log on to [email or web address].        Language Assistance Services     ATTENTION: Language assistance services are available, free of charge. Please call [phone].      ATENCIÓN: Si habla español, tiene a martinez disposición servicios gratuitos de asistencia lingüística. Llame al [phone].     CHÚ Ý: N?u b?n nói Ti?ng Vi?t, có các d?ch v? h? tr? ngôn ng? mi?n phí dành cho b?n. G?i s? [phone].         [facility] complies with applicable Federal civil rights laws and does not discriminate on the basis of race, color, national origin, age, disability, or sex.

## 2017-04-28 NOTE — LETTER
May 3, 2017            [address]  [place] [address]  Phone: [phone]  Fax: [phone] May 3, 2017                     To Whom It May Concern:    Due to a family emergency please excuse Ms. Negrete from work for Tuesday 5/1/17 and Wednesday 5/2/17.    If you have any questions or concerns, please don't hesitate to call.    Sincerely,        Janey Zuñiga RN

## 2017-04-29 PROBLEM — R10.11 RUQ PAIN: Status: ACTIVE | Noted: 2017-04-29

## 2017-04-29 PROBLEM — D64.9 ANEMIA: Status: ACTIVE | Noted: 2017-04-29

## 2017-04-29 PROBLEM — K80.00 CALCULUS OF GALLBLADDER WITH ACUTE CHOLECYSTITIS: Status: ACTIVE | Noted: 2017-04-29

## 2017-04-29 LAB
ALBUMIN SERPL BCP-MCNC: 3.6 G/DL
ALBUMIN SERPL BCP-MCNC: 4.1 G/DL
ALP SERPL-CCNC: 78 U/L
ALP SERPL-CCNC: 89 U/L
ALT SERPL W/O P-5'-P-CCNC: 7 U/L
ALT SERPL W/O P-5'-P-CCNC: 8 U/L
ANION GAP SERPL CALC-SCNC: 10 MMOL/L
ANION GAP SERPL CALC-SCNC: 9 MMOL/L
AST SERPL-CCNC: 16 U/L
AST SERPL-CCNC: 18 U/L
B-HCG UR QL: NEGATIVE
BASOPHILS # BLD AUTO: 0.02 K/UL
BASOPHILS # BLD AUTO: 0.02 K/UL
BASOPHILS NFR BLD: 0.3 %
BASOPHILS NFR BLD: 0.3 %
BILIRUB SERPL-MCNC: 0.2 MG/DL
BILIRUB SERPL-MCNC: 0.3 MG/DL
BUN SERPL-MCNC: 8 MG/DL
BUN SERPL-MCNC: 9 MG/DL
CALCIUM SERPL-MCNC: 9.3 MG/DL
CALCIUM SERPL-MCNC: 9.5 MG/DL
CHLORIDE SERPL-SCNC: 104 MMOL/L
CHLORIDE SERPL-SCNC: 107 MMOL/L
CO2 SERPL-SCNC: 25 MMOL/L
CO2 SERPL-SCNC: 26 MMOL/L
CREAT SERPL-MCNC: 0.7 MG/DL
CREAT SERPL-MCNC: 0.8 MG/DL
CTP QC/QA: YES
DIFFERENTIAL METHOD: ABNORMAL
DIFFERENTIAL METHOD: ABNORMAL
EOSINOPHIL # BLD AUTO: 0 K/UL
EOSINOPHIL # BLD AUTO: 0.1 K/UL
EOSINOPHIL NFR BLD: 0.6 %
EOSINOPHIL NFR BLD: 1.2 %
ERYTHROCYTE [DISTWIDTH] IN BLOOD BY AUTOMATED COUNT: 15.3 %
ERYTHROCYTE [DISTWIDTH] IN BLOOD BY AUTOMATED COUNT: 15.4 %
EST. GFR  (AFRICAN AMERICAN): >60 ML/MIN/1.73 M^2
EST. GFR  (AFRICAN AMERICAN): >60 ML/MIN/1.73 M^2
EST. GFR  (NON AFRICAN AMERICAN): >60 ML/MIN/1.73 M^2
EST. GFR  (NON AFRICAN AMERICAN): >60 ML/MIN/1.73 M^2
FERRITIN SERPL-MCNC: 9 NG/ML
GLUCOSE SERPL-MCNC: 91 MG/DL
GLUCOSE SERPL-MCNC: 92 MG/DL
HCT VFR BLD AUTO: 34.9 %
HCT VFR BLD AUTO: 36.2 %
HGB BLD-MCNC: 10.9 G/DL
HGB BLD-MCNC: 11.5 G/DL
IRON SERPL-MCNC: 30 UG/DL
LIPASE SERPL-CCNC: 52 U/L
LYMPHOCYTES # BLD AUTO: 2.5 K/UL
LYMPHOCYTES # BLD AUTO: 2.5 K/UL
LYMPHOCYTES NFR BLD: 36.8 %
LYMPHOCYTES NFR BLD: 42.4 %
MAGNESIUM SERPL-MCNC: 1.9 MG/DL
MCH RBC QN AUTO: 25.2 PG
MCH RBC QN AUTO: 25.7 PG
MCHC RBC AUTO-ENTMCNC: 31.2 %
MCHC RBC AUTO-ENTMCNC: 31.8 %
MCV RBC AUTO: 81 FL
MCV RBC AUTO: 81 FL
MONOCYTES # BLD AUTO: 0.6 K/UL
MONOCYTES # BLD AUTO: 0.7 K/UL
MONOCYTES NFR BLD: 10.3 %
MONOCYTES NFR BLD: 10.6 %
NEUTROPHILS # BLD AUTO: 2.7 K/UL
NEUTROPHILS # BLD AUTO: 3.5 K/UL
NEUTROPHILS NFR BLD: 45.3 %
NEUTROPHILS NFR BLD: 51.9 %
PLATELET # BLD AUTO: 214 K/UL
PLATELET # BLD AUTO: 242 K/UL
PMV BLD AUTO: 11.2 FL
PMV BLD AUTO: 11.2 FL
POTASSIUM SERPL-SCNC: 3.7 MMOL/L
POTASSIUM SERPL-SCNC: 3.9 MMOL/L
PROT SERPL-MCNC: 7.1 G/DL
PROT SERPL-MCNC: 8.1 G/DL
RBC # BLD AUTO: 4.33 M/UL
RBC # BLD AUTO: 4.48 M/UL
SATURATED IRON: 7 %
SODIUM SERPL-SCNC: 140 MMOL/L
SODIUM SERPL-SCNC: 141 MMOL/L
TOTAL IRON BINDING CAPACITY: 435 UG/DL
TRANSFERRIN SERPL-MCNC: 294 MG/DL
WBC # BLD AUTO: 5.87 K/UL
WBC # BLD AUTO: 6.69 K/UL

## 2017-04-29 PROCEDURE — 63600175 PHARM REV CODE 636 W HCPCS: Performed by: INTERNAL MEDICINE

## 2017-04-29 PROCEDURE — 99220 PR INITIAL OBSERVATION CARE,LEVL III: CPT | Mod: ,,, | Performed by: HOSPITALIST

## 2017-04-29 PROCEDURE — G0378 HOSPITAL OBSERVATION PER HR: HCPCS

## 2017-04-29 PROCEDURE — 63600175 PHARM REV CODE 636 W HCPCS: Performed by: EMERGENCY MEDICINE

## 2017-04-29 PROCEDURE — 36415 COLL VENOUS BLD VENIPUNCTURE: CPT

## 2017-04-29 PROCEDURE — 83540 ASSAY OF IRON: CPT

## 2017-04-29 PROCEDURE — 85025 COMPLETE CBC W/AUTO DIFF WBC: CPT | Mod: 91

## 2017-04-29 PROCEDURE — 81025 URINE PREGNANCY TEST: CPT | Performed by: EMERGENCY MEDICINE

## 2017-04-29 PROCEDURE — 82728 ASSAY OF FERRITIN: CPT

## 2017-04-29 PROCEDURE — 83735 ASSAY OF MAGNESIUM: CPT

## 2017-04-29 PROCEDURE — 80053 COMPREHEN METABOLIC PANEL: CPT | Mod: 91

## 2017-04-29 PROCEDURE — 80053 COMPREHEN METABOLIC PANEL: CPT

## 2017-04-29 PROCEDURE — 83690 ASSAY OF LIPASE: CPT

## 2017-04-29 RX ORDER — ONDANSETRON 2 MG/ML
4 INJECTION INTRAMUSCULAR; INTRAVENOUS
Status: COMPLETED | OUTPATIENT
Start: 2017-04-29 | End: 2017-04-29

## 2017-04-29 RX ORDER — MORPHINE SULFATE 4 MG/ML
4 INJECTION, SOLUTION INTRAMUSCULAR; INTRAVENOUS
Status: COMPLETED | OUTPATIENT
Start: 2017-04-29 | End: 2017-04-29

## 2017-04-29 RX ORDER — MORPHINE SULFATE 4 MG/ML
4 INJECTION, SOLUTION INTRAMUSCULAR; INTRAVENOUS EVERY 4 HOURS PRN
Status: DISCONTINUED | OUTPATIENT
Start: 2017-04-29 | End: 2017-05-02

## 2017-04-29 RX ORDER — TRAMADOL HYDROCHLORIDE 50 MG/1
50 TABLET ORAL EVERY 6 HOURS PRN
Status: ON HOLD | COMMUNITY
End: 2017-05-04

## 2017-04-29 RX ORDER — ONDANSETRON 2 MG/ML
4 INJECTION INTRAMUSCULAR; INTRAVENOUS EVERY 12 HOURS PRN
Status: DISCONTINUED | OUTPATIENT
Start: 2017-04-29 | End: 2017-04-29

## 2017-04-29 RX ORDER — CIPROFLOXACIN 2 MG/ML
400 INJECTION, SOLUTION INTRAVENOUS
Status: CANCELLED | OUTPATIENT
Start: 2017-04-29

## 2017-04-29 RX ORDER — ONDANSETRON 2 MG/ML
4 INJECTION INTRAMUSCULAR; INTRAVENOUS EVERY 8 HOURS PRN
Status: DISCONTINUED | OUTPATIENT
Start: 2017-04-29 | End: 2017-05-04 | Stop reason: HOSPADM

## 2017-04-29 RX ORDER — ENOXAPARIN SODIUM 100 MG/ML
40 INJECTION SUBCUTANEOUS EVERY 24 HOURS
Status: DISCONTINUED | OUTPATIENT
Start: 2017-04-29 | End: 2017-05-04 | Stop reason: HOSPADM

## 2017-04-29 RX ADMIN — ENOXAPARIN SODIUM 40 MG: 100 INJECTION SUBCUTANEOUS at 05:04

## 2017-04-29 RX ADMIN — ONDANSETRON 4 MG: 2 INJECTION, SOLUTION INTRAMUSCULAR; INTRAVENOUS at 12:04

## 2017-04-29 RX ADMIN — MORPHINE SULFATE 4 MG: 4 INJECTION, SOLUTION INTRAMUSCULAR; INTRAVENOUS at 05:04

## 2017-04-29 RX ADMIN — MORPHINE SULFATE 4 MG: 4 INJECTION, SOLUTION INTRAMUSCULAR; INTRAVENOUS at 12:04

## 2017-04-29 NOTE — CONSULTS
DATE OF CONSULTATION:  04/29/2017    HISTORY OF PRESENT ILLNESS:  The patient is a 26-year-old female who was   admitted through the Emergency Room with abdominal pain and vomiting.  The pain   was localized to the right upper quadrant, radiating to the back.  The patient   had a known history of symptomatic gallbladder disease and was scheduled for   surgery by Dr. Kay on May 18th.  The patient denies fever, chills,   jaundice, or weight loss.    PAST MEDICAL HISTORY:  Significant for abnormal Paps and ovarian cyst as well as   a known history of gallstones.  She is status post tonsillectomy and   adenoidectomy.    ALLERGIES:  The patient has no known drug allergies.    HOME MEDICATIONS:  Include oral contraceptive pills, Ultram, and ibuprofen.    SOCIAL HISTORY:  The patient has never used tobacco products.  She occasionally   uses alcohol.    REVIEW OF SYSTEMS:  No weight loss.  No fever.  Positive chills.  No chest pain,   shortness of breath, cough, sputum production or hemoptysis.  No palpitations.    No orthopnea.  Positive nausea and vomiting.  No diarrhea, constipation or   change in bowel habits.  No hematuria, dysuria, urgency or frequency.  No   myalgias, tremors or motor weakness.  No rashes, itching or bruising.    PHYSICAL EXAMINATION:  GENERAL:  The patient is afebrile.  She is awake, alert and oriented.  HEENT:  She is normocephalic.  Her extraocular movements are intact.  The   conjunctivae are anicteric.  NECK:  Supple.  Trachea midline.  There is no jugular venous distention.  CHEST:  Clear.  HEART:  Regular rate and rhythm.  ABDOMEN:  Soft.  There is mild tenderness in the right upper quadrant to deep   palpation.  There is no rebound.  EXTREMITIES:  Show no cyanosis, clubbing or edema.    LABORATORY WORKUP:  Shows a hematocrit of 36 with a white blood cell count of   5.8.  The patient's liver profile and bilirubin are within normal limits.    Ultrasound of the abdomen was performed this  morning showed cholelithiasis with   trace amount of pericholecystic fluid.  HIDA scan showed no evidence of common   bile or cystic duct obstruction.    IMPRESSION:  Symptomatic gallbladder disease.  I spoke to the patient and her   mother advised that cholecystectomy should likely not wait until the 18th of   next month.  She should either contact Dr. Kay to facilitate moving up to   date of surgery or I would be able to perform cholecystectomy on Monday.      SAADIA/  dd: 04/29/2017 14:53:03 (CDT)  td: 04/29/2017 16:59:54 (CDT)  Doc ID   #[id number]  Job ID #[id number]    CC: Stone Kay M.D.

## 2017-04-29 NOTE — ED PROVIDER NOTES
Encounter Date: 4/28/2017    SCRIBE #1 NOTE: I, Viola Rollins, am scribing for, and in the presence of, Dr. Krueger.       History     Chief Complaint   Patient presents with    Vomiting     c/o vomiting x 3 episodes tonight along with BUQ pain radiating through to back. Pt has upcoming lap li scheduled    Abdominal Pain     Review of patient's allergies indicates:  No Known Allergies  HPI Comments: Time seen by provider: 12:16 AM    This is a 26 y.o. female who presents with complaint of constant RUQ abdominal pain that has persisted for approximately 3 hours.  The patient states that her pain radiates through to her back.  She also endorses nausea and vomiting, which began approximately 2 hours ago.  She mentions no fever, chills, constipation, or diarrhea.  She reports no identifying, alleviating, or exacerbating factors.  The patient admits that she has had multiple episodes with similar symptomology, for which she was seen at the ED.  She admits to history of cholelithiasis and ovarian cyst. She reports no pertinent surgical history.  She mentions that she is scheduled for a laparoscopic cholecystectomy next month.      The history is provided by the patient.     Past Medical History:   Diagnosis Date    Abnormal Pap smear of cervix yrs ago    repeat pap    Gall stones     Ovarian cyst      Past Surgical History:   Procedure Laterality Date    TONSILLECTOMY, ADENOIDECTOMY       Family History   Problem Relation Age of Onset    Diabetes Maternal Grandmother     Diabetes Mother     Breast cancer Neg Hx     Colon cancer Neg Hx     Ovarian cancer Neg Hx      Social History   Substance Use Topics    Smoking status: Never Smoker    Smokeless tobacco: Never Used    Alcohol use Yes      Comment: Social      Review of Systems   Constitutional: Negative for chills and fever.   HENT: Negative for facial swelling.    Respiratory: Negative for shortness of breath.    Cardiovascular: Negative for chest pain.    Gastrointestinal: Positive for abdominal pain, nausea and vomiting. Negative for constipation and diarrhea.   Endocrine: Negative for polyuria.   Genitourinary: Negative for dysuria.   Musculoskeletal: Positive for back pain. Negative for myalgias.   Skin: Negative for rash.   Neurological: Negative for headaches.       Physical Exam   Initial Vitals   BP Pulse Resp Temp SpO2   04/28/17 2351 04/28/17 2351 04/28/17 2351 04/28/17 2351 04/28/17 2351   124/81 70 18 97.7 °F (36.5 °C) 100 %     Physical Exam    Nursing note and vitals reviewed.  Constitutional: She appears well-developed and well-nourished. She is not diaphoretic. No distress.   HENT:   Head: Normocephalic and atraumatic.   Right Ear: External ear normal.   Left Ear: External ear normal.   Eyes: EOM are normal. Right eye exhibits no discharge. Left eye exhibits no discharge.   Neck: Normal range of motion.   Cardiovascular: Normal rate, regular rhythm and normal heart sounds. Exam reveals no gallop and no friction rub.    No murmur heard.  Pulmonary/Chest: Breath sounds normal. No respiratory distress. She has no wheezes. She has no rhonchi. She has no rales.   Clear to ascultation bilaterally.   Abdominal: Soft. She exhibits no distension. There is tenderness in the right upper quadrant. There is rigidity and positive Meade's sign. There is no rebound and no guarding.   RUQ tenderness to palpation.  Positive Meade's sign.  No abdominal distention.  No rigidity.     Musculoskeletal: Normal range of motion. She exhibits no edema or tenderness.   Neurological: She is alert and oriented to person, place, and time.   Skin: Skin is warm and dry. No rash and no abscess noted. No erythema. No pallor.   Psychiatric: She has a normal mood and affect. Her behavior is normal. Judgment and thought content normal.         ED Course   Procedures  Labs Reviewed   CBC W/ AUTO DIFFERENTIAL - Abnormal; Notable for the following:        Result Value    Hemoglobin 11.5  (*)     Hematocrit 36.2 (*)     MCV 81 (*)     MCH 25.7 (*)     MCHC 31.8 (*)     RDW 15.4 (*)     All other components within normal limits   COMPREHENSIVE METABOLIC PANEL - Abnormal; Notable for the following:     ALT 8 (*)     All other components within normal limits   LIPASE   POCT URINE PREGNANCY     Imaging Results         US Abdomen Limited (In process) Result time:  04/29/17 01:30:23                  Medical Decision Making:   Clinical Tests:   Lab Tests: Ordered and Reviewed  Radiological Study: Ordered and Reviewed  ED Management:  Anxious appearing woman complains of right upper quadrant pain.  Intermittent and worsening over one month.  Diagnosed with gallstones, has elective laparoscopic cholecystectomy upcoming.  Reports pain got much worse 2 hours ago.  Started having nausea and vomiting.  Very tender right upper quadrant.  Labwork tonight has no acute abnormalities, demonstrate resolution of her previous transaminitis.  Alkaline phosphatase is also normal.  Ultrasound demonstrates multiple stones, pericholecystic fluid, no other signs of cholecystitis.  Radiology recommends HIDA if clinical concern exists, and I do think her presentation is concerning.  Admitted to the hospital service to arrange for HIDA scan and possible surgical consultation.    I did have an extensive talk regarding signs to return for and need for follow up. Patient expressed understanding and will monitor symptoms closely and follow-up as needed.    ALONDRA Krueger M.D.  04/29/2017  1:52 AM      Other:   I have discussed this case with another health care provider.       <> Summary of the Discussion: 1:40 AM. Case discussed with Dr. De La Fuente, who will admit the patient.            Scribe Attestation:   Scribe #1: I performed the above scribed service and the documentation accurately describes the services I performed. I attest to the accuracy of the note.    Attending Attestation:           Physician Attestation for  Scribe:  Physician Attestation Statement for Scribe #1: I, Dr. Krueger, reviewed documentation, as scribed by Viola Rollins in my presence, and it is both accurate and complete.                 ED Course     Clinical Impression:     1. RUQ pain    2. Abnormal abdominal ultrasound                Artemio Krueger MD  04/29/17 0152

## 2017-04-29 NOTE — PLAN OF CARE
ATTN: TEAM DC PLANNING      NO NEEDS VOICED PER PATIENT      PT REQUEST PCP @  [facility] ( [facility] ) CLOSED ON WEEKEND - WILL LEAVE INFO ON AVS    Chana Lira RN  Case management 4/29/20175:25 PM  # [phone] (FAX) [phone]     04/29/17 1730   Discharge Assessment   Assessment Type Discharge Planning Assessment   Assessment information obtained from? Patient   Communicated expected length of stay with patient/caregiver yes   Prior to hospitilization cognitive status: Alert/Oriented   Prior to hospitalization functional status: Independent   Current cognitive status: Alert/Oriented   Current Functional Status: Independent   Lives With alone   Able to Return to Prior Arrangements yes   Is patient able to care for self after discharge? Yes   Patient currently being followed by outpatient case management? No   Patient currently receives home health services? No   Does the patient currently use HME? No   Patient currently receives private duty nursing? N/A   Patient currently receives any other outside agency services? No   Do you have any problems affording any of your prescribed medications? TBD   Is the patient taking medications as prescribed? yes   Do you have any financial concerns preventing you from receiving the healthcare you need? No   Does the patient have transportation to healthcare appointments? No   On Dialysis? No   Does the patient receive services at the Coumadin Clinic? No   Are there any open cases? No   Discharge Plan A Home with family   Discharge Plan B Home with family   Patient/Family In Agreement With Plan yes

## 2017-04-29 NOTE — H&P
[facility] History and Physical      Admitting Physician: See De La Fuente MD  Attending Physician: Hospital Medicine Staff  PCP: none    Date of Admit: 4/28/2017    Chief Complaint     Vomiting (c/o vomiting x 3 episodes tonight along with BUQ pain radiating through to back. Pt has upcoming lap li scheduled) and Abdominal Pain      Subjective:      History of Present Illness:  Jose Luis Crowder is a 26 y.o. AA female who  has a past medical history of Abnormal Pap smear of cervix (yrs ago); Gall stones; and Ovarian cyst.. The patient presented to [facility] on 4/28/2017 with a primary complaint of Vomiting (c/o vomiting x 3 episodes tonight along with BUQ pain radiating through to back. Pt has upcoming lap li scheduled) and Abdominal Pain      The patient was in their usual state of health until 3 hrs ago when she reports pain that radiates to her back.  She also endorses chills, nausea, and vomiting, which began approximately 2 hours ago. She mentions no fever, constipation, or diarrhea. She reports no identifying, alleviating, or exacerbating factors. The patient admits that she has had multiple episodes with similar symptomology, for which she was seen at the ED. She admits to history of cholelithiasis and ovarian cyst.  She mentions that she is scheduled for a laparoscopic cholecystectomy May 18th.    Past Medical History:  Past Medical History:   Diagnosis Date    Abnormal Pap smear of cervix yrs ago    repeat pap    Gall stones     Ovarian cyst        Past Surgical History:  Past Surgical History:   Procedure Laterality Date    TONSILLECTOMY, ADENOIDECTOMY         Allergies:  Review of patient's allergies indicates:  No Known Allergies    Home Medications:  Prior to Admission medications    Medication Sig Start Date End Date Taking? Authorizing Provider   norethindrone-ethinyl estradiol (JUNEL FE 1/20, 28,) 1 mg-20 mcg (21)/75 mg (7) per tablet Take 1 tablet by  "mouth once daily. 3/20/17 3/20/18 Yes Charleen Rizzo MD   tramadol (ULTRAM) 50 mg tablet Take 50 mg by mouth every 6 (six) hours as needed for Pain.   Yes Historical Provider, MD   ibuprofen (ADVIL,MOTRIN) 600 MG tablet Take 1 tablet (600 mg total) by mouth every 6 (six) hours as needed for Pain. 17   Divya Chase MD       Family History:  Family History   Problem Relation Age of Onset    Diabetes Maternal Grandmother     Diabetes Mother     Breast cancer Neg Hx     Colon cancer Neg Hx     Ovarian cancer Neg Hx        Social History:  Social History   Substance Use Topics    Smoking status: Never Smoker    Smokeless tobacco: Never Used    Alcohol use Yes      Comment: Social        Review of Systems:  Pertinent items are noted in HPI.   Constitutional: No fever +chills, no weight changes.  Eyes: No visual changes or photophobia  HEENT: No nasal congestion or sore throat  Respiratory: No cough or shorness of breath  Cardiovascular: No chest pain or palpitations  Gastrointestinal: +nausea/vomiting, no diarrhea or change in bowel habits  Genitourinary: No hematuria or dysuria  Musculoskeletal: No myalgias or weakness   Skin: No rash or pruritis  Hematologic/lymphatic: No easy bruising, bleeding or lymphadenopathy  Neurologic: no seizures or tremors  Behavioral/Psych: no auditory or visual hallucinations  Endocrine: no heat or cold intolerance  All other systems are reviewed and are negative.     Objective:   Last 24 Hour Vital Signs:  BP  Min: 109/82  Max: 134/101  Temp  Av.7 °F (36.5 °C)  Min: 97.7 °F (36.5 °C)  Max: 97.7 °F (36.5 °C)  Pulse  Av  Min: 56  Max: 78  Resp  Av  Min: 18  Max: 18  SpO2  Av %  Min: 100 %  Max: 100 %  Height  Av' 4" (162.6 cm)  Min: 5' 4" (162.6 cm)  Max: 5' 4" (162.6 cm)  Weight  Av kg (130 lb)  Min: 59 kg (130 lb)  Max: 59 kg (130 lb)  Body mass index is 22.31 kg/(m^2).       Physical Examination:    GEN: AAOx3, nad  Head: atraumatic, " normocephalic  EEN: eomi, perrl, op clear, mmm  T: neck supple, JVP ~8   Lungs: CTA b, no wheezes, or rhonchi, no accessory muscle use  Heart: rrr, no mrg  Abd: s, nd +bs, tender to palpation in RUQ  Ext: no c/c/e, capillary refill ~2 sec  Lymph: no cervical, axillary, inguinal LAD  Skin: no rashes or lesions  Neuro: no focal deficits, 5/5 upper and lower strength   Psych: normal mood and affect    Laboratory:  Most Recent Data:  CBC: Lab Results   Component Value Date    WBC 5.87 04/29/2017    HGB 11.5 (L) 04/29/2017    HCT 36.2 (L) 04/29/2017     04/29/2017    MCV 81 (L) 04/29/2017    RDW 15.4 (H) 04/29/2017     BMP: Lab Results   Component Value Date     04/29/2017    K 3.7 04/29/2017     04/29/2017    CO2 26 04/29/2017    BUN 9 04/29/2017    CREATININE 0.8 04/29/2017    GLU 92 04/29/2017    CALCIUM 9.3 04/29/2017     LFTs: Lab Results   Component Value Date    PROT 8.1 04/29/2017    ALBUMIN 4.1 04/29/2017    BILITOT 0.2 04/29/2017    AST 16 04/29/2017    ALKPHOS 89 04/29/2017    ALT 8 (L) 04/29/2017     Coags: No results found for: INR, PROTIME, PTT  FLP: No results found for: CHOL, HDL, LDLCALC, TRIG, CHOLHDL  DM: Lab Results   Component Value Date    CREATININE 0.8 04/29/2017     Thyroid: No results found for: TSH, FREET4, X6CXGSG, A8SUVTZ, THYROIDAB  Anemia: No results found for: IRON, TIBC, FERRITIN, [id number], FOLATE  Cardiac: Lab Results   Component Value Date    TROPONINI 0.018 03/30/2017     Urinalysis: Lab Results   Component Value Date    LABURIN No growth 06/23/2016    COLORU Yellow 04/19/2017    SPECGRAV 1.020 04/19/2017    NITRITE Negative 04/19/2017    KETONESU Negative 04/19/2017    UROBILINOGEN Negative 04/19/2017    WBCUA 20 (H) 04/01/2017       Trended Lab Data:    Recent Labs  Lab 04/29/17  0020   WBC 5.87   HGB 11.5*   HCT 36.2*      MCV 81*   RDW 15.4*      K 3.7      CO2 26   BUN 9   CREATININE 0.8   GLU 92   PROT 8.1   ALBUMIN 4.1   BILITOT 0.2   AST  16   ALKPHOS 89   ALT 8*       Trended Cardiac Data:  No results for input(s): TROPONINI, CKTOTAL, CKMB, BNP in the last 168 hours.    Radiology:  Imaging Results         US Abdomen Limited (Final result) Result time:  17 01:33:24    Final result by Marvin Chavez MD (17 01:33:24)    Impression:        Cholelithiasis with trace pericholecystic fluid but no additional secondary sonographic signs of cholecystitis. If clinically indicated, HIDA scan can be performed to further evaluate.      Electronically signed by: MARVIN CHAVEZ MD  Date:     17  Time:    01:33     Narrative:    Comparison: None.    Technique:  Limited right upper quadrant ultrasound performed.    Findings:    The visualized pancreas is unremarkable.  The gallbladder is well distended and contains small, mobile echogenic foci indicating cholelithiasis. Trace pericholecystic fluid is seen. There is no hyperemia of the gallbladder wall, thickening, or sonographic Meade's sign to indicate acute cholecystitis.  The common bile duct is not enlarged and measures 3 mm in diameter. Hepatic parenchyma included on this exam is unremarkable relative to echogenicity of the adjacent right renal cortex.               Assessment:     Jose Luis Crowder is a 26 y.o. female with:  Patient Active Problem List    Diagnosis Date Noted    RUQ pain 2017    Anemia 2017    Calculus of gallbladder with acute cholecystitis 2017     (spontaneous vaginal delivery) 2017        Plan:     Acute cholecystitis  -gb with stones and pericholecystic fluid.  Will get hida in am.  No fever so will hold on abx.  Plan for surgical consultation pending hida.    Anemia  -mild, will get baseline iron studies    Post partum  -spontaneous delivery 17  -no complications     PPx  -lovenox     Code Status:     Full     See De La Fuente MD  [facility]  SpectraLink: [id number]  Pager: [phone]

## 2017-04-29 NOTE — NURSING
No significant events overnight. Remains free from fall, injury, and skin breakdown. Voiding independently. VSS on RA throughout the night. Pain well controlled with IV meds. SCDs and nonskid socks in place. Plan of care reviewed with patient and all questions answered. NPO maintained. Bed low, locked w/ bed alarm on. Call light within reach. Purposeful rounding performed. Resting comfortably in bed, mother is at the bedside, no other complaints at this time.

## 2017-04-30 PROBLEM — K80.00 CHOLELITHIASIS WITH ACUTE CHOLECYSTITIS: Status: ACTIVE | Noted: 2017-04-30

## 2017-04-30 LAB
ALBUMIN SERPL BCP-MCNC: 3.5 G/DL
ALP SERPL-CCNC: 77 U/L
ALT SERPL W/O P-5'-P-CCNC: 8 U/L
ANION GAP SERPL CALC-SCNC: 8 MMOL/L
AST SERPL-CCNC: 16 U/L
BASOPHILS # BLD AUTO: 0.03 K/UL
BASOPHILS NFR BLD: 0.7 %
BILIRUB SERPL-MCNC: 0.2 MG/DL
BUN SERPL-MCNC: 7 MG/DL
CALCIUM SERPL-MCNC: 8.7 MG/DL
CHLORIDE SERPL-SCNC: 108 MMOL/L
CO2 SERPL-SCNC: 24 MMOL/L
CREAT SERPL-MCNC: 0.8 MG/DL
DIFFERENTIAL METHOD: ABNORMAL
EOSINOPHIL # BLD AUTO: 0.1 K/UL
EOSINOPHIL NFR BLD: 1.7 %
ERYTHROCYTE [DISTWIDTH] IN BLOOD BY AUTOMATED COUNT: 15.4 %
EST. GFR  (AFRICAN AMERICAN): >60 ML/MIN/1.73 M^2
EST. GFR  (NON AFRICAN AMERICAN): >60 ML/MIN/1.73 M^2
GLUCOSE SERPL-MCNC: 86 MG/DL
HCT VFR BLD AUTO: 35.1 %
HGB BLD-MCNC: 11.1 G/DL
LYMPHOCYTES # BLD AUTO: 2.2 K/UL
LYMPHOCYTES NFR BLD: 53.7 %
MAGNESIUM SERPL-MCNC: 1.9 MG/DL
MCH RBC QN AUTO: 25.4 PG
MCHC RBC AUTO-ENTMCNC: 31.6 %
MCV RBC AUTO: 80 FL
MONOCYTES # BLD AUTO: 0.5 K/UL
MONOCYTES NFR BLD: 11.8 %
NEUTROPHILS # BLD AUTO: 1.3 K/UL
NEUTROPHILS NFR BLD: 31.9 %
PLATELET # BLD AUTO: 219 K/UL
PMV BLD AUTO: 11.4 FL
POTASSIUM SERPL-SCNC: 4.1 MMOL/L
PROT SERPL-MCNC: 6.9 G/DL
RBC # BLD AUTO: 4.37 M/UL
SODIUM SERPL-SCNC: 140 MMOL/L
WBC # BLD AUTO: 4.17 K/UL

## 2017-04-30 PROCEDURE — G0378 HOSPITAL OBSERVATION PER HR: HCPCS

## 2017-04-30 PROCEDURE — 85025 COMPLETE CBC W/AUTO DIFF WBC: CPT

## 2017-04-30 PROCEDURE — 99225 PR SUBSEQUENT OBSERVATION CARE,LEVEL II: CPT | Mod: ,,, | Performed by: HOSPITALIST

## 2017-04-30 PROCEDURE — 63600175 PHARM REV CODE 636 W HCPCS: Performed by: INTERNAL MEDICINE

## 2017-04-30 PROCEDURE — 36415 COLL VENOUS BLD VENIPUNCTURE: CPT

## 2017-04-30 PROCEDURE — 63600175 PHARM REV CODE 636 W HCPCS: Performed by: EMERGENCY MEDICINE

## 2017-04-30 PROCEDURE — 80053 COMPREHEN METABOLIC PANEL: CPT

## 2017-04-30 PROCEDURE — 83735 ASSAY OF MAGNESIUM: CPT

## 2017-04-30 RX ADMIN — MORPHINE SULFATE 4 MG: 4 INJECTION, SOLUTION INTRAMUSCULAR; INTRAVENOUS at 09:04

## 2017-04-30 RX ADMIN — ENOXAPARIN SODIUM 40 MG: 100 INJECTION SUBCUTANEOUS at 05:04

## 2017-04-30 NOTE — PLAN OF CARE
Resting comfortably in bed at this time.  VSS on RA and afebrile this shift.  Tolerating pain per MAR.  Good appetite and good UOP this shift, independent with BRP.  Repositions self independently in bed and ambulating around room.   Free from injury or skin breakdown; Fall precautions maintained and call light in reach.  POC updated questions answered and comments acknowledged.  Purposeful hourly rounding completed this shift.  NPO after Midnight tonight OR with Chowdary. Consent on Chart

## 2017-04-30 NOTE — PROGRESS NOTES
Progress Note  [facility]      Admit Date: 4/28/2017 (LOS: 0)    SUBJECTIVE:     Follow-up For:  Cholelithiasis with acute cholecystitis    HPI/Interval history: No acute events overnight.  Abdominal pain improved.  Mild nausea.    Review of Systems: Patient denies chest pain, shortness of breath.  No fevers, chills.  No vomiting, or diarrhea.    Medications: Reviewed and documented in the MAR.    OBJECTIVE:     Vital Signs (Most Recent)  Temp: 97.6 °F (36.4 °C) (04/30/17 0440)  Pulse: 69 (04/30/17 0440)  Resp: 16 (04/30/17 0440)  BP: (!) 95/58 (04/30/17 0440)  SpO2: 99 % (04/30/17 0440)    Vital Signs Range (Last 24H):  Temp:  [97.6 °F (36.4 °C)-97.8 °F (36.6 °C)]   Pulse:  [61-69]   Resp:  [16]   BP: ()/(58-70)   SpO2:  [98 %-99 %]     I & O (Last 24H):No intake or output data in the 24 hours ending 04/30/17 1050    BMI: Body mass index is 22.31 kg/(m^2).    Physical Exam:  General: Patient is awake, alert, and oriented, and in no acute distress.  Eyes: Pupils equal, round, and reactive to light, anicteric sclera.  Mouth: No lesions, moist mucous membranes.  Respiratory: Lungs clear to ausculation, no crackles or wheezing.  Cardiovascular: Regular rate and rythmn, no murmurs appreciated.  Abdomen: Soft, mild right upper quadrant tendermess, non-distended, normal bowel sounds.  Skin: No rashes or breakdown.  Extremities: No edema, cyanosis, or clubbing.  Neuro: No focal weakness.    Diagnostic Results:  CBC:    Recent Labs  Lab 04/29/17  0020 04/29/17  0501 04/30/17  0505   WBC 5.87 6.69 4.17   HGB 11.5* 10.9* 11.1*   HCT 36.2* 34.9* 35.1*    214 219   MCV 81* 81* 80*     BMP:    Recent Labs  Lab 04/29/17  0020 04/29/17  0501 04/30/17  0505   GLU 92 91 86    141 140   K 3.7 3.9 4.1    107 108   CO2 26 25 24   BUN 9 8 7   CREATININE 0.8 0.7 0.8   CALCIUM 9.3 9.5 8.7   MG  --  1.9 1.9       ASSESSMENT/PLAN:     Active Hospital Problems    Diagnosis  POA    *Cholelithiasis with acute  cholecystitis [K80.00]  Yes    RUQ pain [R10.11]  Yes    Anemia [D64.9]  Yes    Calculus of gallbladder with acute cholecystitis [K80.00]  Yes      Resolved Hospital Problems    Diagnosis Date Resolved POA   No resolved problems to display.     Acute vs. chronic cholecystitis.  General surgery recommended cholecystectomy and patient agreeable to surgery.  NPO after midnight for surgery.    DVT prophylaxis.  Subcutaneous enoxaparin.

## 2017-04-30 NOTE — NURSING
No significant events overnight. Remains free from fall, injury, and skin breakdown. Voiding independently. VSS on RA throughout the night. Denies pain. Nonskid socks in place. Plan of care reviewed with patient and all questions answered. Bed low, locked w/ bed alarm on. Call light within reach. Purposeful rounding performed. Resting comfortably in bed, mother is at the bedside, no other complaints at this time.

## 2017-04-30 NOTE — PROGRESS NOTES
AVSS  Feeling better, no abd pain today  Tolerating solid diet    PE  Heent--anicteric  Chest---clear     Cor---rrr  Abd--soft, no tenderness, no distention, + be    LAB  Liver profile, wbc are both nml    IMP  Symptomatic GBD    Plan  Spoke with pt and family, they wish to proceed with cholecystectomy in am  NPO past MN  Consents signed

## 2017-05-01 ENCOUNTER — ANESTHESIA EVENT (OUTPATIENT)
Dept: SURGERY | Facility: OTHER | Age: 27
End: 2017-05-01
Payer: MEDICAID

## 2017-05-01 ENCOUNTER — ANESTHESIA (OUTPATIENT)
Dept: SURGERY | Facility: OTHER | Age: 27
End: 2017-05-01
Payer: MEDICAID

## 2017-05-01 PROBLEM — K80.00 CALCULUS OF GALLBLADDER WITH ACUTE CHOLECYSTITIS WITHOUT OBSTRUCTION: Status: ACTIVE | Noted: 2017-05-01

## 2017-05-01 LAB
ALBUMIN SERPL BCP-MCNC: 3.5 G/DL
ALP SERPL-CCNC: 76 U/L
ALT SERPL W/O P-5'-P-CCNC: 7 U/L
ANION GAP SERPL CALC-SCNC: 6 MMOL/L
AST SERPL-CCNC: 18 U/L
BASOPHILS # BLD AUTO: 0.03 K/UL
BASOPHILS NFR BLD: 0.6 %
BILIRUB SERPL-MCNC: 0.2 MG/DL
BUN SERPL-MCNC: 8 MG/DL
CALCIUM SERPL-MCNC: 9 MG/DL
CHLORIDE SERPL-SCNC: 108 MMOL/L
CO2 SERPL-SCNC: 27 MMOL/L
CREAT SERPL-MCNC: 0.8 MG/DL
DIFFERENTIAL METHOD: ABNORMAL
EOSINOPHIL # BLD AUTO: 0.1 K/UL
EOSINOPHIL NFR BLD: 1.2 %
ERYTHROCYTE [DISTWIDTH] IN BLOOD BY AUTOMATED COUNT: 15.2 %
EST. GFR  (AFRICAN AMERICAN): >60 ML/MIN/1.73 M^2
EST. GFR  (NON AFRICAN AMERICAN): >60 ML/MIN/1.73 M^2
GLUCOSE SERPL-MCNC: 96 MG/DL
HCT VFR BLD AUTO: 35.1 %
HGB BLD-MCNC: 11.2 G/DL
LYMPHOCYTES # BLD AUTO: 2.6 K/UL
LYMPHOCYTES NFR BLD: 49.7 %
MAGNESIUM SERPL-MCNC: 2.1 MG/DL
MCH RBC QN AUTO: 25.6 PG
MCHC RBC AUTO-ENTMCNC: 31.9 %
MCV RBC AUTO: 80 FL
MONOCYTES # BLD AUTO: 0.8 K/UL
MONOCYTES NFR BLD: 14.6 %
NEUTROPHILS # BLD AUTO: 1.7 K/UL
NEUTROPHILS NFR BLD: 33.7 %
PLATELET # BLD AUTO: 225 K/UL
PMV BLD AUTO: 11.3 FL
POTASSIUM SERPL-SCNC: 4.2 MMOL/L
PROT SERPL-MCNC: 7.1 G/DL
RBC # BLD AUTO: 4.37 M/UL
SODIUM SERPL-SCNC: 141 MMOL/L
WBC # BLD AUTO: 5.13 K/UL

## 2017-05-01 PROCEDURE — 25000003 PHARM REV CODE 250: Performed by: SPECIALIST

## 2017-05-01 PROCEDURE — 80053 COMPREHEN METABOLIC PANEL: CPT

## 2017-05-01 PROCEDURE — G0378 HOSPITAL OBSERVATION PER HR: HCPCS

## 2017-05-01 PROCEDURE — 27201423 OPTIME MED/SURG SUP & DEVICES STERILE SUPPLY: Performed by: SPECIALIST

## 2017-05-01 PROCEDURE — 88304 TISSUE EXAM BY PATHOLOGIST: CPT | Mod: 26,,, | Performed by: PATHOLOGY

## 2017-05-01 PROCEDURE — 83735 ASSAY OF MAGNESIUM: CPT

## 2017-05-01 PROCEDURE — 37000009 HC ANESTHESIA EA ADD 15 MINS: Performed by: SPECIALIST

## 2017-05-01 PROCEDURE — 99225 PR SUBSEQUENT OBSERVATION CARE,LEVEL II: CPT | Mod: ,,, | Performed by: HOSPITALIST

## 2017-05-01 PROCEDURE — 63600175 PHARM REV CODE 636 W HCPCS: Performed by: NURSE ANESTHETIST, CERTIFIED REGISTERED

## 2017-05-01 PROCEDURE — 63600175 PHARM REV CODE 636 W HCPCS: Performed by: EMERGENCY MEDICINE

## 2017-05-01 PROCEDURE — 25000003 PHARM REV CODE 250: Performed by: NURSE ANESTHETIST, CERTIFIED REGISTERED

## 2017-05-01 PROCEDURE — 63600175 PHARM REV CODE 636 W HCPCS: Performed by: INTERNAL MEDICINE

## 2017-05-01 PROCEDURE — 63600175 PHARM REV CODE 636 W HCPCS: Performed by: SPECIALIST

## 2017-05-01 PROCEDURE — 85025 COMPLETE CBC W/AUTO DIFF WBC: CPT

## 2017-05-01 PROCEDURE — 36000709 HC OR TIME LEV III EA ADD 15 MIN: Performed by: SPECIALIST

## 2017-05-01 PROCEDURE — 71000033 HC RECOVERY, INTIAL HOUR: Performed by: SPECIALIST

## 2017-05-01 PROCEDURE — 37000008 HC ANESTHESIA 1ST 15 MINUTES: Performed by: SPECIALIST

## 2017-05-01 PROCEDURE — 36000708 HC OR TIME LEV III 1ST 15 MIN: Performed by: SPECIALIST

## 2017-05-01 PROCEDURE — 71000039 HC RECOVERY, EACH ADD'L HOUR: Performed by: SPECIALIST

## 2017-05-01 PROCEDURE — 88304 TISSUE EXAM BY PATHOLOGIST: CPT | Performed by: PATHOLOGY

## 2017-05-01 PROCEDURE — 36415 COLL VENOUS BLD VENIPUNCTURE: CPT

## 2017-05-01 RX ORDER — SODIUM CHLORIDE, SODIUM LACTATE, POTASSIUM CHLORIDE, CALCIUM CHLORIDE 600; 310; 30; 20 MG/100ML; MG/100ML; MG/100ML; MG/100ML
INJECTION, SOLUTION INTRAVENOUS CONTINUOUS PRN
Status: DISCONTINUED | OUTPATIENT
Start: 2017-05-01 | End: 2017-05-01

## 2017-05-01 RX ORDER — MEPERIDINE HYDROCHLORIDE 50 MG/ML
12.5 INJECTION INTRAMUSCULAR; INTRAVENOUS; SUBCUTANEOUS ONCE AS NEEDED
Status: DISCONTINUED | OUTPATIENT
Start: 2017-05-01 | End: 2017-05-01

## 2017-05-01 RX ORDER — ACETAMINOPHEN 10 MG/ML
INJECTION, SOLUTION INTRAVENOUS
Status: DISCONTINUED | OUTPATIENT
Start: 2017-05-01 | End: 2017-05-01

## 2017-05-01 RX ORDER — SODIUM CHLORIDE 0.9 % (FLUSH) 0.9 %
3 SYRINGE (ML) INJECTION EVERY 8 HOURS
Status: DISCONTINUED | OUTPATIENT
Start: 2017-05-01 | End: 2017-05-01

## 2017-05-01 RX ORDER — LIDOCAINE HCL/PF 100 MG/5ML
SYRINGE (ML) INTRAVENOUS
Status: DISCONTINUED | OUTPATIENT
Start: 2017-05-01 | End: 2017-05-01

## 2017-05-01 RX ORDER — BUPIVACAINE HCL/EPINEPHRINE 0.25-.0005
VIAL (ML) INJECTION
Status: DISCONTINUED | OUTPATIENT
Start: 2017-05-01 | End: 2017-05-01 | Stop reason: HOSPADM

## 2017-05-01 RX ORDER — FENTANYL CITRATE 50 UG/ML
INJECTION, SOLUTION INTRAMUSCULAR; INTRAVENOUS
Status: DISCONTINUED | OUTPATIENT
Start: 2017-05-01 | End: 2017-05-01

## 2017-05-01 RX ORDER — OXYCODONE AND ACETAMINOPHEN 10; 325 MG/1; MG/1
1 TABLET ORAL ONCE
Status: COMPLETED | OUTPATIENT
Start: 2017-05-02 | End: 2017-05-01

## 2017-05-01 RX ORDER — FENTANYL CITRATE 50 UG/ML
25 INJECTION, SOLUTION INTRAMUSCULAR; INTRAVENOUS EVERY 5 MIN PRN
Status: DISCONTINUED | OUTPATIENT
Start: 2017-05-01 | End: 2017-05-01

## 2017-05-01 RX ORDER — DIPHENHYDRAMINE HYDROCHLORIDE 50 MG/ML
25 INJECTION INTRAMUSCULAR; INTRAVENOUS EVERY 6 HOURS PRN
Status: DISCONTINUED | OUTPATIENT
Start: 2017-05-01 | End: 2017-05-01

## 2017-05-01 RX ORDER — HYDROMORPHONE HYDROCHLORIDE 2 MG/ML
0.4 INJECTION, SOLUTION INTRAMUSCULAR; INTRAVENOUS; SUBCUTANEOUS EVERY 5 MIN PRN
Status: DISCONTINUED | OUTPATIENT
Start: 2017-05-01 | End: 2017-05-01

## 2017-05-01 RX ORDER — ONDANSETRON 2 MG/ML
INJECTION INTRAMUSCULAR; INTRAVENOUS
Status: DISCONTINUED | OUTPATIENT
Start: 2017-05-01 | End: 2017-05-01

## 2017-05-01 RX ORDER — PROPOFOL 10 MG/ML
VIAL (ML) INTRAVENOUS
Status: DISCONTINUED | OUTPATIENT
Start: 2017-05-01 | End: 2017-05-01

## 2017-05-01 RX ORDER — OXYCODONE HYDROCHLORIDE 5 MG/1
5 TABLET ORAL
Status: DISCONTINUED | OUTPATIENT
Start: 2017-05-01 | End: 2017-05-01

## 2017-05-01 RX ORDER — MIDAZOLAM HYDROCHLORIDE 1 MG/ML
INJECTION INTRAMUSCULAR; INTRAVENOUS
Status: DISCONTINUED | OUTPATIENT
Start: 2017-05-01 | End: 2017-05-01

## 2017-05-01 RX ORDER — KETOROLAC TROMETHAMINE 30 MG/ML
INJECTION, SOLUTION INTRAMUSCULAR; INTRAVENOUS
Status: DISCONTINUED | OUTPATIENT
Start: 2017-05-01 | End: 2017-05-01

## 2017-05-01 RX ORDER — ONDANSETRON 2 MG/ML
4 INJECTION INTRAMUSCULAR; INTRAVENOUS ONCE AS NEEDED
Status: DISCONTINUED | OUTPATIENT
Start: 2017-05-01 | End: 2017-05-01

## 2017-05-01 RX ORDER — SODIUM CHLORIDE 0.9 % (FLUSH) 0.9 %
3 SYRINGE (ML) INJECTION
Status: DISCONTINUED | OUTPATIENT
Start: 2017-05-01 | End: 2017-05-04 | Stop reason: HOSPADM

## 2017-05-01 RX ORDER — GLYCOPYRROLATE 0.2 MG/ML
INJECTION INTRAMUSCULAR; INTRAVENOUS
Status: DISCONTINUED | OUTPATIENT
Start: 2017-05-01 | End: 2017-05-01

## 2017-05-01 RX ORDER — NEOSTIGMINE METHYLSULFATE 1 MG/ML
INJECTION, SOLUTION INTRAVENOUS
Status: DISCONTINUED | OUTPATIENT
Start: 2017-05-01 | End: 2017-05-01

## 2017-05-01 RX ORDER — ROCURONIUM BROMIDE 10 MG/ML
INJECTION, SOLUTION INTRAVENOUS
Status: DISCONTINUED | OUTPATIENT
Start: 2017-05-01 | End: 2017-05-01

## 2017-05-01 RX ADMIN — OXYCODONE HYDROCHLORIDE 5 MG: 5 TABLET ORAL at 04:05

## 2017-05-01 RX ADMIN — ACETAMINOPHEN 1000 MG: 10 INJECTION, SOLUTION INTRAVENOUS at 03:05

## 2017-05-01 RX ADMIN — PROPOFOL 200 MG: 10 INJECTION, EMULSION INTRAVENOUS at 02:05

## 2017-05-01 RX ADMIN — HYDROMORPHONE HYDROCHLORIDE 0.4 MG: 2 INJECTION INTRAMUSCULAR; INTRAVENOUS; SUBCUTANEOUS at 04:05

## 2017-05-01 RX ADMIN — ONDANSETRON 4 MG: 2 INJECTION INTRAMUSCULAR; INTRAVENOUS at 08:05

## 2017-05-01 RX ADMIN — CARBOXYMETHYLCELLULOSE SODIUM 2 DROP: 2.5 SOLUTION/ DROPS OPHTHALMIC at 02:05

## 2017-05-01 RX ADMIN — GLYCOPYRROLATE 0.6 MG: 0.2 INJECTION, SOLUTION INTRAMUSCULAR; INTRAVENOUS at 03:05

## 2017-05-01 RX ADMIN — OXYCODONE HYDROCHLORIDE AND ACETAMINOPHEN 1 TABLET: 10; 325 TABLET ORAL at 11:05

## 2017-05-01 RX ADMIN — EPHEDRINE SULFATE 10 MG: 50 INJECTION INTRAMUSCULAR; INTRAVENOUS; SUBCUTANEOUS at 03:05

## 2017-05-01 RX ADMIN — ROCURONIUM BROMIDE 35 MG: 10 INJECTION, SOLUTION INTRAVENOUS at 02:05

## 2017-05-01 RX ADMIN — LIDOCAINE HYDROCHLORIDE 75 MG: 20 INJECTION, SOLUTION INTRAVENOUS at 02:05

## 2017-05-01 RX ADMIN — FENTANYL CITRATE 50 MCG: 50 INJECTION, SOLUTION INTRAMUSCULAR; INTRAVENOUS at 04:05

## 2017-05-01 RX ADMIN — ONDANSETRON 4 MG: 2 INJECTION INTRAMUSCULAR; INTRAVENOUS at 03:05

## 2017-05-01 RX ADMIN — MIDAZOLAM HYDROCHLORIDE 2 MG: 1 INJECTION, SOLUTION INTRAMUSCULAR; INTRAVENOUS at 02:05

## 2017-05-01 RX ADMIN — FENTANYL CITRATE 100 MCG: 50 INJECTION, SOLUTION INTRAMUSCULAR; INTRAVENOUS at 02:05

## 2017-05-01 RX ADMIN — HYDROMORPHONE HYDROCHLORIDE 0.4 MG: 2 INJECTION INTRAMUSCULAR; INTRAVENOUS; SUBCUTANEOUS at 05:05

## 2017-05-01 RX ADMIN — MORPHINE SULFATE 4 MG: 4 INJECTION, SOLUTION INTRAMUSCULAR; INTRAVENOUS at 07:05

## 2017-05-01 RX ADMIN — NEOSTIGMINE METHYLSULFATE 4 MG: 1 INJECTION INTRAVENOUS at 03:05

## 2017-05-01 RX ADMIN — FENTANYL CITRATE 100 MCG: 50 INJECTION, SOLUTION INTRAMUSCULAR; INTRAVENOUS at 03:05

## 2017-05-01 RX ADMIN — KETOROLAC TROMETHAMINE 30 MG: 30 INJECTION, SOLUTION INTRAMUSCULAR; INTRAVENOUS at 03:05

## 2017-05-01 RX ADMIN — GLYCOPYRROLATE 0.2 MG: 0.2 INJECTION, SOLUTION INTRAMUSCULAR; INTRAVENOUS at 03:05

## 2017-05-01 RX ADMIN — SODIUM CHLORIDE, SODIUM LACTATE, POTASSIUM CHLORIDE, AND CALCIUM CHLORIDE: 600; 310; 30; 20 INJECTION, SOLUTION INTRAVENOUS at 02:05

## 2017-05-01 NOTE — PROGRESS NOTES
[facility]  Brief Operative Note    SUMMARY     Surgery Date: 5/1/2017     Surgeon(s) and Role:     * Vega Chowdary Jr., MD - Primary    Assisting Surgeon: None    Pre-op Diagnosis:  Cholecystitis [K81.9]    Post-op Diagnosis:  Post-Op Diagnosis Codes:     * Cholecystitis [K81.9]    Procedure(s) (LRB):  CHOLECYSTECTOMY-LAPAROSCOPIC (N/A)    Anesthesia: General     Description of Procedure: lap li    Description of the findings of the procedure: acute cholecystitis    Estimated Blood Loss: min         Specimens:   Specimen (12h ago through future)    Start     Ordered    05/01/17 1539  Specimen to Pathology - Surgery  Once     Comments:  GALLBLADDER    05/01/17 1538

## 2017-05-01 NOTE — ANESTHESIA POSTPROCEDURE EVALUATION
"Anesthesia Post Evaluation    Patient: Jose Luis Crowder    Procedure(s) Performed: Procedure(s) (LRB):  CHOLECYSTECTOMY-LAPAROSCOPIC (N/A)    Final Anesthesia Type: general  Patient location during evaluation: PACU  Patient participation: Yes- Able to Participate  Level of consciousness: awake and alert and oriented  Post-procedure vital signs: reviewed and stable  Pain management: adequate  Airway patency: patent  PONV status at discharge: No PONV  Anesthetic complications: no      Cardiovascular status: blood pressure returned to baseline and hemodynamically stable  Respiratory status: unassisted, spontaneous ventilation and room air  Hydration status: euvolemic  Follow-up not needed.        Visit Vitals    /78    Pulse 70    Temp 36.4 °C (97.6 °F) (Oral)    Resp 16    Ht 5' 4" (1.626 m)    Wt 59 kg (130 lb)    LMP 04/26/2016    SpO2 99%    Breastfeeding No    BMI 22.31 kg/m2       Pain/Vicenta Score: Pain Assessment Performed: Yes (5/1/2017  4:37 PM)  Presence of Pain: complains of pain/discomfort (5/1/2017  4:37 PM)  Pain Rating Prior to Med Admin: 7 (5/1/2017  4:37 PM)  Pain Rating Post Med Admin: 7 (5/1/2017  4:16 PM)  Vicenta Score: 10 (5/1/2017  4:37 PM)      "

## 2017-05-01 NOTE — ANESTHESIA PREPROCEDURE EVALUATION
2017  Jose Luis Crowder is a 26 y.o., female.    OB History    Para Term  AB SAB TAB Ectopic Multiple Living   1 1 1      0 1      # Outcome Date GA Lbr Neftali/2nd Weight Sex Delivery Anes PTL Lv   1 Term 17 39w3d / 00:26 3.09 kg (6 lb 13 oz) F Vag-Vacuum EPI N Y          Wt Readings from Last 1 Encounters:   17 2351 59 kg (130 lb)       BP Readings from Last 3 Encounters:   17 (!) 91/51   17 95/69   17 96/65       Patient Active Problem List   Diagnosis    RUQ pain    Anemia    Calculus of gallbladder with acute cholecystitis    Cholelithiasis with acute cholecystitis    Calculus of gallbladder with acute cholecystitis without obstruction    Cholecystitis       Past Surgical History:   Procedure Laterality Date    TONSILLECTOMY, ADENOIDECTOMY         Social History     Social History    Marital status: Single     Spouse name: N/A    Number of children: N/A    Years of education: N/A     Occupational History    Not on file.     Social History Main Topics    Smoking status: Never Smoker    Smokeless tobacco: Never Used    Alcohol use Yes      Comment: Social     Drug use: No    Sexual activity: Yes     Partners: Male     Birth control/ protection: Condom     Other Topics Concern    Not on file     Social History Narrative         Chemistry        Component Value Date/Time     2017 0520    K 4.2 2017 0520     2017 0520    CO2 27 2017 0520    BUN 8 2017 0520    CREATININE 0.8 2017 0520    GLU 96 2017 0520        Component Value Date/Time    CALCIUM 9.0 2017 0520    ALKPHOS 76 2017 0520    AST 18 2017 0520    ALT 7 (L) 2017 0520    BILITOT 0.2 2017 0520            Lab Results   Component Value Date    WBC 5.13 2017    HGB 11.2 (L) 2017    HCT 35.1 (L) 2017     MCV 80 (L) 05/01/2017     05/01/2017       No results for input(s): INR, PROTIME, APTT in the last 72 hours.    Invalid input(s): PT          Anesthesia Evaluation    I have reviewed the Patient Summary Reports.    I have reviewed the Nursing Notes.   I have reviewed the Medications.     Review of Systems  Anesthesia Hx:  No problems with previous Anesthesia  Denies Family Hx of Anesthesia complications.   Denies Personal Hx of Anesthesia complications.   Social:  Social Alcohol Use, Non-Smoker    Hematology/Oncology:     Oncology Normal    -- Anemia:   EENT/Dental:EENT/Dental Normal   Cardiovascular:  Cardiovascular Normal Exercise tolerance: good     Pulmonary:  Pulmonary Normal    Renal/:  Renal/ Normal     Hepatic/GI:  Hepatic/GI Normal    Musculoskeletal:  Musculoskeletal Normal    Neurological:  Neurology Normal    Endocrine:  Endocrine Normal    Dermatological:  Skin Normal    Psych:  Psychiatric Normal           Physical Exam  General:  Well nourished    Airway/Jaw/Neck:  Airway Findings: Mouth Opening: Normal Tongue: Normal  General Airway Assessment: Adult, Good  Mallampati: I  Improves to I with phonation.  TM Distance: Normal, at least 6 cm        Eyes/Ears/Nose:  EYES/EARS/NOSE FINDINGS: Normal   Dental:  Dental Findings: In tact    Chest/Lungs:  Chest/Lungs Findings: Normal Respiratory Rate     Heart/Vascular:  Heart Findings: Rate: Normal     Abdomen:  Abdomen Findings: Normal    Musculoskeletal:  Musculoskeletal Findings: Normal   Skin:  Skin Findings: Normal    Mental Status:  Mental Status Findings:  Cooperative, Alert and Oriented         Anesthesia Plan  Type of Anesthesia, risks & benefits discussed:  Anesthesia Type:  general  Patient's Preference:   Intra-op Monitoring Plan:   Intra-op Monitoring Plan Comments:   Post Op Pain Control Plan:   Post Op Pain Control Plan Comments:   Induction:   IV  Beta Blocker:  Patient is not currently on a Beta-Blocker (No further documentation  required).       Informed Consent: Patient understands risks and agrees with Anesthesia plan.  Questions answered. Anesthesia consent signed with patient.  ASA Score: 2  emergent   Day of Surgery Review of History & Physical:    H&P update referred to the surgeon.         Ready For Surgery From Anesthesia Perspective.

## 2017-05-01 NOTE — PROGRESS NOTES
Progress Note  [facility]      Admit Date: 4/28/2017 (LOS: 0)    SUBJECTIVE:     Follow-up For:  Cholelithiasis with acute cholecystitis    HPI/Interval history: Poor appetite.  Mild right upper quadrant pain but pain well controlled.     Review of Systems: Patient denies chest pain, shortness of breath.  No fevers, chills.  No nausea, vomiting, or diarrhea.    Medications: Reviewed and documented in the MAR.    OBJECTIVE:     Vital Signs (Most Recent)  Temp: 98.2 °F (36.8 °C) (05/01/17 0805)  Pulse: 69 (05/01/17 0805)  Resp: 18 (05/01/17 0805)  BP: 97/69 (05/01/17 0805)  SpO2: 99 % (05/01/17 0805)    Vital Signs Range (Last 24H):  Temp:  [97.8 °F (36.6 °C)-98.3 °F (36.8 °C)]   Pulse:  [61-76]   Resp:  [16-18]   BP: ()/(58-69)   SpO2:  [98 %-100 %]     I & O (Last 24H):No intake or output data in the 24 hours ending 05/01/17 1010    BMI: Body mass index is 22.31 kg/(m^2).    Physical Exam:  General: Patient is awake, alert, and oriented, and in no acute distress.  Eyes: Pupils equal, round, and reactive to light, anicteric sclera.  Mouth: No lesions, moist mucous membranes.  Respiratory: Lungs clear to ausculation, no crackles or wheezing.  Cardiovascular: Regular rate and rythmn, no murmurs appreciated.  Abdomen: Soft, right upper quadrant pain, non-distended, normal bowel sounds.  Skin: No rashes or breakdown.  Extremities: No edema, cyanosis, or clubbing.  Neuro: No focal weakness.    Diagnostic Results:  CBC:    Recent Labs  Lab 04/29/17  0020 04/29/17  0501 04/30/17  0505 05/01/17  0520   WBC 5.87 6.69 4.17 5.13   HGB 11.5* 10.9* 11.1* 11.2*   HCT 36.2* 34.9* 35.1* 35.1*    214 219 225   MCV 81* 81* 80* 80*     BMP:    Recent Labs  Lab 04/29/17  0020 04/29/17  0501 04/30/17  0505 05/01/17  0520   GLU 92 91 86 96    141 140 141   K 3.7 3.9 4.1 4.2    107 108 108   CO2 26 25 24 27   BUN 9 8 7 8   CREATININE 0.8 0.7 0.8 0.8   CALCIUM 9.3 9.5 8.7 9.0   MG  --  1.9 1.9 2.1        ASSESSMENT/PLAN:     Active Hospital Problems    Diagnosis  POA    *Cholelithiasis with acute cholecystitis [K80.00]  Yes    RUQ pain [R10.11]  Yes    Anemia [D64.9]  Yes    Calculus of gallbladder with acute cholecystitis [K80.00]  Yes      Resolved Hospital Problems    Diagnosis Date Resolved POA   No resolved problems to display.     Cholelithiasis with acute on chronic cholecystitis.  Stable.  Plan for surgery today.    DVT prophylaxis.  High risk.  Thrombo Embolic Deterrent hose (MARTELL® hose) and sequential compression devices for now pending surgery.

## 2017-05-01 NOTE — NURSING
No significant events overnight. Remains free from fall, injury, and skin breakdown. Voiding independently. VSS on RA throughout the night. Pain well controlled with IV meds. Nonskid socks in place. NPO after midnight maintained. Plan of care reviewed with patient and all questions answered. Bed low, locked w/ bed alarm on. Call light within reach. Purposeful rounding performed. Resting comfortably in bed, mother is at the bedside, no other complaints at this time.

## 2017-05-01 NOTE — TRANSFER OF CARE
"Anesthesia Transfer of Care Note    Patient: Jose Luis Crowder    Procedure(s) Performed: Procedure(s) (LRB):  CHOLECYSTECTOMY-LAPAROSCOPIC (N/A)    Patient location: PACU    Anesthesia Type: general    Transport from OR: Transported from OR on 2-3 L/min O2 by NC with adequate spontaneous ventilation    Post pain: adequate analgesia    Post assessment: no apparent anesthetic complications    Post vital signs: stable    Level of consciousness: awake    Nausea/Vomiting: no nausea/vomiting    Complications: none          Last vitals:   Visit Vitals    BP (!) 141/76 (BP Location: Left arm, Patient Position: Lying, BP Method: Automatic)    Pulse 68    Temp 36.6 °C (97.8 °F) (Oral)    Resp 16    Ht 5' 4" (1.626 m)    Wt 59 kg (130 lb)    LMP 04/26/2016    SpO2 100%    Breastfeeding No    BMI 22.31 kg/m2     "

## 2017-05-02 LAB
ALBUMIN SERPL BCP-MCNC: 3.5 G/DL
ALP SERPL-CCNC: 136 U/L
ALT SERPL W/O P-5'-P-CCNC: 167 U/L
ANION GAP SERPL CALC-SCNC: 6 MMOL/L
AST SERPL-CCNC: 384 U/L
BASOPHILS # BLD AUTO: 0.02 K/UL
BASOPHILS NFR BLD: 0.3 %
BILIRUB SERPL-MCNC: 1.4 MG/DL
BUN SERPL-MCNC: 6 MG/DL
CALCIUM SERPL-MCNC: 8.9 MG/DL
CHLORIDE SERPL-SCNC: 104 MMOL/L
CO2 SERPL-SCNC: 27 MMOL/L
CREAT SERPL-MCNC: 0.8 MG/DL
DIFFERENTIAL METHOD: ABNORMAL
EOSINOPHIL # BLD AUTO: 0 K/UL
EOSINOPHIL NFR BLD: 0.3 %
ERYTHROCYTE [DISTWIDTH] IN BLOOD BY AUTOMATED COUNT: 15.1 %
EST. GFR  (AFRICAN AMERICAN): >60 ML/MIN/1.73 M^2
EST. GFR  (NON AFRICAN AMERICAN): >60 ML/MIN/1.73 M^2
GLUCOSE SERPL-MCNC: 87 MG/DL
HCT VFR BLD AUTO: 35 %
HGB BLD-MCNC: 10.9 G/DL
LYMPHOCYTES # BLD AUTO: 0.9 K/UL
LYMPHOCYTES NFR BLD: 13.5 %
MAGNESIUM SERPL-MCNC: 1.8 MG/DL
MCH RBC QN AUTO: 25.1 PG
MCHC RBC AUTO-ENTMCNC: 31.1 %
MCV RBC AUTO: 81 FL
MONOCYTES # BLD AUTO: 1 K/UL
MONOCYTES NFR BLD: 15.6 %
NEUTROPHILS # BLD AUTO: 4.5 K/UL
NEUTROPHILS NFR BLD: 70.1 %
PLATELET # BLD AUTO: 196 K/UL
PMV BLD AUTO: 11.6 FL
POTASSIUM SERPL-SCNC: 4.1 MMOL/L
PROT SERPL-MCNC: 7.1 G/DL
RBC # BLD AUTO: 4.35 M/UL
SODIUM SERPL-SCNC: 137 MMOL/L
WBC # BLD AUTO: 6.43 K/UL

## 2017-05-02 PROCEDURE — 99900035 HC TECH TIME PER 15 MIN (STAT)

## 2017-05-02 PROCEDURE — 83735 ASSAY OF MAGNESIUM: CPT

## 2017-05-02 PROCEDURE — 99225 PR SUBSEQUENT OBSERVATION CARE,LEVEL II: CPT | Mod: ,,, | Performed by: HOSPITALIST

## 2017-05-02 PROCEDURE — 63600175 PHARM REV CODE 636 W HCPCS: Performed by: INTERNAL MEDICINE

## 2017-05-02 PROCEDURE — 25000003 PHARM REV CODE 250: Performed by: INTERNAL MEDICINE

## 2017-05-02 PROCEDURE — 63600175 PHARM REV CODE 636 W HCPCS: Performed by: EMERGENCY MEDICINE

## 2017-05-02 PROCEDURE — 36415 COLL VENOUS BLD VENIPUNCTURE: CPT

## 2017-05-02 PROCEDURE — 80053 COMPREHEN METABOLIC PANEL: CPT

## 2017-05-02 PROCEDURE — G0378 HOSPITAL OBSERVATION PER HR: HCPCS

## 2017-05-02 PROCEDURE — 25000003 PHARM REV CODE 250: Performed by: HOSPITALIST

## 2017-05-02 PROCEDURE — 85025 COMPLETE CBC W/AUTO DIFF WBC: CPT

## 2017-05-02 RX ORDER — SODIUM CHLORIDE 9 MG/ML
INJECTION, SOLUTION INTRAVENOUS ONCE
Status: COMPLETED | OUTPATIENT
Start: 2017-05-02 | End: 2017-05-03

## 2017-05-02 RX ORDER — OXYCODONE AND ACETAMINOPHEN 7.5; 325 MG/1; MG/1
1 TABLET ORAL EVERY 6 HOURS PRN
Status: DISCONTINUED | OUTPATIENT
Start: 2017-05-02 | End: 2017-05-03

## 2017-05-02 RX ORDER — PROMETHAZINE HYDROCHLORIDE 25 MG/1
25 TABLET ORAL EVERY 6 HOURS PRN
Status: DISCONTINUED | OUTPATIENT
Start: 2017-05-02 | End: 2017-05-04 | Stop reason: HOSPADM

## 2017-05-02 RX ADMIN — OXYCODONE HYDROCHLORIDE AND ACETAMINOPHEN 1 TABLET: 7.5; 325 TABLET ORAL at 03:05

## 2017-05-02 RX ADMIN — SODIUM CHLORIDE: 0.9 INJECTION, SOLUTION INTRAVENOUS at 11:05

## 2017-05-02 RX ADMIN — ONDANSETRON 4 MG: 2 INJECTION INTRAMUSCULAR; INTRAVENOUS at 09:05

## 2017-05-02 RX ADMIN — ONDANSETRON 4 MG: 2 INJECTION INTRAMUSCULAR; INTRAVENOUS at 03:05

## 2017-05-02 RX ADMIN — ONDANSETRON 4 MG: 2 INJECTION INTRAMUSCULAR; INTRAVENOUS at 05:05

## 2017-05-02 RX ADMIN — PROMETHAZINE HYDROCHLORIDE 25 MG: 25 TABLET ORAL at 11:05

## 2017-05-02 RX ADMIN — MORPHINE SULFATE 4 MG: 4 INJECTION, SOLUTION INTRAMUSCULAR; INTRAVENOUS at 03:05

## 2017-05-02 RX ADMIN — OXYCODONE HYDROCHLORIDE AND ACETAMINOPHEN 1 TABLET: 7.5; 325 TABLET ORAL at 09:05

## 2017-05-02 RX ADMIN — ENOXAPARIN SODIUM 40 MG: 100 INJECTION SUBCUTANEOUS at 05:05

## 2017-05-02 NOTE — PLAN OF CARE
Problem: Patient Care Overview  Goal: Plan of Care Review  Outcome: Ongoing (interventions implemented as appropriate)  Pt free of trauma, falls, and injury. Pt VSS and afebrile throughout shift. Pt free of skin breakdown. Pt dressing is clean, dry, and intact. Pt pain has been moderately controlled by PO/IV pain meds and tolerated well. Pt has been eating and voiding adequately throughout shift. Purposeful rounding done. Pt has call light in reach, bed alarm on, bed brakes on, side rails up x2, bed in low position, TEDs/SCDs on, IS at bedside, and nonskid socks on. Pt lying in bed in no distress. Will continue to monitor.

## 2017-05-02 NOTE — PROGRESS NOTES
Progress Note  [facility]      Admit Date: 4/28/2017 (LOS: 0)    SUBJECTIVE:     Follow-up For:  Cholelithiasis with acute cholecystitis    HPI/Interval history:   Pt new to me.  States still has abdominal pain on right side.          DATE OF PROCEDURE: 05/01/2017   PREOPERATIVE DIAGNOSIS: Symptomatic gallbladder disease.   POSTOPERATIVE DIAGNOSIS: Acute cholecystitis.   PROCEDURE: Laparoscopic cholecystectomy.      Review of Systems: Patient denies chest pain, shortness of breath.  No fevers, chills.  No nausea, vomiting, or diarrhea.    Medications: Reviewed and documented in the MAR.    OBJECTIVE:     Vital Signs (Most Recent)  Temp: 98.2 °F (36.8 °C) (05/02/17 0734)  Pulse: 62 (05/02/17 0734)  Resp: 16 (05/02/17 0734)  BP: 109/72 (05/02/17 0734)  SpO2: 99 % (05/02/17 0734)    Vital Signs Range (Last 24H):  Temp:  [97.6 °F (36.4 °C)-98.7 °F (37.1 °C)]   Pulse:  [62-72]   Resp:  [16]   BP: (109-141)/(61-85)   SpO2:  [99 %-100 %]     I & O (Last 24H):    Intake/Output Summary (Last 24 hours) at 05/02/17 1118  Last data filed at 05/02/17 0500   Gross per 24 hour   Intake              920 ml   Output              200 ml   Net              720 ml       BMI: Body mass index is 23.46 kg/(m^2).    Physical Exam:  General: Patient is awake, alert, and oriented, and in no acute distress.  Eyes: Pupils equal, round, and reactive to light, anicteric sclera.  Mouth: No lesions, moist mucous membranes.  Respiratory: Lungs clear to ausculation, no crackles or wheezing.  Cardiovascular: Regular rate and rythmn, no murmurs appreciated.  Abdomen: Soft, right upper quadrant pain,  non-distended, normal bowel sounds.  Dsg CDI.   Skin: No rashes or breakdown.  Extremities: No edema, cyanosis, or clubbing.  Neuro: No focal weakness.    Diagnostic Results:  CBC:    Recent Labs  Lab 04/30/17  0505 05/01/17  0520 05/02/17  0557   WBC 4.17 5.13 6.43   HGB 11.1* 11.2* 10.9*   HCT 35.1* 35.1* 35.0*    225 196   MCV 80* 80* 81*      BMP:    Recent Labs  Lab 04/30/17  0505 05/01/17  0520 05/02/17  0557   GLU 86 96 87    141 137   K 4.1 4.2 4.1    108 104   CO2 24 27 27   BUN 7 8 6   CREATININE 0.8 0.8 0.8   CALCIUM 8.7 9.0 8.9   MG 1.9 2.1 1.8     Imaging:  ABD US 4/29/17:  The visualized pancreas is unremarkable.  The gallbladder is well distended and contains small, mobile echogenic foci indicating cholelithiasis. Trace pericholecystic fluid is seen. There is no hyperemia of the gallbladder wall, thickening, or sonographic Meade's sign to indicate acute cholecystitis.  The common bile duct is not enlarged and measures 3 mm in diameter. Hepatic parenchyma included on this exam is unremarkable relative to echogenicity of the adjacent right renal cortex.  IMPRESSION:  Cholelithiasis with trace pericholecystic fluid but no additional secondary sonographic signs of cholecystitis. If clinically indicated, HIDA scan can be performed to further evaluate.      HIDA 4/29/17:  No evidence of cystic or common bile duct obstruction.        ASSESSMENT/PLAN:     Active Hospital Problems    Diagnosis  POA    *Cholelithiasis with acute cholecystitis [K80.00]  Yes    Calculus of gallbladder with acute cholecystitis without obstruction [K80.00]  Yes    Cholecystitis [K81.9]  Unknown    RUQ pain [R10.11]  Yes    Anemia [D64.9]  Yes    Calculus of gallbladder with acute cholecystitis [K80.00]  Yes      Resolved Hospital Problems    Diagnosis Date Resolved POA   No resolved problems to display.         Cholelithiasis with acute on chronic cholecystitis.   - Lap li 5/1/17  - Follow up gen surg recs.     DVT prophylaxis.  High risk.  Thrombo Embolic Deterrent hose (MARTELL® hose) and sequential compression devices for now pending surgery.

## 2017-05-02 NOTE — OP NOTE
DATE OF PROCEDURE:  05/01/2017    PREOPERATIVE DIAGNOSIS:  Symptomatic gallbladder disease.    POSTOPERATIVE DIAGNOSIS:  Acute cholecystitis.    PROCEDURE:  Laparoscopic cholecystectomy.    PROCEDURE IN DETAIL:  The patient was brought to the Operating Room, placed in   supine position.  The abdomen was prepped and draped in a sterile fashion.    Small incision was made below the umbilicus.  A Veress needle inserted and   pneumoperitoneum achieved.  A 10-mm Optiview trocar inserted at the umbilicus.    Under direct observation, three 5 mm trocars were inserted, 1 in the upper   midline, 2 in the right upper quadrant.  The gallbladder visualized, seemed to   be acutely inflamed.  Using blunt dissection, adhesions to the gallbladder were   taken down, exposing the cystic artery and duct.  These were doubly clamped   proximally then transected.  Then using electrocautery Bovie, the gallbladder   was removed from the liver bed.  Under direct observation, the gallbladder   removed through the periumbilical port.  Peritoneal cavity irrigated and   inspected.  The pneumoperitoneum released.  The fascia of the umbilicus closed   with 0 Vicryl, the skin with running 4-0 Monocryl.  The patient tolerated the   procedure well and left the Operating Room in good condition.  At the end of   procedure, all sponge, lap and instrument counts were correct.      VIVI  dd: 05/01/2017 16:22:30 (CDT)  td: 05/01/2017 20:27:46 (CDT)  Doc ID   #[id number]  Job ID #[id number]    CC:

## 2017-05-02 NOTE — PLAN OF CARE
Problem: Patient Care Overview  Goal: Plan of Care Review  Outcome: Ongoing (interventions implemented as appropriate)  Pt free of trauma, falls, and injury. Pt VSS and afebrile throughout shift. Pt free of skin breakdown. Pt steri strips on abdomen are clean, dry, and intact. Pt pain and nausea have been moderately controlled by PO pain meds and anti-emetics and tolerated well. Pt has been eating and voiding adequately throughout shift. Pt has call light in reach, bed alarm on, bed brakes on, side rails up x2, bed in low position, TEDS/SCDs on, IS at bedside, and nonskid socks on. Pt lying in bed in no distress. Will continue to monitor.

## 2017-05-02 NOTE — PLAN OF CARE
Problem: Patient Care Overview  Goal: Plan of Care Review  Outcome: Ongoing (interventions implemented as appropriate)  Patient on room air; no change in therapy

## 2017-05-03 PROBLEM — K80.00 CALCULUS OF GALLBLADDER WITH ACUTE CHOLECYSTITIS WITHOUT OBSTRUCTION: Status: RESOLVED | Noted: 2017-05-01 | Resolved: 2017-05-03

## 2017-05-03 PROBLEM — Z90.49 S/P CHOLECYSTECTOMY: Status: ACTIVE | Noted: 2017-05-03

## 2017-05-03 PROBLEM — K80.00 CHOLELITHIASIS WITH ACUTE CHOLECYSTITIS: Status: RESOLVED | Noted: 2017-04-30 | Resolved: 2017-05-03

## 2017-05-03 PROBLEM — K80.00 CALCULUS OF GALLBLADDER WITH ACUTE CHOLECYSTITIS: Status: RESOLVED | Noted: 2017-04-29 | Resolved: 2017-05-03

## 2017-05-03 PROBLEM — D50.9 IRON DEFICIENCY ANEMIA: Status: ACTIVE | Noted: 2017-04-29

## 2017-05-03 LAB
ALBUMIN SERPL BCP-MCNC: 3.2 G/DL
ALP SERPL-CCNC: 237 U/L
ALT SERPL W/O P-5'-P-CCNC: 244 U/L
ANION GAP SERPL CALC-SCNC: 6 MMOL/L
AST SERPL-CCNC: 328 U/L
BASOPHILS # BLD AUTO: 0.02 K/UL
BASOPHILS NFR BLD: 0.4 %
BILIRUB DIRECT SERPL-MCNC: 2 MG/DL
BILIRUB SERPL-MCNC: 2.7 MG/DL
BUN SERPL-MCNC: 8 MG/DL
CALCIUM SERPL-MCNC: 9 MG/DL
CHLORIDE SERPL-SCNC: 105 MMOL/L
CO2 SERPL-SCNC: 27 MMOL/L
CREAT SERPL-MCNC: 0.8 MG/DL
DIFFERENTIAL METHOD: ABNORMAL
EOSINOPHIL # BLD AUTO: 0.1 K/UL
EOSINOPHIL NFR BLD: 2.8 %
ERYTHROCYTE [DISTWIDTH] IN BLOOD BY AUTOMATED COUNT: 15.6 %
EST. GFR  (AFRICAN AMERICAN): >60 ML/MIN/1.73 M^2
EST. GFR  (NON AFRICAN AMERICAN): >60 ML/MIN/1.73 M^2
GLUCOSE SERPL-MCNC: 79 MG/DL
HCT VFR BLD AUTO: 32.6 %
HGB BLD-MCNC: 10.2 G/DL
LYMPHOCYTES # BLD AUTO: 1.4 K/UL
LYMPHOCYTES NFR BLD: 30.7 %
MAGNESIUM SERPL-MCNC: 1.7 MG/DL
MCH RBC QN AUTO: 25.3 PG
MCHC RBC AUTO-ENTMCNC: 31.3 %
MCV RBC AUTO: 81 FL
MONOCYTES # BLD AUTO: 0.8 K/UL
MONOCYTES NFR BLD: 17 %
NEUTROPHILS # BLD AUTO: 2.3 K/UL
NEUTROPHILS NFR BLD: 48.9 %
PLATELET # BLD AUTO: 186 K/UL
PMV BLD AUTO: 11.7 FL
POTASSIUM SERPL-SCNC: 4.2 MMOL/L
PROT SERPL-MCNC: 6.7 G/DL
RBC # BLD AUTO: 4.03 M/UL
SODIUM SERPL-SCNC: 138 MMOL/L
WBC # BLD AUTO: 4.66 K/UL

## 2017-05-03 PROCEDURE — 83735 ASSAY OF MAGNESIUM: CPT

## 2017-05-03 PROCEDURE — 85025 COMPLETE CBC W/AUTO DIFF WBC: CPT

## 2017-05-03 PROCEDURE — 99232 SBSQ HOSP IP/OBS MODERATE 35: CPT | Mod: ,,, | Performed by: HOSPITALIST

## 2017-05-03 PROCEDURE — 25000003 PHARM REV CODE 250: Performed by: HOSPITALIST

## 2017-05-03 PROCEDURE — 63600175 PHARM REV CODE 636 W HCPCS: Performed by: INTERNAL MEDICINE

## 2017-05-03 PROCEDURE — 82248 BILIRUBIN DIRECT: CPT

## 2017-05-03 PROCEDURE — 99900035 HC TECH TIME PER 15 MIN (STAT)

## 2017-05-03 PROCEDURE — 25000003 PHARM REV CODE 250: Performed by: SPECIALIST

## 2017-05-03 PROCEDURE — G0378 HOSPITAL OBSERVATION PER HR: HCPCS

## 2017-05-03 PROCEDURE — 63600175 PHARM REV CODE 636 W HCPCS: Performed by: HOSPITALIST

## 2017-05-03 PROCEDURE — 36415 COLL VENOUS BLD VENIPUNCTURE: CPT

## 2017-05-03 PROCEDURE — 80053 COMPREHEN METABOLIC PANEL: CPT

## 2017-05-03 RX ORDER — HYDROMORPHONE HYDROCHLORIDE 1 MG/ML
1 INJECTION, SOLUTION INTRAMUSCULAR; INTRAVENOUS; SUBCUTANEOUS ONCE
Status: COMPLETED | OUTPATIENT
Start: 2017-05-03 | End: 2017-05-03

## 2017-05-03 RX ORDER — BISACODYL 5 MG
10 TABLET, DELAYED RELEASE (ENTERIC COATED) ORAL ONCE
Status: COMPLETED | OUTPATIENT
Start: 2017-05-03 | End: 2017-05-03

## 2017-05-03 RX ORDER — TRAMADOL HYDROCHLORIDE 50 MG/1
50 TABLET ORAL EVERY 6 HOURS PRN
Status: DISCONTINUED | OUTPATIENT
Start: 2017-05-03 | End: 2017-05-03

## 2017-05-03 RX ORDER — OXYCODONE HYDROCHLORIDE 5 MG/1
5 TABLET ORAL EVERY 4 HOURS PRN
Status: DISCONTINUED | OUTPATIENT
Start: 2017-05-03 | End: 2017-05-04 | Stop reason: HOSPADM

## 2017-05-03 RX ORDER — HYDROMORPHONE HYDROCHLORIDE 1 MG/ML
1 INJECTION, SOLUTION INTRAMUSCULAR; INTRAVENOUS; SUBCUTANEOUS ONCE AS NEEDED
Status: COMPLETED | OUTPATIENT
Start: 2017-05-03 | End: 2017-05-03

## 2017-05-03 RX ORDER — SODIUM CHLORIDE 9 MG/ML
INJECTION, SOLUTION INTRAVENOUS CONTINUOUS
Status: DISCONTINUED | OUTPATIENT
Start: 2017-05-03 | End: 2017-05-04 | Stop reason: HOSPADM

## 2017-05-03 RX ADMIN — SODIUM CHLORIDE: 0.9 INJECTION, SOLUTION INTRAVENOUS at 12:05

## 2017-05-03 RX ADMIN — ONDANSETRON 4 MG: 2 INJECTION INTRAMUSCULAR; INTRAVENOUS at 03:05

## 2017-05-03 RX ADMIN — OXYCODONE HYDROCHLORIDE 5 MG: 5 TABLET ORAL at 10:05

## 2017-05-03 RX ADMIN — HYDROMORPHONE HYDROCHLORIDE 1 MG: 1 INJECTION, SOLUTION INTRAMUSCULAR; INTRAVENOUS; SUBCUTANEOUS at 11:05

## 2017-05-03 RX ADMIN — BISACODYL 10 MG: 5 TABLET, COATED ORAL at 03:05

## 2017-05-03 RX ADMIN — OXYCODONE HYDROCHLORIDE 5 MG: 5 TABLET ORAL at 03:05

## 2017-05-03 NOTE — PLAN OF CARE
8:53 AM   Spoke with office staff to notify MD Epi of 9/0-10 uncontrolled pain refusal of prn pain meds <percocet> -intolerant -with phenergan added to MAR.     12:28 PM   Returned to bed tolerated US with a one time dose of 1mg Dilaudid administered.  CO of 9/0-10 abdominal pain. Amina BUI

## 2017-05-03 NOTE — PROGRESS NOTES
Progress Note  [facility]      Admit Date: 4/28/2017 (LOS: 0)    SUBJECTIVE:     Follow-up For:  Cholelithiasis with acute cholecystitis    HPI/Interval history:     States still has abdominal pain on right side, but feels better.  Denies any nausea or vomiting today.            DATE OF PROCEDURE: 05/01/2017   PREOPERATIVE DIAGNOSIS: Symptomatic gallbladder disease.   POSTOPERATIVE DIAGNOSIS: Acute cholecystitis.   PROCEDURE: Laparoscopic cholecystectomy.      Review of Systems: Patient denies chest pain, shortness of breath.  No fevers, chills.  No nausea, vomiting, or diarrhea.    Medications: Reviewed and documented in the MAR.    OBJECTIVE:     Vital Signs (Most Recent)  Temp: 98.2 °F (36.8 °C) (05/03/17 0713)  Pulse: (!) 56 (05/03/17 0713)  Resp: 18 (05/03/17 0713)  BP: 105/68 (05/03/17 0713)  SpO2: 98 % (05/03/17 0713)    Vital Signs Range (Last 24H):  Temp:  [98.2 °F (36.8 °C)-99 °F (37.2 °C)]   Pulse:  [56-72]   Resp:  [16-20]   BP: (104-114)/(64-78)   SpO2:  [96 %-100 %]     I & O (Last 24H):    Intake/Output Summary (Last 24 hours) at 05/03/17 1015  Last data filed at 05/03/17 0525   Gross per 24 hour   Intake                0 ml   Output              120 ml   Net             -120 ml       BMI: Body mass index is 23.16 kg/(m^2).    Physical Exam:  General: Patient is awake, alert, and oriented, and in no acute distress.  Eyes: Pupils equal, round, and reactive to light, anicteric sclera.  Mouth: No lesions, moist mucous membranes.  Respiratory: Lungs clear to ausculation, no crackles or wheezing.  Cardiovascular: Regular rate and rythmn, no murmurs appreciated.  Abdomen: Soft, right upper quadrant pain,  non-distended, normal bowel sounds.  Dsg CDI.   Skin: No rashes or breakdown.  Extremities: No edema, cyanosis, or clubbing.  Neuro: No focal weakness.    Diagnostic Results:  CBC:    Recent Labs  Lab 05/01/17  0520 05/02/17  0557 05/03/17  0552   WBC 5.13 6.43 4.66   HGB 11.2* 10.9* 10.2*   HCT  35.1* 35.0* 32.6*    196 186   MCV 80* 81* 81*     BMP:    Recent Labs  Lab 05/01/17  0520 05/02/17  0557 05/03/17  0552   GLU 96 87 79    137 138   K 4.2 4.1 4.2    104 105   CO2 27 27 27   BUN 8 6 8   CREATININE 0.8 0.8 0.8   CALCIUM 9.0 8.9 9.0   MG 2.1 1.8 1.7     Imaging:  ABD US 4/29/17:  The visualized pancreas is unremarkable.  The gallbladder is well distended and contains small, mobile echogenic foci indicating cholelithiasis. Trace pericholecystic fluid is seen. There is no hyperemia of the gallbladder wall, thickening, or sonographic Meade's sign to indicate acute cholecystitis.  The common bile duct is not enlarged and measures 3 mm in diameter. Hepatic parenchyma included on this exam is unremarkable relative to echogenicity of the adjacent right renal cortex.  IMPRESSION:  Cholelithiasis with trace pericholecystic fluid but no additional secondary sonographic signs of cholecystitis. If clinically indicated, HIDA scan can be performed to further evaluate.      HIDA 4/29/17:  No evidence of cystic or common bile duct obstruction.      LIVER US 5/3/17:   No acute abnormality identified in this patient status post cholecystectomy.  No evidence for biliary dilation.        ASSESSMENT/PLAN:     Active Hospital Problems    Diagnosis  POA    S/P cholecystectomy [Z90.49]  Not Applicable    RUQ pain [R10.11]  Yes    Iron deficiency anemia [D50.9]  Yes    Nausea & vomiting [R11.2]  Yes      Resolved Hospital Problems    Diagnosis Date Resolved POA    *Cholelithiasis with acute cholecystitis [K80.00] 05/03/2017 Yes    Calculus of gallbladder with acute cholecystitis without obstruction [K80.00] 05/03/2017 Yes    Cholecystitis [K81.9] 05/03/2017 Yes    Calculus of gallbladder with acute cholecystitis [K80.00] 05/03/2017 Yes         Cholelithiasis with acute on chronic cholecystitis.   - Lap li 5/1/17  - Follow up gen surg recs.     Abnormal LFTs / RUQ pain  - Bilirubin higher.   DB =  2, increasing.   - Liver US no acute process.     DVT prophylaxis.  High risk.  Thrombo Embolic Deterrent hose (MRATELL® hose) and sequential compression devices for now pending surgery.

## 2017-05-03 NOTE — PLAN OF CARE
Problem: Patient Care Overview  Goal: Plan of Care Review  Outcome: Ongoing (interventions implemented as appropriate)  Pt free of trauma, falls, and injury. Pt VSS and afebrile throughout shift. Pt free of skin breakdown. Pt dressing is clean, dry, and intact. Pt refused pain medication during shift. Pt has one episode of vomiting during shift. PO medication given. Purposeful rounding done. Pt has call light in reach, bed alarm on, bed brakes on, side rails up x2, bed in low position, TEDs/SCDs on, and nonskid socks on. Pt lying in bed in no distress. Family at bedside. Will continue to monitor.

## 2017-05-03 NOTE — PLAN OF CARE
Problem: Patient Care Overview  Goal: Plan of Care Review  Outcome: Ongoing (interventions implemented as appropriate)  Patient in no apparent distress. Sat's 96 % on room air . Will continue to monitor.

## 2017-05-03 NOTE — PLAN OF CARE
SW met with pt at bedside.  Pt was pleasant, offered good eye contact, thoughts organized and clear.  Pt mentioned having pain and waiting for MD.  No needs identified by pt or SW, at this time.        05/03/17 1250   Discharge Assessment   Assessment Type Discharge Planning Reassessment   Assessment information obtained from? Patient   Prior to hospitilization cognitive status: Alert/Oriented   Discharge Plan A Home   Patient/Family In Agreement With Plan yes

## 2017-05-04 VITALS
BODY MASS INDEX: 22.96 KG/M2 | OXYGEN SATURATION: 100 % | DIASTOLIC BLOOD PRESSURE: 66 MMHG | SYSTOLIC BLOOD PRESSURE: 102 MMHG | HEART RATE: 62 BPM | HEIGHT: 64 IN | WEIGHT: 134.5 LBS | RESPIRATION RATE: 17 BRPM | TEMPERATURE: 98 F

## 2017-05-04 PROBLEM — E83.42 HYPOMAGNESEMIA: Status: ACTIVE | Noted: 2017-05-04

## 2017-05-04 PROBLEM — E83.42 HYPOMAGNESEMIA: Status: RESOLVED | Noted: 2017-05-04 | Resolved: 2017-05-04

## 2017-05-04 LAB
ALBUMIN SERPL BCP-MCNC: 3.1 G/DL
ALP SERPL-CCNC: 194 U/L
ALT SERPL W/O P-5'-P-CCNC: 147 U/L
ANION GAP SERPL CALC-SCNC: 6 MMOL/L
AST SERPL-CCNC: 102 U/L
BASOPHILS # BLD AUTO: 0.02 K/UL
BASOPHILS NFR BLD: 0.4 %
BILIRUB SERPL-MCNC: 0.4 MG/DL
BUN SERPL-MCNC: 7 MG/DL
CALCIUM SERPL-MCNC: 8.7 MG/DL
CHLORIDE SERPL-SCNC: 107 MMOL/L
CO2 SERPL-SCNC: 27 MMOL/L
CREAT SERPL-MCNC: 0.7 MG/DL
DIFFERENTIAL METHOD: ABNORMAL
EOSINOPHIL # BLD AUTO: 0.2 K/UL
EOSINOPHIL NFR BLD: 3.5 %
ERYTHROCYTE [DISTWIDTH] IN BLOOD BY AUTOMATED COUNT: 15.9 %
EST. GFR  (AFRICAN AMERICAN): >60 ML/MIN/1.73 M^2
EST. GFR  (NON AFRICAN AMERICAN): >60 ML/MIN/1.73 M^2
GLUCOSE SERPL-MCNC: 85 MG/DL
HCT VFR BLD AUTO: 33 %
HGB BLD-MCNC: 10.2 G/DL
LYMPHOCYTES # BLD AUTO: 1.8 K/UL
LYMPHOCYTES NFR BLD: 36.5 %
MAGNESIUM SERPL-MCNC: 1.5 MG/DL
MCH RBC QN AUTO: 25.1 PG
MCHC RBC AUTO-ENTMCNC: 30.9 %
MCV RBC AUTO: 81 FL
MONOCYTES # BLD AUTO: 0.8 K/UL
MONOCYTES NFR BLD: 15.6 %
NEUTROPHILS # BLD AUTO: 2.1 K/UL
NEUTROPHILS NFR BLD: 43.8 %
PLATELET # BLD AUTO: 187 K/UL
PMV BLD AUTO: 11.9 FL
POTASSIUM SERPL-SCNC: 3.9 MMOL/L
PROT SERPL-MCNC: 6.4 G/DL
RBC # BLD AUTO: 4.07 M/UL
SODIUM SERPL-SCNC: 140 MMOL/L
WBC # BLD AUTO: 4.8 K/UL

## 2017-05-04 PROCEDURE — 80053 COMPREHEN METABOLIC PANEL: CPT

## 2017-05-04 PROCEDURE — 63600175 PHARM REV CODE 636 W HCPCS: Performed by: HOSPITALIST

## 2017-05-04 PROCEDURE — 99900035 HC TECH TIME PER 15 MIN (STAT)

## 2017-05-04 PROCEDURE — G0378 HOSPITAL OBSERVATION PER HR: HCPCS

## 2017-05-04 PROCEDURE — 36415 COLL VENOUS BLD VENIPUNCTURE: CPT

## 2017-05-04 PROCEDURE — 25000003 PHARM REV CODE 250: Performed by: HOSPITALIST

## 2017-05-04 PROCEDURE — 99225 PR SUBSEQUENT OBSERVATION CARE,LEVEL II: CPT | Mod: ,,, | Performed by: HOSPITALIST

## 2017-05-04 PROCEDURE — 25000003 PHARM REV CODE 250: Performed by: SPECIALIST

## 2017-05-04 PROCEDURE — 83735 ASSAY OF MAGNESIUM: CPT

## 2017-05-04 PROCEDURE — 85025 COMPLETE CBC W/AUTO DIFF WBC: CPT

## 2017-05-04 RX ORDER — FERROUS SULFATE 325(65) MG
325 TABLET, DELAYED RELEASE (ENTERIC COATED) ORAL DAILY
Refills: 0 | COMMUNITY
Start: 2017-05-04 | End: 2017-12-11

## 2017-05-04 RX ORDER — MAGNESIUM SULFATE HEPTAHYDRATE 40 MG/ML
2 INJECTION, SOLUTION INTRAVENOUS ONCE
Status: COMPLETED | OUTPATIENT
Start: 2017-05-04 | End: 2017-05-04

## 2017-05-04 RX ORDER — TRAMADOL HYDROCHLORIDE 50 MG/1
50 TABLET ORAL EVERY 6 HOURS PRN
Qty: 15 TABLET | Refills: 0 | Status: SHIPPED | OUTPATIENT
Start: 2017-05-04 | End: 2017-06-26

## 2017-05-04 RX ORDER — FERROUS SULFATE 325(65) MG
325 TABLET, DELAYED RELEASE (ENTERIC COATED) ORAL DAILY
Status: DISCONTINUED | OUTPATIENT
Start: 2017-05-04 | End: 2017-05-04 | Stop reason: HOSPADM

## 2017-05-04 RX ADMIN — MAGNESIUM SULFATE IN WATER 2 G: 40 INJECTION, SOLUTION INTRAVENOUS at 09:05

## 2017-05-04 RX ADMIN — OXYCODONE HYDROCHLORIDE 5 MG: 5 TABLET ORAL at 09:05

## 2017-05-04 RX ADMIN — FERROUS SULFATE TAB EC 324 MG (65 MG FE EQUIVALENT) 325 MG: 324 (65 FE) TABLET DELAYED RESPONSE at 09:05

## 2017-05-04 NOTE — PROGRESS NOTES
Progress Note  [facility]      Admit Date: 4/28/2017 (LOS: 0)    SUBJECTIVE:     Follow-up For:  Cholelithiasis with acute cholecystitis    HPI/Interval history:     States still has abdominal pain on right side, but feels better.  Tolerating PO.   Denies any nausea or vomiting today.            DATE OF PROCEDURE: 05/01/2017   PREOPERATIVE DIAGNOSIS: Symptomatic gallbladder disease.   POSTOPERATIVE DIAGNOSIS: Acute cholecystitis.   PROCEDURE: Laparoscopic cholecystectomy.      Review of Systems: Patient denies chest pain, shortness of breath.  No fevers, chills.  No nausea, vomiting, or diarrhea.    Medications: Reviewed and documented in the MAR.    OBJECTIVE:     Vital Signs (Most Recent)  Temp: 98.3 °F (36.8 °C) (05/04/17 0416)  Pulse: (!) 57 (05/04/17 0416)  Resp: 16 (05/04/17 0416)  BP: 102/66 (05/04/17 0416)  SpO2: 99 % (05/04/17 0416)    Vital Signs Range (Last 24H):  Temp:  [98.3 °F (36.8 °C)-98.5 °F (36.9 °C)]   Pulse:  [57-62]   Resp:  [16]   BP: (102-105)/(56-66)   SpO2:  [98 %-99 %]     I & O (Last 24H):    Intake/Output Summary (Last 24 hours) at 05/04/17 1003  Last data filed at 05/04/17 0631   Gross per 24 hour   Intake          2291.67 ml   Output                0 ml   Net          2291.67 ml       BMI: Body mass index is 23.08 kg/(m^2).    Physical Exam:  General: Patient is awake, alert, and oriented, and in no acute distress.  Eyes: Pupils equal, round, and reactive to light, anicteric sclera.  Mouth: No lesions, moist mucous membranes.  Respiratory: Lungs clear to ausculation, no crackles or wheezing.  Cardiovascular: Regular rate and rythmn, no murmurs appreciated.  Abdomen: Soft, right upper quadrant pain,  non-distended, normal bowel sounds.  Dsg CDI.   Skin: No rashes or breakdown.  Extremities: No edema, cyanosis, or clubbing.  Neuro: No focal weakness.    Diagnostic Results:  CBC:    Recent Labs  Lab 05/02/17  0557 05/03/17  0552 05/04/17  0533   WBC 6.43 4.66 4.80   HGB 10.9* 10.2*  10.2*   HCT 35.0* 32.6* 33.0*    186 187   MCV 81* 81* 81*     BMP:    Recent Labs  Lab 05/02/17  0557 05/03/17  0552 05/04/17  0533   GLU 87 79 85    138 140   K 4.1 4.2 3.9    105 107   CO2 27 27 27   BUN 6 8 7   CREATININE 0.8 0.8 0.7   CALCIUM 8.9 9.0 8.7   MG 1.8 1.7 1.5*     Imaging:  ABD US 4/29/17:  The visualized pancreas is unremarkable.  The gallbladder is well distended and contains small, mobile echogenic foci indicating cholelithiasis. Trace pericholecystic fluid is seen. There is no hyperemia of the gallbladder wall, thickening, or sonographic Meade's sign to indicate acute cholecystitis.  The common bile duct is not enlarged and measures 3 mm in diameter. Hepatic parenchyma included on this exam is unremarkable relative to echogenicity of the adjacent right renal cortex.  IMPRESSION:  Cholelithiasis with trace pericholecystic fluid but no additional secondary sonographic signs of cholecystitis. If clinically indicated, HIDA scan can be performed to further evaluate.      HIDA 4/29/17:  No evidence of cystic or common bile duct obstruction.      LIVER US 5/3/17:   No acute abnormality identified in this patient status post cholecystectomy.  No evidence for biliary dilation.        ASSESSMENT/PLAN:     Active Hospital Problems    Diagnosis  POA    Hypomagnesemia [E83.42]  No    S/P cholecystectomy [Z90.49]  Not Applicable    RUQ pain [R10.11]  Yes    Iron deficiency anemia [D50.9]  Yes      Resolved Hospital Problems    Diagnosis Date Resolved POA    *Cholelithiasis with acute cholecystitis [K80.00] 05/03/2017 Yes    Calculus of gallbladder with acute cholecystitis without obstruction [K80.00] 05/03/2017 Yes    Cholecystitis [K81.9] 05/03/2017 Yes    Calculus of gallbladder with acute cholecystitis [K80.00] 05/03/2017 Yes    Nausea & vomiting [R11.2] 05/04/2017 Yes         Cholelithiasis with acute on chronic cholecystitis.   - Lap li 5/1/17  - Follow up gen surg   Epi after discharge. .     Abnormal LFTs / RUQ pain  - Bilirubin normal.   - Hepatitis panel negative.  - Liver US no acute process.   - Resolving.     Fe def anemia  - Supplement at discharge    Hypomagnesemia  - Monitor and replace electrolytes PRN.     DVT prophylaxis.  High risk.  Thrombo Embolic Deterrent hose (MARTELL® hose) and sequential compression devices for now pending surgery.

## 2017-05-04 NOTE — DISCHARGE INSTRUCTIONS
Dr. Vega Chowdary  [address]  [place] [address]  [phone]              POST OP INSTRUCTIONS    1.  On the second day following your surgery, carefully remove your dressing.  In most instances, you will see that your incision does not have any visible stitches but does have small white pieces of tape across it.  These are called steri-strips.  These should not be removed until they become loosened, usually in 7 - 10 days.  (On occasion, regular stitches are used, and if so, they should be removed in the office within 6 - 9 days.)  If you desire, you can replace your dressing with clean gauze pads and tape, but this is not necessary in most instances.      2.  Keep your incision dry for 2 days.  If you have surgery on Monday, keep incision dry until Wednesday, etc.  At that time, it will be okay to shower, but do not soak the incision (bath, swimming pool) for 2 weeks.      3.  Unless otherwise stated, you should make an appointment to have your incision checked in approximately 10 days -2 weeks.  In most cases, by this time, you may feel a firm ridge under the incision.  This is entirely normal and will resolve within a couple of months.      4.  Finally, if there are any unexpected problems, contact Dr. Chowdary at [phone].            Thank you for choosing [facility] as your Health Care Provider. [facility] strives to provide the best healthcare available to you. In the next few days you may receive a Survey, either by mail or email,  asking you to rate our care that was provided to you during your stay.  Please return the survey to us, as your feedback is important. We aim to meet your expectations of safe, quality health care.    From your [facility] Team.

## 2017-05-04 NOTE — DISCHARGE SUMMARY
[facility]  Discharge Summary      Admit Date: 4/28/2017    Discharge Date and Time:  05/04/2017 10:05 AM    Attending Physician: Jose Saldana MD     Reason for Admission:  Abdominal pain    Procedures Performed: Procedure(s) (LRB):  CHOLECYSTECTOMY-LAPAROSCOPIC (ADD ON) (N/A)    DATE OF PROCEDURE: 05/01/2017   PREOPERATIVE DIAGNOSIS: Symptomatic gallbladder disease.   POSTOPERATIVE DIAGNOSIS: Acute cholecystitis.   PROCEDURE: Laparoscopic cholecystectomy.      Hospital Course:    Jose Luis Crowder is a 26 y.o. AA female who  has a past medical history of Abnormal Pap smear of cervix (yrs ago); Gall stones; and Ovarian cyst.. The patient presented to [facility] on 4/28/2017 with a primary complaint of Vomiting (c/o vomiting x 3 episodes tonight along with BUQ pain radiating through to back. Pt has upcoming lap li scheduled) and Abdominal Pain        The patient was in their usual state of health until 3 hrs ago when she reports pain that radiates to her back. She also endorses chills, nausea, and vomiting, which began approximately 2 hours ago. She mentions no fever, constipation, or diarrhea. She reports no identifying, alleviating, or exacerbating factors. The patient admits that she has had multiple episodes with similar symptomology, for which she was seen at the ED. She admits to history of cholelithiasis and ovarian cyst. She mentions that she is scheduled for a laparoscopic cholecystectomy May 18th.                             The patient is a 26-year-old female who was   admitted through the Emergency Room with abdominal pain and vomiting. The pain   was localized to the right upper quadrant, radiating to the back. The patient   had a known history of symptomatic gallbladder disease and was scheduled for   surgery by Dr. Kay on May 18th. The patient denies fever, chills,   jaundice, or weight loss.  ______________________________________    Cholelithiasis with acute on chronic  cholecystitis.   - Lap li 5/1/17  - Pain improving.   - Follow up gen surg Dr. Chowdary after discharge. .      Abnormal LFTs / RUQ pain  - Bilirubin normal.   - Hepatitis panel negative.  - Liver US no acute process.   - Resolving.      Fe def anemia  - Supplement at discharge     Doing better, tolerating PO, ambulating and will discharge home.  Brief course tramadol as needed for pain.       Consults: general surgery    Significant Diagnostic Studies:   ABD US 4/29/17:  The visualized pancreas is unremarkable.  The gallbladder is well distended and contains small, mobile echogenic foci indicating cholelithiasis. Trace pericholecystic fluid is seen. There is no hyperemia of the gallbladder wall, thickening, or sonographic Meade's sign to indicate acute cholecystitis.  The common bile duct is not enlarged and measures 3 mm in diameter. Hepatic parenchyma included on this exam is unremarkable relative to echogenicity of the adjacent right renal cortex.  IMPRESSION:  Cholelithiasis with trace pericholecystic fluid but no additional secondary sonographic signs of cholecystitis. If clinically indicated, HIDA scan can be performed to further evaluate.      HIDA 4/29/17:  No evidence of cystic or common bile duct obstruction.        LIVER US 5/3/17:   No acute abnormality identified in this patient status post cholecystectomy.  No evidence for biliary dilation.     Final Diagnoses:    Principal Problem: Cholelithiasis with acute cholecystitis   Secondary Diagnoses:   Active Hospital Problems    Diagnosis  POA    S/P cholecystectomy [Z90.49]  Not Applicable    RUQ pain [R10.11]  Yes    Iron deficiency anemia [D50.9]  Yes      Resolved Hospital Problems    Diagnosis Date Resolved POA    *Cholelithiasis with acute cholecystitis [K80.00] 05/03/2017 Yes    Hypomagnesemia [E83.42] 05/04/2017 No    Calculus of gallbladder with acute cholecystitis without obstruction [K80.00] 05/03/2017 Yes    Cholecystitis [K81.9]  05/03/2017 Yes    Calculus of gallbladder with acute cholecystitis [K80.00] 05/03/2017 Yes    Nausea & vomiting [R11.2] 05/04/2017 Yes       Discharged Condition: good    Disposition: Home or Self Care    Follow Up/Patient Instructions:     Medications:  Reconciled Home Medications:   Current Discharge Medication List      START taking these medications    Details   ferrous sulfate 325 (65 FE) MG EC tablet Take 1 tablet (325 mg total) by mouth once daily.  Refills: 0    Associated Diagnoses: Iron deficiency anemia, unspecified iron deficiency anemia type         CONTINUE these medications which have CHANGED    Details   tramadol (ULTRAM) 50 mg tablet Take 1 tablet (50 mg total) by mouth every 6 (six) hours as needed for Pain.  Qty: 15 tablet, Refills: 0    Associated Diagnoses: RUQ pain         CONTINUE these medications which have NOT CHANGED    Details   norethindrone-ethinyl estradiol (JUNEL FE 1/20, 28,) 1 mg-20 mcg (21)/75 mg (7) per tablet Take 1 tablet by mouth once daily.  Qty: 28 tablet, Refills: 11    Associated Diagnoses: Postpartum state; Encounter for contraceptive management, unspecified contraceptive encounter type      ibuprofen (ADVIL,MOTRIN) 600 MG tablet Take 1 tablet (600 mg total) by mouth every 6 (six) hours as needed for Pain.  Qty: 20 tablet, Refills: 0             Discharge Procedure Orders  Diet general     Activity as tolerated     Call MD for:  increased confusion or weakness     Call MD for:  persistent dizziness, light-headedness, or visual disturbances     Call MD for:  worsening rash     Call MD for:  severe persistent headache     Call MD for:  difficulty breathing or increased cough     Call MD for:  redness, tenderness, or signs of infection (pain, swelling, redness, odor or green/yellow discharge around incision site)     Call MD for:  severe uncontrolled pain     Call MD for:  persistent nausea and vomiting or diarrhea     Call MD for:  temperature >100.4       Follow-up  Information     Follow up with Vega Chowdary Jr, MD. Schedule an appointment as soon as possible for a visit in 7 days.    Specialties:  Vascular Surgery, General Surgery    Contact information:    [address]  [address] [address]  [phone]          Follow up with [facility].    Contact information:    [address]  [facility] [address]  [phone]

## 2017-05-04 NOTE — PLAN OF CARE
Problem: Patient Care Overview  Goal: Plan of Care Review  Outcome: Ongoing (interventions implemented as appropriate)  Patient remained on RA this shift with no changes at this time. Will continue to monitor.

## 2017-05-04 NOTE — PLAN OF CARE
Problem: Patient Care Overview  Goal: Plan of Care Review  Outcome: Outcome(s) achieved Date Met:  05/04/17  Eager & in agreement with discharge. Abdomen soft and positive BS. Verbal understanding of discharge instructions-- paperwork & prescriptions passed and explained to patient.  IV removed from right AC w/ cath tip intact, no redness or edema, placed pressure dressing with Coban and gauze.  To be DCd home w/ sister-- will be escorted downstairs via wheelchair and transport team once dressed, ready & ride arrives. Free from falls, injury, or skin breakdown this hospital admission.

## 2017-05-04 NOTE — PLAN OF CARE
Problem: Patient Care Overview  Goal: Plan of Care Review  Outcome: Ongoing (interventions implemented as appropriate)  Pt free of trauma, falls, and injury. Pt VSS and afebrile throughout shift. Pt free of skin breakdown. Pt dressing is clean, dry, and intact. Pt pain has been moderately controlled by PO pain meds and tolerated well. Purposeful rounding done. Pt has call light in reach, bed alarm on, bed brakes on, side rails up x2, bed in low position, SCDs on, and nonskid socks on. Pt lying in bed in no distress. Family at bedside. Will continue to monitor.

## 2017-05-05 NOTE — PLAN OF CARE
05/05/17 0846   Final Note   Assessment Type Final Discharge Note   Discharge Disposition Home   Discharge planning education complete? Yes   What phone number can be called within the next 1-3 days to see how you are doing after discharge? ([phone] )   Hospital Follow Up  Appt(s) scheduled? No   Offered [facility] -- Bedside Delivery? n/a   Discharge/Hospital Encounter Summary to ([facility]) PCP n/a   Referral to Outpatient Case Management complete? n/a   Referral to / orders for Home Health Complete? n/a   30 day supply of medicines given at discharge, if documented non-compliance / non-adherence? n/a   Any social issues identified prior to discharge? No   Did you assess the readiness or willingness of the family or caregiver to support self management of care? n/a

## 2017-06-21 ENCOUNTER — TELEPHONE (OUTPATIENT)
Dept: OBSTETRICS AND GYNECOLOGY | Facility: HOSPITAL | Age: 27
End: 2017-06-21

## 2017-06-21 DIAGNOSIS — O20.0 THREATENED MISCARRIAGE: Primary | ICD-10-CM

## 2017-06-21 NOTE — TELEPHONE ENCOUNTER
Please schedule the patient an appt in the office .  Add to on call day tomorrow in the afternoon?  KADY Rizzo

## 2017-06-21 NOTE — TELEPHONE ENCOUNTER
----- Message from Anita Avila sent at 6/21/2017 12:09 PM CDT -----  _X  1st Request  _  2nd Request  _  3rd Request        Who: SHRUTHI JARAMILLO [[id number]]    Why: Pt stating she's pregnant and experiencing really bad cramping and bleeding. She went to the ER and they informed her she had a threatened Miscarriage and she needed to contact her OB-GYN. Please call to schedule an apptmt.  [place] Pregnancy Test-6/8  [facility]    What Number to Call Back:[phone]    When to Expect a call back: (Before the end of the day)   -- if the call is after 12:00, the call back will be tomorrow.

## 2017-06-22 NOTE — TELEPHONE ENCOUNTER
Spoke with patient mother. Mother stated that the patient was on another phone call at the time. Mother given contact number ([phone]). Mother stated she would give patient the contact number. Waiting for patient to call back.     HCG quant ordered for patient

## 2017-06-23 ENCOUNTER — TELEPHONE (OUTPATIENT)
Dept: OBSTETRICS AND GYNECOLOGY | Facility: CLINIC | Age: 27
End: 2017-06-23

## 2017-06-23 NOTE — TELEPHONE ENCOUNTER
Spoke with pt. Pt advised to come into the office on this morning to evaluate sx. Pt states could  see me before work. I have to go to work for 10am. Voiced understanding. Pt advised location. Pt advised  may order additional labs with hcg. Please report to office first

## 2017-06-23 NOTE — TELEPHONE ENCOUNTER
----- Message from Luis Eduardo Santana sent at 6/23/2017  2:19 PM CDT -----  New ob pt needs appt asap. Pt can be reached at [phone].

## 2017-06-23 NOTE — TELEPHONE ENCOUNTER
Spoke with pt. Pt states she was running late this morning and knew she wouldn't be able to make it in on time. Pt offered/accepted appt this Monday. Advised to come in at 8:30am, Pt off that day and states can make it in.     Called pt back. Pt advised to have labs if at all possible on today for  to review with her on Monday. Pt accepts. Pt states she gets off from work at 4:30 she works in [place]. Pt advised to stop by [facility] in [place] for blood work. Pt provided detailed time/location. Pt states she will stop by on today. Pt given ER precautions and voiced understanding.    Unable to book appt at the time of phone call due to Medicaid ins. Spoke with Marlena Villanueva whom okayed appt time. Appt set, will mail slip

## 2017-06-26 ENCOUNTER — OFFICE VISIT (OUTPATIENT)
Dept: OBSTETRICS AND GYNECOLOGY | Facility: CLINIC | Age: 27
End: 2017-06-26
Payer: MEDICAID

## 2017-06-26 VITALS — BODY MASS INDEX: 21.3 KG/M2 | DIASTOLIC BLOOD PRESSURE: 64 MMHG | WEIGHT: 124.13 LBS | SYSTOLIC BLOOD PRESSURE: 112 MMHG

## 2017-06-26 DIAGNOSIS — O03.9 SPONTANEOUS ABORTION: Primary | ICD-10-CM

## 2017-06-26 DIAGNOSIS — Z30.9 ENCOUNTER FOR CONTRACEPTIVE MANAGEMENT, UNSPECIFIED TYPE: ICD-10-CM

## 2017-06-26 LAB
B-HCG UR QL: NEGATIVE
CTP QC/QA: YES

## 2017-06-26 PROCEDURE — 99213 OFFICE O/P EST LOW 20 MIN: CPT | Mod: PBBFAC | Performed by: OBSTETRICS & GYNECOLOGY

## 2017-06-26 PROCEDURE — 99999 PR PBB SHADOW E&M-EST. PATIENT-LVL III: CPT | Mod: PBBFAC,,, | Performed by: OBSTETRICS & GYNECOLOGY

## 2017-06-26 PROCEDURE — 81025 URINE PREGNANCY TEST: CPT | Mod: PBBFAC | Performed by: OBSTETRICS & GYNECOLOGY

## 2017-06-26 PROCEDURE — 99214 OFFICE O/P EST MOD 30 MIN: CPT | Mod: TH,S$PBB,, | Performed by: OBSTETRICS & GYNECOLOGY

## 2017-06-26 RX ORDER — ETONOGESTREL AND ETHINYL ESTRADIOL VAGINAL RING .015; .12 MG/D; MG/D
1 RING VAGINAL
Qty: 1 EACH | Refills: 12 | Status: SHIPPED | OUTPATIENT
Start: 2017-06-26 | End: 2017-12-11

## 2017-06-26 NOTE — PROGRESS NOTES
CC: SAB  Contraception    Jose Luis Crowder is a 26 y.o. female  presents for SAB at 6 wks.  She went to [place] and had US . Reports a completed SAB.  Minimal bleeding now.    UPT negative today  Wants contraception      Past Medical History:   Diagnosis Date    Abnormal Pap smear of cervix yrs ago    repeat pap    Gall stones     Ovarian cyst        Past Surgical History:   Procedure Laterality Date    CHOLECYSTECTOMY  2017     Laparoscopic cholecystectomy -     TONSILLECTOMY, ADENOIDECTOMY         OB History    Para Term  AB Living   2 1 1   1 1   SAB TAB Ectopic Multiple Live Births   1     0 1      # Outcome Date GA Lbr Neftali/2nd Weight Sex Delivery Anes PTL Lv   2 SAB 17           1 Term 17 39w3d / 00:26 3.09 kg (6 lb 13 oz) F Vag-Vacuum EPI N GAIL          Family History   Problem Relation Age of Onset    Diabetes Maternal Grandmother     Diabetes Mother     Breast cancer Neg Hx     Colon cancer Neg Hx     Ovarian cancer Neg Hx        Social History   Substance Use Topics    Smoking status: Never Smoker    Smokeless tobacco: Never Used    Alcohol use Yes      Comment: Social        /64   Wt 56.3 kg (124 lb 1.9 oz)   LMP 2017   BMI 21.30 kg/m²     ROS:  GENERAL: Denies weight gain or weight loss. Feeling well overall.   SKIN: Denies rash or lesions.   HEAD: Denies head injury or headache.   NODES: Denies enlarged lymph nodes.   CHEST: Denies chest pain or shortness of breath.   CARDIOVASCULAR: Denies palpitations or left sided chest pain.   ABDOMEN: No abdominal pain, constipation, diarrhea, nausea, vomiting or rectal bleeding.   URINARY: No frequency, dysuria, hematuria, or burning on urination.  REPRODUCTIVE: See HPI.   BREASTS: The patient performs breast self-examination and denies pain, lumps, or nipple discharge.   HEMATOLOGIC: No easy bruisability or excessive bleeding.  MUSCULOSKELETAL: Denies joint pain or swelling.   NEUROLOGIC: Denies  syncope or weakness.   PSYCHIATRIC: Denies depression, anxiety or mood swings.    Physical Exam:    APPEARANCE: Well nourished, well developed, in no acute distress.  AFFECT: WNL, alert and oriented x 3  SKIN: No acne or hirsutism  NECK: Neck symmetric without masses or thyromegaly  NODES: No inguinal, cervical, axillary, or femoral lymph node enlargement  CHEST: Good respiratory effect  ABDOMEN: Soft.  No tenderness or masses.  No hepatosplenomegaly.  No hernias.  PELVIC: Normal external genitalia without lesions.  Normal hair distribution.  Adequate perineal body, normal urethral meatus.  Vagina moist and well rugated without lesions or discharge.  Cervix pink, without lesions, discharge or tenderness.  No significant cystocele or rectocele.  Bimanual exam shows uterus to be normal size, regular, mobile and nontender.  Adnexa without masses or tenderness.    EXTREMITIES: No edema.    ASSESSMENT AND PLAN  1. Spontaneous   POCT Urine Pregnancy    US Pelvis Comp with Transvag NON-OB (xpd    etonogestrel-ethinyl estradiol (NUVARING) 0.12-0.015 mg/24 hr vaginal ring   2. Encounter for contraceptive management, unspecified type  etonogestrel-ethinyl estradiol (NUVARING) 0.12-0.015 mg/24 hr vaginal ring     Discussed birth control options- possible OCPs, combination vs progesterone only pill, NUVA RING, ORTHO EVRA RING, increased risks of elevated BP   On birth control with estrogen.  Possible MIRENA IUD VS Paragard IUD.   Possible barrier methods- sponge, diaphragm, spermacides, condoms.  Permanent options- sterilization procedures.      Patient was counseled today on A.C.S. Pap guidelines and recommendations for yearly pelvic exams, mammograms and monthly self breast exams; to see her PCP for other health maintenance.     F/U PRN

## 2017-06-28 ENCOUNTER — HOSPITAL ENCOUNTER (OUTPATIENT)
Dept: RADIOLOGY | Facility: OTHER | Age: 27
Discharge: HOME | End: 2017-06-28
Attending: OBSTETRICS & GYNECOLOGY
Payer: MEDICAID

## 2017-06-28 DIAGNOSIS — O03.9 SPONTANEOUS ABORTION: ICD-10-CM

## 2017-06-28 PROCEDURE — 76830 TRANSVAGINAL US NON-OB: CPT | Mod: 26,,, | Performed by: RADIOLOGY

## 2017-06-28 PROCEDURE — 76856 US EXAM PELVIC COMPLETE: CPT | Mod: 26,,, | Performed by: RADIOLOGY

## 2017-06-28 PROCEDURE — 76856 US EXAM PELVIC COMPLETE: CPT | Mod: TC

## 2017-07-24 ENCOUNTER — TELEPHONE (OUTPATIENT)
Dept: OBSTETRICS AND GYNECOLOGY | Facility: CLINIC | Age: 27
End: 2017-07-24

## 2017-07-26 ENCOUNTER — TELEPHONE (OUTPATIENT)
Dept: OBSTETRICS AND GYNECOLOGY | Facility: CLINIC | Age: 27
End: 2017-07-26

## 2017-07-26 NOTE — TELEPHONE ENCOUNTER
Called pt to schedule her blood work per Dr Rizzo.  Informed pt of appt date and time.  Pt verbalized understanding.  Letter sent out.

## 2017-12-11 ENCOUNTER — OFFICE VISIT (OUTPATIENT)
Dept: OBSTETRICS AND GYNECOLOGY | Facility: CLINIC | Age: 27
End: 2017-12-11
Payer: MEDICAID

## 2017-12-11 VITALS
WEIGHT: 124 LBS | SYSTOLIC BLOOD PRESSURE: 98 MMHG | BODY MASS INDEX: 21.17 KG/M2 | HEIGHT: 64 IN | DIASTOLIC BLOOD PRESSURE: 74 MMHG

## 2017-12-11 DIAGNOSIS — Z11.3 SCREENING FOR STDS (SEXUALLY TRANSMITTED DISEASES): ICD-10-CM

## 2017-12-11 DIAGNOSIS — Z30.011 ENCOUNTER FOR INITIAL PRESCRIPTION OF CONTRACEPTIVE PILLS: ICD-10-CM

## 2017-12-11 DIAGNOSIS — N89.8 VAGINAL DISCHARGE: Primary | ICD-10-CM

## 2017-12-11 LAB
C TRACH DNA SPEC QL NAA+PROBE: NOT DETECTED
CANDIDA RRNA VAG QL PROBE: NEGATIVE
G VAGINALIS RRNA GENITAL QL PROBE: POSITIVE
N GONORRHOEA DNA SPEC QL NAA+PROBE: NOT DETECTED
T VAGINALIS RRNA GENITAL QL PROBE: NEGATIVE

## 2017-12-11 PROCEDURE — 87480 CANDIDA DNA DIR PROBE: CPT

## 2017-12-11 PROCEDURE — 99213 OFFICE O/P EST LOW 20 MIN: CPT | Mod: S$PBB,,, | Performed by: NURSE PRACTITIONER

## 2017-12-11 PROCEDURE — 87591 N.GONORRHOEAE DNA AMP PROB: CPT

## 2017-12-11 PROCEDURE — 99999 PR PBB SHADOW E&M-EST. PATIENT-LVL III: CPT | Mod: PBBFAC,,, | Performed by: NURSE PRACTITIONER

## 2017-12-11 PROCEDURE — 87660 TRICHOMONAS VAGIN DIR PROBE: CPT

## 2017-12-11 PROCEDURE — 99213 OFFICE O/P EST LOW 20 MIN: CPT | Mod: PBBFAC | Performed by: NURSE PRACTITIONER

## 2017-12-11 RX ORDER — FLUCONAZOLE 150 MG/1
150 TABLET ORAL ONCE
Qty: 2 TABLET | Refills: 4 | Status: SHIPPED | OUTPATIENT
Start: 2017-12-11 | End: 2017-12-11

## 2017-12-11 NOTE — PROGRESS NOTES
HISTORY OF PRESENT ILLNESS:    Jose Luis Crowder is a 27 y.o. female, , Patient's last menstrual period was 2017 (approximate).,  presents with c/o vaginitis.   -Took antibiotics for a sinus infection and started with vulvar itching and white vaginal discharge, so self treated with Monistat, but still irritated.   -Denies associated fever, pelvic pain, odor, UTI sx or AUB.  -Requests STD testing.   -Since SAB in July hasn't had intercourse, and would like to start OCPs instead of the Nuvaring.     Past Medical History:   Diagnosis Date    Abnormal Pap smear of cervix yrs ago    repeat pap    Gall stones     Ovarian cyst        Past Surgical History:   Procedure Laterality Date    CHOLECYSTECTOMY  2017     Laparoscopic cholecystectomy -     TONSILLECTOMY, ADENOIDECTOMY         MEDICATIONS AND ALLERGIES:  None  Review of patient's allergies indicates:  No Known Allergies    Family History   Problem Relation Age of Onset    Diabetes Maternal Grandmother     Diabetes Mother     Breast cancer Neg Hx     Colon cancer Neg Hx     Ovarian cancer Neg Hx        Social History     Social History    Marital status: Single     Spouse name: N/A    Number of children: N/A    Years of education: N/A     Occupational History    Not on file.     Social History Main Topics    Smoking status: Never Smoker    Smokeless tobacco: Never Used    Alcohol use Yes      Comment: Social     Drug use: No    Sexual activity: Not Currently     Partners: Male     Other Topics Concern    Not on file     Social History Narrative    No narrative on file       OB HISTORY: Number of vaginal deliveries:1 Number of SAB:1      COMPREHENSIVE GYN HISTORY:  PAP History: Denies abnormal Paps. LAST PAP 16 NORMAL  Infection History: Denies STDs. Denies PID.  Benign History: Denies uterine fibroids. Denies ovarian cysts. Denies endometriosis. Denies other conditions.  Cancer History: Denies cervical cancer. Denies uterine  "cancer or hyperplasia. Denies ovarian cancer. Denies vulvar cancer or pre-cancer. Denies vaginal cancer or pre-cancer. Denies breast cancer. Denies colon cancer.  Sexual Activity History: Denies currently being sexually active  Menstrual History: Monthly. Mod to light flow. 5 days.  Dysmenorrhea History: Denies dysmenorrhea.  Contraception History: N/A.    ROS:  GENERAL: No fever or chills.   BREASTS: No pain. No lumps. No discharge.  ABDOMEN: No pain. No nausea. No vomiting. No diarrhea. No constipation.  REPRODUCTIVE: No abnormal bleeding.   VULVA: No pain. No lesions. + ITCHING.  VAGINA: No relaxation. No itching. No odor. + D/C. No lesions.  URINARY: No incontinence. No nocturia. No frequency. No dysuria.    BP 98/74   Ht 5' 4" (1.626 m)   Wt 56.2 kg (124 lb)   LMP 11/17/2017 (Approximate)   BMI 21.28 kg/m²     PE:  APPEARANCE: Well nourished, well developed, in no acute distress.  AFFECT: WNL, alert and oriented x 3.  PELVIC: External female genitalia without lesions.  Female hair distribution. Adequate perineal body, Normal urethral meatus. Vagina moist and well rugated without lesions. MOSTLY CREAM PRESENT. No significant cystocele or rectocele present. Cervix pink without lesions, discharge or tenderness. Uterus is 8 week size, regular, mobile and nontender. Adnexa without masses or tenderness.  EXTREMITIES: No edema    DIAGNOSIS:  1. Vaginal discharge    2. Screening for STDs (sexually transmitted diseases)    3. Encounter for initial prescription of contraceptive pills        PLAN:    Orders Placed This Encounter    Vaginosis Screen by DNA Probe    C. trachomatis/N. gonorrhoeae by AMP DNA Cervix    fluconazole (DIFLUCAN) 150 MG Tab       COUNSELING:  The patient was counseled today on:  -Vaginitis prevention including :  a. avoiding feminine products such as deoderant soaps, body wash, bubble bath, douches, scented toilet paper, deoderant tampons or pads, baby or feminine wipes, chronic pad use, " etc. and       b. avoiding other vulvovaginal irritants such as long hot baths, humidity, tight, synthetic clothing, chlorine and sitting around in wet bathing suits and   c. wearing cotton underwear, avoiding thong underwear and no underwear to bed and      d. taking showers instead of baths and use a hair dryer on cool setting afterwards to dry and  e.wearing cotton to exercise and shower immediately after exercise and change clothes and  f. using polyurethane condoms without spermicide if sexually active and symptoms are triggered by intercourse.  -Diflucan use and potential side effects;  -pelvic rest until symptoms resolve;  -STDs and prevention;  -all contraceptive options and she chose OCPs;  -OCP use and potential side effects.    FOLLOW-UP with me pending test results.

## 2017-12-12 DIAGNOSIS — N76.0 BV (BACTERIAL VAGINOSIS): Primary | ICD-10-CM

## 2017-12-12 DIAGNOSIS — B96.89 BV (BACTERIAL VAGINOSIS): Primary | ICD-10-CM

## 2017-12-12 RX ORDER — METRONIDAZOLE 500 MG/1
500 TABLET ORAL 2 TIMES DAILY
Qty: 14 TABLET | Refills: 1 | Status: SHIPPED | OUTPATIENT
Start: 2017-12-12 | End: 2017-12-19

## 2018-02-21 ENCOUNTER — PATIENT MESSAGE (OUTPATIENT)
Dept: OBSTETRICS AND GYNECOLOGY | Facility: CLINIC | Age: 28
End: 2018-02-21

## 2018-02-22 DIAGNOSIS — B96.89 BV (BACTERIAL VAGINOSIS): Primary | ICD-10-CM

## 2018-02-22 DIAGNOSIS — N76.0 BV (BACTERIAL VAGINOSIS): Primary | ICD-10-CM

## 2018-02-22 RX ORDER — METRONIDAZOLE 7.5 MG/G
1 GEL VAGINAL DAILY
Qty: 70 G | Refills: 4 | Status: SHIPPED | OUTPATIENT
Start: 2018-02-22 | End: 2018-02-27

## 2018-04-17 ENCOUNTER — TELEPHONE (OUTPATIENT)
Dept: OBSTETRICS AND GYNECOLOGY | Facility: CLINIC | Age: 28
End: 2018-04-17

## 2018-04-17 DIAGNOSIS — Z30.9 ENCOUNTER FOR CONTRACEPTIVE MANAGEMENT, UNSPECIFIED TYPE: Primary | ICD-10-CM

## 2018-04-17 NOTE — TELEPHONE ENCOUNTER
Pt says she currently uses the Nuva Ring for birth control and would like to switch to the Depo Provera injection. Told pt that I would make her desire known to Dr. Rizzo and call her back with the physician's response. Pt communicated understanding. Pt confirmed preferred pharmacy is [facility] on [address]. Prescription request sent to Dr. Rizzo.

## 2018-04-17 NOTE — TELEPHONE ENCOUNTER
----- Message from Sylvie Pierre sent at 4/16/2018  1:49 PM CDT -----  Contact: SHRUTHI JARAMILLO [[id number]]            Name of Who is Calling: SHRUTHI JARAMILLO [[id number]]      What is the request in detail:pt would like to speak to staff in regards to switching Birth control to Depo       Can the clinic reply by [facility]: no      What Number to Call Back if not in [facility]: [phone]

## 2018-04-18 RX ORDER — MEDROXYPROGESTERONE ACETATE 150 MG/ML
150 INJECTION, SUSPENSION INTRAMUSCULAR
Qty: 1 ML | Refills: 2 | Status: SHIPPED | OUTPATIENT
Start: 2018-04-18 | End: 2018-10-23

## 2018-04-19 ENCOUNTER — TELEPHONE (OUTPATIENT)
Dept: OBSTETRICS AND GYNECOLOGY | Facility: CLINIC | Age: 28
End: 2018-04-19

## 2018-04-19 NOTE — TELEPHONE ENCOUNTER
Called to inform pt that her prescription for Depo Provera was sent to her preferred pharmacy and is available for . Pt communicated understanding and said that she picked it up yesterday.

## 2018-10-23 ENCOUNTER — TELEPHONE (OUTPATIENT)
Dept: OBSTETRICS AND GYNECOLOGY | Facility: CLINIC | Age: 28
End: 2018-10-23

## 2018-10-23 ENCOUNTER — OFFICE VISIT (OUTPATIENT)
Dept: OBSTETRICS AND GYNECOLOGY | Facility: CLINIC | Age: 28
End: 2018-10-23
Payer: MEDICAID

## 2018-10-23 VITALS
WEIGHT: 135.56 LBS | HEIGHT: 64 IN | DIASTOLIC BLOOD PRESSURE: 68 MMHG | BODY MASS INDEX: 23.14 KG/M2 | SYSTOLIC BLOOD PRESSURE: 118 MMHG

## 2018-10-23 DIAGNOSIS — Z01.419 WELL WOMAN EXAM WITH ROUTINE GYNECOLOGICAL EXAM: Primary | ICD-10-CM

## 2018-10-23 DIAGNOSIS — N89.8 VAGINAL DISCHARGE: ICD-10-CM

## 2018-10-23 DIAGNOSIS — Z11.3 SCREENING FOR STDS (SEXUALLY TRANSMITTED DISEASES): ICD-10-CM

## 2018-10-23 DIAGNOSIS — Z30.011 ENCOUNTER FOR INITIAL PRESCRIPTION OF CONTRACEPTIVE PILLS: ICD-10-CM

## 2018-10-23 DIAGNOSIS — N92.6 IRREGULAR PERIODS: ICD-10-CM

## 2018-10-23 LAB
CANDIDA RRNA VAG QL PROBE: NEGATIVE
G VAGINALIS RRNA GENITAL QL PROBE: POSITIVE
T VAGINALIS RRNA GENITAL QL PROBE: NEGATIVE

## 2018-10-23 PROCEDURE — 99213 OFFICE O/P EST LOW 20 MIN: CPT | Mod: PBBFAC | Performed by: NURSE PRACTITIONER

## 2018-10-23 PROCEDURE — 87660 TRICHOMONAS VAGIN DIR PROBE: CPT

## 2018-10-23 PROCEDURE — 99395 PREV VISIT EST AGE 18-39: CPT | Mod: S$PBB,,, | Performed by: NURSE PRACTITIONER

## 2018-10-23 PROCEDURE — 99999 PR PBB SHADOW E&M-EST. PATIENT-LVL III: CPT | Mod: PBBFAC,,, | Performed by: NURSE PRACTITIONER

## 2018-10-23 PROCEDURE — 87491 CHLMYD TRACH DNA AMP PROBE: CPT

## 2018-10-23 RX ORDER — DESOGESTREL AND ETHINYL ESTRADIOL 0.15-0.03
1 KIT ORAL DAILY
Qty: 84 TABLET | Refills: 4 | Status: SHIPPED | OUTPATIENT
Start: 2018-10-23 | End: 2019-09-17

## 2018-10-23 NOTE — PROGRESS NOTES
HISTORY OF PRESENT ILLNESS:    Jose Luis Crowder is a 27 y.o. female, [id number], Patient's last menstrual period was 10/09/2018 (exact date).,  presents for a routine exam, contraception, STD screening and reports itching with her period.  -Stopped Depo Provera (last shot 2018) due to irregular spotting.   -No bleeding in August and then spotted all of September, none since.  -Requests OCPs for cycle control and STD screening.   -Not sexually active and states FOB was murdered in robbery about 6 months ago.  -c/o vulvar itching with her period and increased discharge afterwards not associated with fever, pelvic pain, odor, UTI sx.  -Reports use of bubble baths and Always products.    Past Medical History:   Diagnosis Date    Abnormal Pap smear of cervix yrs ago    repeat pap    Gall stones     Ovarian cyst        Past Surgical History:   Procedure Laterality Date    CHOLECYSTECTOMY  2017     Laparoscopic cholecystectomy -     CHOLECYSTECTOMY-LAPAROSCOPIC (ADD ON) N/A 2017    Performed by Vega [facility]dary Jr[facility] at [facility] OR    TONSILLECTOMY, ADENOIDECTOMY         MEDICATIONS AND ALLERGIES:      Current Outpatient Medications:     desogestrel-ethinyl estradiol (APRI) 0.15-0.03 mg per tablet, Take 1 tablet by mouth once daily., Disp: 84 tablet, Rfl: 4    Review of patient's allergies indicates:  No Known Allergies    Family History   Problem Relation Age of Onset    Diabetes Maternal Grandmother     Diabetes Mother     Breast cancer Neg Hx     Colon cancer Neg Hx     Ovarian cancer Neg Hx        Social History     Socioeconomic History    Marital status: Single     Spouse name: Not on file    Number of children: Not on file    Years of education: Not on file    Highest education level: Not on file   Social Needs    Financial resource strain: Not on file    Food insecurity - worry: Not on file    Food insecurity - inability: Not on file    Transportation needs - medical: Not on file     "Transportation needs - non-medical: Not on file   Occupational History    Not on file   Tobacco Use    Smoking status: Never Smoker    Smokeless tobacco: Never Used   Substance and Sexual Activity    Alcohol use: Yes     Comment: Social     Drug use: No    Sexual activity: Not Currently     Partners: Male   Other Topics Concern    Not on file   Social History Narrative    Not on file       OB HISTORY: Number of vaginal deliveries:1 Number of SAB:1      COMPREHENSIVE GYN HISTORY:  PAP History: Denies abnormal Paps. LAST PAP 6-23-16 NORMAL  Infection History: Denies STDs. Denies PID.  Benign History: Denies uterine fibroids. Denies ovarian cysts. Denies endometriosis. Denies other conditions.  Cancer History: Denies cervical cancer. Denies uterine cancer or hyperplasia. Denies ovarian cancer. Denies vulvar cancer or pre-cancer. Denies vaginal cancer or pre-cancer. Denies breast cancer. Denies colon cancer.  Sexual Activity History: Denies currently being sexually active  Menstrual History: Monthly. Mod to light flow. 5 days.  Dysmenorrhea History: Denies dysmenorrhea.  Contraception History: N/A.      ROS:  GENERAL: No weight changes. No swelling. No fatigue. No fever.  CARDIOVASCULAR: No chest pain. No shortness of breath. No leg cramps.   NEUROLOGICAL: No headaches. No vision changes.  BREASTS: No pain. No lumps. No discharge.  ABDOMEN: No pain. No nausea. No vomiting. No diarrhea. No constipation.  REPRODUCTIVE: See HPI.   VULVA: No pain. No lesions. + ITCHING with PERIOD.  VAGINA: No relaxation. No itching. No odor. + DISCHARGE. No lesions.  URINARY: No incontinence. No nocturia. No frequency. No dysuria.    /68   Ht 5' 4" (1.626 m)   Wt 61.5 kg (135 lb 9.3 oz)   LMP 10/09/2018 (Exact Date)   BMI 23.27 kg/m²     PE:  APPEARANCE: Well nourished, well developed, in no acute distress.  AFFECT: WNL, alert and oriented x 3.  SKIN: No acne or hirsutism.  NECK: Neck symmetric, without masses or " thyromegaly.  NODES: No inguinal, cervical, axillary or femoral lymph node enlargement.  CHEST: Good respiratory effort.   ABDOMEN: Soft. No tenderness or masses.   BREASTS: Symmetrical, no skin changes, visible lesions, palpable masses or nipple discharge bilaterally.  PELVIC: External female genitalia without lesions.  Female hair distribution. Adequate perineal body, Normal urethral meatus. Vagina moist and well rugated without lesions MOD MUCOID D/C present.  No significant cystocele or rectocele present. Cervix pink without lesions, discharge or tenderness. Uterus is 8 week size, regular, mobile and nontender. Adnexa without masses or tenderness.  EXTREMITIES: No edema    PROCEDURES:  UPT negative    DIAGNOSIS:  1. Well woman exam with routine gynecological exam    2. Encounter for initial prescription of contraceptive pills    3. Irregular periods after discontinuing Depo Provera    4. Screening for STDs (sexually transmitted diseases)    5. Vaginal discharge        PLAN:    Orders Placed This Encounter    C. trachomatis/N. gonorrhoeae by AMP DNA    Vaginosis Screen by DNA Probe    desogestrel-ethinyl estradiol (APRI) 0.15-0.03 mg per tablet   Declined other STD testing    COUNSELING:  The patient was counseled today on:  -OCP use and potential side effects.  -STDs and prevention including the Gardasil vaccine (has completed 3/3);  -Vaginitis prevention including :  a. avoiding feminine products such as deoderant soaps, body wash, bubble bath, douches, scented toilet paper, deoderant tampons or pads, baby or feminine wipes, chronic pad use, etc. and       b. avoiding other vulvovaginal irritants such as long hot baths, humidity, tight, synthetic clothing, chlorine and sitting around in wet bathing suits and   c. wearing cotton underwear, avoiding thong underwear and no underwear to bed and      d. taking showers instead of baths and use a hair dryer on cool setting afterwards to dry and  e.wearing cotton to  exercise and shower immediately after exercise and change clothes and  f. using polyurethane condoms without spermicide if sexually active and symptoms are triggered by intercourse;  -A.C.S. Pap and pelvic exam guidelines (pap every 3 years);  -to follow up with her PCP for other health maintenance.    FOLLOW-UP with me pending test results and in one year.

## 2018-10-24 LAB
C TRACH DNA SPEC QL NAA+PROBE: NOT DETECTED
N GONORRHOEA DNA SPEC QL NAA+PROBE: NOT DETECTED

## 2018-10-25 RX ORDER — METRONIDAZOLE 500 MG/1
500 TABLET ORAL 2 TIMES DAILY
Qty: 14 TABLET | Refills: 1 | Status: SHIPPED | OUTPATIENT
Start: 2018-10-25 | End: 2018-11-01

## 2019-01-22 ENCOUNTER — TELEPHONE (OUTPATIENT)
Dept: OBSTETRICS AND GYNECOLOGY | Facility: CLINIC | Age: 29
End: 2019-01-22

## 2019-01-22 NOTE — TELEPHONE ENCOUNTER
----- Message from Vicky Dawkins sent at 1/22/2019 11:22 AM CST -----  Contact: [place] ( [facility] )   Can the clinic reply in [facility]: no       Please refill the medication(s) listed below. Please call the patient when the prescription(s) is ready for  at the phone number  [phone]    Medication #1: fluconazole (DIFLUCAN) 150 MG Tab    Medication #2:       Preferred Pharmacy: [facility] [address] [address] [address]  AT [facility]

## 2019-02-12 ENCOUNTER — HOSPITAL ENCOUNTER (EMERGENCY)
Facility: OTHER | Age: 29
Discharge: HOME | End: 2019-02-13
Attending: EMERGENCY MEDICINE
Payer: MEDICAID

## 2019-02-12 DIAGNOSIS — Z34.90 PREGNANCY, UNSPECIFIED GESTATIONAL AGE: Primary | ICD-10-CM

## 2019-02-12 DIAGNOSIS — O46.90 VAGINAL BLEEDING IN PREGNANCY: Primary | ICD-10-CM

## 2019-02-12 LAB
ABO + RH BLD: NORMAL
ALBUMIN SERPL BCP-MCNC: 3.5 G/DL
ALP SERPL-CCNC: 59 U/L
ALT SERPL W/O P-5'-P-CCNC: 9 U/L
ANION GAP SERPL CALC-SCNC: 6 MMOL/L
AST SERPL-CCNC: 13 U/L
B-HCG UR QL: POSITIVE
BACTERIA #/AREA URNS HPF: ABNORMAL /HPF
BASOPHILS # BLD AUTO: 0.02 K/UL
BASOPHILS NFR BLD: 0.3 %
BILIRUB SERPL-MCNC: 0.2 MG/DL
BILIRUB UR QL STRIP: NEGATIVE
BLD GP AB SCN CELLS X3 SERPL QL: NORMAL
BUN SERPL-MCNC: 10 MG/DL
CALCIUM SERPL-MCNC: 8.9 MG/DL
CHLORIDE SERPL-SCNC: 105 MMOL/L
CLARITY UR: CLEAR
CO2 SERPL-SCNC: 24 MMOL/L
COLOR UR: YELLOW
CREAT SERPL-MCNC: 0.7 MG/DL
CTP QC/QA: YES
DIFFERENTIAL METHOD: ABNORMAL
EOSINOPHIL # BLD AUTO: 0 K/UL
EOSINOPHIL NFR BLD: 0.6 %
ERYTHROCYTE [DISTWIDTH] IN BLOOD BY AUTOMATED COUNT: 13.3 %
EST. GFR  (AFRICAN AMERICAN): >60 ML/MIN/1.73 M^2
EST. GFR  (NON AFRICAN AMERICAN): >60 ML/MIN/1.73 M^2
GLUCOSE SERPL-MCNC: 83 MG/DL
GLUCOSE UR QL STRIP: NEGATIVE
HCG INTACT+B SERPL-ACNC: NORMAL MIU/ML
HCT VFR BLD AUTO: 35.2 %
HGB BLD-MCNC: 11.8 G/DL
HGB UR QL STRIP: ABNORMAL
HYALINE CASTS #/AREA URNS LPF: 0 /LPF
KETONES UR QL STRIP: NEGATIVE
LEUKOCYTE ESTERASE UR QL STRIP: NEGATIVE
LYMPHOCYTES # BLD AUTO: 2.1 K/UL
LYMPHOCYTES NFR BLD: 31.4 %
MCH RBC QN AUTO: 28.7 PG
MCHC RBC AUTO-ENTMCNC: 33.5 G/DL
MCV RBC AUTO: 86 FL
MICROSCOPIC COMMENT: ABNORMAL
MONOCYTES # BLD AUTO: 0.9 K/UL
MONOCYTES NFR BLD: 13.9 %
NEUTROPHILS # BLD AUTO: 3.6 K/UL
NEUTROPHILS NFR BLD: 53.5 %
NITRITE UR QL STRIP: NEGATIVE
PH UR STRIP: 6 [PH] (ref 5–8)
PLATELET # BLD AUTO: 245 K/UL
PMV BLD AUTO: 11.6 FL
POTASSIUM SERPL-SCNC: 4.1 MMOL/L
PROT SERPL-MCNC: 7.5 G/DL
PROT UR QL STRIP: ABNORMAL
RBC # BLD AUTO: 4.11 M/UL
RBC #/AREA URNS HPF: 12 /HPF (ref 0–4)
SODIUM SERPL-SCNC: 135 MMOL/L
SP GR UR STRIP: 1.02 (ref 1–1.03)
URN SPEC COLLECT METH UR: ABNORMAL
UROBILINOGEN UR STRIP-ACNC: NEGATIVE EU/DL
WBC # BLD AUTO: 6.76 K/UL
WBC #/AREA URNS HPF: 6 /HPF (ref 0–5)

## 2019-02-12 PROCEDURE — 86901 BLOOD TYPING SEROLOGIC RH(D): CPT

## 2019-02-12 PROCEDURE — 99284 EMERGENCY DEPT VISIT MOD MDM: CPT

## 2019-02-12 PROCEDURE — 81000 URINALYSIS NONAUTO W/SCOPE: CPT

## 2019-02-12 PROCEDURE — 85025 COMPLETE CBC W/AUTO DIFF WBC: CPT

## 2019-02-12 PROCEDURE — 81025 URINE PREGNANCY TEST: CPT | Performed by: PHYSICIAN ASSISTANT

## 2019-02-12 PROCEDURE — 80053 COMPREHEN METABOLIC PANEL: CPT

## 2019-02-12 PROCEDURE — 84702 CHORIONIC GONADOTROPIN TEST: CPT

## 2019-02-13 ENCOUNTER — TELEPHONE (OUTPATIENT)
Dept: OBSTETRICS AND GYNECOLOGY | Facility: CLINIC | Age: 29
End: 2019-02-13

## 2019-02-13 ENCOUNTER — TELEPHONE (OUTPATIENT)
Dept: OBSTETRICS AND GYNECOLOGY | Facility: HOSPITAL | Age: 29
End: 2019-02-13

## 2019-02-13 VITALS
SYSTOLIC BLOOD PRESSURE: 109 MMHG | BODY MASS INDEX: 21.49 KG/M2 | DIASTOLIC BLOOD PRESSURE: 70 MMHG | RESPIRATION RATE: 18 BRPM | TEMPERATURE: 98 F | HEIGHT: 65 IN | OXYGEN SATURATION: 100 % | HEART RATE: 78 BPM | WEIGHT: 129 LBS

## 2019-02-13 NOTE — ED NOTES
Pt presented to ED via POV with complaints of dark red vaginal bleeding x1 day pta, reports saturating x3 pad/hr with subjective dizziness. On arrival pt appears calm and cooperative RR easy non labored, NAD. AAOx4, VSS, denies N/V/D or urinary symptoms. Negative other complaints at this time, side rails up x2, call light within reach, bed in lowest locked position, will continue to monitor and assess for changes.

## 2019-02-13 NOTE — ED PROVIDER NOTES
Encounter Date: 2019    SCRIBE #1 NOTE: I, Marckshawn Thomas, am scribing for, and in the presence of, Dr. Chase.       History     Chief Complaint   Patient presents with    Vaginal Bleeding     Pt reports she is 8 weeks gestation, with vaginal bleeding since this morning.      Time seen by provider: 9:45 PM    This is a 28 y.o. female, A1 in early gestation, who presents with complaint of heavy bleeding with clots starting yesterday.  She states bleeding is heavier than a regular period.  She reports going through multiple menstrual pads today.  She also reports dizziness yesterday (now resolved) and general abdominal cramping, rated 7/10.  She follows Dr. Rizzo for OB/GYN, but has not yet had prenatal care for this pregnancy.  She denies recent fever, chills, cough or rhinorrhea.  She denies SOB or CP.  She denies nausea or vomiting.  She reports that her LMP was in November.      The history is provided by the patient and a friend.     Review of patient's allergies indicates:  No Known Allergies  Past Medical History:   Diagnosis Date    Abnormal Pap smear of cervix yrs ago    repeat pap    Gall stones     Ovarian cyst      Past Surgical History:   Procedure Laterality Date    CHOLECYSTECTOMY  2017     Laparoscopic cholecystectomy -     CHOLECYSTECTOMY-LAPAROSCOPIC (ADD ON) N/A 2017    Performed by [facility] at [facility] OR    TONSILLECTOMY, ADENOIDECTOMY       Family History   Problem Relation Age of Onset    Diabetes Maternal Grandmother     Diabetes Mother     Breast cancer Neg Hx     Colon cancer Neg Hx     Ovarian cancer Neg Hx      Social History     Tobacco Use    Smoking status: Never Smoker    Smokeless tobacco: Never Used   Substance Use Topics    Alcohol use: Yes     Comment: Social     Drug use: No     Review of Systems   Constitutional: Negative for chills and fever.   HENT: Negative for congestion and rhinorrhea.    Respiratory: Negative for cough  and shortness of breath.    Cardiovascular: Negative for chest pain.   Gastrointestinal: Positive for abdominal pain. Negative for diarrhea, nausea and vomiting.   Genitourinary: Positive for vaginal bleeding. Negative for decreased urine volume, difficulty urinating, dyspareunia, dysuria, enuresis, frequency, hematuria and urgency.   Musculoskeletal: Negative for back pain.   Skin: Negative for color change, pallor and rash.   Neurological: Positive for dizziness. Negative for headaches.   Hematological: Negative for adenopathy. Does not bruise/bleed easily.       Physical Exam     Initial Vitals [02/12/19 1907]   BP Pulse Resp Temp SpO2   (!) 95/52 68 18 97.9 °F (36.6 °C) 99 %      MAP       --         Vitals:    02/12/19 2231 02/12/19 2232 02/12/19 2310 02/12/19 2331   BP: 117/79   109/70   Pulse: 86 84 80 72   Resp:       Temp:       TempSrc:       SpO2: 100% 100% 99% 100%   Weight:       Height:        02/12/19 2354   BP:    Pulse: 78   Resp:    Temp:    TempSrc:    SpO2: 100%   Weight:    Height:      Physical Exam    Nursing note and vitals reviewed.  Constitutional: She appears well-developed and well-nourished. She is not diaphoretic. No distress.   HENT:   Head: Normocephalic and atraumatic.   Moist mucous membranes.   Eyes: Conjunctivae and EOM are normal. No scleral icterus.   Neck: Normal range of motion. Neck supple.   Cardiovascular: Normal rate, regular rhythm and normal heart sounds. Exam reveals no gallop and no friction rub.    No murmur heard.  Pulmonary/Chest: Breath sounds normal. No respiratory distress. She has no wheezes. She has no rhonchi. She has no rales.   Abdominal: Soft. Bowel sounds are normal. She exhibits no distension. There is tenderness. There is no rebound and no guarding.   Genitourinary:   Genitourinary Comments: Normal external genitalia.  Moderate pooled dark red blood in the vaginal vault.  Cervix is poorly visualized, but mild active bleeding.  No tissue noted.  Os is  open.   Musculoskeletal: Normal range of motion. She exhibits no edema or tenderness.   Neurological: She is alert and oriented to person, place, and time.   Skin: Skin is warm and dry. No rash noted. No erythema. No pallor.   Psychiatric: She has a normal mood and affect. Her behavior is normal. Judgment and thought content normal.         ED Course   Procedures  Labs Reviewed   URINALYSIS, REFLEX TO URINE CULTURE - Abnormal; Notable for the following components:       Result Value    Protein, UA 1+ (*)     Occult Blood UA 3+ (*)     All other components within normal limits    Narrative:     Preferred Collection Type->Urine, Clean Catch   URINALYSIS MICROSCOPIC - Abnormal; Notable for the following components:    RBC, UA 12 (*)     WBC, UA 6 (*)     Bacteria, UA Moderate (*)     All other components within normal limits    Narrative:     Preferred Collection Type->Urine, Clean Catch   CBC W/ AUTO DIFFERENTIAL - Abnormal; Notable for the following components:    Hemoglobin 11.8 (*)     Hematocrit 35.2 (*)     All other components within normal limits   COMPREHENSIVE METABOLIC PANEL - Abnormal; Notable for the following components:    Sodium 135 (*)     ALT 9 (*)     Anion Gap 6 (*)     All other components within normal limits   POCT URINE PREGNANCY - Abnormal; Notable for the following components:    POC Preg Test, Ur Positive (*)     All other components within normal limits   HCG, QUANTITATIVE, PREGNANCY   TYPE & SCREEN          Imaging Results          US OB Less Than 14 Wks with Transvag(xpd (Final result)  Result time 19 23:17:36    Final result by Jass aGge MD (19 23:17:36)                 Impression:      No intrauterine pregnancy or gestational sac visualized, technically pregnancy of unknown location.  Findings may relate to early pregnancy or spontaneous .  No adnexal abnormalities or significant free fluid seen to suggest ectopic pregnancy.  Recommend serial beta hCGs and  repeat pelvic ultrasound as clinically warranted.      Electronically signed by: Jass Gage MD  Date:    02/12/2019  Time:    23:17             Narrative:    EXAMINATION:  US OB LESS THAN 14 WKS WITH TRANSVAGINAL (XPD)    CLINICAL HISTORY:  Vag Bleeding;    TECHNIQUE:  Transabdominal sonography of the pelvis was performed, followed by transvaginal sonography to better evaluate the uterus and ovaries.    COMPARISON:  None.    FINDINGS:  The uterus measures 10.4 x 6.3 x 9.6 cm. Uterine parenchyma is heterogenous in echotexture.  No intrauterine pregnancy or gestational sac seen.  There is thickened heterogenous appearance of the endometrium.    The right ovary measures 2.5 x 1.1 x 2.4 cm. The left ovary measures 2.8 x 1.8 x 2.5 cm. Arterial and venous flow are preserved bilaterally. A suspected corpus luteum cyst measuring 1.8 x 1.1 x 1.0 cm is seen in the left ovary. Small amount of free fluid is seen.                                 Medical Decision Making:   Clinical Tests:   Lab Tests: Ordered and Reviewed  Radiological Study: Ordered and Reviewed  ED Management:  Emergent evaluation of a 28-year-old female with vaginal bleeding, early pregnancy.  Vital signs revealed hypotension, afebrile.  She is asymptomatic at this time and blood pressure improved without intervention throughout ER course.  On exam there is no abdominal tenderness whatsoever.  Os is open with moderate bleeding.  Quantitative beta HCG is in the 70k range, but pregnancy of unknown known location on ultrasound - no IUP identified.  I discussed the case with OBGYN.  They recommend 48 hr repeat beta and follow up with her OBGYN, Dr. Darling.  Physical exam findings and ultrasound findings and lab findings were discussed.  Given her dates, beta, an empty uterus, I suspect she has completed a miscarriage.  Given the open os and continued bleeding I have given her strict return precautions.  She is encouraged to return immediately for going through  more than 1 pad per hour for 2 hr in a row, any signs of infection such as fever, increased pain, or dizziness/lightheadedness.  She is Rh positive and did not require RhoGAM.  She was discharged in good condition and advised close follow-up.  She has been added to the beta book.            Scribe Attestation:   Scribe #1: I performed the above scribed service and the documentation accurately describes the services I performed. I attest to the accuracy of the note.    Attending Attestation:           Physician Attestation for Scribe:  Physician Attestation Statement for Scribe #1: I, Dr. Chase, reviewed documentation, as scribed by Daija Thomas in my presence, and it is both accurate and complete.                 ED Course as of Feb 13 1248   Wed Feb 13, 2019   0000 Patient was seen by Dr. Chase who discussed with OBGYN plan for the patient have repeat beta-hCG performed on February 14.  The OBGYN resident called me to request that the patient actually be seen during business hours today (February 13) for repeat beta HCG.  [RC]   0000    0002    0002    0007    0010 From room ED 06 to room SINAN  [ES]   0011    0132 Divya Chase MD removed as Attending  [ES]   0138    0138    0138    0138       ED Course User Index  [ER] Domenic Barrera RN  [ES] Helena Olson RN  [ML] Kofi Escamilla MD  [RC] Sonny Gacria MD     Clinical Impression:     1. Vaginal bleeding in pregnancy                                   Divya Chase MD  02/13/19 1250

## 2019-02-13 NOTE — DISCHARGE INSTRUCTIONS
Call your primary care doctor to make the first available appointment.     Keep all your medical appointments.     Take your regular medication as prescribed. Contact your primary care provider before running out of medication, or for any problems obtaining them.    Do not drive or operate heavy machinery while taking opioid or muscle relaxing medications. Examples include norco, percocet, xanax, valium, flexeril.     Overuse or long term use of pain and sedating medication may lead to addiction, dependence, organ failure, family and work problems, legal problems, accidental overdose and death.    If you do not have health insurance, you probably qualify for heavily discounted rates:  Call 1-953.151.8359 ([facility] hotline) or go to www.[employer].[place].gov    Your evaluation in the ED does not suggest any emergent or life threatening medical condition requiring admission or immediate intervention beyond that provided in the ED.   However, the signs of a serious problem sometimes take more time to appear.   RETURN TO THE ER if any of the following occur:    Weakness, dizziness, fainting, or loss of consciousness   Fever of 100.4ºF (38ºC) or higher  Any worse symptoms  Any new or concerning symptoms

## 2019-02-13 NOTE — TELEPHONE ENCOUNTER
----- Message from Summer Tillman sent at 2/13/2019  2:22 PM CST -----  Contact: self  Pt was returning your call. Call back number is [phone]

## 2019-02-13 NOTE — PROGRESS NOTES
Page from ED regarding patient. Patient with bleeding for the past day and pregnancy of unknown gestational age. She has not yet received prenatal care.   Beta hcg of 79,000 with no signs of intrauterine gestational sac. Plan for patient to return for repeat beta hCG in AM and follow up with clinic appointment.  Instructed ED to give patient strict return precautions.     Vero Darling MD  OBGYN, PGY-1

## 2019-02-14 ENCOUNTER — OFFICE VISIT (OUTPATIENT)
Dept: OBSTETRICS AND GYNECOLOGY | Facility: CLINIC | Age: 29
End: 2019-02-14
Payer: MEDICAID

## 2019-02-14 ENCOUNTER — CLINICAL SUPPORT (OUTPATIENT)
Dept: INTERNAL MEDICINE | Facility: CLINIC | Age: 29
End: 2019-02-14
Payer: MEDICAID

## 2019-02-14 VITALS — SYSTOLIC BLOOD PRESSURE: 100 MMHG | DIASTOLIC BLOOD PRESSURE: 64 MMHG | BODY MASS INDEX: 21.47 KG/M2 | HEIGHT: 65 IN

## 2019-02-14 DIAGNOSIS — Z34.90 PREGNANCY, UNSPECIFIED GESTATIONAL AGE: ICD-10-CM

## 2019-02-14 DIAGNOSIS — Z32.01 POSITIVE PREGNANCY TEST: Primary | ICD-10-CM

## 2019-02-14 DIAGNOSIS — O03.9 ABORTION: ICD-10-CM

## 2019-02-14 DIAGNOSIS — N71.9 ENDOMETRITIS: ICD-10-CM

## 2019-02-14 LAB — HCG INTACT+B SERPL-ACNC: NORMAL MIU/ML

## 2019-02-14 PROCEDURE — 87491 CHLMYD TRACH DNA AMP PROBE: CPT

## 2019-02-14 PROCEDURE — 88305 TISSUE EXAM BY PATHOLOGIST: CPT | Performed by: PATHOLOGY

## 2019-02-14 PROCEDURE — 88305 TISSUE EXAM BY PATHOLOGIST: CPT | Mod: 26,,, | Performed by: PATHOLOGY

## 2019-02-14 PROCEDURE — 99999 PR PBB SHADOW E&M-EST. PATIENT-LVL III: ICD-10-PCS | Mod: PBBFAC,,, | Performed by: OBSTETRICS & GYNECOLOGY

## 2019-02-14 PROCEDURE — 99214 PR OFFICE/OUTPT VISIT, EST, LEVL IV, 30-39 MIN: ICD-10-PCS | Mod: S$PBB,,, | Performed by: OBSTETRICS & GYNECOLOGY

## 2019-02-14 PROCEDURE — 84702 CHORIONIC GONADOTROPIN TEST: CPT

## 2019-02-14 PROCEDURE — 99999 PR PBB SHADOW E&M-EST. PATIENT-LVL III: CPT | Mod: PBBFAC,,, | Performed by: OBSTETRICS & GYNECOLOGY

## 2019-02-14 PROCEDURE — 88305 TISSUE SPECIMEN TO PATHOLOGY, OBSTETRICS/GYNECOLOGY: ICD-10-PCS | Mod: 26,,, | Performed by: PATHOLOGY

## 2019-02-14 PROCEDURE — 99213 OFFICE O/P EST LOW 20 MIN: CPT | Mod: PBBFAC,TH,PN | Performed by: OBSTETRICS & GYNECOLOGY

## 2019-02-14 PROCEDURE — 99214 OFFICE O/P EST MOD 30 MIN: CPT | Mod: S$PBB,,, | Performed by: OBSTETRICS & GYNECOLOGY

## 2019-02-14 RX ORDER — HYDROCODONE BITARTRATE AND ACETAMINOPHEN 5; 325 MG/1; MG/1
1 TABLET ORAL EVERY 6 HOURS PRN
Qty: 20 TABLET | Refills: 0 | Status: SHIPPED | OUTPATIENT
Start: 2019-02-14 | End: 2019-09-17

## 2019-02-14 RX ORDER — DOXYCYCLINE HYCLATE 100 MG/1
100 TABLET, DELAYED RELEASE ORAL 2 TIMES DAILY
Qty: 20 TABLET | Refills: 0 | Status: SHIPPED | OUTPATIENT
Start: 2019-02-14 | End: 2019-03-12 | Stop reason: SDUPTHER

## 2019-02-14 NOTE — PROGRESS NOTES
CC: positive pregnancy test  Vaginal bleeding   Possible MAB     Jose Luis Crowder is a 28 y.o. female  presents for  Vaginal bleeding and cramping.  LMP 2018-  9 6/7 wks  Started with cramping and N/V and went to [facility] ER 2019.  POS preg test.  Two weeks ago started having vaginal spotting.  On Monday night she had vaginal bleeding.  Pain woke up in the am and started having vaginal clotting and passed tissue on the toilet.   Bleeding has improved but the cramping has worsened.   on  BHCG was 77425  todays BHCG is pending    FINDINGS:  The uterus measures 10.4 x 6.3 x 9.6 cm. Uterine parenchyma is heterogenous in echotexture.  No intrauterine pregnancy or gestational sac seen.  There is thickened heterogenous appearance of the endometrium.    The right ovary measures 2.5 x 1.1 x 2.4 cm. The left ovary measures 2.8 x 1.8 x 2.5 cm. Arterial and venous flow are preserved bilaterally. A suspected corpus luteum cyst measuring 1.8 x 1.1 x 1.0 cm is seen in the left ovary. Small amount of free fluid is seen.      Impression       No intrauterine pregnancy or gestational sac visualized, technically pregnancy of unknown location.  Findings may relate to early pregnancy or spontaneous .  No adnexal abnormalities or significant free fluid seen to suggest ectopic pregnancy.  Recommend serial beta hCGs and repeat pelvic ultrasound as clinically warranted.         Past Medical History:   Diagnosis Date    Abnormal Pap smear of cervix yrs ago    repeat pap    Gall stones     Ovarian cyst        Past Surgical History:   Procedure Laterality Date    CHOLECYSTECTOMY  2017     Laparoscopic cholecystectomy -     CHOLECYSTECTOMY-LAPAROSCOPIC (ADD ON) N/A 2017    [facility] OR    TONSILLECTOMY, ADENOIDECTOMY         OB History    Para Term  AB Living   2 1 1   1 1   SAB TAB Ectopic Multiple Live Births   1     0 1      # Outcome Date GA Lbr  "Neftali/2nd Weight Sex Delivery Anes PTL Lv   2 SAB 06/24/17           1 Term 01/27/17 39w3d / 00:26 3.09 kg (6 lb 13 oz) F Vag-Vacuum EPI N GAIL          Family History   Problem Relation Age of Onset    Diabetes Maternal Grandmother     Diabetes Mother     Breast cancer Neg Hx     Colon cancer Neg Hx     Ovarian cancer Neg Hx        Social History     Tobacco Use    Smoking status: Never Smoker    Smokeless tobacco: Never Used   Substance Use Topics    Alcohol use: Yes     Comment: Social     Drug use: No       /64   Ht 5' 5" (1.651 m)   LMP 12/07/2018   BMI 21.47 kg/m²     ROS:  GENERAL: Denies weight gain or weight loss. Feeling well overall.   SKIN: Denies rash or lesions.   HEAD: Denies head injury or headache.   NODES: Denies enlarged lymph nodes.   CHEST: Denies chest pain or shortness of breath.   CARDIOVASCULAR: Denies palpitations or left sided chest pain.   ABDOMEN: No abdominal pain, constipation, diarrhea, nausea, vomiting or rectal bleeding.   URINARY: No frequency, dysuria, hematuria, or burning on urination.  REPRODUCTIVE: See HPI.   BREASTS: The patient performs breast self-examination and denies pain, lumps, or nipple discharge.   HEMATOLOGIC: No easy bruisability or excessive bleeding.  MUSCULOSKELETAL: Denies joint pain or swelling.   NEUROLOGIC: Denies syncope or weakness.   PSYCHIATRIC: Denies depression, anxiety or mood swings.    Physical Exam:    APPEARANCE: Well nourished, well developed, in no acute distress.  AFFECT: WNL, alert and oriented x 3  SKIN: No acne or hirsutism  NECK: Neck symmetric without masses or thyromegaly  NODES: No inguinal, cervical, axillary, or femoral lymph node enlargement  CHEST: Good respiratory effect  ABDOMEN: Soft.  No tenderness or masses.  No hepatosplenomegaly.  No hernias.  PELVIC: Normal external genitalia without lesions.  Normal hair distribution.  Adequate perineal body, normal urethral meatus.  Vagina moist and well rugated without " lesions or discharge.  Cervix pink, without lesions, discharge or tenderness.  No significant cystocele or rectocele.  Bimanual exam shows uterus to be normal size, regular, mobile and nontender.  Adnexa without masses or tenderness.    EXTREMITIES: No edema.    Dictation #1  MRN:[id number]  [facility]:[id number]    ASSESSMENT AND PLAN  1. Positive pregnancy test  Tissue Specimen To Pathology, Obstetrics/Gynecology    HYDROcodone-acetaminophen (NORCO) 5-325 mg per tablet   2.   Tissue Specimen To Pathology, Obstetrics/Gynecology    HYDROcodone-acetaminophen (NORCO) 5-325 mg per tablet   3. Endometritis  doxycycline (DORYX) 100 MG EC tablet     Follow up [facility] today    Patient was counseled today on A.C.S. Pap guidelines and recommendations for yearly pelvic exams, mammograms and monthly self breast exams; to see her PCP for other health maintenance.     Follow-up in about 1 week (around 2019).

## 2019-02-14 NOTE — TELEPHONE ENCOUNTER
MD telephone call to patient regarding missed lab draw for beta hcg today.     Patient reports she is still having mild cramping but bleeding has decreased. She denies severe abdominal pain, heavy bleeding, fever, chills.     She states that she thought she was supposed to got to for lab draw and appointment tomorrow. Patient reports she has an appointment with lab and clinic tomorrow am.     Expressed to patient importance of attending clinic appointment and appropriate precautions given. Patient expressed understanding.     Vero Darling MD  OBGYN, PGY-1

## 2019-02-16 ENCOUNTER — HOSPITAL ENCOUNTER (EMERGENCY)
Facility: OTHER | Age: 29
Discharge: HOME | End: 2019-02-17
Attending: EMERGENCY MEDICINE
Payer: MEDICAID

## 2019-02-16 DIAGNOSIS — O03.4 INCOMPLETE MISCARRIAGE: Primary | ICD-10-CM

## 2019-02-16 PROCEDURE — 81025 URINE PREGNANCY TEST: CPT | Performed by: EMERGENCY MEDICINE

## 2019-02-16 PROCEDURE — 99284 EMERGENCY DEPT VISIT MOD MDM: CPT | Mod: 25

## 2019-02-17 ENCOUNTER — TELEPHONE (OUTPATIENT)
Dept: OBSTETRICS AND GYNECOLOGY | Facility: CLINIC | Age: 29
End: 2019-02-17

## 2019-02-17 VITALS
DIASTOLIC BLOOD PRESSURE: 68 MMHG | HEIGHT: 65 IN | SYSTOLIC BLOOD PRESSURE: 120 MMHG | OXYGEN SATURATION: 99 % | BODY MASS INDEX: 20.83 KG/M2 | TEMPERATURE: 98 F | RESPIRATION RATE: 16 BRPM | HEART RATE: 78 BPM | WEIGHT: 125 LBS

## 2019-02-17 PROBLEM — O03.9 SAB (SPONTANEOUS ABORTION): Status: ACTIVE | Noted: 2019-02-17

## 2019-02-17 LAB
ABO + RH BLD: NORMAL
ALBUMIN SERPL BCP-MCNC: 3.6 G/DL
ALP SERPL-CCNC: 61 U/L
ALT SERPL W/O P-5'-P-CCNC: 8 U/L
ANION GAP SERPL CALC-SCNC: 8 MMOL/L
AST SERPL-CCNC: 14 U/L
B-HCG UR QL: POSITIVE
BACTERIA #/AREA URNS HPF: ABNORMAL /HPF
BASOPHILS # BLD AUTO: 0.03 K/UL
BASOPHILS NFR BLD: 0.3 %
BILIRUB SERPL-MCNC: 0.4 MG/DL
BILIRUB UR QL STRIP: NEGATIVE
BLD GP AB SCN CELLS X3 SERPL QL: NORMAL
BUN SERPL-MCNC: 6 MG/DL
C TRACH DNA SPEC QL NAA+PROBE: DETECTED
CALCIUM SERPL-MCNC: 9.1 MG/DL
CHLORIDE SERPL-SCNC: 105 MMOL/L
CLARITY UR: CLEAR
CO2 SERPL-SCNC: 26 MMOL/L
COLOR UR: YELLOW
CREAT SERPL-MCNC: 0.7 MG/DL
CTP QC/QA: YES
DIFFERENTIAL METHOD: ABNORMAL
EOSINOPHIL # BLD AUTO: 0 K/UL
EOSINOPHIL NFR BLD: 0.3 %
ERYTHROCYTE [DISTWIDTH] IN BLOOD BY AUTOMATED COUNT: 13.4 %
EST. GFR  (AFRICAN AMERICAN): >60 ML/MIN/1.73 M^2
EST. GFR  (NON AFRICAN AMERICAN): >60 ML/MIN/1.73 M^2
GLUCOSE SERPL-MCNC: 90 MG/DL
GLUCOSE UR QL STRIP: NEGATIVE
HCG INTACT+B SERPL-ACNC: 8928 MIU/ML
HCT VFR BLD AUTO: 33 %
HGB BLD-MCNC: 10.7 G/DL
HGB UR QL STRIP: ABNORMAL
KETONES UR QL STRIP: ABNORMAL
LEUKOCYTE ESTERASE UR QL STRIP: NEGATIVE
LYMPHOCYTES # BLD AUTO: 2.3 K/UL
LYMPHOCYTES NFR BLD: 26.4 %
MCH RBC QN AUTO: 28.3 PG
MCHC RBC AUTO-ENTMCNC: 32.4 G/DL
MCV RBC AUTO: 87 FL
MICROSCOPIC COMMENT: ABNORMAL
MONOCYTES # BLD AUTO: 0.9 K/UL
MONOCYTES NFR BLD: 10.2 %
N GONORRHOEA DNA SPEC QL NAA+PROBE: NOT DETECTED
NEUTROPHILS # BLD AUTO: 5.4 K/UL
NEUTROPHILS NFR BLD: 62.6 %
NITRITE UR QL STRIP: NEGATIVE
PH UR STRIP: 6 [PH] (ref 5–8)
PLATELET # BLD AUTO: 249 K/UL
PMV BLD AUTO: 11.9 FL
POTASSIUM SERPL-SCNC: 3.9 MMOL/L
PROT SERPL-MCNC: 7.3 G/DL
PROT UR QL STRIP: NEGATIVE
RBC # BLD AUTO: 3.78 M/UL
RBC #/AREA URNS HPF: 25 /HPF (ref 0–4)
SODIUM SERPL-SCNC: 139 MMOL/L
SP GR UR STRIP: 1.02 (ref 1–1.03)
URN SPEC COLLECT METH UR: ABNORMAL
UROBILINOGEN UR STRIP-ACNC: 1 EU/DL
WBC # BLD AUTO: 8.7 K/UL
WBC #/AREA URNS HPF: 5 /HPF (ref 0–5)

## 2019-02-17 PROCEDURE — 85025 COMPLETE CBC W/AUTO DIFF WBC: CPT

## 2019-02-17 PROCEDURE — 84702 CHORIONIC GONADOTROPIN TEST: CPT

## 2019-02-17 PROCEDURE — 80053 COMPREHEN METABOLIC PANEL: CPT

## 2019-02-17 PROCEDURE — 86850 RBC ANTIBODY SCREEN: CPT

## 2019-02-17 PROCEDURE — 81000 URINALYSIS NONAUTO W/SCOPE: CPT

## 2019-02-17 PROCEDURE — 25000003 PHARM REV CODE 250: Performed by: STUDENT IN AN ORGANIZED HEALTH CARE EDUCATION/TRAINING PROGRAM

## 2019-02-17 PROCEDURE — 25000003 PHARM REV CODE 250: Performed by: PHYSICIAN ASSISTANT

## 2019-02-17 PROCEDURE — 36415 COLL VENOUS BLD VENIPUNCTURE: CPT

## 2019-02-17 RX ORDER — OXYCODONE AND ACETAMINOPHEN 5; 325 MG/1; MG/1
1 TABLET ORAL
Status: COMPLETED | OUTPATIENT
Start: 2019-02-17 | End: 2019-02-17

## 2019-02-17 RX ORDER — MISOPROSTOL 200 UG/1
600 TABLET ORAL ONCE
Status: COMPLETED | OUTPATIENT
Start: 2019-02-17 | End: 2019-02-17

## 2019-02-17 RX ORDER — ONDANSETRON 4 MG/1
4 TABLET, ORALLY DISINTEGRATING ORAL
Status: COMPLETED | OUTPATIENT
Start: 2019-02-17 | End: 2019-02-17

## 2019-02-17 RX ADMIN — ONDANSETRON 4 MG: 4 TABLET, ORALLY DISINTEGRATING ORAL at 02:02

## 2019-02-17 RX ADMIN — MISOPROSTOL 600 MCG: 200 TABLET ORAL at 02:02

## 2019-02-17 RX ADMIN — OXYCODONE AND ACETAMINOPHEN 1 TABLET: 5; 325 TABLET ORAL at 02:02

## 2019-02-17 NOTE — ED TRIAGE NOTES
"Pt presents to ED with c/o vaginal bleeding and lower abdominal cramping described as "contractions". Pt reports she was recently diagnosed with miscarriage, saw her OBGYN in clinic on Tuesday and had clots and tissue removed. Pt AAO x4.   "

## 2019-02-17 NOTE — ED NOTES
"Pt complaining of pain upon IV insertion, states "they always hurt when I get them" blood sample obtained and IV catheter removed, pressure dressing applied to site. Will inform provider.   "

## 2019-02-17 NOTE — CONSULTS
[facility]  Obstetrics & Gynecology  Consult Note    Patient Name: Jose Luis Crowder  MRN: [id number]  Admission Date: 2019  Hospital Length of Stay: 0 days  Code Status: Prior  Primary Care Provider: [facility]  Principal Problem: SAB (spontaneous )    Inpatient consult to Obstetrics  Consult performed by: Charleen Gale MD  Consult ordered by: Fadia Walker PA-C        Subjective:     Chief Complaint: vaginal bleeding    History of Present Illness:   Jose Luis Crowder is a 28 y.o. [id number] who is approximately 9 weeks pregnant who presents with vaginal bleeding. She reports she was seen last week in the ED for bleeding and clots on a setting of +UPT at home. Beta HCG at this time was 95384 and TVUS showed no intrauterine pregnancy or gestational sac. She was recommended to follow up with her primary OBGYN, Dr. Rizzo, and to repeat beta HCG levels in 48 hours. She continued to have vaginal bleeding/clots wish associated cramping and passage of tissue in the toilet when she went to her follow up clinic visit which [place] [address]. Sterile speculum exam performed removed additional tissue and she was instructed to follow up this coming week for evaluation of bleeding. Today, she presents to the ED with continued bleeding soaking 2 pads/hour while at work. She has not passed any more tissue however, was alarmed at the amount of bleeding she had and wished to be evaluated. She denies any HA, CP/SOB, dizziness or weakness. No fever, chills, nausea or vomiting. Currently, patient reports bleeding has slowed and states has not required any change of pads since arriving.     No current facility-administered medications on file prior to encounter.      Current Outpatient Medications on File Prior to Encounter   Medication Sig    desogestrel-ethinyl estradiol (APRI) 0.15-0.03 mg per tablet Take 1 tablet by mouth once daily.    doxycycline (DORYX) 100 MG  EC tablet Take 1 tablet (100 mg total) by mouth 2 (two) times daily.    HYDROcodone-acetaminophen (NORCO) 5-325 mg per tablet Take 1 tablet by mouth every 6 (six) hours as needed for Pain.       Review of patient's allergies indicates:  No Known Allergies    Past Medical History:   Diagnosis Date    Abnormal Pap smear of cervix yrs ago    repeat pap    Gall stones     Ovarian cyst      Obstetric History       T1      L1     SAB1   TAB0   Ectopic0   Multiple0   Live Births1       # Outcome Date GA Lbr Neftali/2nd Weight Sex Delivery Anes PTL Lv   2 SAB 17           1 Term 17 39w3d / 00:26 3.09 kg (6 lb 13 oz) F Vag-Vacuum EPI N GAIL      Name: KAREN JARAMILLO      Apgar1:  5                Apgar5: 9        Past Surgical History:   Procedure Laterality Date    CHOLECYSTECTOMY  2017     Laparoscopic cholecystectomy -     CHOLECYSTECTOMY-LAPAROSCOPIC (ADD ON) N/A 2017    Performed by V[facility]dary Jr., [facility] at [facility] OR    TONSILLECTOMY, ADENOIDECTOMY       Family History     Problem Relation (Age of Onset)    Diabetes Maternal Grandmother, Mother        Tobacco Use    Smoking status: Never Smoker    Smokeless tobacco: Never Used   Substance and Sexual Activity    Alcohol use: Yes     Comment: Social     Drug use: No    Sexual activity: Not Currently     Partners: Male     Review of Systems   Constitutional: Negative for chills, diaphoresis and fever.   Respiratory: Negative for cough, shortness of breath and wheezing.    Cardiovascular: Negative for chest pain, palpitations and leg swelling.   Gastrointestinal: Negative for abdominal pain, constipation, diarrhea, nausea and vomiting.   Genitourinary: Positive for pelvic pain (Lower pelvic cramping) and vaginal bleeding. Negative for dysuria, hematuria, vaginal discharge, vaginal pain and vaginal odor.   Musculoskeletal: Negative for back pain.   Neurological: Negative for syncope and headaches.     Objective:     Vital  Signs (Most Recent):  Temp: 98.4 °F (36.9 °C) (02/16/19 2142)  Pulse: 71 (02/16/19 2142)  Resp: 16 (02/16/19 2142)  BP: 123/73 (02/16/19 2142)  SpO2: 100 % (02/16/19 2142) Vital Signs (24h Range):  Temp:  [98.4 °F (36.9 °C)] 98.4 °F (36.9 °C)  Pulse:  [71] 71  Resp:  [16] 16  SpO2:  [100 %] 100 %  BP: (123)/(73) 123/73     Weight: 56.7 kg (125 lb)  Body mass index is 20.8 kg/m².  No LMP recorded.    Physical Exam:   Constitutional: She is oriented to person, place, and time. She appears well-developed and well-nourished. No distress.    HENT:   Head: Normocephalic and atraumatic.   Nose: Nose normal.      Cardiovascular: Normal rate, regular rhythm and intact distal pulses.     Pulmonary/Chest: Effort normal and breath sounds normal. No respiratory distress.        Abdominal: Soft. Bowel sounds are normal. She exhibits no distension.     Genitourinary: Vagina normal and uterus normal. Pelvic exam was performed with patient supine. Cervix is normal. Right adnexum displays no mass and no tenderness. Left adnexum displays no mass and no tenderness.   Genitourinary Comments: Sterile speculum exam reveals external cervical os visibly open with small blood clot protruding through os.  Pauly swabs used to clear out vaginal vault. No evidence of active bleeding or tissue.                Neurological: She is alert and oriented to person, place, and time. No cranial nerve deficit.    Skin: Skin is warm and dry. She is not diaphoretic.    Psychiatric: She has a normal mood and affect. Her behavior is normal. Judgment and thought content normal.       Laboratory:  BMP:   Recent Labs   Lab 02/17/19  0030   GLU 90      K 3.9      CO2 26   BUN 6   CREATININE 0.7   CALCIUM 9.1     CBC:   Recent Labs   Lab 02/17/19 0030   WBC 8.70   RBC 3.78*   HGB 10.7*   HCT 33.0*      MCV 87   MCH 28.3   MCHC 32.4       Diagnostic Results:  Imaging Results          US OB Less Than 14 Wks with Transvag(xpd (Final result)   Result time 19 01:26:39    Final result by Jass Gage MD (19 01:26:39)                 Impression:      Pregnancy of unknown location.  No definite intrauterine pregnancy identified.  Small anechoic cystic structure is seen which could represent possible small early intrauterine gestational sac.  Recommend serial beta HCGs and repeat pelvic ultrasound in 2 weeks if warranted.      Electronically signed by: Jass Gage MD  Date:    2019  Time:    01:26             Narrative:    EXAMINATION:  US OB LESS THAN 14 WKS WITH TRANSVAGINAL (XPD)    CLINICAL HISTORY:  Vag Bleeding;    TECHNIQUE:  Transabdominal sonography of the pelvis was performed, followed by transvaginal sonography to better evaluate the uterus and ovaries.    COMPARISON:  2019.    FINDINGS:  The uterus measures 10.0 x 5.2 x 7.5 cm. Uterine parenchyma is heterogenous in echotexture.  No intrauterine pregnancy seen.  There is thickened heterogenous appearance of the endometrium.  Small anechoic cystic area versus potential small early intrauterine gestational sac is seen.    The right ovary measures 3.5 x 1.6 x 2.6 6 cm. The left ovary measures 2.7 x 2.2 x 3.5 cm. Arterial and venous flow are preserved bilaterally.  No adnexal abnormalities are seen.  No significant free fluid is seen.                                  Assessment/Plan:     Active Diagnoses:    Diagnosis Date Noted POA    PRINCIPAL PROBLEM:  SAB (spontaneous ) [O03.9] 2019 Unknown      Problems Resolved During this Admission:     1.Vaginal bleeding  - Patient presenting with continued vaginal bleeding with known SAB  - VSS, H/h: 10/33  - Physical exam: minimal amount of blood in the vaginal vault, external cervical os visibly open with no further passage of tissue  - US: No intrauterine pregnancy seen  - Beta with appropriate decreasing trend since initial evaluation: [id number]  - Patient counseled and explained the risks and benefits  of management SAB including expectant vs medical vs surgical treatment. After discussion, patient elects for medical management with close follow up   - Will give patient cytotec 600 mcg   - Continue norco pain medications as prescribed for symptomatic relief  - AB POS, Rhogam not indicated  - To follow up with primary OBGYN on Tuesday for follow up for vaginal bleeding    Thank you for your consult. I will sign off. Please contact us if you have any additional questions.    Charlene Gale MD  Obstetrics & Gynecology  [facility]

## 2019-02-17 NOTE — ED PROVIDER NOTES
Encounter Date: 2019       History     Chief Complaint   Patient presents with    Abdominal Pain     c/o pain and contractions, diagnosed with miscarriage on Tuesday, saw OBGYN on Thursday and has tissue removed     Vaginal Bleeding     states she is still passing clots     Patient is a 28-year-old female  approximately 9 weeks pregnant who presents to the emergency department with vaginal bleeding.  Patient states she was seen by her OBGYN on Thursday and told she was miscarrying.  Patient states she was given bleeding precautions and discharged with instructions to follow up in clinic next week.  Patient states over the last 2 days the bleeding has worsened.  She states she is passing clots.  She states 2 hr prior to arrival she went through 2 pads.  She states the bleeding has decreased since she has gotten here.  She states she still in the same pad since she was triaged.  She states she has intermittent pelvic cramping.  She states she is ready for this to be over with.  She states she does not want to prolong the process.  She denies any fevers or chills.  She states she has been taking the doxycycline that was prescribed by OBGYN.      The history is provided by the patient.     Review of patient's allergies indicates:  No Known Allergies  Past Medical History:   Diagnosis Date    Abnormal Pap smear of cervix yrs ago    repeat pap    Gall stones     Ovarian cyst      Past Surgical History:   Procedure Laterality Date    CHOLECYSTECTOMY  2017     Laparoscopic cholecystectomy -     CHOLECYSTECTOMY-LAPAROSCOPIC (ADD ON) N/A 2017    Performed by V[facility]dary Jr., [facility] at [facility] OR    TONSILLECTOMY, ADENOIDECTOMY       Family History   Problem Relation Age of Onset    Diabetes Maternal Grandmother     Diabetes Mother     Breast cancer Neg Hx     Colon cancer Neg Hx     Ovarian cancer Neg Hx      Social History     Tobacco Use    Smoking status: Never Smoker    Smokeless  tobacco: Never Used   Substance Use Topics    Alcohol use: Yes     Comment: Social     Drug use: No     Review of Systems   Constitutional: Negative for activity change, appetite change, chills, fatigue and fever.   HENT: Negative for congestion, ear discharge, ear pain, postnasal drip, rhinorrhea, sore throat and trouble swallowing.    Respiratory: Negative for cough and shortness of breath.    Cardiovascular: Negative for chest pain.   Gastrointestinal: Negative for abdominal pain, blood in stool, constipation, diarrhea, nausea and vomiting.   Genitourinary: Positive for pelvic pain and vaginal bleeding. Negative for dysuria, flank pain and hematuria.   Musculoskeletal: Negative for back pain, neck pain and neck stiffness.   Skin: Negative for rash and wound.   Neurological: Negative for dizziness, weakness, light-headedness and headaches.       Physical Exam     Initial Vitals [02/16/19 2142]   BP Pulse Resp Temp SpO2   123/73 71 16 98.4 °F (36.9 °C) 100 %      MAP       --         Physical Exam    Nursing note and vitals reviewed.  Constitutional: She appears well-developed and well-nourished. She is not diaphoretic.  Non-toxic appearance. No distress.   HENT:   Head: Normocephalic.   Right Ear: Hearing and external ear normal.   Left Ear: Hearing and external ear normal.   Nose: Nose normal.   Mouth/Throat: Uvula is midline, oropharynx is clear and moist and mucous membranes are normal. No oropharyngeal exudate.   Eyes: Conjunctivae are normal. Pupils are equal, round, and reactive to light.   Neck: Normal range of motion.   Cardiovascular: Normal rate and regular rhythm.   Pulmonary/Chest: Breath sounds normal.   Abdominal: Soft. Bowel sounds are normal. There is no tenderness.   Lymphadenopathy:     She has no cervical adenopathy.   Neurological: She is alert and oriented to person, place, and time.   Skin: Skin is warm and dry. Capillary refill takes less than 2 seconds.   Psychiatric: She has a normal  mood and affect.         ED Course   Procedures  Labs Reviewed   CBC W/ AUTO DIFFERENTIAL - Abnormal; Notable for the following components:       Result Value    RBC 3.78 (*)     Hemoglobin 10.7 (*)     Hematocrit 33.0 (*)     All other components within normal limits   COMPREHENSIVE METABOLIC PANEL - Abnormal; Notable for the following components:    ALT 8 (*)     All other components within normal limits   URINALYSIS, REFLEX TO URINE CULTURE - Abnormal; Notable for the following components:    Ketones, UA 1+ (*)     Occult Blood UA 2+ (*)     All other components within normal limits    Narrative:     Preferred Collection Type->Urine, Clean Catch   URINALYSIS MICROSCOPIC - Abnormal; Notable for the following components:    RBC, UA 25 (*)     All other components within normal limits    Narrative:     Preferred Collection Type->Urine, Clean Catch   POCT URINE PREGNANCY - Abnormal; Notable for the following components:    POC Preg Test, Ur Positive (*)     All other components within normal limits   HCG, QUANTITATIVE, PREGNANCY   TYPE & SCREEN          Imaging Results          US OB Less Than 14 Wks with Transvag(xpd (Final result)  Result time 02/17/19 01:26:39    Final result by Jass Gage MD (02/17/19 01:26:39)                 Impression:      Pregnancy of unknown location.  No definite intrauterine pregnancy identified.  Small anechoic cystic structure is seen which could represent possible small early intrauterine gestational sac.  Recommend serial beta HCGs and repeat pelvic ultrasound in 2 weeks if warranted.      Electronically signed by: Jass Gage MD  Date:    02/17/2019  Time:    01:26             Narrative:    EXAMINATION:  US OB LESS THAN 14 WKS WITH TRANSVAGINAL (XPD)    CLINICAL HISTORY:  Vag Bleeding;    TECHNIQUE:  Transabdominal sonography of the pelvis was performed, followed by transvaginal sonography to better evaluate the uterus and  ovaries.    COMPARISON:  2019.    FINDINGS:  The uterus measures 10.0 x 5.2 x 7.5 cm. Uterine parenchyma is heterogenous in echotexture.  No intrauterine pregnancy seen.  There is thickened heterogenous appearance of the endometrium.  Small anechoic cystic area versus potential small early intrauterine gestational sac is seen.    The right ovary measures 3.5 x 1.6 x 2.6 6 cm. The left ovary measures 2.7 x 2.2 x 3.5 cm. Arterial and venous flow are preserved bilaterally.  No adnexal abnormalities are seen.  No significant free fluid is seen.                                 Medical Decision Making:   Initial Assessment:   Urgent evaluation of a 28-year-old female  approximately 9 weeks pregnant who presents to the emergency department with vaginal bleeding.  Patient is afebrile, nontoxic appearing, and hemodynamically stable.  Benign abdominal exam.  Patient's chart is reviewed.  Patient was told she was miscarrying at last visit this week with OBGYN.  Her beta HCG was 24,000 at her last visit.  Today we will evaluate her H&H.  Ultrasound will be obtained to determine if patient has retained products.  Patient will be given pain control and re-evaluated.  ED Management:  1:46 AM  H&H is 10.7 and 33.  Beta HCG has significantly decreased to 8900.  Ultrasound reveals small anechoic cystic structure seen within the uterus.  Case was discussed with OBGYN team.  She advised me to speak with patient about cytotec options.  I discussed with patient who states she is interested.  I have spoken to OBGYN team again and they will come down to evaluate patient.                      Clinical Impression:   The encounter diagnosis was Incomplete miscarriage.                             Fadia Walker PA-C  [phone]

## 2019-02-18 NOTE — TELEPHONE ENCOUNTER
Chlamydia present on cervical cultures.  Doxycyline  Rx was alreay sent to your pharmacy.  Partner needs to be treated and follow up needed for Test of cure in three weeks  Your [facility] is falling consistant with a miscarriage

## 2019-02-19 ENCOUNTER — HOSPITAL ENCOUNTER (OUTPATIENT)
Facility: OTHER | Age: 29
Discharge: HOME | End: 2019-02-19
Attending: OBSTETRICS & GYNECOLOGY | Admitting: OBSTETRICS & GYNECOLOGY
Payer: MEDICAID

## 2019-02-19 ENCOUNTER — ANESTHESIA (OUTPATIENT)
Dept: SURGERY | Facility: OTHER | Age: 29
End: 2019-02-19
Payer: MEDICAID

## 2019-02-19 ENCOUNTER — ANESTHESIA EVENT (OUTPATIENT)
Dept: SURGERY | Facility: OTHER | Age: 29
End: 2019-02-19
Payer: MEDICAID

## 2019-02-19 ENCOUNTER — OFFICE VISIT (OUTPATIENT)
Dept: OBSTETRICS AND GYNECOLOGY | Facility: CLINIC | Age: 29
End: 2019-02-19
Payer: MEDICAID

## 2019-02-19 VITALS — BODY MASS INDEX: 21.3 KG/M2 | WEIGHT: 128 LBS | DIASTOLIC BLOOD PRESSURE: 70 MMHG | SYSTOLIC BLOOD PRESSURE: 120 MMHG

## 2019-02-19 VITALS
WEIGHT: 127 LBS | TEMPERATURE: 98 F | SYSTOLIC BLOOD PRESSURE: 128 MMHG | OXYGEN SATURATION: 99 % | HEIGHT: 65 IN | DIASTOLIC BLOOD PRESSURE: 78 MMHG | HEART RATE: 75 BPM | RESPIRATION RATE: 18 BRPM | BODY MASS INDEX: 21.16 KG/M2

## 2019-02-19 DIAGNOSIS — N71.9 ENDOMETRITIS: ICD-10-CM

## 2019-02-19 DIAGNOSIS — O03.9 SPONTANEOUS ABORTION: ICD-10-CM

## 2019-02-19 DIAGNOSIS — O03.9 SAB (SPONTANEOUS ABORTION): Primary | ICD-10-CM

## 2019-02-19 DIAGNOSIS — Z98.890 STATUS POST DILATATION AND CURETTAGE: ICD-10-CM

## 2019-02-19 DIAGNOSIS — A74.9 CHLAMYDIA INFECTION: Primary | ICD-10-CM

## 2019-02-19 PROCEDURE — 63600175 PHARM REV CODE 636 W HCPCS: Performed by: NURSE ANESTHETIST, CERTIFIED REGISTERED

## 2019-02-19 PROCEDURE — 37000009 HC ANESTHESIA EA ADD 15 MINS: Performed by: OBSTETRICS & GYNECOLOGY

## 2019-02-19 PROCEDURE — 88305 TISSUE SPECIMEN TO PATHOLOGY, OBSTETRICS/GYNECOLOGY: ICD-10-PCS | Mod: 26,,, | Performed by: PATHOLOGY

## 2019-02-19 PROCEDURE — 88305 TISSUE EXAM BY PATHOLOGIST: CPT | Mod: 26,,, | Performed by: PATHOLOGY

## 2019-02-19 PROCEDURE — 99214 PR OFFICE/OUTPT VISIT, EST, LEVL IV, 30-39 MIN: ICD-10-PCS | Mod: 57,S$PBB,TH, | Performed by: OBSTETRICS & GYNECOLOGY

## 2019-02-19 PROCEDURE — 99214 OFFICE O/P EST MOD 30 MIN: CPT | Mod: 57,S$PBB,TH, | Performed by: OBSTETRICS & GYNECOLOGY

## 2019-02-19 PROCEDURE — 63600175 PHARM REV CODE 636 W HCPCS: Performed by: OBSTETRICS & GYNECOLOGY

## 2019-02-19 PROCEDURE — 99213 OFFICE O/P EST LOW 20 MIN: CPT | Mod: PBBFAC,TH,PN | Performed by: OBSTETRICS & GYNECOLOGY

## 2019-02-19 PROCEDURE — 88305 TISSUE EXAM BY PATHOLOGIST: CPT | Performed by: PATHOLOGY

## 2019-02-19 PROCEDURE — 90471 IMMUNIZATION ADMIN: CPT | Performed by: OBSTETRICS & GYNECOLOGY

## 2019-02-19 PROCEDURE — 37000008 HC ANESTHESIA 1ST 15 MINUTES: Performed by: OBSTETRICS & GYNECOLOGY

## 2019-02-19 PROCEDURE — 25000003 PHARM REV CODE 250: Performed by: OBSTETRICS & GYNECOLOGY

## 2019-02-19 PROCEDURE — 25000003 PHARM REV CODE 250: Performed by: ANESTHESIOLOGY

## 2019-02-19 PROCEDURE — 25000003 PHARM REV CODE 250: Performed by: NURSE ANESTHETIST, CERTIFIED REGISTERED

## 2019-02-19 PROCEDURE — 90686 IIV4 VACC NO PRSV 0.5 ML IM: CPT | Performed by: OBSTETRICS & GYNECOLOGY

## 2019-02-19 PROCEDURE — 36000704 HC OR TIME LEV I 1ST 15 MIN: Performed by: OBSTETRICS & GYNECOLOGY

## 2019-02-19 PROCEDURE — 71000033 HC RECOVERY, INTIAL HOUR: Performed by: OBSTETRICS & GYNECOLOGY

## 2019-02-19 PROCEDURE — 88305 TISSUE SPECIMEN TO PATHOLOGY - SURGERY: ICD-10-PCS | Mod: 26,,, | Performed by: PATHOLOGY

## 2019-02-19 PROCEDURE — 59820 CARE OF MISCARRIAGE: CPT | Mod: ,,, | Performed by: OBSTETRICS & GYNECOLOGY

## 2019-02-19 PROCEDURE — 71000016 HC POSTOP RECOV ADDL HR: Performed by: OBSTETRICS & GYNECOLOGY

## 2019-02-19 PROCEDURE — 59820 PR SURG RX MISSED ABORTN,1ST TRI: ICD-10-PCS | Mod: ,,, | Performed by: OBSTETRICS & GYNECOLOGY

## 2019-02-19 PROCEDURE — 36000705 HC OR TIME LEV I EA ADD 15 MIN: Performed by: OBSTETRICS & GYNECOLOGY

## 2019-02-19 PROCEDURE — 99999 PR PBB SHADOW E&M-EST. PATIENT-LVL III: CPT | Mod: PBBFAC,,, | Performed by: OBSTETRICS & GYNECOLOGY

## 2019-02-19 PROCEDURE — 99999 PR PBB SHADOW E&M-EST. PATIENT-LVL III: ICD-10-PCS | Mod: PBBFAC,,, | Performed by: OBSTETRICS & GYNECOLOGY

## 2019-02-19 PROCEDURE — 71000015 HC POSTOP RECOV 1ST HR: Performed by: OBSTETRICS & GYNECOLOGY

## 2019-02-19 RX ORDER — ONDANSETRON 2 MG/ML
4 INJECTION INTRAMUSCULAR; INTRAVENOUS DAILY PRN
Status: DISCONTINUED | OUTPATIENT
Start: 2019-02-19 | End: 2019-02-19 | Stop reason: HOSPADM

## 2019-02-19 RX ORDER — SILVER NITRATE 38.21; 12.74 MG/1; MG/1
STICK TOPICAL
Status: DISCONTINUED | OUTPATIENT
Start: 2019-02-19 | End: 2019-02-19 | Stop reason: HOSPADM

## 2019-02-19 RX ORDER — ONDANSETRON 2 MG/ML
INJECTION INTRAMUSCULAR; INTRAVENOUS
Status: DISCONTINUED | OUTPATIENT
Start: 2019-02-19 | End: 2019-02-19

## 2019-02-19 RX ORDER — SODIUM CHLORIDE, SODIUM LACTATE, POTASSIUM CHLORIDE, CALCIUM CHLORIDE 600; 310; 30; 20 MG/100ML; MG/100ML; MG/100ML; MG/100ML
INJECTION, SOLUTION INTRAVENOUS CONTINUOUS PRN
Status: DISCONTINUED | OUTPATIENT
Start: 2019-02-19 | End: 2019-02-19

## 2019-02-19 RX ORDER — FENTANYL CITRATE 50 UG/ML
25 INJECTION, SOLUTION INTRAMUSCULAR; INTRAVENOUS EVERY 5 MIN PRN
Status: DISCONTINUED | OUTPATIENT
Start: 2019-02-19 | End: 2019-02-19 | Stop reason: HOSPADM

## 2019-02-19 RX ORDER — DEXAMETHASONE SODIUM PHOSPHATE 4 MG/ML
INJECTION, SOLUTION INTRA-ARTICULAR; INTRALESIONAL; INTRAMUSCULAR; INTRAVENOUS; SOFT TISSUE
Status: DISCONTINUED | OUTPATIENT
Start: 2019-02-19 | End: 2019-02-19

## 2019-02-19 RX ORDER — MIDAZOLAM HYDROCHLORIDE 1 MG/ML
INJECTION INTRAMUSCULAR; INTRAVENOUS
Status: DISCONTINUED | OUTPATIENT
Start: 2019-02-19 | End: 2019-02-19

## 2019-02-19 RX ORDER — EPHEDRINE SULFATE 50 MG/ML
INJECTION, SOLUTION INTRAVENOUS
Status: DISCONTINUED | OUTPATIENT
Start: 2019-02-19 | End: 2019-02-19

## 2019-02-19 RX ORDER — SODIUM CHLORIDE 0.9 % (FLUSH) 0.9 %
3 SYRINGE (ML) INJECTION
Status: DISCONTINUED | OUTPATIENT
Start: 2019-02-19 | End: 2019-02-19 | Stop reason: HOSPADM

## 2019-02-19 RX ORDER — GLYCOPYRROLATE 0.2 MG/ML
INJECTION INTRAMUSCULAR; INTRAVENOUS
Status: DISCONTINUED | OUTPATIENT
Start: 2019-02-19 | End: 2019-02-19

## 2019-02-19 RX ORDER — KETOROLAC TROMETHAMINE 30 MG/ML
INJECTION, SOLUTION INTRAMUSCULAR; INTRAVENOUS
Status: DISCONTINUED | OUTPATIENT
Start: 2019-02-19 | End: 2019-02-19

## 2019-02-19 RX ORDER — PROPOFOL 10 MG/ML
VIAL (ML) INTRAVENOUS
Status: DISCONTINUED | OUTPATIENT
Start: 2019-02-19 | End: 2019-02-19

## 2019-02-19 RX ORDER — HYDROMORPHONE HYDROCHLORIDE 2 MG/ML
0.4 INJECTION, SOLUTION INTRAMUSCULAR; INTRAVENOUS; SUBCUTANEOUS EVERY 5 MIN PRN
Status: DISCONTINUED | OUTPATIENT
Start: 2019-02-19 | End: 2019-02-19 | Stop reason: HOSPADM

## 2019-02-19 RX ORDER — OXYCODONE HYDROCHLORIDE 5 MG/1
5 TABLET ORAL
Status: DISCONTINUED | OUTPATIENT
Start: 2019-02-19 | End: 2019-02-19 | Stop reason: HOSPADM

## 2019-02-19 RX ORDER — FENTANYL CITRATE 50 UG/ML
INJECTION, SOLUTION INTRAMUSCULAR; INTRAVENOUS
Status: DISCONTINUED | OUTPATIENT
Start: 2019-02-19 | End: 2019-02-19

## 2019-02-19 RX ORDER — MEPERIDINE HYDROCHLORIDE 25 MG/ML
12.5 INJECTION INTRAMUSCULAR; INTRAVENOUS; SUBCUTANEOUS ONCE AS NEEDED
Status: DISCONTINUED | OUTPATIENT
Start: 2019-02-19 | End: 2019-02-19 | Stop reason: HOSPADM

## 2019-02-19 RX ORDER — LIDOCAINE HCL/PF 100 MG/5ML
SYRINGE (ML) INTRAVENOUS
Status: DISCONTINUED | OUTPATIENT
Start: 2019-02-19 | End: 2019-02-19

## 2019-02-19 RX ORDER — IBUPROFEN 600 MG/1
600 TABLET ORAL EVERY 6 HOURS PRN
Qty: 30 TABLET | Refills: 0 | Status: SHIPPED | OUTPATIENT
Start: 2019-02-19 | End: 2019-09-17

## 2019-02-19 RX ADMIN — GLYCOPYRROLATE 0.2 MG: 0.2 INJECTION, SOLUTION INTRAMUSCULAR; INTRAVENOUS at 04:02

## 2019-02-19 RX ADMIN — DOXYCYCLINE 200 MG: 100 INJECTION, POWDER, LYOPHILIZED, FOR SOLUTION INTRAVENOUS at 04:02

## 2019-02-19 RX ADMIN — FENTANYL CITRATE 100 MCG: 50 INJECTION, SOLUTION INTRAMUSCULAR; INTRAVENOUS at 03:02

## 2019-02-19 RX ADMIN — ONDANSETRON 4 MG: 2 INJECTION INTRAMUSCULAR; INTRAVENOUS at 04:02

## 2019-02-19 RX ADMIN — INFLUENZA A VIRUS A/MICHIGAN/45/2015 X-275 (H1N1) ANTIGEN (FORMALDEHYDE INACTIVATED), INFLUENZA A VIRUS A/SINGAPORE/INFIMH-16-0019/2016 IVR-186 (H3N2) ANTIGEN (FORMALDEHYDE INACTIVATED), INFLUENZA B VIRUS B/PHUKET/3073/2013 ANTIGEN (FORMALDEHYDE INACTIVATED), AND INFLUENZA B VIRUS B/MARYLAND/15/2016 BX-69A ANTIGEN (FORMALDEHYDE INACTIVATED) 0.5 ML: 15; 15; 15; 15 INJECTION, SUSPENSION INTRAMUSCULAR at 05:02

## 2019-02-19 RX ADMIN — LIDOCAINE HYDROCHLORIDE 100 MG: 20 INJECTION, SOLUTION INTRAVENOUS at 03:02

## 2019-02-19 RX ADMIN — KETOROLAC TROMETHAMINE 30 MG: 30 INJECTION, SOLUTION INTRAMUSCULAR; INTRAVENOUS at 04:02

## 2019-02-19 RX ADMIN — EPHEDRINE SULFATE 15 MG: 50 INJECTION INTRAMUSCULAR; INTRAVENOUS; SUBCUTANEOUS at 04:02

## 2019-02-19 RX ADMIN — MIDAZOLAM HYDROCHLORIDE 2 MG: 1 INJECTION, SOLUTION INTRAMUSCULAR; INTRAVENOUS at 03:02

## 2019-02-19 RX ADMIN — SODIUM CHLORIDE, SODIUM LACTATE, POTASSIUM CHLORIDE, AND CALCIUM CHLORIDE: 600; 310; 30; 20 INJECTION, SOLUTION INTRAVENOUS at 03:02

## 2019-02-19 RX ADMIN — PROPOFOL 150 MG: 10 INJECTION, EMULSION INTRAVENOUS at 03:02

## 2019-02-19 RX ADMIN — DEXAMETHASONE SODIUM PHOSPHATE 8 MG: 4 INJECTION, SOLUTION INTRAMUSCULAR; INTRAVENOUS at 03:02

## 2019-02-19 NOTE — ANESTHESIA PREPROCEDURE EVALUATION
02/19/2019  Jsoe Luis Crowder is a 28 y.o., female.    Anesthesia Evaluation    I have reviewed the Patient Summary Reports.    I have reviewed the Nursing Notes.   I have reviewed the Medications.     Review of Systems  Anesthesia Hx:  Denies Family Hx of Anesthesia complications.   Denies Personal Hx of Anesthesia complications.   Hematology/Oncology:     Oncology Normal    -- Anemia: Hematology Comments: H/H 10.7/33.0    Cardiovascular:  Cardiovascular Normal Exercise tolerance: good     Pulmonary:  Pulmonary Normal    Renal/:  Renal/ Normal     Hepatic/GI:  Hepatic/GI Normal    OB/GYN/PEDS:  Missed AB   Neurological:  Neurology Normal    Endocrine:  Endocrine Normal        Physical Exam  General:  Well nourished    Airway/Jaw/Neck:  Airway Findings: Mouth Opening: Normal Tongue: Normal  General Airway Assessment: Adult  Mallampati: II  TM Distance: Normal, at least 6 cm       Chest/Lungs:  Chest/Lungs Findings: Clear to auscultation         Mental Status:  Mental Status Findings:  Alert and Oriented, Cooperative         Anesthesia Plan  Type of Anesthesia, risks & benefits discussed:  Anesthesia Type:  general  Patient's Preference:   Intra-op Monitoring Plan: standard ASA monitors  Intra-op Monitoring Plan Comments:   Post Op Pain Control Plan: multimodal analgesia  Post Op Pain Control Plan Comments:   Induction:   IV  Beta Blocker:         Informed Consent: Patient understands risks and agrees with Anesthesia plan.  Questions answered. Anesthesia consent signed with patient.  ASA Score: 2  emergent   Day of Surgery Review of History & Physical:    H&P update referred to the surgeon.         Ready For Surgery From Anesthesia Perspective.

## 2019-02-19 NOTE — TRANSFER OF CARE
"Anesthesia Transfer of Care Note    Patient: Jose Luis Crowder    Procedure(s) Performed: Procedure(s) (LRB):  DILATION AND CURETTAGE, UTERUS, USING SUCTION (N/A)    Patient location: PACU    Anesthesia Type: general    Transport from OR: Transported from OR on 2-3 L/min O2 by NC with adequate spontaneous ventilation    Post pain: adequate analgesia    Post assessment: no apparent anesthetic complications    Post vital signs: stable    Level of consciousness: awake, alert and oriented    Nausea/Vomiting: no nausea/vomiting    Complications: none    Transfer of care protocol was followed      Last vitals:   Visit Vitals  /82 (BP Location: Right arm, Patient Position: Sitting)   Pulse 71   Temp 36.8 °C (98.2 °F) (Oral)   Resp 16   Ht 5' 5" (1.651 m)   Wt 57.6 kg (127 lb)   SpO2 100%   Breastfeeding? No   BMI 21.13 kg/m²     "

## 2019-02-19 NOTE — OP NOTE
OPERATIVE NOTE    DATE OF PROCEDURE: 2019    SURGEON: Pelon Mead III, MD     ASSISTANT:  - Qi Billy MD - PGY4  - Fany Alvarez MD - PGY1    PREOPERATIVE DIAGNOSIS: Missed     POSTOPERATIVE DIAGNOSIS: Same.     PROCEDURE: Suction Dilation and curettage.     ANESTHESIA: General    FINDINGS: Uterus sounded to 6 cm. Cervix did not pull, was already dilated and able to accommodate an 11 mm suction curette. Suction D&C performed yielding small amount of tissue. Bleeding minimal at completion of procedure. Silver nitrate applied to tenaculum sites.    IV FLUIDS: 500 ml    UOP: 5 ml    ESTIMATED BLOOD LOSS: 50 mL.      SPECIMEN: Products of conception    INDICATIONS: Ultrasound performed prior to the procedure confirmed missed .  Patient was counseled extensively on medical and surgical options and opted for surgical management via suction dilation and curettage.     PROCEDURE IN DETAIL: After proper consents were explained and obtained, the   patient was taken to the Operating Room where general anesthesia was obtained without difficulty. She was then placed in dorsal lithotomy position using Cristobal stirrups. The patient was prepped and draped in normal sterile fashion. Bladder was drained via in-and-out catheterization prior to procedure. A surgical timeout was performed with patient's name, date of birth, allergies, and procedure to be performed verbalized. All OR staff were in agreement. Preoperative antibiotics doxycycline 200 mg were administered as indicated.     Attention was then turned to the vagina where two right angles were placed into the vagina to visualize the cervix. The anterior lip of the cervix was grasped with the single tooth tenaculum. The uterus was gently sounded to 6 cm. Cervix did not pull. The cervix was dilated to #11 Hegar and the 11mm curved suction curette was introduced into the endometrial cavity and suction was applied to remove the products of  conception. Serial sweeps were performed and products of conception were removed. Using a Barby curette, serial sweeps were performed until a gritty consistency of the uterine lining was felt in all 4 quadrants. A final pass was performed with the suction curette to ensure that all remaining products of conception were removed. Bleeding from the uterus was minimal to none at completion of the procedure.    The tenaculum was removed and silver nitrate was used to achieve hemostasis. All instruments were removed from the vagina. All counts were correct x2.    The patient tolerated the procedure well and was awakened without difficulty.  The patient was taken to Recovery area in stable condition.    Fany Alvarez MD  OBGYN, PGY-1      I was present for the entire surgical suction D&C.

## 2019-02-19 NOTE — INTERVAL H&P NOTE
The patient has been examined and the H&P has been reviewed:    I concur with the findings and no changes have occurred since H&P was written. Patient reports lower abdominal cramping today and mild bleeding. Thinks she may have passed tissue. Denies nausea/vomiting and fever.    Anesthesia/Surgery risks, benefits and alternative options discussed and understood by patient.    Fany Alvarez MD  OBGYN, PGY-1          Active Hospital Problems    Diagnosis  POA    SAB (spontaneous ) [O03.9]  Yes      Resolved Hospital Problems   No resolved problems to display.         Admitted with retained products of conception and endometritis after a spontaneous miscarriage.  I have reviewed the resident's note, and agree with the diagnosis and management plan.

## 2019-02-19 NOTE — PROGRESS NOTES
CC: vaginal bleeding  Missed   Endometritis    Jose Luis Crowder is a 28 y.o. female  presents for a follow ER visit after being given cytotec.  She passed the GS in clinic (path pending)  and we have been following a falling BHCG.   She was given doxy for + CT.  Just got the medication.    She is having pain cramping and heavier bleeding.     US 2019.    FINDINGS:  The uterus measures 10.0 x 5.2 x 7.5 cm. Uterine parenchyma is heterogenous in echotexture.  No intrauterine pregnancy seen.  There is thickened heterogenous appearance of the endometrium.  Small anechoic cystic area versus potential small early intrauterine gestational sac is seen.    The right ovary measures 3.5 x 1.6 x 2.6 6 cm. The left ovary measures 2.7 x 2.2 x 3.5 cm. Arterial and venous flow are preserved bilaterally.  No adnexal abnormalities are seen.  No significant free fluid is seen.      Impression       Pregnancy of unknown location.  No definite intrauterine pregnancy identified.  Small anechoic cystic structure is seen which could represent possible small early intrauterine gestational sac.       Past Medical History:   Diagnosis Date    Abnormal Pap smear of cervix yrs ago    repeat pap    Gall stones     Ovarian cyst        Past Surgical History:   Procedure Laterality Date    CHOLECYSTECTOMY  2017     Laparoscopic cholecystectomy -     CHOLECYSTECTOMY-LAPAROSCOPIC (ADD ON) N/A 2017    Performed by Vega Chowdary Jr[facility] at [facility] OR    TONSILLECTOMY, ADENOIDECTOMY         OB History    Para Term  AB Living   2 1 1   1 1   SAB TAB Ectopic Multiple Live Births   1     0 1      # Outcome Date GA Lbr Neftali/2nd Weight Sex Delivery Anes PTL Lv   2 SAB 17           1 Term 17 39w3d / 00:26 3.09 kg (6 lb 13 oz) F Vag-Vacuum EPI N GAIL          Family History   Problem Relation Age of Onset    Diabetes Maternal Grandmother     Diabetes Mother     Breast cancer Neg Hx     Colon  cancer Neg Hx     Ovarian cancer Neg Hx        Social History     Tobacco Use    Smoking status: Never Smoker    Smokeless tobacco: Never Used   Substance Use Topics    Alcohol use: Yes     Comment: Social     Drug use: No       /70   Wt 58 kg (127 lb 15.6 oz)   BMI 21.30 kg/m²     ROS:  GENERAL: Denies weight gain or weight loss. Feeling well overall.   SKIN: Denies rash or lesions.   HEAD: Denies head injury or headache.   NODES: Denies enlarged lymph nodes.   CHEST: Denies chest pain or shortness of breath.   CARDIOVASCULAR: Denies palpitations or left sided chest pain.   ABDOMEN: No abdominal pain, constipation, diarrhea, nausea, vomiting or rectal bleeding.   URINARY: No frequency, dysuria, hematuria, or burning on urination.  REPRODUCTIVE: See HPI.   BREASTS: The patient performs breast self-examination and denies pain, lumps, or nipple discharge.   HEMATOLOGIC: No easy bruisability or excessive bleeding.  MUSCULOSKELETAL: Denies joint pain or swelling.   NEUROLOGIC: Denies syncope or weakness.   PSYCHIATRIC: Denies depression, anxiety or mood swings.    Physical Exam:    APPEARANCE: Well nourished, well developed, in no acute distress.  AFFECT: WNL, alert and oriented x 3  SKIN: No acne or hirsutism  NECK: Neck symmetric without masses or thyromegaly  NODES: No inguinal, cervical, axillary, or femoral lymph node enlargement  CHEST: Good respiratory effect  ABDOMEN: Soft.  No tenderness or masses.  No hepatosplenomegaly.  No hernias.  PELVIC: Normal external genitalia without lesions.  Normal hair distribution.  Adequate perineal body, normal urethral meatus.  Vagina moist and well rugated without lesions or discharge.  Cervix pink, BLOOD and clot in the os,  Pulled tissue from the os,  without lesions, discharge or ++ tenderness.  No significant cystocele or rectocele.  Bimanual exam shows uterus to be normal size, regular, mobile and nontender.  Adnexa with fulness and  tenderness.     EXTREMITIES: No edema.      ASSESSMENT AND PLAN  1. Chlamydia infection     2. Spontaneous   CANCELED: hCG, quantitative   3. Endometritis       Will plan to go to the hospital to the OR for Suction D & C  Will get IV abx pre procedure and will complete abx post procedure    Patient was counseled today on possible surgical intervention   Patient is in pain, bleeding heavily, passing tissue for possible retained infected products with + CT.  She has not had food for over 8 hours . Only had water.   Will prepare for the OR- discussed with OB/ GYN team

## 2019-02-19 NOTE — H&P (VIEW-ONLY)
CC: vaginal bleeding  Missed   Endometritis    Jose Luis Crowder is a 28 y.o. female  presents for a follow ER visit after being given cytotec.  She passed the GS in clinic (path pending)  and we have been following a falling BHCG.   She was given doxy for + CT.  Just got the medication.    She is having pain cramping and heavier bleeding.     US 2019.    FINDINGS:  The uterus measures 10.0 x 5.2 x 7.5 cm. Uterine parenchyma is heterogenous in echotexture.  No intrauterine pregnancy seen.  There is thickened heterogenous appearance of the endometrium.  Small anechoic cystic area versus potential small early intrauterine gestational sac is seen.    The right ovary measures 3.5 x 1.6 x 2.6 6 cm. The left ovary measures 2.7 x 2.2 x 3.5 cm. Arterial and venous flow are preserved bilaterally.  No adnexal abnormalities are seen.  No significant free fluid is seen.      Impression       Pregnancy of unknown location.  No definite intrauterine pregnancy identified.  Small anechoic cystic structure is seen which could represent possible small early intrauterine gestational sac.       Past Medical History:   Diagnosis Date    Abnormal Pap smear of cervix yrs ago    repeat pap    Gall stones     Ovarian cyst        Past Surgical History:   Procedure Laterality Date    CHOLECYSTECTOMY  2017     Laparoscopic cholecystectomy -     CHOLECYSTECTOMY-LAPAROSCOPIC (ADD ON) N/A 2017    Performed by Vega Chowdary Jr[facility] at [facility] [place]    TONSILLECTOMY, ADENOIDECTOMY         OB History    Para Term  AB Living   2 1 1   1 1   SAB TAB Ectopic Multiple Live Births   1     0 1      # Outcome Date GA Lbr Neftali/2nd Weight Sex Delivery Anes PTL Lv   2 SAB 17           1 Term 17 39w3d / 00:26 3.09 kg (6 lb 13 oz) F Vag-Vacuum EPI N GAIL          Family History   Problem Relation Age of Onset    Diabetes Maternal Grandmother     Diabetes Mother     Breast cancer Neg Hx     Colon  cancer Neg Hx     Ovarian cancer Neg Hx        Social History     Tobacco Use    Smoking status: Never Smoker    Smokeless tobacco: Never Used   Substance Use Topics    Alcohol use: Yes     Comment: Social     Drug use: No       /70   Wt 58 kg (127 lb 15.6 oz)   BMI 21.30 kg/m²     ROS:  GENERAL: Denies weight gain or weight loss. Feeling well overall.   SKIN: Denies rash or lesions.   HEAD: Denies head injury or headache.   NODES: Denies enlarged lymph nodes.   CHEST: Denies chest pain or shortness of breath.   CARDIOVASCULAR: Denies palpitations or left sided chest pain.   ABDOMEN: No abdominal pain, constipation, diarrhea, nausea, vomiting or rectal bleeding.   URINARY: No frequency, dysuria, hematuria, or burning on urination.  REPRODUCTIVE: See HPI.   BREASTS: The patient performs breast self-examination and denies pain, lumps, or nipple discharge.   HEMATOLOGIC: No easy bruisability or excessive bleeding.  MUSCULOSKELETAL: Denies joint pain or swelling.   NEUROLOGIC: Denies syncope or weakness.   PSYCHIATRIC: Denies depression, anxiety or mood swings.    Physical Exam:    APPEARANCE: Well nourished, well developed, in no acute distress.  AFFECT: WNL, alert and oriented x 3  SKIN: No acne or hirsutism  NECK: Neck symmetric without masses or thyromegaly  NODES: No inguinal, cervical, axillary, or femoral lymph node enlargement  CHEST: Good respiratory effect  ABDOMEN: Soft.  No tenderness or masses.  No hepatosplenomegaly.  No hernias.  PELVIC: Normal external genitalia without lesions.  Normal hair distribution.  Adequate perineal body, normal urethral meatus.  Vagina moist and well rugated without lesions or discharge.  Cervix pink, BLOOD and clot in the os,  Pulled tissue from the os,  without lesions, discharge or ++ tenderness.  No significant cystocele or rectocele.  Bimanual exam shows uterus to be normal size, regular, mobile and nontender.  Adnexa with fulness and  tenderness.     EXTREMITIES: No edema.      ASSESSMENT AND PLAN  1. Chlamydia infection     2. Spontaneous   CANCELED: hCG, quantitative   3. Endometritis       Will plan to go to the hospital to the OR for Suction D & C  Will get IV abx pre procedure and will complete abx post procedure    Patient was counseled today on possible surgical intervention   Patient is in pain, bleeding heavily, passing tissue for possible retained infected products with + CT.  She has not had food for over 8 hours . Only had water.   Will prepare for the OR- discussed with OB/ GYN team

## 2019-02-19 NOTE — DISCHARGE SUMMARY
[facility]  Brief Op Note/Discharge Note  Short Stay    Admit Date: 2019    Discharge Date: 2019    Attending Physician: Pelon Mead III, MD     Surgery Date: 2019     Surgeon(s) and Role:     * Pelon Mead III, MD - Primary    Assisting Surgeons:  - Qi Billy MD - PGY4  - Fany Alvarez MD - PGY1    Pre-op Diagnosis:  SAB (spontaneous ) [O03.9]    Post-op Diagnosis:  Post-Op Diagnosis Codes:     * SAB (spontaneous ) [O03.9]    Procedure(s) (LRB):  DILATION AND CURETTAGE, UTERUS, USING SUCTION (N/A)    Anesthesia: General    Findings/Key Components: Uterus sounded to 6 cm. Cervix did not pull, was already dilated and able to accommodate an 11 mm suction curette. Suction D&C performed yielding small amount of tissue. Silver nitrate applied to tenaculum sites. Bleeding minimal at completion of procedure.     Estimated Blood Loss: 5 mL         Specimens:   Specimen (12h ago, onward)    Start     Ordered    19 1617  Specimen to Pathology - Surgery  Once     Comments:  1. PRODUCTS OF CONCEPTION     Start Status   19 1617 Collected (19 1622)       19 1622          Discharge Provider: Fany Alvarez    Diagnoses:  Active Hospital Problems    Diagnosis  POA    *Status post dilatation and suction curettage [Z98.890]  Not Applicable    SAB (spontaneous ) [O03.9]  Yes      Resolved Hospital Problems   No resolved problems to display.       Discharged Condition: good    Hospital Course:   Patient was admitted for outpatient procedure as above, and tolerated the procedure well with no complications. Please see operative report for further details. Following the procedure, the patient was awakened from anesthesia and transferred to the recovery area in stable condition. She was discharged to home once ambulating, voiding, tolerating PO intake, and pain was well-controlled. Patient was given routine post-op instructions and prescriptions  for pain medication to take as needed. Patient instructed to follow up with Dr. Rizzo in 4 weeks.    Final Diagnoses: Same as principal problem.    Disposition: Home or Self Care    Follow up/Patient Instructions:    Medications:  Reconciled Home Medications:      Medication List      START taking these medications    ibuprofen 600 MG tablet  Commonly known as:  ADVIL,MOTRIN  Take 1 tablet (600 mg total) by mouth every 6 (six) hours as needed.        CONTINUE taking these medications    desogestrel-ethinyl estradiol 0.15-0.03 mg per tablet  Commonly known as:  APRI  Take 1 tablet by mouth once daily.     doxycycline 100 MG EC tablet  Commonly known as:  DORYX  Take 1 tablet (100 mg total) by mouth 2 (two) times daily.     HYDROcodone-acetaminophen 5-325 mg per tablet  Commonly known as:  NORCO  Take 1 tablet by mouth every 6 (six) hours as needed for Pain.          Discharge Procedure Orders   Diet Adult Regular     Notify your health care provider if you experience any of the following:   Order Comments: Heavy vaginal bleeding saturating more than 1 pad per hr for at least consecutive 2 hrs.     Notify your health care provider if you experience any of the following:  increased confusion or weakness     Notify your health care provider if you experience any of the following:  persistent dizziness, light-headedness, or visual disturbances     Notify your health care provider if you experience any of the following:  severe persistent headache     Notify your health care provider if you experience any of the following:  difficulty breathing or increased cough     Notify your health care provider if you experience any of the following:  severe uncontrolled pain     Notify your health care provider if you experience any of the following:  persistent nausea and vomiting or diarrhea     Notify your health care provider if you experience any of the following:  temperature >100.4     Activity as tolerated   Order Comments:  Pelvic rest until follow up visit. Nothing in vagina -no sex, tampons, douching, etc.     Follow-up Information     Charleen Rizzo MD. Schedule an appointment as soon as possible for a visit in 4 weeks.    Specialties:  Obstetrics, Obstetrics and Gynecology  Why:  Postoperative appointment  Contact information:  [address] [address]  [phone]                   Fany Alvarez MD  OBGYN, PGY-1    Doing well, no complaints, ready for D/C.

## 2019-02-20 NOTE — DISCHARGE INSTRUCTIONS
Suction Curettage (Therapeutic Dilation & Curettage, D&C)  Suction curettage is a procedure to remove the lining and contents of the womb (uterus). It may be done to stop bleeding, control pain, and prevent infection after a miscarriage, , or childbirth. It may also be done to remove a molar pregnancy. This is when tumors grow in the womb instead of or in addition to a fetus.  After the procedure, you should be able to return to your normal routine in 1 or 2 days. You may have some cramping and light bleeding, however. This is normal. These problems should go away within 5 to 7 days. You can expect to have your next period within 4 to 6 weeks.  Home care  · If you have pain or cramping, use pain medicine as directed.  · If you have light bleeding, use pads instead of tampons. Change these as often as needed.  · Avoid douching, using tampons, or having sex until your healthcare provider says its OK.  · Take showers instead of baths for 1 to 2 weeks.  Follow-up care  Follow-up with your healthcare provider as directed.  When to seek medical advice  Call your healthcare provider right away if any of these occur:  · Fever of 100.4ºF (38ºC) or higher, or as directed by your provider  · Heavy bleeding  · Bleeding that lasts longer than 1 week  · Pain or cramping worsens instead of getting better  · Foul-smelling discharge from the vagina  · Passage of anything that resembles tissue from the vagina (if possible, save the tissue and bring it to the healthcare provider)  · Weakness, dizziness or fainting  Date Last Reviewed: 2015-2017 [employer]. [address] [place] [address], [place] [address]. All rights reserved. This information is not intended as a substitute for professional medical care. Always follow your healthcare professional's instructions.      FOR EMERGENCIES:  If any unusual problems or difficulties occur, contact Dr. Mead or the resident at [phone] (page ) or  the Clinic office, [phone].    ---------------------------------------------------      Discharge Instructions: After Your Surgery  Youve just had surgery. During surgery, you were given medicine called anesthesia to keep you relaxed and free of pain. After surgery, you may have some pain or nausea. This is common. Here are some tips for feeling better and getting well after surgery.     Stay on schedule with your medicine.   Going home  Your healthcare provider will show you how to take care of yourself when you go home. He or she will also answer your questions. Have an adult family member or friend drive you home. For the first 24 hours after your surgery:  · Do not drive or use heavy equipment.  · Do not make important decisions or sign legal papers.  · Do not drink alcohol.  · Have someone stay with you, if needed. He or she can watch for problems and help keep you safe.  Be sure to go to all follow-up visits with your healthcare provider. And rest after your surgery for as long as your healthcare provider tells you to.  Coping with pain  If you have pain after surgery, pain medicine will help you feel better. Take it as told, before pain becomes severe. Also, ask your healthcare provider or pharmacist about other ways to control pain. This might be with heat, ice, or relaxation. And follow any other instructions your surgeon or nurse gives you.  Tips for taking pain medicine  To get the best relief possible, remember these points:  · Pain medicines can upset your stomach. Taking them with a little food may help.  · Most pain relievers taken by mouth need at least 20 to 30 minutes to start to work.  · Taking medicine on a schedule can help you remember to take it. Try to time your medicine so that you can take it before starting an activity. This might be before you get dressed, go for a walk, or sit down for dinner.  · Constipation is a common side effect of pain medicines. Call your healthcare  provider before taking any medicines such as laxatives or stool softeners to help ease constipation. Also ask if you should skip any foods. Drinking lots of fluids and eating foods such as fruits and vegetables that are high in fiber can also help. Remember, do not take laxatives unless your surgeon has prescribed them.  · Drinking alcohol and taking pain medicine can cause dizziness and slow your breathing. It can even be deadly. Do not drink alcohol while taking pain medicine.  · Pain medicine can make you react more slowly to things. Do not drive or run machinery while taking pain medicine.  Your healthcare provider may tell you to take acetaminophen to help ease your pain. Ask him or her how much you are supposed to take each day. Acetaminophen or other pain relievers may interact with your prescription medicines or other over-the-counter (OTC) medicines. Some prescription medicines have acetaminophen and other ingredients. Using both prescription and OTC acetaminophen for pain can cause you to overdose. Read the labels on your OTC medicines with care. This will help you to clearly know the list of ingredients, how much to take, and any warnings. It may also help you not take too much acetaminophen. If you have questions or do not understand the information, ask your pharmacist or healthcare provider to explain it to you before you take the OTC medicine.  Managing nausea  Some people have an upset stomach after surgery. This is often because of anesthesia, pain, or pain medicine, or the stress of surgery. These tips will help you handle nausea and eat healthy foods as you get better. If you were on a special food plan before surgery, ask your healthcare provider if you should follow it while you get better. These tips may help:  · Do not push yourself to eat. Your body will tell you when to eat and how much.  · Start off with clear liquids and soup. They are easier to digest.  · Next try semi-solid foods, such  as mashed potatoes, applesauce, and gelatin, as you feel ready.  · Slowly move to solid foods. Dont eat fatty, rich, or spicy foods at first.  · Do not force yourself to have 3 large meals a day. Instead eat smaller amounts more often.  · Take pain medicines with a small amount of solid food, such as crackers or toast, to avoid nausea.     Call your surgeon if  · You still have pain an hour after taking medicine. The medicine may not be strong enough.  · You feel too sleepy, dizzy, or groggy. The medicine may be too strong.  · You have side effects like nausea, vomiting, or skin changes, such as rash, itching, or hives.       If you have obstructive sleep apnea  You were given anesthesia medicine during surgery to keep you comfortable and free of pain. After surgery, you may have more apnea spells because of this medicine and other medicines you were given. The spells may last longer than usual.   At home:  · Keep using the continuous positive airway pressure (CPAP) device when you sleep. Unless your healthcare provider tells you not to, use it when you sleep, day or night. CPAP is a common device used to treat obstructive sleep apnea.  · Talk with your provider before taking any pain medicine, muscle relaxants, or sedatives. Your provider will tell you about the possible dangers of taking these medicines.  Date Last Reviewed: 12/1/2016 © 2000-2017 [employer]. [address] [place] [address], [place] [address]. All rights reserved. This information is not intended as a substitute for professional medical care. Always follow your healthcare professional's instructions.      FOLLOW ANY OTHER INSTRUCTIONS GIVEN TO YOU BY DR. JOHNSTON!!!

## 2019-02-20 NOTE — ANESTHESIA POSTPROCEDURE EVALUATION
"Anesthesia Post Evaluation    Patient: Jose Luis Crowder    Procedure(s) Performed: Procedure(s) (LRB):  DILATION AND CURETTAGE, UTERUS, USING SUCTION (N/A)    Final Anesthesia Type: general  Patient location during evaluation: PACU  Patient participation: Yes- Able to Participate  Level of consciousness: awake and alert  Post-procedure vital signs: reviewed and stable  Pain management: adequate  Airway patency: patent  PONV status at discharge: No PONV  Anesthetic complications: no      Cardiovascular status: blood pressure returned to baseline  Respiratory status: unassisted and spontaneous ventilation  Hydration status: euvolemic  Follow-up not needed.        Visit Vitals  /82   Pulse 78   Temp 36.7 °C (98.1 °F) (Oral)   Resp 16   Ht 5' 5" (1.651 m)   Wt 57.6 kg (127 lb)   SpO2 100%   Breastfeeding? No   BMI 21.13 kg/m²       Pain/Vicenta Score: Vicenta Score: 10 (2/19/2019  5:50 PM)        "

## 2019-03-12 DIAGNOSIS — N71.9 ENDOMETRITIS: ICD-10-CM

## 2019-03-12 RX ORDER — DOXYCYCLINE HYCLATE 100 MG/1
100 TABLET, DELAYED RELEASE ORAL 2 TIMES DAILY
Qty: 20 TABLET | Refills: 0 | Status: SHIPPED | OUTPATIENT
Start: 2019-03-12 | End: 2019-09-17

## 2019-03-12 NOTE — TELEPHONE ENCOUNTER
patient scheduled for 4 week post op appointment. Pt stated that she lost her antibiotic pills when  Moving and would like a refill on the medication if possible. Pt stated that she only took 4 days worth

## 2019-08-13 ENCOUNTER — PATIENT MESSAGE (OUTPATIENT)
Dept: OBSTETRICS AND GYNECOLOGY | Facility: CLINIC | Age: 29
End: 2019-08-13

## 2019-08-13 RX ORDER — FLUCONAZOLE 150 MG/1
150 TABLET ORAL ONCE
Qty: 1 TABLET | Refills: 0 | Status: SHIPPED | OUTPATIENT
Start: 2019-08-13 | End: 2019-08-13

## 2019-09-17 ENCOUNTER — OFFICE VISIT (OUTPATIENT)
Dept: OBSTETRICS AND GYNECOLOGY | Facility: CLINIC | Age: 29
End: 2019-09-17
Payer: MEDICAID

## 2019-09-17 VITALS
DIASTOLIC BLOOD PRESSURE: 80 MMHG | SYSTOLIC BLOOD PRESSURE: 100 MMHG | WEIGHT: 126.13 LBS | HEIGHT: 65 IN | BODY MASS INDEX: 21.01 KG/M2

## 2019-09-17 DIAGNOSIS — Z12.4 SCREENING FOR CERVICAL CANCER: ICD-10-CM

## 2019-09-17 DIAGNOSIS — Z11.3 SCREENING FOR STDS (SEXUALLY TRANSMITTED DISEASES): ICD-10-CM

## 2019-09-17 DIAGNOSIS — Z01.419 WELL WOMAN EXAM WITH ROUTINE GYNECOLOGICAL EXAM: Primary | ICD-10-CM

## 2019-09-17 DIAGNOSIS — Z30.09 EVALUATION REGARDING CONTRACEPTION OPTIONS: ICD-10-CM

## 2019-09-17 PROCEDURE — 88175 CYTOPATH C/V AUTO FLUID REDO: CPT

## 2019-09-17 PROCEDURE — 99395 PR PREVENTIVE VISIT,EST,18-39: ICD-10-PCS | Mod: S$PBB,,, | Performed by: OBSTETRICS & GYNECOLOGY

## 2019-09-17 PROCEDURE — 99999 PR PBB SHADOW E&M-EST. PATIENT-LVL III: CPT | Mod: PBBFAC,,, | Performed by: OBSTETRICS & GYNECOLOGY

## 2019-09-17 PROCEDURE — 99213 OFFICE O/P EST LOW 20 MIN: CPT | Mod: PBBFAC,PO | Performed by: OBSTETRICS & GYNECOLOGY

## 2019-09-17 PROCEDURE — 87801 DETECT AGNT MULT DNA AMPLI: CPT

## 2019-09-17 PROCEDURE — 87491 CHLMYD TRACH DNA AMP PROBE: CPT

## 2019-09-17 PROCEDURE — 99395 PREV VISIT EST AGE 18-39: CPT | Mod: S$PBB,,, | Performed by: OBSTETRICS & GYNECOLOGY

## 2019-09-17 PROCEDURE — 87481 CANDIDA DNA AMP PROBE: CPT | Mod: 59

## 2019-09-17 PROCEDURE — 99999 PR PBB SHADOW E&M-EST. PATIENT-LVL III: ICD-10-PCS | Mod: PBBFAC,,, | Performed by: OBSTETRICS & GYNECOLOGY

## 2019-09-17 NOTE — PROGRESS NOTES
Subjective:       Patient ID: Jose Luis Crowder is a 28 y.o. female.    Chief Complaint:  Well Woman (no complaints)      History of Present Illness  29yo  presents for annual exam.  No complaints today.  Not currently on birth control, but does desire to start.  Previously on OCPs and had migraines.  Tolerated NuvaRing well but was cumbersome.  Did not like side effects of Depo.  Cycles are regular, monthly.  Not too heavy or painful.  No other complaints today.  Sexually active without complaints.  Nonsmoker.    GYN & OB History  Patient's last menstrual period was 2019.   Date of Last Pap: 2016    OB History    Para Term  AB Living   2 1 1   1 1   SAB TAB Ectopic Multiple Live Births   1     0 1      # Outcome Date GA Lbr Neftali/2nd Weight Sex Delivery Anes PTL Lv   2 SAB 17           1 Term 17 39w3d / 00:26 3.09 kg (6 lb 13 oz) F Vag-Vacuum EPI N GAIL       Review of Systems  Review of Systems   Constitutional: Negative for chills, fatigue and fever.   HENT: Negative for nasal congestion and mouth sores.    Eyes: Negative for visual disturbance.   Respiratory: Negative for cough and shortness of breath.    Cardiovascular: Negative for chest pain and palpitations.   Gastrointestinal: Negative for abdominal pain, bloating, constipation, diarrhea, nausea and vomiting.   Genitourinary: Negative for dysmenorrhea, dyspareunia, menorrhagia, pelvic pain, vaginal discharge and vaginal odor.   Musculoskeletal: Negative for arthralgias, back pain and myalgias.   Integumentary:  Negative for rash, mole/lesion and breast mass.   Neurological: Negative for seizures and headaches.   Hematological: Negative for adenopathy. Does not bruise/bleed easily.   Psychiatric/Behavioral: Negative for depression. The patient is not nervous/anxious.    Breast: Negative for lump, mass and mastodynia          Objective:    Physical Exam:   Constitutional: She is oriented to person, place, and time. She  "appears well-developed and well-nourished.    HENT:   Head: Normocephalic and atraumatic.    Eyes: Conjunctivae and EOM are normal.    Neck: Normal range of motion. Neck supple.    Cardiovascular: Normal rate, regular rhythm and normal heart sounds.     Pulmonary/Chest: Effort normal and breath sounds normal.        Abdominal: Soft. She exhibits no distension and no mass. There is no tenderness. No hernia.     Genitourinary:   Genitourinary Comments: Normal-appearing external female genitalia.  No CMT.  Uterus small, mobile, nontender.  No adnexal masses or tenderness.           Musculoskeletal: Normal range of motion and moves all extremeties.       Neurological: She is alert and oriented to person, place, and time.    Skin: Skin is warm and dry.    Psychiatric: She has a normal mood and affect.   Breast: Symmetric, nontender.  No masses.  No skin changes.  No nipple discharge.    /80   Ht 5' 5" (1.651 m)   Wt 57.2 kg (126 lb 1.6 oz)   LMP 09/01/2019   BMI 20.98 kg/m²          Assessment:        1. Well woman exam with routine gynecological exam    2. Screening for STDs (sexually transmitted diseases)    3. Screening for cervical cancer    4. Evaluation regarding contraception options              Plan:      1.  Routine annual exam with pap smear and breast exam today.  2.  STD screening.  3.  Contraception counseling done.  Considering LARC.  Handouts given.  Will return to clinic to sign consent if she desires Nexplanon.  4.  Counseling done, precautions given, all questions answered.  RTC for contraception insertion if she desires.        "

## 2019-09-18 LAB
C TRACH DNA SPEC QL NAA+PROBE: NOT DETECTED
N GONORRHOEA DNA SPEC QL NAA+PROBE: NOT DETECTED

## 2019-09-19 ENCOUNTER — TELEPHONE (OUTPATIENT)
Dept: OBSTETRICS AND GYNECOLOGY | Facility: HOSPITAL | Age: 29
End: 2019-09-19

## 2019-09-19 ENCOUNTER — TELEPHONE (OUTPATIENT)
Dept: OBSTETRICS AND GYNECOLOGY | Facility: CLINIC | Age: 29
End: 2019-09-19

## 2019-09-19 LAB
BACTERIAL VAGINOSIS DNA: POSITIVE
CANDIDA GLABRATA DNA: NEGATIVE
CANDIDA KRUSEI DNA: NEGATIVE
CANDIDA RRNA VAG QL PROBE: NEGATIVE
T VAGINALIS RRNA GENITAL QL PROBE: NEGATIVE

## 2019-09-19 RX ORDER — METRONIDAZOLE 500 MG/1
500 TABLET ORAL EVERY 12 HOURS
Qty: 14 TABLET | Refills: 0 | Status: SHIPPED | OUTPATIENT
Start: 2019-09-19 | End: 2019-09-26

## 2019-09-19 NOTE — TELEPHONE ENCOUNTER
----- Message from Ying Chisholm sent at 9/19/2019 10:32 AM CDT -----  Contact: [phone] self   Pt is returning a call from your office. Please advise

## 2019-09-19 NOTE — TELEPHONE ENCOUNTER
Please let the patient know her cultures show she has bacterial vaginosis.  This is not a sexually transmitted disease.  I have sent in Flagyl for her.    Her gonorrhea and chlamydia tests were negative.       Let me know if she has any questions.  Thank you.

## 2019-10-21 ENCOUNTER — OFFICE VISIT (OUTPATIENT)
Dept: OBSTETRICS AND GYNECOLOGY | Facility: CLINIC | Age: 29
End: 2019-10-21
Payer: MEDICAID

## 2019-10-21 VITALS
BODY MASS INDEX: 22.26 KG/M2 | SYSTOLIC BLOOD PRESSURE: 90 MMHG | HEIGHT: 65 IN | DIASTOLIC BLOOD PRESSURE: 62 MMHG | WEIGHT: 133.63 LBS

## 2019-10-21 DIAGNOSIS — B96.89 BACTERIAL VAGINOSIS: ICD-10-CM

## 2019-10-21 DIAGNOSIS — Z30.09 EVALUATION REGARDING CONTRACEPTION OPTIONS: Primary | ICD-10-CM

## 2019-10-21 DIAGNOSIS — N76.0 BACTERIAL VAGINOSIS: ICD-10-CM

## 2019-10-21 LAB
B-HCG UR QL: NEGATIVE
CTP QC/QA: YES

## 2019-10-21 PROCEDURE — 99999 PR PBB SHADOW E&M-EST. PATIENT-LVL III: CPT | Mod: PBBFAC,,, | Performed by: OBSTETRICS & GYNECOLOGY

## 2019-10-21 PROCEDURE — 99213 PR OFFICE/OUTPT VISIT, EST, LEVL III, 20-29 MIN: ICD-10-PCS | Mod: S$PBB,,, | Performed by: OBSTETRICS & GYNECOLOGY

## 2019-10-21 PROCEDURE — 99213 OFFICE O/P EST LOW 20 MIN: CPT | Mod: S$PBB,,, | Performed by: OBSTETRICS & GYNECOLOGY

## 2019-10-21 PROCEDURE — 81025 URINE PREGNANCY TEST: CPT | Mod: PBBFAC,PO | Performed by: OBSTETRICS & GYNECOLOGY

## 2019-10-21 PROCEDURE — 99999 PR PBB SHADOW E&M-EST. PATIENT-LVL III: ICD-10-PCS | Mod: PBBFAC,,, | Performed by: OBSTETRICS & GYNECOLOGY

## 2019-10-21 PROCEDURE — 99213 OFFICE O/P EST LOW 20 MIN: CPT | Mod: PBBFAC,PO | Performed by: OBSTETRICS & GYNECOLOGY

## 2019-10-21 RX ORDER — NORELGESTROMIN AND ETHINYL ESTRADIOL 35; 150 UG/MG; UG/MG
1 PATCH TRANSDERMAL WEEKLY
Qty: 12 PATCH | Refills: 3 | Status: SHIPPED | OUTPATIENT
Start: 2019-10-21 | End: 2020-09-14

## 2019-10-21 RX ORDER — METRONIDAZOLE 7.5 MG/G
GEL VAGINAL
Qty: 70 G | Refills: 0 | Status: SHIPPED | OUTPATIENT
Start: 2019-10-21 | End: 2020-02-06 | Stop reason: SDUPTHER

## 2019-10-21 NOTE — PROGRESS NOTES
"  Subjective:       Patient ID: Jose Luis Crowder is a 28 y.o. female.    Chief Complaint:  Contraception (patient would like birth control )      History of Present Illness  29yo  presents to start birth control.  Desires patches, desires to skip cycles.    Previously treated with Flagyl for BV, but could not tolerate pills.  Still having vaginal discharge but no odor.  No other complaints today.     GYN & OB History  Patient's last menstrual period was 10/01/2019.   Date of Last Pap: 2019    OB History    Para Term  AB Living   2 1 1   1 1   SAB TAB Ectopic Multiple Live Births   1     0 1      # Outcome Date GA Lbr Neftali/2nd Weight Sex Delivery Anes PTL Lv   2 SAB 17           1 Term 17 39w3d / 00:26 3.09 kg (6 lb 13 oz) F Vag-Vacuum EPI N GAIL       Review of Systems  Review of Systems: neg x 10, except as per HPI        Objective:    Physical Exam     BP 90/62   Ht 5' 5" (1.651 m)   Wt 60.6 kg (133 lb 9.6 oz)   LMP 10/01/2019   BMI 22.23 kg/m²     Gen: NAD  Resp: Normal respiratory effort  Abd: soft, NT  Pelvic: deferred  Ext: normal ROM  Psych: appropriate affect  Neuro: grossly intact    UPT negative  Assessment:        1. Evaluation regarding contraception options    2. Bacterial vaginosis              Plan:      1.  Contraception counseling done.  eRx Xulane patches to use weekly, in order to skip cycles.  Encourage condom use as well.  2.  Will treat BV with Solosec, since pt unable to tolerate pills.  3.  Counseling done, precautions given, all questions answered.  RTC 3 months for f/u on cycles, or prn.     "

## 2019-11-12 ENCOUNTER — TELEPHONE (OUTPATIENT)
Dept: PHARMACY | Facility: CLINIC | Age: 29
End: 2019-11-12

## 2019-12-12 ENCOUNTER — PATIENT MESSAGE (OUTPATIENT)
Dept: OBSTETRICS AND GYNECOLOGY | Facility: CLINIC | Age: 29
End: 2019-12-12

## 2019-12-13 RX ORDER — FLUCONAZOLE 150 MG/1
TABLET ORAL
Qty: 2 TABLET | Refills: 0 | Status: SHIPPED | OUTPATIENT
Start: 2019-12-13 | End: 2020-09-14

## 2019-12-13 NOTE — TELEPHONE ENCOUNTER
I sent in diflucan for yeast infection.  If it does not get better, we can see her in the office for an exam.  Thanks.

## 2019-12-13 NOTE — TELEPHONE ENCOUNTER
Patient has complaints of itching and irritation. Tried otc medication which is not helping. Would like medication sent to the pharmacy.    Please advise.

## 2020-02-06 ENCOUNTER — OFFICE VISIT (OUTPATIENT)
Dept: OBSTETRICS AND GYNECOLOGY | Facility: CLINIC | Age: 30
End: 2020-02-06
Payer: MEDICAID

## 2020-02-06 VITALS
DIASTOLIC BLOOD PRESSURE: 70 MMHG | BODY MASS INDEX: 22.88 KG/M2 | SYSTOLIC BLOOD PRESSURE: 92 MMHG | HEIGHT: 65 IN | WEIGHT: 137.31 LBS

## 2020-02-06 DIAGNOSIS — Z30.09 EVALUATION REGARDING CONTRACEPTION OPTIONS: Primary | ICD-10-CM

## 2020-02-06 PROCEDURE — 99999 PR PBB SHADOW E&M-EST. PATIENT-LVL III: CPT | Mod: PBBFAC,,, | Performed by: OBSTETRICS & GYNECOLOGY

## 2020-02-06 PROCEDURE — 99213 OFFICE O/P EST LOW 20 MIN: CPT | Mod: S$PBB,,, | Performed by: OBSTETRICS & GYNECOLOGY

## 2020-02-06 PROCEDURE — 99213 PR OFFICE/OUTPT VISIT, EST, LEVL III, 20-29 MIN: ICD-10-PCS | Mod: S$PBB,,, | Performed by: OBSTETRICS & GYNECOLOGY

## 2020-02-06 PROCEDURE — 99213 OFFICE O/P EST LOW 20 MIN: CPT | Mod: PBBFAC,PO | Performed by: OBSTETRICS & GYNECOLOGY

## 2020-02-06 PROCEDURE — 99999 PR PBB SHADOW E&M-EST. PATIENT-LVL III: ICD-10-PCS | Mod: PBBFAC,,, | Performed by: OBSTETRICS & GYNECOLOGY

## 2020-02-06 RX ORDER — METRONIDAZOLE 7.5 MG/G
GEL VAGINAL
Qty: 70 G | Refills: 2 | Status: SHIPPED | OUTPATIENT
Start: 2020-02-06 | End: 2020-05-24 | Stop reason: SDUPTHER

## 2020-03-03 ENCOUNTER — PATIENT MESSAGE (OUTPATIENT)
Dept: OBSTETRICS AND GYNECOLOGY | Facility: CLINIC | Age: 30
End: 2020-03-03

## 2020-03-03 ENCOUNTER — TELEPHONE (OUTPATIENT)
Dept: OBSTETRICS AND GYNECOLOGY | Facility: CLINIC | Age: 30
End: 2020-03-03

## 2020-03-06 ENCOUNTER — TELEPHONE (OUTPATIENT)
Dept: OBSTETRICS AND GYNECOLOGY | Facility: CLINIC | Age: 30
End: 2020-03-06

## 2020-03-06 NOTE — TELEPHONE ENCOUNTER
----- Message from Ying Chisholm sent at 3/6/2020 11:21 AM CST -----  Contact: [phone] self   Pt is returning your call re: Birth Control. Please advise

## 2020-03-16 ENCOUNTER — TELEPHONE (OUTPATIENT)
Dept: OBSTETRICS AND GYNECOLOGY | Facility: CLINIC | Age: 30
End: 2020-03-16

## 2020-03-16 NOTE — TELEPHONE ENCOUNTER
----- Message from Ron Staples sent at 3/16/2020 12:40 PM CDT -----  Contact: patient  Type:  Needs Medical Advice    Who Called: Jose Luis  Would the patient rather a call back or a response via [facility]?  Call back  Best Call Back Number: [phone]  Additional Information: needs to have her implant inserted

## 2020-03-19 ENCOUNTER — PATIENT MESSAGE (OUTPATIENT)
Dept: OBSTETRICS AND GYNECOLOGY | Facility: CLINIC | Age: 30
End: 2020-03-19

## 2020-03-19 RX ORDER — METRONIDAZOLE 7.5 MG/G
GEL VAGINAL
Qty: 70 G | Refills: 2 | OUTPATIENT
Start: 2020-03-19

## 2020-05-25 RX ORDER — METRONIDAZOLE 7.5 MG/G
GEL VAGINAL
Qty: 70 G | Refills: 2 | Status: SHIPPED | OUTPATIENT
Start: 2020-05-25 | End: 2020-09-14

## 2020-09-14 ENCOUNTER — OFFICE VISIT (OUTPATIENT)
Dept: OBSTETRICS AND GYNECOLOGY | Facility: CLINIC | Age: 30
End: 2020-09-14
Payer: MEDICAID

## 2020-09-14 ENCOUNTER — LAB VISIT (OUTPATIENT)
Dept: LAB | Facility: OTHER | Age: 30
End: 2020-09-14
Attending: NURSE PRACTITIONER
Payer: MEDICAID

## 2020-09-14 VITALS
DIASTOLIC BLOOD PRESSURE: 80 MMHG | HEIGHT: 64 IN | SYSTOLIC BLOOD PRESSURE: 102 MMHG | WEIGHT: 134.06 LBS | BODY MASS INDEX: 22.89 KG/M2

## 2020-09-14 DIAGNOSIS — Z11.3 ROUTINE SCREENING FOR STI (SEXUALLY TRANSMITTED INFECTION): ICD-10-CM

## 2020-09-14 DIAGNOSIS — Z30.015 ENCOUNTER FOR INITIAL PRESCRIPTION OF VAGINAL RING HORMONAL CONTRACEPTIVE: ICD-10-CM

## 2020-09-14 DIAGNOSIS — Z01.419 WELL WOMAN EXAM WITH ROUTINE GYNECOLOGICAL EXAM: Primary | ICD-10-CM

## 2020-09-14 DIAGNOSIS — N89.8 VAGINAL DISCHARGE: ICD-10-CM

## 2020-09-14 PROCEDURE — 99395 PREV VISIT EST AGE 18-39: CPT | Mod: S$PBB,,, | Performed by: NURSE PRACTITIONER

## 2020-09-14 PROCEDURE — 99999 PR PBB SHADOW E&M-EST. PATIENT-LVL III: ICD-10-PCS | Mod: PBBFAC,,, | Performed by: NURSE PRACTITIONER

## 2020-09-14 PROCEDURE — 99395 PR PREVENTIVE VISIT,EST,18-39: ICD-10-PCS | Mod: S$PBB,,, | Performed by: NURSE PRACTITIONER

## 2020-09-14 PROCEDURE — 36415 COLL VENOUS BLD VENIPUNCTURE: CPT

## 2020-09-14 PROCEDURE — 86592 SYPHILIS TEST NON-TREP QUAL: CPT

## 2020-09-14 PROCEDURE — 87491 CHLMYD TRACH DNA AMP PROBE: CPT

## 2020-09-14 PROCEDURE — 87480 CANDIDA DNA DIR PROBE: CPT

## 2020-09-14 PROCEDURE — 87510 GARDNER VAG DNA DIR PROBE: CPT

## 2020-09-14 PROCEDURE — 99213 OFFICE O/P EST LOW 20 MIN: CPT | Mod: PBBFAC | Performed by: NURSE PRACTITIONER

## 2020-09-14 PROCEDURE — 99999 PR PBB SHADOW E&M-EST. PATIENT-LVL III: CPT | Mod: PBBFAC,,, | Performed by: NURSE PRACTITIONER

## 2020-09-14 PROCEDURE — 80074 ACUTE HEPATITIS PANEL: CPT

## 2020-09-14 PROCEDURE — 86703 HIV-1/HIV-2 1 RESULT ANTBDY: CPT

## 2020-09-14 RX ORDER — ETONOGESTREL AND ETHINYL ESTRADIOL VAGINAL RING .015; .12 MG/D; MG/D
1 RING VAGINAL
Qty: 1 EACH | Refills: 11 | Status: SHIPPED | OUTPATIENT
Start: 2020-09-14 | End: 2021-10-07 | Stop reason: SDUPTHER

## 2020-09-14 NOTE — PATIENT INSTRUCTIONS
You have 2 ways to start using NuvaRing:  First Day Start: Insert NuvaRing on the first day of your menstrual period. You will not need to use another birth control method since you are starting NuvaRing the first day of your period.  Day 2 to Day 5 Cycle Start: You may also start on Day 2 to 5 of your period, but in this case make sure you use an extra method of birth control (barrier method) such as male condoms with spermicide for the first 7 days of NuvaRing use in the first cycle.    If you are switching from the pill to the Nuvaring:  If you have been using the pill correctly and are certain that you are not pregnant, you can start NuvaRing any day. However, do not start using NuvaRing any later than the day you would start taking your next birth control pill.

## 2020-09-14 NOTE — PROGRESS NOTES
CC: Annual  HPI: Pt is a 29 y.o.  female who presents for routine annual exam. She stopped using Xulane due to skin irritation. She has been using condoms since. She would like to restart the Nuvaring-did this in the past and liked it. She plans to skip ring free week to avoid having a cycle. She does want STD screening. C/o an increased vaginal discharge-no odor or itching.    FH:   Breast cancer: none  Colon cancer: none  Ovarian cancer: none  Uterine cancer: none    Flu vaccine: na  HPV vaccine: yes  Last pap smear: 2019 negative  Colonoscopy: na  DEXA: na  Mammogram: na  STD history: chlamydia  Birth control: condoms  OB history:   Tobacco use: no    ROS:  GENERAL: Feeling well overall. Denies fever or chills.   SKIN: Denies rash or lesions.   HEAD: Denies head injury or headache.   NODES: Denies enlarged lymph nodes.   CHEST: Denies chest pain or shortness of breath.   CARDIOVASCULAR: Denies palpitations or left sided chest pain.   ABDOMEN: No abdominal pain, constipation, diarrhea, nausea, vomiting or rectal bleeding.   URINARY: No dysuria, hematuria, or burning on urination.  REPRODUCTIVE: See HPI.   BREASTS: Denies pain, lumps, or nipple discharge.   HEMATOLOGIC: No easy bruisability or excessive bleeding.   MUSCULOSKELETAL: Denies joint pain or swelling.   NEUROLOGIC: Denies syncope or weakness.   PSYCHIATRIC: Denies depression, anxiety or mood swings.    PE:   APPEARANCE: Well nourished, well developed, Black or  female in no acute distress.  NODES: no cervical, supraclavicular, or inguinal lymphadenopathy  BREASTS: Symmetrical, no skin changes or visible lesions. No palpable masses, nipple discharge or adenopathy bilaterally.  ABDOMEN: Soft. No tenderness or masses. No distention. No hernias palpated. No CVA tenderness.  VULVA: No lesions. Normal external female genitalia.  URETHRAL MEATUS: Normal size and location, no lesions, no prolapse.  URETHRA: No masses, tenderness, or  prolapse.  VAGINA: Moist. No lesions or lacerations noted. No abnormal discharge present. No odor present.   CERVIX: No lesions or discharge. No cervical motion tenderness.   UTERUS: Normal size, regular shape, mobile, non-tender.  ADNEXA: No tenderness. No fullness or masses palpated in the adnexal regions.   ANUS PERINEUM: Normal.      Diagnosis:  1. Well woman exam with routine gynecological exam    2. Encounter for initial prescription of vaginal ring hormonal contraceptive    3. Vaginal discharge    4. Routine screening for STI (sexually transmitted infection)      Orders Placed This Encounter    Vaginosis Screen by DNA Probe    C. trachomatis/N. gonorrhoeae by AMP DNA    HIV 1/2 Ag/Ab (4th Gen)    RPR    Hepatitis Panel, Acute    etonogestreL-ethinyl estradioL (NUVARING) 0.12-0.015 mg/24 hr vaginal ring       Plan:   1. Pap current  2. Full STI panel  3. Start Nuvaring  The use of the nuvaring has been fully discussed with the patient. This includes the proper method to initiate and continue nuvaring, the need for regular compliance to ensure adequate contraceptive effect, the physiology which makes it effective.  Instructions given for what to do in event of nuvaring falling out, and warnings about anticipated minor side effects such as breakthrough spotting, nausea, breast tenderness, weight changes, acne, headaches, etc.  She has been told of the more serious potential side effects such as MI, stroke, and deep vein thrombosis, all of which are very unlikely.  She has been asked to report any signs of such serious problems immediately.  The need for additional protection, such as a condom, to prevent exposure to sexually transmitted infections has also been discussed - the patient has been clearly reminded that Nuvaring cannot protect her against diseases such as HIV and others. She understands and wishes to take the medication as prescribed.  4. Return for flu vaccine when available    Patient was  counseled today on the new ACS guidelines for cervical cytology screening as well as the current recommendations for breast cancer screening. She was counseled to follow up with her PCP for other routine health maintenance. Counseling session lasted approximately 10 minutes, and all her questions were answered.    Follow-up with me in 1 year for routine exam; pap in 2 years.

## 2020-09-15 LAB
CANDIDA RRNA VAG QL PROBE: NEGATIVE
G VAGINALIS RRNA GENITAL QL PROBE: POSITIVE
HAV IGM SERPL QL IA: NEGATIVE
HBV CORE IGM SERPL QL IA: NEGATIVE
HBV SURFACE AG SERPL QL IA: NEGATIVE
HCV AB SERPL QL IA: NEGATIVE
HIV 1+2 AB+HIV1 P24 AG SERPL QL IA: NEGATIVE
T VAGINALIS RRNA GENITAL QL PROBE: NEGATIVE

## 2020-09-16 LAB — RPR SER QL: NORMAL

## 2020-09-16 RX ORDER — METRONIDAZOLE 500 MG/1
500 TABLET ORAL EVERY 12 HOURS
Qty: 14 TABLET | Refills: 0 | Status: SHIPPED | OUTPATIENT
Start: 2020-09-16 | End: 2020-09-21 | Stop reason: CLARIF

## 2020-09-16 RX ORDER — FLUCONAZOLE 200 MG/1
200 TABLET ORAL
Qty: 2 TABLET | Refills: 0 | Status: SHIPPED | OUTPATIENT
Start: 2020-09-16 | End: 2020-11-27

## 2020-09-20 ENCOUNTER — PATIENT MESSAGE (OUTPATIENT)
Dept: OBSTETRICS AND GYNECOLOGY | Facility: CLINIC | Age: 30
End: 2020-09-20

## 2020-09-21 DIAGNOSIS — N76.0 BACTERIAL VAGINOSIS: Primary | ICD-10-CM

## 2020-09-21 DIAGNOSIS — B96.89 BACTERIAL VAGINOSIS: Primary | ICD-10-CM

## 2020-09-21 RX ORDER — METRONIDAZOLE 7.5 MG/G
1 GEL VAGINAL NIGHTLY
Qty: 1 G | Refills: 0 | Status: SHIPPED | OUTPATIENT
Start: 2020-09-21 | End: 2020-09-28

## 2020-11-27 ENCOUNTER — OFFICE VISIT (OUTPATIENT)
Dept: OBSTETRICS AND GYNECOLOGY | Facility: CLINIC | Age: 30
End: 2020-11-27
Payer: MEDICAID

## 2020-11-27 ENCOUNTER — LAB VISIT (OUTPATIENT)
Dept: LAB | Facility: OTHER | Age: 30
End: 2020-11-27
Attending: PHYSICIAN ASSISTANT
Payer: MEDICAID

## 2020-11-27 VITALS
BODY MASS INDEX: 22.21 KG/M2 | DIASTOLIC BLOOD PRESSURE: 72 MMHG | WEIGHT: 129.44 LBS | SYSTOLIC BLOOD PRESSURE: 100 MMHG

## 2020-11-27 DIAGNOSIS — N93.9 ABNORMAL UTERINE BLEEDING (AUB): Primary | ICD-10-CM

## 2020-11-27 DIAGNOSIS — N93.9 ABNORMAL UTERINE BLEEDING (AUB): ICD-10-CM

## 2020-11-27 DIAGNOSIS — R63.4 UNINTENDED WEIGHT LOSS: ICD-10-CM

## 2020-11-27 LAB
B-HCG UR QL: NEGATIVE
BASOPHILS # BLD AUTO: 0.04 K/UL (ref 0–0.2)
BASOPHILS NFR BLD: 1 % (ref 0–1.9)
CTP QC/QA: YES
DIFFERENTIAL METHOD: ABNORMAL
EOSINOPHIL # BLD AUTO: 0 K/UL (ref 0–0.5)
EOSINOPHIL NFR BLD: 1 % (ref 0–8)
ERYTHROCYTE [DISTWIDTH] IN BLOOD BY AUTOMATED COUNT: 12.9 % (ref 11.5–14.5)
HCT VFR BLD AUTO: 42.3 % (ref 37–48.5)
HGB BLD-MCNC: 13.4 G/DL (ref 12–16)
IMM GRANULOCYTES # BLD AUTO: 0.01 K/UL (ref 0–0.04)
IMM GRANULOCYTES NFR BLD AUTO: 0.2 % (ref 0–0.5)
LYMPHOCYTES # BLD AUTO: 1.9 K/UL (ref 1–4.8)
LYMPHOCYTES NFR BLD: 45.1 % (ref 18–48)
MCH RBC QN AUTO: 28.6 PG (ref 27–31)
MCHC RBC AUTO-ENTMCNC: 31.7 G/DL (ref 32–36)
MCV RBC AUTO: 90 FL (ref 82–98)
MONOCYTES # BLD AUTO: 0.5 K/UL (ref 0.3–1)
MONOCYTES NFR BLD: 11.5 % (ref 4–15)
NEUTROPHILS # BLD AUTO: 1.7 K/UL (ref 1.8–7.7)
NEUTROPHILS NFR BLD: 41.2 % (ref 38–73)
NRBC BLD-RTO: 0 /100 WBC
PLATELET # BLD AUTO: 248 K/UL (ref 150–350)
PMV BLD AUTO: 12 FL (ref 9.2–12.9)
RBC # BLD AUTO: 4.69 M/UL (ref 4–5.4)
T4 FREE SERPL-MCNC: 0.99 NG/DL (ref 0.71–1.51)
TSH SERPL DL<=0.005 MIU/L-ACNC: 1.49 UIU/ML (ref 0.4–4)
WBC # BLD AUTO: 4.19 K/UL (ref 3.9–12.7)

## 2020-11-27 PROCEDURE — 99213 OFFICE O/P EST LOW 20 MIN: CPT | Mod: PBBFAC | Performed by: PHYSICIAN ASSISTANT

## 2020-11-27 PROCEDURE — 87480 CANDIDA DNA DIR PROBE: CPT

## 2020-11-27 PROCEDURE — 87510 GARDNER VAG DNA DIR PROBE: CPT

## 2020-11-27 PROCEDURE — 99214 OFFICE O/P EST MOD 30 MIN: CPT | Mod: S$PBB,,, | Performed by: PHYSICIAN ASSISTANT

## 2020-11-27 PROCEDURE — 85025 COMPLETE CBC W/AUTO DIFF WBC: CPT

## 2020-11-27 PROCEDURE — 84443 ASSAY THYROID STIM HORMONE: CPT

## 2020-11-27 PROCEDURE — 99214 PR OFFICE/OUTPT VISIT, EST, LEVL IV, 30-39 MIN: ICD-10-PCS | Mod: S$PBB,,, | Performed by: PHYSICIAN ASSISTANT

## 2020-11-27 PROCEDURE — 99999 PR PBB SHADOW E&M-EST. PATIENT-LVL III: CPT | Mod: PBBFAC,,, | Performed by: PHYSICIAN ASSISTANT

## 2020-11-27 PROCEDURE — 99999 PR PBB SHADOW E&M-EST. PATIENT-LVL III: ICD-10-PCS | Mod: PBBFAC,,, | Performed by: PHYSICIAN ASSISTANT

## 2020-11-27 PROCEDURE — 36415 COLL VENOUS BLD VENIPUNCTURE: CPT

## 2020-11-27 PROCEDURE — 84439 ASSAY OF FREE THYROXINE: CPT

## 2020-11-27 RX ORDER — MEDROXYPROGESTERONE ACETATE 10 MG/1
10 TABLET ORAL DAILY
Qty: 10 TABLET | Refills: 0 | Status: SHIPPED | OUTPATIENT
Start: 2020-11-27 | End: 2021-10-07

## 2020-11-27 NOTE — PROGRESS NOTES
Subjective:      Jose Luis Crowder is a 30 y.o. female who presents for evaluation of menorrhagia. States she started bleeding on  (2 weeks ago) and has no stopped since. Bleeding is heavy, with clotting. States that today she is using a pad per hour. States that she began nuvaring on  and did not have a cycle in October, first cycle since nuva ring was current one. States that she removed Nuvaring 5 days ago and has not replaced it since. States she has used nuvaring before without problems--however she was using it incorrectly, using one ring each week vs every 3 weeks. Also reports cramping that she describes as a 6/10, states its not that bad. Denies SOB, palpitations, cold intolerance, syncope, lightheadedness, fatigue.  Patient has no relevant history of abnormal sexual development.   Is there a chance of pregnancy? no. HCG lab test done? yes, negative.   Factors that may be contributory to menstrual abnormalities include: diet unintentional 10lb weight loss.   Previous treatments for menstrual abnormalities include: Nuva Ring.  Denies personal family history of thyroid or GYN problems. States she was recently negative for STDs but is willing to be retested.   Last PAP: 2019, nml     Menstrual History:  OB History        2    Para   1    Term   1            AB   1    Living   1       SAB   1    TAB        Ectopic        Multiple   0    Live Births   1                Menarche age: 13  Patient's last menstrual period was 2020.       The following portions of the patient's history were reviewed and updated as appropriate: allergies, current medications, past family history, past medical history, past social history, past surgical history and problem list.    Review of Systems  Constitutional: negative for fatigue, fevers, night sweats and weight loss  Respiratory: negative for cough and dyspnea on exertion  Cardiovascular: negative for chest pain, palpitations and lower extremity  edema  Gastrointestinal: negative for abdominal pain, change in bowel habits, constipation, diarrhea, nausea and vomiting  Genitourinary:negative for genital lesions, hot flashes, sexual problems and vaginal discharge  Integument/breast: negative for breast lump, breast tenderness, nipple discharge and skin color change  Hematologic/lymphatic: negative for easy bruising and lymphadenopathy  Neurological: negative for dizziness and headaches  Endocrine: negative for temperature intolerance         Objective:      Wt 58.7 kg (129 lb 6.6 oz)   LMP 11/13/2020   BMI 22.21 kg/m²   General:   alert, appears stated age and cooperative   Neuro:  Alert, oriented x4, normal speech.   Skin:   no rash or abnormalities, no acne. No hirsutism.    Lungs:   Unlabored, even breaths.  Clear to auscultation bilaterally   Heart:   Regular rate and rhythm   Breasts:   normal without suspicious masses, skin or nipple changes or axillary nodes, self-exam is taught and encouraged and deferred   Abdomen:  soft, non-tender; bowel sounds normal; no masses, no organomegaly   Pelvis:  Vulva and vagina appear normal. Bimanual exam reveals normal uterus and adnexa.  External genitalia: normal general appearance  Urinary system: urethral meatus normal  Vaginal: normal mucosa without prolapse or lesions and + blood  Cervix: normal appearance, GC prep obtained, presence of blood from cervical os and affirm abotained  Adnexa: normal bimanual exam  Uterus: normal single, nontender     Lab Review  CBC, Urine pregnancy test, GC/Chlamydia DNA probe of cervico/vaginal secretions and affirm, thyroid studies      Assessment:      The patient has menorrhagia.      Plan:        All questions answered.  Blood tests: CBC with diff, Free T4 level and TSH.  Contraception: NuvaRing vaginal inserts.  Diagnosis explained in detail, including differential.  Follow up as needed.  GC specimen.  Pelvic ultrasound.  Pregnancy test, result: negative.  Affirm, Provera for  10 days and then restart nuvaring     Referral to internal medicine for unintentional weight loss.

## 2020-12-01 DIAGNOSIS — B96.89 BV (BACTERIAL VAGINOSIS): Primary | ICD-10-CM

## 2020-12-01 DIAGNOSIS — N76.0 BV (BACTERIAL VAGINOSIS): Primary | ICD-10-CM

## 2020-12-01 RX ORDER — METRONIDAZOLE 500 MG/1
500 TABLET ORAL 2 TIMES DAILY
Qty: 14 TABLET | Refills: 0 | Status: SHIPPED | OUTPATIENT
Start: 2020-12-01 | End: 2020-12-08

## 2021-10-07 DIAGNOSIS — Z30.015 ENCOUNTER FOR INITIAL PRESCRIPTION OF VAGINAL RING HORMONAL CONTRACEPTIVE: ICD-10-CM

## 2021-10-07 RX ORDER — ETONOGESTREL AND ETHINYL ESTRADIOL VAGINAL RING .015; .12 MG/D; MG/D
1 RING VAGINAL
Qty: 1 EACH | Refills: 0 | Status: SHIPPED | OUTPATIENT
Start: 2021-10-07 | End: 2023-11-21

## 2021-12-21 ENCOUNTER — OFFICE VISIT (OUTPATIENT)
Dept: OBSTETRICS AND GYNECOLOGY | Facility: CLINIC | Age: 31
End: 2021-12-21
Payer: MEDICAID

## 2021-12-21 ENCOUNTER — TELEPHONE (OUTPATIENT)
Dept: RADIOLOGY | Facility: HOSPITAL | Age: 31
End: 2021-12-21
Payer: MEDICAID

## 2021-12-21 VITALS
SYSTOLIC BLOOD PRESSURE: 108 MMHG | WEIGHT: 138.25 LBS | BODY MASS INDEX: 23.73 KG/M2 | DIASTOLIC BLOOD PRESSURE: 72 MMHG

## 2021-12-21 DIAGNOSIS — N64.4 BREAST PAIN: Primary | ICD-10-CM

## 2021-12-21 DIAGNOSIS — N63.0 BREAST MASS IN FEMALE: ICD-10-CM

## 2021-12-21 PROCEDURE — 99213 OFFICE O/P EST LOW 20 MIN: CPT | Mod: S$PBB,,, | Performed by: NURSE PRACTITIONER

## 2021-12-21 PROCEDURE — 99213 OFFICE O/P EST LOW 20 MIN: CPT | Mod: PBBFAC,PN | Performed by: NURSE PRACTITIONER

## 2021-12-21 PROCEDURE — 99999 PR PBB SHADOW E&M-EST. PATIENT-LVL III: CPT | Mod: PBBFAC,,, | Performed by: NURSE PRACTITIONER

## 2021-12-21 PROCEDURE — 99213 PR OFFICE/OUTPT VISIT, EST, LEVL III, 20-29 MIN: ICD-10-PCS | Mod: S$PBB,,, | Performed by: NURSE PRACTITIONER

## 2021-12-21 PROCEDURE — 99999 PR PBB SHADOW E&M-EST. PATIENT-LVL III: ICD-10-PCS | Mod: PBBFAC,,, | Performed by: NURSE PRACTITIONER

## 2021-12-21 RX ORDER — SULFAMETHOXAZOLE AND TRIMETHOPRIM 800; 160 MG/1; MG/1
1 TABLET ORAL 2 TIMES DAILY
Qty: 20 TABLET | Refills: 0 | Status: SHIPPED | OUTPATIENT
Start: 2021-12-21 | End: 2021-12-31

## 2021-12-29 ENCOUNTER — HOSPITAL ENCOUNTER (OUTPATIENT)
Dept: RADIOLOGY | Facility: HOSPITAL | Age: 31
Discharge: HOME | End: 2021-12-29
Attending: NURSE PRACTITIONER
Payer: MEDICAID

## 2021-12-29 DIAGNOSIS — N63.0 BREAST MASS IN FEMALE: ICD-10-CM

## 2021-12-29 DIAGNOSIS — N64.4 BREAST PAIN: ICD-10-CM

## 2021-12-29 PROCEDURE — 76642 ULTRASOUND BREAST LIMITED: CPT | Mod: 26,LT,, | Performed by: RADIOLOGY

## 2021-12-29 PROCEDURE — 76642 US BREAST LEFT LIMITED: ICD-10-PCS | Mod: 26,LT,, | Performed by: RADIOLOGY

## 2021-12-29 PROCEDURE — 76642 ULTRASOUND BREAST LIMITED: CPT | Mod: TC,LT

## 2021-12-29 PROCEDURE — 77062 MAMMO DIGITAL DIAGNOSTIC BILAT WITH TOMO: ICD-10-PCS | Mod: 26,,, | Performed by: RADIOLOGY

## 2021-12-29 PROCEDURE — 77066 DX MAMMO INCL CAD BI: CPT | Mod: 26,,, | Performed by: RADIOLOGY

## 2021-12-29 PROCEDURE — 77066 MAMMO DIGITAL DIAGNOSTIC BILAT WITH TOMO: ICD-10-PCS | Mod: 26,,, | Performed by: RADIOLOGY

## 2021-12-29 PROCEDURE — 77062 BREAST TOMOSYNTHESIS BI: CPT | Mod: 26,,, | Performed by: RADIOLOGY

## 2021-12-29 PROCEDURE — 77066 DX MAMMO INCL CAD BI: CPT | Mod: TC

## 2022-01-25 ENCOUNTER — TELEPHONE (OUTPATIENT)
Dept: OBSTETRICS AND GYNECOLOGY | Facility: CLINIC | Age: 32
End: 2022-01-25
Payer: MEDICAID

## 2022-01-25 DIAGNOSIS — Z32.00 POSSIBLE PREGNANCY: Primary | ICD-10-CM

## 2022-01-28 ENCOUNTER — HOSPITAL ENCOUNTER (EMERGENCY)
Facility: OTHER | Age: 32
Discharge: HOME | End: 2022-01-28
Attending: EMERGENCY MEDICINE
Payer: MEDICAID

## 2022-01-28 VITALS
RESPIRATION RATE: 16 BRPM | HEIGHT: 64 IN | OXYGEN SATURATION: 100 % | TEMPERATURE: 98 F | SYSTOLIC BLOOD PRESSURE: 105 MMHG | DIASTOLIC BLOOD PRESSURE: 76 MMHG | WEIGHT: 130 LBS | BODY MASS INDEX: 22.2 KG/M2 | HEART RATE: 88 BPM

## 2022-01-28 DIAGNOSIS — R10.9 ABDOMINAL PAIN DURING PREGNANCY IN FIRST TRIMESTER: Primary | ICD-10-CM

## 2022-01-28 DIAGNOSIS — O26.891 ABDOMINAL PAIN DURING PREGNANCY IN FIRST TRIMESTER: Primary | ICD-10-CM

## 2022-01-28 LAB
ABO + RH BLD: NORMAL
ALBUMIN SERPL BCP-MCNC: 4.4 G/DL (ref 3.5–5.2)
ALP SERPL-CCNC: 73 U/L (ref 55–135)
ALT SERPL W/O P-5'-P-CCNC: 8 U/L (ref 10–44)
ANION GAP SERPL CALC-SCNC: 9 MMOL/L (ref 8–16)
AST SERPL-CCNC: 17 U/L (ref 10–40)
B-HCG UR QL: POSITIVE
BASOPHILS # BLD AUTO: 0.03 K/UL (ref 0–0.2)
BASOPHILS NFR BLD: 0.5 % (ref 0–1.9)
BILIRUB SERPL-MCNC: 0.6 MG/DL (ref 0.1–1)
BUN SERPL-MCNC: 9 MG/DL (ref 6–20)
CALCIUM SERPL-MCNC: 9.5 MG/DL (ref 8.7–10.5)
CHLORIDE SERPL-SCNC: 104 MMOL/L (ref 95–110)
CO2 SERPL-SCNC: 22 MMOL/L (ref 23–29)
CREAT SERPL-MCNC: 0.8 MG/DL (ref 0.5–1.4)
CTP QC/QA: YES
DIFFERENTIAL METHOD: NORMAL
EOSINOPHIL # BLD AUTO: 0 K/UL (ref 0–0.5)
EOSINOPHIL NFR BLD: 0.3 % (ref 0–8)
ERYTHROCYTE [DISTWIDTH] IN BLOOD BY AUTOMATED COUNT: 13.1 % (ref 11.5–14.5)
EST. GFR  (AFRICAN AMERICAN): >60 ML/MIN/1.73 M^2
EST. GFR  (NON AFRICAN AMERICAN): >60 ML/MIN/1.73 M^2
GLUCOSE SERPL-MCNC: 111 MG/DL (ref 70–110)
HCG INTACT+B SERPL-ACNC: NORMAL MIU/ML
HCT VFR BLD AUTO: 37.3 % (ref 37–48.5)
HCV AB SERPL QL IA: NEGATIVE
HGB BLD-MCNC: 12.3 G/DL (ref 12–16)
HIV 1+2 AB+HIV1 P24 AG SERPL QL IA: NEGATIVE
IMM GRANULOCYTES # BLD AUTO: 0.02 K/UL (ref 0–0.04)
IMM GRANULOCYTES NFR BLD AUTO: 0.3 % (ref 0–0.5)
LYMPHOCYTES # BLD AUTO: 1.3 K/UL (ref 1–4.8)
LYMPHOCYTES NFR BLD: 21.8 % (ref 18–48)
MCH RBC QN AUTO: 29.3 PG (ref 27–31)
MCHC RBC AUTO-ENTMCNC: 33 G/DL (ref 32–36)
MCV RBC AUTO: 89 FL (ref 82–98)
MONOCYTES # BLD AUTO: 0.6 K/UL (ref 0.3–1)
MONOCYTES NFR BLD: 10.2 % (ref 4–15)
NEUTROPHILS # BLD AUTO: 4 K/UL (ref 1.8–7.7)
NEUTROPHILS NFR BLD: 66.9 % (ref 38–73)
NRBC BLD-RTO: 0 /100 WBC
PLATELET # BLD AUTO: 270 K/UL (ref 150–450)
PMV BLD AUTO: 12 FL (ref 9.2–12.9)
POTASSIUM SERPL-SCNC: 3.8 MMOL/L (ref 3.5–5.1)
PROT SERPL-MCNC: 8.7 G/DL (ref 6–8.4)
RBC # BLD AUTO: 4.2 M/UL (ref 4–5.4)
SODIUM SERPL-SCNC: 135 MMOL/L (ref 136–145)
WBC # BLD AUTO: 5.97 K/UL (ref 3.9–12.7)

## 2022-01-28 PROCEDURE — 81025 URINE PREGNANCY TEST: CPT | Performed by: EMERGENCY MEDICINE

## 2022-01-28 PROCEDURE — 84702 CHORIONIC GONADOTROPIN TEST: CPT | Performed by: EMERGENCY MEDICINE

## 2022-01-28 PROCEDURE — 87389 HIV-1 AG W/HIV-1&-2 AB AG IA: CPT | Performed by: EMERGENCY MEDICINE

## 2022-01-28 PROCEDURE — 80053 COMPREHEN METABOLIC PANEL: CPT | Performed by: EMERGENCY MEDICINE

## 2022-01-28 PROCEDURE — 86803 HEPATITIS C AB TEST: CPT | Performed by: EMERGENCY MEDICINE

## 2022-01-28 PROCEDURE — 85025 COMPLETE CBC W/AUTO DIFF WBC: CPT | Performed by: EMERGENCY MEDICINE

## 2022-01-28 PROCEDURE — 99284 EMERGENCY DEPT VISIT MOD MDM: CPT | Mod: 25

## 2022-01-28 PROCEDURE — 86901 BLOOD TYPING SEROLOGIC RH(D): CPT | Performed by: EMERGENCY MEDICINE

## 2022-01-28 RX ORDER — PROMETHAZINE HYDROCHLORIDE 25 MG/1
25 TABLET ORAL EVERY 6 HOURS PRN
Qty: 25 TABLET | Refills: 0 | Status: SHIPPED | OUTPATIENT
Start: 2022-01-28 | End: 2022-03-29

## 2022-01-28 RX ORDER — ONDANSETRON 4 MG/1
4 TABLET, ORALLY DISINTEGRATING ORAL EVERY 6 HOURS PRN
Qty: 20 TABLET | Refills: 0 | Status: SHIPPED | OUTPATIENT
Start: 2022-01-28 | End: 2022-03-29 | Stop reason: SDUPTHER

## 2022-01-28 NOTE — ED PROVIDER NOTES
"Encounter Date: 2022    SCRIBE #1 NOTE: I, Fabiola Madison, am scribing for, and in the presence of, Jey Rader MD.       History     Chief Complaint   Patient presents with    Abdominal Pain     Intermittent right sided abdominal pain x 2 days.  Reports that it feels like "gas" pain or a gallbladder attack.  Hx of gallbladder removal.  Last BM yesterday and was WNL.  Denies n/v/d.    Possible Pregnancy     Reports taking 2 pregnancy tests at home; one test + and one test -.  LMP 2021.  Denies vaginal bleeding.     Time seen by provider: 9:01 AM    This is a 31 y.o. female who presents with complaint of intermittent RLQ abdominal pain x 2 day. She states that the pain radiates upwards into the RUQ. She describes the pain as "gas pains." There are no exacerbating factors. She denies any N/V/D. No dysuria, hematuria or vaginal discharge. No fever, chills or shortness of breath. She notes that she took two at home pregnancy tests. The first was positive and the second was negative. Her last menstrual period was . She denies any vaginal bleeding. She is . This is the extent of the patient's complaints at this time.    The history is provided by the patient.     Review of patient's allergies indicates:  No Known Allergies  Past Medical History:   Diagnosis Date    Abnormal Pap smear of cervix yrs ago    repeat pap    Gall stones     Ovarian cyst      Past Surgical History:   Procedure Laterality Date    CHOLECYSTECTOMY  2017     Laparoscopic cholecystectomy -     DILATION AND CURETTAGE OF UTERUS USING SUCTION N/A 2019    Procedure: DILATION AND CURETTAGE, UTERUS, USING SUCTION (ADD ON );  Surgeon: Pelon Mead III, MD;  Location: [facility];  Service: OB/GYN;  Laterality: N/A;  (ADD ON )    TONSILLECTOMY, ADENOIDECTOMY       Family History   Problem Relation Age of Onset    Diabetes Maternal Grandmother     Diabetes Mother     Breast cancer Neg Hx     Colon cancer " Neg Hx     Ovarian cancer Neg Hx      Social History     Tobacco Use    Smoking status: Never Smoker    Smokeless tobacco: Never Used   Substance Use Topics    Alcohol use: Yes     Comment: Social     Drug use: No     Review of Systems   Constitutional: Negative for chills and fever.   HENT: Negative for rhinorrhea, sore throat and trouble swallowing.    Respiratory: Negative for chest tightness, shortness of breath and wheezing.    Cardiovascular: Negative for chest pain, palpitations and leg swelling.   Gastrointestinal: Positive for abdominal pain (RLQ). Negative for diarrhea, nausea and vomiting.   Genitourinary: Negative for dysuria, hematuria, vaginal bleeding and vaginal discharge.   Neurological: Negative for speech difficulty and light-headedness.   All other systems reviewed and are negative.      Physical Exam     Initial Vitals [01/28/22 0848]   BP Pulse Resp Temp SpO2   100/75 89 15 98.6 °F (37 °C) 100 %      MAP       --         Physical Exam    Nursing note and vitals reviewed.  Constitutional: She appears well-developed and well-nourished. She is not diaphoretic. No distress.   HENT:   Head: Normocephalic and atraumatic.   Right Ear: External ear normal.   Left Ear: External ear normal.   Eyes: Conjunctivae and EOM are normal. Pupils are equal, round, and reactive to light.   Neck: Neck supple. No tracheal deviation present.   Normal range of motion.  Cardiovascular: Normal rate, regular rhythm, normal heart sounds and intact distal pulses. Exam reveals no gallop and no friction rub.    No murmur heard.  Pulmonary/Chest: Breath sounds normal. No respiratory distress. She has no wheezes. She has no rhonchi. She has no rales. She exhibits no tenderness.   Abdominal: Abdomen is soft. Bowel sounds are normal. She exhibits no distension and no mass. There is no abdominal tenderness.   No CVA tenderness to palpation or percussion There is no rebound and no guarding.   Musculoskeletal:         General:  No tenderness or edema. Normal range of motion.      Cervical back: Normal range of motion and neck supple.     Neurological: She is alert and oriented to person, place, and time.   Skin: Skin is warm and dry. Capillary refill takes less than 2 seconds.   Psychiatric: She has a normal mood and affect. Thought content normal.         ED Course   Procedures  Labs Reviewed   COMPREHENSIVE METABOLIC PANEL - Abnormal; Notable for the following components:       Result Value    Sodium 135 (*)     CO2 22 (*)     Glucose 111 (*)     Total Protein 8.7 (*)     ALT 8 (*)     All other components within normal limits    Narrative:     Release to patient->Immediate   POCT URINE PREGNANCY - Abnormal; Notable for the following components:    POC Preg Test, Ur Positive (*)     All other components within normal limits   HIV 1 / 2 ANTIBODY    Narrative:     Release to patient->Immediate   HEPATITIS C ANTIBODY    Narrative:     Release to patient->Immediate   CBC W/ AUTO DIFFERENTIAL    Narrative:     Release to patient->Immediate   HCG, QUANTITATIVE    Narrative:     Release to patient->Immediate   GROUP & RH          Imaging Results          US OB <14 Wks TransAbd & TransVag, Single Gestation (XPD) (Final result)  Result time 01/28/22 10:36:59    Final result by Alirio Mortensen MD (01/28/22 10:36:59)                 Impression:      Imaging findings compatible with intrauterine pregnancy at reported 5 weeks 2 days gestational age (by LMP), of unknown viability.  Given recorded mean sac diameter, findings may represent early intrauterine gestational sac.  However, close clinical follow-up with serial quantitative beta HCG and follow-up ultrasound would be recommended.      Electronically signed by: Alirio Mortnesen  Date:    01/28/2022  Time:    10:36             Narrative:    EXAMINATION:  US OB <14 WEEKS, TRANSABDOM & TRANSVAG, SINGLE GESTATION (XPD)    CLINICAL HISTORY:  Vag Bleeding;    TECHNIQUE:  Transabdominal sonography of  the pelvis was performed, followed by transvaginal sonography to better evaluate the uterus and ovaries.    COMPARISON:  None.    FINDINGS:  Uterus: Measures 8.2 x 3.8 x 6.9 cm.    Intrauterine gestation(s): Single    Mean gestational sac diameter: 0.9 cm    Yolk sac: Identified, measuring approximately 0.18 cm.    Crown-rump length (CRL): Not applicable.    Cardiac activity: Not identified.    Subchorionic hemorrhage: None.    Right ovary: Measures 1.3 x 1.5 x 1.7 cm and is unremarkable in appearance.    Left ovary: Measures 3.2 x 4.3 x 2 cm.  Complex mixed echogenicity region measures 2.2 x 2.2 x 1.9 cm, with appearance suggesting corpus luteum cyst.    Miscellaneous: No Free Fluid.                                 Medications - No data to display  Medical Decision Making:   History:   Old Medical Records: I decided to obtain old medical records.  Differential Diagnosis:   Ectopic pregnancy, threatened miscarriage, miscarriage, ovarian torsion, ovarian cyst rupture, PID, BV, TOA, urinary tract infection, acute pyelonephritis, ureterolithiais, AAA rupture / dissection, diverticulitis, colitis, inflammatory bowel disease, gastroenteritis, gastritis, ulcer, cholecystitis, gallstones, pancreatitis, ileus, small bowel obstruction, appendicitis, constipation, intestinal gas pain, GERD, intestinal spasm,  intrabominal hemorrhage, intrabominal thrombus      Clinical Tests:   Lab Tests: Reviewed and Ordered  Radiological Study: Ordered and Reviewed  ED Management:  Patient with ultrasound that shows positive IUP, a normal right ovary, no tenderness to palpation on my exam and she is hemodynamically stable; therefore, I feel it is safe and appropriate at this time for the patient to be discharged for follow up and re-evaluation as detailed in the discharge instructions. No further workup indicated based on their complaints or examination today. Discussed results with the patient. I educated the patient/guardian on the  warning signs and symptoms for which they must seek immediate medical attention. All questions addressed and patient/guardian were given discharge instructions and followup information.     Management decisions for this encounter made during a severe acute wave of the COVID-19 public health emergency. Available resources, standards for appropriate emergency department evaluation, and admission vs. discharge standards have necessarily shifted and remain dynamic.     Note was created using voice recognition software. It may have occasional typographical errors not identified and edited despite initial review prior to signing.            Scribe Attestation:   Scribe #1: I performed the above scribed service and the documentation accurately describes the services I performed. I attest to the accuracy of the note.              Physician Attestation for Scribe: I, MAA, reviewed documentation as scribed in my presence, which is both accurate and complete.    Clinical Impression:   Final diagnoses:  [O26.891, R10.9] Abdominal pain during pregnancy in first trimester (Primary)          ED Disposition Condition    Discharge Stable        ED Prescriptions     Medication Sig Dispense Start Date End Date Auth. Provider    promethazine (PHENERGAN) 25 MG tablet Take 1 tablet (25 mg total) by mouth every 6 (six) hours as needed for Nausea. 25 tablet 1/28/2022  Jey Rader MD    ondansetron (ZOFRAN-ODT) 4 MG TbDL Take 1 tablet (4 mg total) by mouth every 6 (six) hours as needed (Nausea and vomiting). 20 tablet 1/28/2022  Jey Rader MD        Follow-up Information     Follow up With Specialties Details Why Contact Info    [facility] Obstetrics and Gynecology Schedule an appointment as soon as possible for a visit in 3 days For follow-up and re-evaluation by OBGYN [address], [address]  [facility] [address] [phone]    [facility] [place] [facility] Emergency Medicine  As needed, for any new or  worsening symptoms [address]  [facility] [address]  [phone]           Jey Rader MD  01/28/22 3193

## 2022-01-28 NOTE — ED TRIAGE NOTES
"Chief Complaint   Patient presents with    Abdominal Pain     Intermittent right sided abdominal pain x 2 days.  Reports that it feels like "gas" pain or a gallbladder attack.  Hx of gallbladder removal.  Last BM yesterday and was WNL.  Denies n/v/d.    Possible Pregnancy     Reports taking 2 pregnancy tests at home; one test + and one test -.  LMP 12/22/2021.  Denies vaginal bleeding.     Pt has had 2 previous pregnancies; 1 miscarriage and 1 delivery.    "

## 2022-01-31 ENCOUNTER — TELEPHONE (OUTPATIENT)
Dept: OBSTETRICS AND GYNECOLOGY | Facility: CLINIC | Age: 32
End: 2022-01-31
Payer: MEDICAID

## 2022-01-31 DIAGNOSIS — Z34.90 PREGNANCY: Primary | ICD-10-CM

## 2022-02-07 ENCOUNTER — PATIENT MESSAGE (OUTPATIENT)
Dept: OBSTETRICS AND GYNECOLOGY | Facility: CLINIC | Age: 32
End: 2022-02-07
Payer: MEDICAID

## 2022-02-07 DIAGNOSIS — O21.9 NAUSEA AND VOMITING DURING PREGNANCY: Primary | ICD-10-CM

## 2022-02-07 RX ORDER — DOXYLAMINE SUCCINATE AND PYRIDOXINE HYDROCHLORIDE, DELAYED RELEASE TABLETS 10 MG/10 MG 10; 10 MG/1; MG/1
2 TABLET, DELAYED RELEASE ORAL NIGHTLY
Qty: 60 TABLET | Refills: 1 | Status: ON HOLD | OUTPATIENT
Start: 2022-02-07 | End: 2022-09-22 | Stop reason: HOSPADM

## 2022-02-07 RX ORDER — PROMETHAZINE HYDROCHLORIDE 25 MG/1
25 SUPPOSITORY RECTAL EVERY 6 HOURS PRN
Qty: 12 SUPPOSITORY | Refills: 1 | OUTPATIENT
Start: 2022-02-07 | End: 2023-02-07

## 2022-02-07 NOTE — TELEPHONE ENCOUNTER
Spoke with pt in regards to concerns   Pt informed me that zofran does not help her and she would like phenergan instead   Verified pharmacy with pt.

## 2022-02-16 ENCOUNTER — TELEPHONE (OUTPATIENT)
Dept: OBSTETRICS AND GYNECOLOGY | Facility: CLINIC | Age: 32
End: 2022-02-16
Payer: MEDICAID

## 2022-02-20 ENCOUNTER — HOSPITAL ENCOUNTER (EMERGENCY)
Facility: HOSPITAL | Age: 32
Discharge: HOME | End: 2022-02-20
Attending: EMERGENCY MEDICINE
Payer: MEDICAID

## 2022-02-20 VITALS
RESPIRATION RATE: 18 BRPM | BODY MASS INDEX: 21.46 KG/M2 | OXYGEN SATURATION: 99 % | TEMPERATURE: 98 F | WEIGHT: 125 LBS | SYSTOLIC BLOOD PRESSURE: 110 MMHG | DIASTOLIC BLOOD PRESSURE: 60 MMHG | HEART RATE: 73 BPM

## 2022-02-20 DIAGNOSIS — S39.012A BACK STRAIN, INITIAL ENCOUNTER: ICD-10-CM
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
## 2022-02-20 LAB
B-HCG UR QL: POSITIVE
BACTERIA #/AREA URNS HPF: NORMAL /HPF
BILIRUB UR QL STRIP: NEGATIVE
CLARITY UR: CLEAR
COLOR UR: YELLOW
CTP QC/QA: YES
GLUCOSE UR QL STRIP: NEGATIVE
HGB UR QL STRIP: NEGATIVE
KETONES UR QL STRIP: NEGATIVE
LEUKOCYTE ESTERASE UR QL STRIP: ABNORMAL
MICROSCOPIC COMMENT: NORMAL
NITRITE UR QL STRIP: NEGATIVE
PH UR STRIP: 7 [PH] (ref 5–8)
PROT UR QL STRIP: NEGATIVE
RBC #/AREA URNS HPF: 1 /HPF (ref 0–4)
SP GR UR STRIP: 1.02 (ref 1–1.03)
SQUAMOUS #/AREA URNS HPF: 6 /HPF
URN SPEC COLLECT METH UR: ABNORMAL
UROBILINOGEN UR STRIP-ACNC: NEGATIVE EU/DL
WBC #/AREA URNS HPF: 4 /HPF (ref 0–5)

## 2022-02-20 PROCEDURE — 81000 URINALYSIS NONAUTO W/SCOPE: CPT | Performed by: EMERGENCY MEDICINE

## 2022-02-20 PROCEDURE — 81025 URINE PREGNANCY TEST: CPT | Performed by: EMERGENCY MEDICINE

## 2022-02-20 PROCEDURE — 25000003 PHARM REV CODE 250: Performed by: PHYSICIAN ASSISTANT

## 2022-02-20 PROCEDURE — 99283 EMERGENCY DEPT VISIT LOW MDM: CPT | Mod: 25

## 2022-02-20 RX ORDER — ACETAMINOPHEN 500 MG
500 TABLET ORAL
Status: COMPLETED | OUTPATIENT
Start: 2022-02-20 | End: 2022-02-20

## 2022-02-20 RX ADMIN — ACETAMINOPHEN 500 MG: 500 TABLET ORAL at 04:02
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
## 2022-02-20 NOTE — DISCHARGE INSTRUCTIONS
You are instructed to follow-up with your OBGYN for re-evaluation on 02/22/2022.  You are instructed to return to the emergency department immediately for any new or worsening symptoms.

## 2022-02-21 NOTE — ED NOTES
Pt given discharge and follow up instructions. Pt verbalized understanding and ambulated out of ER with steady gait in stable condition.

## 2022-02-21 NOTE — ED NOTES
Pt moved to room 9 by AUSTIN Moore. She performed a bedside ultrasound and discussed discharge and follow up instructions.

## 2022-02-22 ENCOUNTER — LAB VISIT (OUTPATIENT)
Dept: LAB | Facility: HOSPITAL | Age: 32
End: 2022-02-22
Attending: OBSTETRICS & GYNECOLOGY
Payer: MEDICAID

## 2022-02-22 ENCOUNTER — PROCEDURE VISIT (OUTPATIENT)
Dept: OBSTETRICS AND GYNECOLOGY | Facility: CLINIC | Age: 32
End: 2022-02-22
Payer: MEDICAID

## 2022-02-22 ENCOUNTER — INITIAL PRENATAL (OUTPATIENT)
Dept: OBSTETRICS AND GYNECOLOGY | Facility: CLINIC | Age: 32
End: 2022-02-22
Payer: MEDICAID

## 2022-02-22 VITALS
WEIGHT: 138.56 LBS | BODY MASS INDEX: 23.78 KG/M2 | SYSTOLIC BLOOD PRESSURE: 112 MMHG | DIASTOLIC BLOOD PRESSURE: 62 MMHG

## 2022-02-22 DIAGNOSIS — Z3A.09 9 WEEKS GESTATION OF PREGNANCY: ICD-10-CM

## 2022-02-22 DIAGNOSIS — Z34.91 INITIAL OBSTETRIC VISIT IN FIRST TRIMESTER: ICD-10-CM

## 2022-02-22 DIAGNOSIS — Z34.91 INITIAL OBSTETRIC VISIT IN FIRST TRIMESTER: Primary | ICD-10-CM

## 2022-02-22 DIAGNOSIS — O21.9 NAUSEA AND VOMITING IN PREGNANCY: ICD-10-CM

## 2022-02-22 DIAGNOSIS — Z34.90 PREGNANCY: ICD-10-CM

## 2022-02-22 LAB
ABO + RH BLD: NORMAL
BASOPHILS # BLD AUTO: 0.05 K/UL (ref 0–0.2)
BASOPHILS NFR BLD: 1 % (ref 0–1.9)
BLD GP AB SCN CELLS X3 SERPL QL: NORMAL
DIFFERENTIAL METHOD: ABNORMAL
EOSINOPHIL # BLD AUTO: 0 K/UL (ref 0–0.5)
EOSINOPHIL NFR BLD: 0.4 % (ref 0–8)
ERYTHROCYTE [DISTWIDTH] IN BLOOD BY AUTOMATED COUNT: 13.3 % (ref 11.5–14.5)
HCT VFR BLD AUTO: 33.7 % (ref 37–48.5)
HGB BLD-MCNC: 10.8 G/DL (ref 12–16)
IMM GRANULOCYTES # BLD AUTO: 0.02 K/UL (ref 0–0.04)
IMM GRANULOCYTES NFR BLD AUTO: 0.4 % (ref 0–0.5)
LYMPHOCYTES # BLD AUTO: 1.3 K/UL (ref 1–4.8)
LYMPHOCYTES NFR BLD: 26 % (ref 18–48)
MCH RBC QN AUTO: 29.6 PG (ref 27–31)
MCHC RBC AUTO-ENTMCNC: 32 G/DL (ref 32–36)
MCV RBC AUTO: 92 FL (ref 82–98)
MONOCYTES # BLD AUTO: 0.5 K/UL (ref 0.3–1)
MONOCYTES NFR BLD: 10.5 % (ref 4–15)
NEUTROPHILS # BLD AUTO: 3.1 K/UL (ref 1.8–7.7)
NEUTROPHILS NFR BLD: 61.7 % (ref 38–73)
NRBC BLD-RTO: 0 /100 WBC
PLATELET # BLD AUTO: 223 K/UL (ref 150–450)
PMV BLD AUTO: 12.5 FL (ref 9.2–12.9)
RBC # BLD AUTO: 3.65 M/UL (ref 4–5.4)
WBC # BLD AUTO: 5.03 K/UL (ref 3.9–12.7)

## 2022-02-22 PROCEDURE — 86900 BLOOD TYPING SEROLOGIC ABO: CPT | Performed by: OBSTETRICS & GYNECOLOGY

## 2022-02-22 PROCEDURE — 76801 US OB/GYN PROCEDURE (VIEWPOINT): ICD-10-PCS | Mod: 26,S$PBB,, | Performed by: OBSTETRICS & GYNECOLOGY

## 2022-02-22 PROCEDURE — 87086 URINE CULTURE/COLONY COUNT: CPT | Performed by: OBSTETRICS & GYNECOLOGY

## 2022-02-22 PROCEDURE — 85025 COMPLETE CBC W/AUTO DIFF WBC: CPT | Performed by: OBSTETRICS & GYNECOLOGY

## 2022-02-22 PROCEDURE — 99212 OFFICE O/P EST SF 10 MIN: CPT | Mod: PBBFAC,TH,PN | Performed by: OBSTETRICS & GYNECOLOGY

## 2022-02-22 PROCEDURE — 88175 CYTOPATH C/V AUTO FLUID REDO: CPT | Performed by: OBSTETRICS & GYNECOLOGY

## 2022-02-22 PROCEDURE — 86850 RBC ANTIBODY SCREEN: CPT | Performed by: OBSTETRICS & GYNECOLOGY

## 2022-02-22 PROCEDURE — 86592 SYPHILIS TEST NON-TREP QUAL: CPT | Performed by: OBSTETRICS & GYNECOLOGY

## 2022-02-22 PROCEDURE — 36415 COLL VENOUS BLD VENIPUNCTURE: CPT | Mod: PN | Performed by: OBSTETRICS & GYNECOLOGY

## 2022-02-22 PROCEDURE — 99214 OFFICE O/P EST MOD 30 MIN: CPT | Mod: TH,S$PBB,, | Performed by: OBSTETRICS & GYNECOLOGY

## 2022-02-22 PROCEDURE — 99214 PR OFFICE/OUTPT VISIT, EST, LEVL IV, 30-39 MIN: ICD-10-PCS | Mod: TH,S$PBB,, | Performed by: OBSTETRICS & GYNECOLOGY

## 2022-02-22 PROCEDURE — 87389 HIV-1 AG W/HIV-1&-2 AB AG IA: CPT | Performed by: OBSTETRICS & GYNECOLOGY

## 2022-02-22 PROCEDURE — 99999 PR PBB SHADOW E&M-EST. PATIENT-LVL II: ICD-10-PCS | Mod: PBBFAC,,, | Performed by: OBSTETRICS & GYNECOLOGY

## 2022-02-22 PROCEDURE — 87340 HEPATITIS B SURFACE AG IA: CPT | Performed by: OBSTETRICS & GYNECOLOGY

## 2022-02-22 PROCEDURE — 99999 PR PBB SHADOW E&M-EST. PATIENT-LVL II: CPT | Mod: PBBFAC,,, | Performed by: OBSTETRICS & GYNECOLOGY

## 2022-02-22 PROCEDURE — 87591 N.GONORRHOEAE DNA AMP PROB: CPT | Performed by: OBSTETRICS & GYNECOLOGY

## 2022-02-22 PROCEDURE — 76801 OB US < 14 WKS SINGLE FETUS: CPT | Mod: PBBFAC | Performed by: OBSTETRICS & GYNECOLOGY

## 2022-02-22 PROCEDURE — 86762 RUBELLA ANTIBODY: CPT | Performed by: OBSTETRICS & GYNECOLOGY

## 2022-02-22 PROCEDURE — 87491 CHLMYD TRACH DNA AMP PROBE: CPT | Performed by: OBSTETRICS & GYNECOLOGY

## 2022-02-22 PROCEDURE — 87624 HPV HI-RISK TYP POOLED RSLT: CPT | Performed by: OBSTETRICS & GYNECOLOGY

## 2022-02-22 NOTE — PROGRESS NOTES
@9w0d: Here today for INOB. Dating scan c/w LMP. Denies pelvic cramping or bleeding. H/o  at 39 weeks (eIOL) in 2016, 6#13, no complications. Denies problems during that pregnancy. Did not see MFM. No h/o GHTN/PreE or GDM. Denies medical problems and is not taking any medications.   Nausea minimally improved with Zofran. Recommend Vitamin B6 and Unisom, available OTC.   Discussed genetic screening, interested in MT21. Information provided.   INOB labs and pap collected today. Normal pelvic exam.  RTC 4 weeks for OB f/u, sooner PRN. SAB precautions reviewed.

## 2022-02-23 LAB
BACTERIA UR CULT: NORMAL
RPR SER QL: NORMAL
RUBV IGG SER-ACNC: 18.2 IU/ML
RUBV IGG SER-IMP: REACTIVE

## 2022-02-23 RX ORDER — PNV 112/IRON/FOLIC/OM3/DHA/EPA 3.33-.33MG
TABLET,CHEWABLE ORAL
Qty: 90 TABLET | Refills: 4 | Status: ON HOLD | OUTPATIENT
Start: 2022-02-23 | End: 2022-09-22 | Stop reason: HOSPADM

## 2022-02-24 LAB
C TRACH DNA SPEC QL NAA+PROBE: NOT DETECTED
N GONORRHOEA DNA SPEC QL NAA+PROBE: NOT DETECTED

## 2022-02-28 LAB
CLINICAL INFO: NORMAL
CYTO CVX: NORMAL
CYTOLOGIST CVX/VAG CYTO: NORMAL
CYTOLOGIST CVX/VAG CYTO: NORMAL
CYTOLOGY CMNT CVX/VAG CYTO-IMP: NORMAL
CYTOLOGY PAP THIN PREP EXPLANATION: NORMAL
DATE OF PREVIOUS PAP: NORMAL
DATE PREVIOUS BX: NO
GEN CATEG CVX/VAG CYTO-IMP: NORMAL
HBV SURFACE AG SERPL QL IA: NEGATIVE
HIV 1+2 AB+HIV1 P24 AG SERPL QL IA: NEGATIVE
HPV I/H RISK 4 DNA CVX QL NAA+PROBE: NOT DETECTED
LMP START DATE: NORMAL
MICROORGANISM CVX/VAG CYTO: NORMAL
PATHOLOGIST CVX/VAG CYTO: NORMAL
SERVICE CMNT-IMP: NORMAL
SPECIMEN SOURCE CVX/VAG CYTO: NORMAL
STAT OF ADQ CVX/VAG CYTO-IMP: NORMAL

## 2022-03-08 ENCOUNTER — TELEPHONE (OUTPATIENT)
Dept: OBSTETRICS AND GYNECOLOGY | Facility: CLINIC | Age: 32
End: 2022-03-08
Payer: MEDICAID

## 2022-03-10 ENCOUNTER — TELEPHONE (OUTPATIENT)
Dept: RESEARCH | Facility: OTHER | Age: 32
End: 2022-03-10
Payer: MEDICAID

## 2022-03-10 NOTE — TELEPHONE ENCOUNTER
IRB NO. [id number]    [employer]ILL[employer] Lakesha was contacted in regard to a research study (IRB #[id number]) by myself. This is the first contact attempt in regard to this study. Patient declined to participate in the study.

## 2022-03-12 ENCOUNTER — HOSPITAL ENCOUNTER (EMERGENCY)
Facility: OTHER | Age: 32
Discharge: HOME | End: 2022-03-12
Attending: EMERGENCY MEDICINE
Payer: MEDICAID

## 2022-03-12 VITALS
BODY MASS INDEX: 23.9 KG/M2 | DIASTOLIC BLOOD PRESSURE: 58 MMHG | OXYGEN SATURATION: 100 % | HEIGHT: 64 IN | TEMPERATURE: 99 F | WEIGHT: 140 LBS | HEART RATE: 70 BPM | SYSTOLIC BLOOD PRESSURE: 106 MMHG | RESPIRATION RATE: 16 BRPM

## 2022-03-12 DIAGNOSIS — N76.0 BV (BACTERIAL VAGINOSIS): ICD-10-CM

## 2022-03-12 DIAGNOSIS — M54.9 ACUTE UPPER BACK PAIN: ICD-10-CM

## 2022-03-12 DIAGNOSIS — O20.9 BLEEDING IN EARLY PREGNANCY: Primary | ICD-10-CM

## 2022-03-12 DIAGNOSIS — B96.89 BV (BACTERIAL VAGINOSIS): ICD-10-CM

## 2022-03-12 LAB
BACTERIA GENITAL QL WET PREP: ABNORMAL
BILIRUB UR QL STRIP: NEGATIVE
CLARITY UR: CLEAR
CLUE CELLS VAG QL WET PREP: ABNORMAL
COLOR UR: YELLOW
FILAMENT FUNGI VAG WET PREP-#/AREA: ABNORMAL
GLUCOSE UR QL STRIP: NEGATIVE
HGB UR QL STRIP: ABNORMAL
KETONES UR QL STRIP: NEGATIVE
LEUKOCYTE ESTERASE UR QL STRIP: NEGATIVE
NITRITE UR QL STRIP: NEGATIVE
PH UR STRIP: 7 [PH] (ref 5–8)
PROT UR QL STRIP: NEGATIVE
SP GR UR STRIP: 1.02 (ref 1–1.03)
SPECIMEN SOURCE: ABNORMAL
T VAGINALIS GENITAL QL WET PREP: ABNORMAL
URN SPEC COLLECT METH UR: ABNORMAL
UROBILINOGEN UR STRIP-ACNC: NEGATIVE EU/DL
WBC #/AREA VAG WET PREP: ABNORMAL
YEAST GENITAL QL WET PREP: ABNORMAL

## 2022-03-12 PROCEDURE — 87210 SMEAR WET MOUNT SALINE/INK: CPT | Performed by: EMERGENCY MEDICINE

## 2022-03-12 PROCEDURE — 87491 CHLMYD TRACH DNA AMP PROBE: CPT | Performed by: EMERGENCY MEDICINE

## 2022-03-12 PROCEDURE — 99284 EMERGENCY DEPT VISIT MOD MDM: CPT | Mod: 25

## 2022-03-12 PROCEDURE — 81003 URINALYSIS AUTO W/O SCOPE: CPT | Performed by: EMERGENCY MEDICINE

## 2022-03-12 PROCEDURE — 87591 N.GONORRHOEAE DNA AMP PROB: CPT | Performed by: EMERGENCY MEDICINE

## 2022-03-12 RX ORDER — METRONIDAZOLE 500 MG/1
500 TABLET ORAL 2 TIMES DAILY
Qty: 14 TABLET | Refills: 0 | Status: SHIPPED | OUTPATIENT
Start: 2022-03-12 | End: 2022-03-19

## 2022-03-12 NOTE — ED PROVIDER NOTES
Encounter Date: 3/12/2022    SCRIBE #1 NOTE: Sameer HOLM III, anna scribing for, and in the presence of, Yahaira Templeton MD.       History     Chief Complaint   Patient presents with    Back Pain     Thoracic region back pain x 1 week. Spotting post urination 5 hours.     Jose Luis Crowder is a 31 y.o. female in her 1st trimester, with a PMHx of gallstones and ovarian cysts, who presents to the ED with complaint of constant thoracic region back pain that began 1 week ago. She locates the back pain near her right shoulder blade but she denies any trauma or heavy lifting. Patient states the pain is similar to when she had her gallbladder removed. Patient also reports spotting after urination 5 hrs prior to arrival. She is 11 weeks pregnant. She denies dysuria, vaginal discharge, frequency, or other associated genitourinary symptoms.  No other exacerbating or alleviating factors. Denies any other associated symptoms.       The history is provided by the patient.     Review of patient's allergies indicates:  No Known Allergies  Past Medical History:   Diagnosis Date    Abnormal Pap smear of cervix yrs ago    repeat pap    Gall stones     Ovarian cyst      Past Surgical History:   Procedure Laterality Date    CHOLECYSTECTOMY  05/01/2017     Laparoscopic cholecystectomy -     DILATION AND CURETTAGE OF UTERUS USING SUCTION N/A 2/19/2019    Procedure: DILATION AND CURETTAGE, UTERUS, USING SUCTION (ADD ON );  Surgeon: Pelon Mead III, MD;  Location: [facility];  Service: OB/GYN;  Laterality: N/A;  (ADD ON )    TONSILLECTOMY, ADENOIDECTOMY       Family History   Problem Relation Age of Onset    Diabetes Maternal Grandmother     Diabetes Mother     Breast cancer Neg Hx     Colon cancer Neg Hx     Ovarian cancer Neg Hx      Social History     Tobacco Use    Smoking status: Never Smoker    Smokeless tobacco: Never Used   Substance Use Topics    Alcohol use: Yes     Comment: Social     Drug use: No      Review of Systems   Constitutional: Negative for fever.   HENT: Negative for sore throat.    Respiratory: Negative for shortness of breath.    Cardiovascular: Negative for chest pain.   Gastrointestinal: Negative for nausea.   Genitourinary: Positive for vaginal bleeding. Negative for dysuria, frequency and vaginal discharge.   Musculoskeletal: Positive for back pain (thoracic).   Skin: Negative for rash.   Neurological: Negative for weakness.   Hematological: Does not bruise/bleed easily.   All other systems reviewed and are negative.      Physical Exam     Initial Vitals [03/12/22 0344]   BP Pulse Resp Temp SpO2   (!) 100/53 65 16 98.5 °F (36.9 °C) 98 %      MAP       --         Physical Exam    Nursing note and vitals reviewed.  Constitutional: She appears well-developed and well-nourished. She does not have a sickly appearance. No distress.   HENT:   Head: Normocephalic and atraumatic.   Right Ear: External ear normal.   Left Ear: External ear normal.   Eyes: Conjunctivae, EOM and lids are normal. Right eye exhibits no discharge. Left eye exhibits no discharge. Right conjunctiva is not injected. Right conjunctiva has no hemorrhage. Left conjunctiva is not injected. Left conjunctiva has no hemorrhage. No scleral icterus.   Neck: Phonation normal. No stridor present. No tracheal deviation present.   Normal range of motion.  Cardiovascular: Normal rate, regular rhythm and normal heart sounds. Exam reveals no friction rub.    No murmur heard.  Pulses:       Radial pulses are 2+ on the right side and 2+ on the left side.        Dorsalis pedis pulses are 2+ on the right side and 2+ on the left side.   Pulmonary/Chest: Breath sounds normal.   Genitourinary: Cervix exhibits no motion tenderness. Right adnexum displays no tenderness.    Genitourinary Comments: Trace amount of white discharge. No blood. No CMT. No adnexal tenderness. Cervix closed.      Musculoskeletal:      Cervical back: Normal range of motion.       Comments: Midline and paraspinal tenderness to right thoracic region. No CVA tenderness. Suprapubic tenderness     Neurological: She is alert and oriented to person, place, and time. She has normal strength. GCS eye subscore is 4. GCS verbal subscore is 5. GCS motor subscore is 6.   Skin: Skin is warm.   Psychiatric: She has a normal mood and affect. Her speech is normal and behavior is normal. Judgment and thought content normal. Cognition and memory are normal.         ED Course   Procedures  Labs Reviewed   VAGINAL SCREEN - Abnormal; Notable for the following components:       Result Value    Clue Cells Few (*)     WBC - Vaginal Screen Few (*)     Bacteria - Vaginal Screen Moderate (*)     All other components within normal limits    Narrative:     Release to patient->Immediate   URINALYSIS, REFLEX TO URINE CULTURE - Abnormal; Notable for the following components:    Occult Blood UA Trace (*)     All other components within normal limits    Narrative:     Specimen Source->Urine   C. TRACHOMATIS/N. GONORRHOEAE BY AMP DNA          Imaging Results    None          Medications - No data to display  Medical Decision Making:   History:   Old Medical Records: I decided to obtain old medical records.  Clinical Tests:   Lab Tests: Ordered and Reviewed    Additional MDM:   Comments: 31-year-old female currently in her 1st trimester presents complaining of upper back pain as well as spotting tonight.    Spotting - with wiping only after urination.  Abdominal exam significant only for suprapubic tenderness to deep palpation.  trace amount of whitish clear discharge on exam but no blood.  Wet prep was positive for BV.  UA negative for UTI.  Spotting at this time is likely secondary to BV T. The patient was given a prescription for Flagyl and instructed to follow up with her OB for re-evaluation.  She was also given precautions for seek immediate re-evaluation in the emergency department.    Back pain -  no focal bony  tenderness to palpation.  suspect muscular etiology.  the patient counseled on supportive care for home which include taking Tylenol, applying heat to the area, stretching, and massage..        Scribe Attestation:   Scribe #1: I performed the above scribed service and the documentation accurately describes the services I performed. I attest to the accuracy of the note.                Physician Attestation for Scribe: I, Yahaira Templeton  , reviewed documentation as scribed in my presence, which is both accurate and complete.      Clinical Impression:   Final diagnoses:  [O20.9] Bleeding in early pregnancy (Primary)  [N76.0, B96.89] BV (bacterial vaginosis)  [M54.9] Acute upper back pain          ED Disposition Condition    Discharge Stable        ED Prescriptions     Medication Sig Dispense Start Date End Date Auth. Provider    metroNIDAZOLE (FLAGYL) 500 MG tablet Take 1 tablet (500 mg total) by mouth 2 (two) times a day. for 7 days 14 tablet 3/12/2022 3/19/2022 Yahaira Templeton MD        Follow-up Information     Follow up With Specialties Details Why Contact Info    Your OB  Schedule an appointment as soon as possible for a visit  for re-evaluation     [facility] - Emergency Dept Emergency Medicine Go to  If symptoms worsen [address] [facility] [id number] [phone]           Yahaira Templeton MD  03/12/22 7908

## 2022-03-13 ENCOUNTER — PATIENT MESSAGE (OUTPATIENT)
Dept: OBSTETRICS AND GYNECOLOGY | Facility: CLINIC | Age: 32
End: 2022-03-13
Payer: MEDICAID

## 2022-03-13 DIAGNOSIS — B96.89 BACTERIAL VAGINOSIS: Primary | ICD-10-CM

## 2022-03-13 DIAGNOSIS — N76.0 BACTERIAL VAGINOSIS: Primary | ICD-10-CM

## 2022-03-14 RX ORDER — METRONIDAZOLE 7.5 MG/G
1 GEL VAGINAL NIGHTLY
Qty: 5 APPLICATOR | Refills: 0 | Status: SHIPPED | OUTPATIENT
Start: 2022-03-14 | End: 2022-03-19

## 2022-03-16 ENCOUNTER — TELEPHONE (OUTPATIENT)
Dept: OBSTETRICS AND GYNECOLOGY | Facility: CLINIC | Age: 32
End: 2022-03-16
Payer: MEDICAID

## 2022-03-16 DIAGNOSIS — A74.9 CHLAMYDIA INFECTION: Primary | ICD-10-CM

## 2022-03-16 LAB
C TRACH DNA SPEC QL NAA+PROBE: DETECTED
N GONORRHOEA DNA SPEC QL NAA+PROBE: NOT DETECTED

## 2022-03-16 RX ORDER — DOXYCYCLINE 100 MG/1
100 CAPSULE ORAL EVERY 12 HOURS
Qty: 14 CAPSULE | Refills: 1 | Status: SHIPPED | OUTPATIENT
Start: 2022-03-16 | End: 2022-03-17 | Stop reason: ALTCHOICE

## 2022-03-16 NOTE — TELEPHONE ENCOUNTER
Will send Rx for doxycycline with one refill for expedited partner therapy. Confirmed with patient her partner has no allergies.     Spoke with pt in regards to recent std screening will send in medication to treat for both patient and partner. Partner confirms no allergies.

## 2022-03-17 DIAGNOSIS — A74.9 CHLAMYDIA: Primary | ICD-10-CM

## 2022-03-17 RX ORDER — AZITHROMYCIN 500 MG/1
2000 TABLET, FILM COATED ORAL ONCE
Qty: 4 TABLET | Refills: 0 | Status: SHIPPED | OUTPATIENT
Start: 2022-03-17 | End: 2022-03-17

## 2022-03-17 NOTE — PROGRESS NOTES
Patient informed to stop taking Doxycycline, Rx sent for Azithromycin with expedited partner treatment.

## 2022-03-29 ENCOUNTER — PATIENT MESSAGE (OUTPATIENT)
Dept: ADMINISTRATIVE | Facility: OTHER | Age: 32
End: 2022-03-29
Payer: MEDICAID

## 2022-03-29 ENCOUNTER — ROUTINE PRENATAL (OUTPATIENT)
Dept: OBSTETRICS AND GYNECOLOGY | Facility: CLINIC | Age: 32
End: 2022-03-29
Payer: MEDICAID

## 2022-03-29 ENCOUNTER — PATIENT MESSAGE (OUTPATIENT)
Dept: OBSTETRICS AND GYNECOLOGY | Facility: CLINIC | Age: 32
End: 2022-03-29
Payer: MEDICAID

## 2022-03-29 VITALS
SYSTOLIC BLOOD PRESSURE: 104 MMHG | BODY MASS INDEX: 22.33 KG/M2 | WEIGHT: 130.06 LBS | DIASTOLIC BLOOD PRESSURE: 60 MMHG

## 2022-03-29 DIAGNOSIS — D50.9 IRON DEFICIENCY ANEMIA, UNSPECIFIED IRON DEFICIENCY ANEMIA TYPE: ICD-10-CM

## 2022-03-29 DIAGNOSIS — Z34.82 ENCOUNTER FOR SUPERVISION OF OTHER NORMAL PREGNANCY, SECOND TRIMESTER: Primary | ICD-10-CM

## 2022-03-29 DIAGNOSIS — O21.9 NAUSEA AND VOMITING DURING PREGNANCY: ICD-10-CM

## 2022-03-29 DIAGNOSIS — Z3A.14 14 WEEKS GESTATION OF PREGNANCY: ICD-10-CM

## 2022-03-29 PROCEDURE — 99213 OFFICE O/P EST LOW 20 MIN: CPT | Mod: PBBFAC,TH,PN | Performed by: OBSTETRICS & GYNECOLOGY

## 2022-03-29 PROCEDURE — 99213 PR OFFICE/OUTPT VISIT, EST, LEVL III, 20-29 MIN: ICD-10-PCS | Mod: TH,S$PBB,, | Performed by: OBSTETRICS & GYNECOLOGY

## 2022-03-29 PROCEDURE — 99213 OFFICE O/P EST LOW 20 MIN: CPT | Mod: TH,S$PBB,, | Performed by: OBSTETRICS & GYNECOLOGY

## 2022-03-29 PROCEDURE — 99999 PR PBB SHADOW E&M-EST. PATIENT-LVL III: ICD-10-PCS | Mod: PBBFAC,,, | Performed by: OBSTETRICS & GYNECOLOGY

## 2022-03-29 PROCEDURE — 99999 PR PBB SHADOW E&M-EST. PATIENT-LVL III: CPT | Mod: PBBFAC,,, | Performed by: OBSTETRICS & GYNECOLOGY

## 2022-03-29 RX ORDER — ONDANSETRON 4 MG/1
4 TABLET, ORALLY DISINTEGRATING ORAL EVERY 6 HOURS PRN
Qty: 30 TABLET | Refills: 3 | Status: SHIPPED | OUTPATIENT
Start: 2022-03-29 | End: 2022-03-29 | Stop reason: SDUPTHER

## 2022-03-29 RX ORDER — ONDANSETRON 4 MG/1
4 TABLET, ORALLY DISINTEGRATING ORAL EVERY 6 HOURS PRN
Qty: 30 TABLET | Refills: 3 | Status: CANCELLED | OUTPATIENT
Start: 2022-03-29

## 2022-03-29 RX ORDER — ONDANSETRON 4 MG/1
4 TABLET, ORALLY DISINTEGRATING ORAL EVERY 6 HOURS PRN
Qty: 30 TABLET | Refills: 3 | Status: ON HOLD | OUTPATIENT
Start: 2022-03-29 | End: 2022-09-22 | Stop reason: HOSPADM

## 2022-03-29 RX ORDER — PROMETHAZINE HYDROCHLORIDE 25 MG/1
25 TABLET ORAL EVERY 6 HOURS PRN
Qty: 30 TABLET | Refills: 3 | Status: SHIPPED | OUTPATIENT
Start: 2022-03-29 | End: 2023-11-21

## 2022-03-29 NOTE — PROGRESS NOTES
@14w0d: Doing well, denies VB or cramping. Not yet feeling FM.  +CT, patient and partner treated. Will perform CAYDEN in ~4 weeks.  Anemia, continue PO iron supplement, will repeat CBC with glucose screen.  Anatomy scan ordered today.   Patient has lost ~8 lbs since last visit. Rx resent for Zofran and Phenergan, reports decreased appetite but no vomiting. Continue PNV.  Connected Mom orders today.  RTC 4 weeks OB f/u. SAB precautions reviewed.

## 2022-03-30 ENCOUNTER — PATIENT MESSAGE (OUTPATIENT)
Dept: OBSTETRICS AND GYNECOLOGY | Facility: CLINIC | Age: 32
End: 2022-03-30
Payer: MEDICAID

## 2022-03-30 ENCOUNTER — PATIENT MESSAGE (OUTPATIENT)
Dept: MATERNAL FETAL MEDICINE | Facility: CLINIC | Age: 32
End: 2022-03-30
Payer: MEDICAID

## 2022-03-30 DIAGNOSIS — F32.A DEPRESSION AFFECTING PREGNANCY: Primary | ICD-10-CM

## 2022-03-30 DIAGNOSIS — O99.340 DEPRESSION AFFECTING PREGNANCY: Primary | ICD-10-CM

## 2022-03-30 NOTE — TELEPHONE ENCOUNTER
Patient struggling with decreased appetite, nausea, and mild depression. Will initiate Remeron 15 mg qHS. Rx sent to patient's pharmacy.

## 2022-04-04 ENCOUNTER — PATIENT MESSAGE (OUTPATIENT)
Dept: OBSTETRICS AND GYNECOLOGY | Facility: CLINIC | Age: 32
End: 2022-04-04
Payer: MEDICAID

## 2022-04-04 PROBLEM — O99.340 DEPRESSION AFFECTING PREGNANCY: Status: ACTIVE | Noted: 2022-04-04

## 2022-04-04 PROBLEM — F32.A DEPRESSION AFFECTING PREGNANCY: Status: ACTIVE | Noted: 2022-04-04

## 2022-04-04 RX ORDER — MIRTAZAPINE 15 MG/1
15 TABLET, FILM COATED ORAL NIGHTLY
Qty: 30 TABLET | Refills: 2 | Status: SHIPPED | OUTPATIENT
Start: 2022-04-04 | End: 2023-11-21

## 2022-04-26 ENCOUNTER — ROUTINE PRENATAL (OUTPATIENT)
Dept: OBSTETRICS AND GYNECOLOGY | Facility: CLINIC | Age: 32
End: 2022-04-26
Payer: MEDICAID

## 2022-04-26 VITALS — WEIGHT: 132.25 LBS | SYSTOLIC BLOOD PRESSURE: 90 MMHG | DIASTOLIC BLOOD PRESSURE: 58 MMHG | BODY MASS INDEX: 22.71 KG/M2

## 2022-04-26 DIAGNOSIS — O21.9 NAUSEA AND VOMITING DURING PREGNANCY: ICD-10-CM

## 2022-04-26 DIAGNOSIS — Z34.82 ENCOUNTER FOR SUPERVISION OF OTHER NORMAL PREGNANCY, SECOND TRIMESTER: Primary | ICD-10-CM

## 2022-04-26 DIAGNOSIS — Z3A.18 18 WEEKS GESTATION OF PREGNANCY: ICD-10-CM

## 2022-04-26 DIAGNOSIS — A74.9 CHLAMYDIA INFECTION: ICD-10-CM

## 2022-04-26 PROCEDURE — 99213 PR OFFICE/OUTPT VISIT, EST, LEVL III, 20-29 MIN: ICD-10-PCS | Mod: TH,S$PBB,, | Performed by: OBSTETRICS & GYNECOLOGY

## 2022-04-26 PROCEDURE — 99213 OFFICE O/P EST LOW 20 MIN: CPT | Mod: TH,S$PBB,, | Performed by: OBSTETRICS & GYNECOLOGY

## 2022-04-26 PROCEDURE — 87591 N.GONORRHOEAE DNA AMP PROB: CPT | Performed by: OBSTETRICS & GYNECOLOGY

## 2022-04-26 PROCEDURE — 99212 OFFICE O/P EST SF 10 MIN: CPT | Mod: PBBFAC,TH,PN | Performed by: OBSTETRICS & GYNECOLOGY

## 2022-04-26 PROCEDURE — 87491 CHLMYD TRACH DNA AMP PROBE: CPT | Performed by: OBSTETRICS & GYNECOLOGY

## 2022-04-26 PROCEDURE — 99999 PR PBB SHADOW E&M-EST. PATIENT-LVL II: ICD-10-PCS | Mod: PBBFAC,,, | Performed by: OBSTETRICS & GYNECOLOGY

## 2022-04-26 PROCEDURE — 99999 PR PBB SHADOW E&M-EST. PATIENT-LVL II: CPT | Mod: PBBFAC,,, | Performed by: OBSTETRICS & GYNECOLOGY

## 2022-04-26 NOTE — PROGRESS NOTES
@18w0d: Doing well, denies vaginal bleeding or cramping. +FM.   Appetite improved, has gained weight since last visit. Continue Remeron qHS.   Repeat GC/CT urine test today. Treated for Chlamydia 3/17/22.   Continue Fe and PNV.   Anatomy scan scheduled for next week.   RTC 4 weeks OB f/u. SAB precautions reviewed.

## 2022-04-27 LAB
C TRACH DNA SPEC QL NAA+PROBE: NOT DETECTED
N GONORRHOEA DNA SPEC QL NAA+PROBE: NOT DETECTED

## 2022-05-02 ENCOUNTER — PATIENT MESSAGE (OUTPATIENT)
Dept: MATERNAL FETAL MEDICINE | Facility: CLINIC | Age: 32
End: 2022-05-02
Payer: MEDICAID

## 2022-05-03 ENCOUNTER — PROCEDURE VISIT (OUTPATIENT)
Dept: MATERNAL FETAL MEDICINE | Facility: CLINIC | Age: 32
End: 2022-05-03
Payer: MEDICAID

## 2022-05-03 DIAGNOSIS — Z34.82 ENCOUNTER FOR SUPERVISION OF OTHER NORMAL PREGNANCY, SECOND TRIMESTER: ICD-10-CM

## 2022-05-03 PROCEDURE — 76805 US MFM PROCEDURE (VIEWPOINT): ICD-10-PCS | Mod: 26,S$PBB,, | Performed by: OBSTETRICS & GYNECOLOGY

## 2022-05-03 PROCEDURE — 76805 OB US >/= 14 WKS SNGL FETUS: CPT | Mod: PBBFAC | Performed by: OBSTETRICS & GYNECOLOGY

## 2022-05-24 ENCOUNTER — ROUTINE PRENATAL (OUTPATIENT)
Dept: OBSTETRICS AND GYNECOLOGY | Facility: CLINIC | Age: 32
End: 2022-05-24
Payer: MEDICAID

## 2022-05-24 VITALS — WEIGHT: 138.88 LBS | SYSTOLIC BLOOD PRESSURE: 99 MMHG | DIASTOLIC BLOOD PRESSURE: 55 MMHG | BODY MASS INDEX: 23.84 KG/M2

## 2022-05-24 DIAGNOSIS — Z34.82 ENCOUNTER FOR SUPERVISION OF OTHER NORMAL PREGNANCY, SECOND TRIMESTER: Primary | ICD-10-CM

## 2022-05-24 DIAGNOSIS — F32.A DEPRESSION AFFECTING PREGNANCY: ICD-10-CM

## 2022-05-24 DIAGNOSIS — O99.340 DEPRESSION AFFECTING PREGNANCY: ICD-10-CM

## 2022-05-24 DIAGNOSIS — Z3A.22 22 WEEKS GESTATION OF PREGNANCY: ICD-10-CM

## 2022-05-24 PROCEDURE — 99213 OFFICE O/P EST LOW 20 MIN: CPT | Mod: TH,S$PBB,, | Performed by: OBSTETRICS & GYNECOLOGY

## 2022-05-24 PROCEDURE — 99999 PR PBB SHADOW E&M-EST. PATIENT-LVL III: ICD-10-PCS | Mod: PBBFAC,,, | Performed by: OBSTETRICS & GYNECOLOGY

## 2022-05-24 PROCEDURE — 99213 PR OFFICE/OUTPT VISIT, EST, LEVL III, 20-29 MIN: ICD-10-PCS | Mod: TH,S$PBB,, | Performed by: OBSTETRICS & GYNECOLOGY

## 2022-05-24 PROCEDURE — 99999 PR PBB SHADOW E&M-EST. PATIENT-LVL III: CPT | Mod: PBBFAC,,, | Performed by: OBSTETRICS & GYNECOLOGY

## 2022-05-24 PROCEDURE — 99213 OFFICE O/P EST LOW 20 MIN: CPT | Mod: PBBFAC,TH,PN | Performed by: OBSTETRICS & GYNECOLOGY

## 2022-05-24 NOTE — PROGRESS NOTES
@22w0d: Doing well, denies CTX, LOF, VB. +FM. Occasional BH. Improved nausea.  Not really in communication with partner, denies depression. Negative CAYDEN for CT, treated.   Takes Remeron as needed. Has gained back the weight she lost in early pregnancy.  Glucose screen and CBC next visit. Continue iron once a day.   RTC 4 weeks OB f/u. PTL/PPROM/PreE/FM precautions reviewed.

## 2022-06-14 ENCOUNTER — PATIENT MESSAGE (OUTPATIENT)
Dept: ADMINISTRATIVE | Facility: OTHER | Age: 32
End: 2022-06-14
Payer: MEDICAID

## 2022-06-28 ENCOUNTER — ROUTINE PRENATAL (OUTPATIENT)
Dept: OBSTETRICS AND GYNECOLOGY | Facility: CLINIC | Age: 32
End: 2022-06-28
Payer: MEDICAID

## 2022-06-28 ENCOUNTER — LAB VISIT (OUTPATIENT)
Dept: LAB | Facility: HOSPITAL | Age: 32
End: 2022-06-28
Attending: OBSTETRICS & GYNECOLOGY
Payer: MEDICAID

## 2022-06-28 VITALS — SYSTOLIC BLOOD PRESSURE: 87 MMHG | DIASTOLIC BLOOD PRESSURE: 56 MMHG | BODY MASS INDEX: 26.15 KG/M2 | WEIGHT: 152.31 LBS

## 2022-06-28 DIAGNOSIS — D50.9 IRON DEFICIENCY ANEMIA, UNSPECIFIED IRON DEFICIENCY ANEMIA TYPE: ICD-10-CM

## 2022-06-28 DIAGNOSIS — Z34.82 ENCOUNTER FOR SUPERVISION OF OTHER NORMAL PREGNANCY, SECOND TRIMESTER: ICD-10-CM

## 2022-06-28 DIAGNOSIS — Z3A.27 27 WEEKS GESTATION OF PREGNANCY: ICD-10-CM

## 2022-06-28 DIAGNOSIS — Z34.82 ENCOUNTER FOR SUPERVISION OF OTHER NORMAL PREGNANCY, SECOND TRIMESTER: Primary | ICD-10-CM

## 2022-06-28 LAB
BASOPHILS # BLD AUTO: 0.05 K/UL (ref 0–0.2)
BASOPHILS NFR BLD: 0.5 % (ref 0–1.9)
DIFFERENTIAL METHOD: ABNORMAL
EOSINOPHIL # BLD AUTO: 0.1 K/UL (ref 0–0.5)
EOSINOPHIL NFR BLD: 0.6 % (ref 0–8)
ERYTHROCYTE [DISTWIDTH] IN BLOOD BY AUTOMATED COUNT: 13.1 % (ref 11.5–14.5)
GLUCOSE SERPL-MCNC: 66 MG/DL (ref 70–140)
HCT VFR BLD AUTO: 34.8 % (ref 37–48.5)
HGB BLD-MCNC: 11.1 G/DL (ref 12–16)
IMM GRANULOCYTES # BLD AUTO: 0.2 K/UL (ref 0–0.04)
IMM GRANULOCYTES NFR BLD AUTO: 2 % (ref 0–0.5)
LYMPHOCYTES # BLD AUTO: 2 K/UL (ref 1–4.8)
LYMPHOCYTES NFR BLD: 20.1 % (ref 18–48)
MCH RBC QN AUTO: 29.8 PG (ref 27–31)
MCHC RBC AUTO-ENTMCNC: 31.9 G/DL (ref 32–36)
MCV RBC AUTO: 93 FL (ref 82–98)
MONOCYTES # BLD AUTO: 1.1 K/UL (ref 0.3–1)
MONOCYTES NFR BLD: 11 % (ref 4–15)
NEUTROPHILS # BLD AUTO: 6.4 K/UL (ref 1.8–7.7)
NEUTROPHILS NFR BLD: 65.8 % (ref 38–73)
NRBC BLD-RTO: 0 /100 WBC
PLATELET # BLD AUTO: 226 K/UL (ref 150–450)
PMV BLD AUTO: 12.1 FL (ref 9.2–12.9)
RBC # BLD AUTO: 3.73 M/UL (ref 4–5.4)
WBC # BLD AUTO: 9.77 K/UL (ref 3.9–12.7)

## 2022-06-28 PROCEDURE — 82950 GLUCOSE TEST: CPT | Performed by: OBSTETRICS & GYNECOLOGY

## 2022-06-28 PROCEDURE — 99213 PR OFFICE/OUTPT VISIT, EST, LEVL III, 20-29 MIN: ICD-10-PCS | Mod: TH,S$PBB,, | Performed by: OBSTETRICS & GYNECOLOGY

## 2022-06-28 PROCEDURE — 36415 COLL VENOUS BLD VENIPUNCTURE: CPT | Mod: PN | Performed by: OBSTETRICS & GYNECOLOGY

## 2022-06-28 PROCEDURE — 99213 OFFICE O/P EST LOW 20 MIN: CPT | Mod: PBBFAC,TH,PN | Performed by: OBSTETRICS & GYNECOLOGY

## 2022-06-28 PROCEDURE — 85025 COMPLETE CBC W/AUTO DIFF WBC: CPT | Performed by: OBSTETRICS & GYNECOLOGY

## 2022-06-28 PROCEDURE — 99999 PR PBB SHADOW E&M-EST. PATIENT-LVL III: CPT | Mod: PBBFAC,,, | Performed by: OBSTETRICS & GYNECOLOGY

## 2022-06-28 PROCEDURE — 99213 OFFICE O/P EST LOW 20 MIN: CPT | Mod: TH,S$PBB,, | Performed by: OBSTETRICS & GYNECOLOGY

## 2022-06-28 PROCEDURE — 99999 PR PBB SHADOW E&M-EST. PATIENT-LVL III: ICD-10-PCS | Mod: PBBFAC,,, | Performed by: OBSTETRICS & GYNECOLOGY

## 2022-06-28 NOTE — PROGRESS NOTES
@27w0d: Doing well, denies CTX, LOF, VB. +FM.   Continue iron supplement.  TDap next visit.  Glucose screen today.   RTC 3 weeks OB f/u. PTL/PPROM/PreE/FM precautions reviewed.

## 2022-07-05 ENCOUNTER — PATIENT MESSAGE (OUTPATIENT)
Dept: ADMINISTRATIVE | Facility: OTHER | Age: 32
End: 2022-07-05
Payer: MEDICAID

## 2022-07-19 ENCOUNTER — ROUTINE PRENATAL (OUTPATIENT)
Dept: OBSTETRICS AND GYNECOLOGY | Facility: CLINIC | Age: 32
End: 2022-07-19
Payer: MEDICAID

## 2022-07-19 VITALS — SYSTOLIC BLOOD PRESSURE: 93 MMHG | BODY MASS INDEX: 25.23 KG/M2 | DIASTOLIC BLOOD PRESSURE: 64 MMHG | WEIGHT: 147 LBS

## 2022-07-19 DIAGNOSIS — F32.A DEPRESSION AFFECTING PREGNANCY: ICD-10-CM

## 2022-07-19 DIAGNOSIS — Z34.83 ENCOUNTER FOR SUPERVISION OF OTHER NORMAL PREGNANCY IN THIRD TRIMESTER: Primary | ICD-10-CM

## 2022-07-19 DIAGNOSIS — Z3A.30 30 WEEKS GESTATION OF PREGNANCY: ICD-10-CM

## 2022-07-19 DIAGNOSIS — O99.340 DEPRESSION AFFECTING PREGNANCY: ICD-10-CM

## 2022-07-19 DIAGNOSIS — A74.9 CHLAMYDIA INFECTION: ICD-10-CM

## 2022-07-19 DIAGNOSIS — O26.849 UTERINE SIZE DATE DISCREPANCY PREGNANCY: ICD-10-CM

## 2022-07-19 DIAGNOSIS — D50.9 IRON DEFICIENCY ANEMIA, UNSPECIFIED IRON DEFICIENCY ANEMIA TYPE: ICD-10-CM

## 2022-07-19 PROBLEM — Z34.93 ENCOUNTER FOR SUPERVISION OF NORMAL PREGNANCY IN THIRD TRIMESTER: Status: ACTIVE | Noted: 2022-03-29

## 2022-07-19 PROCEDURE — 99999 PR PBB SHADOW E&M-EST. PATIENT-LVL II: CPT | Mod: PBBFAC,,, | Performed by: OBSTETRICS & GYNECOLOGY

## 2022-07-19 PROCEDURE — 99213 OFFICE O/P EST LOW 20 MIN: CPT | Mod: TH,S$PBB,, | Performed by: OBSTETRICS & GYNECOLOGY

## 2022-07-19 PROCEDURE — 99212 OFFICE O/P EST SF 10 MIN: CPT | Mod: PBBFAC,TH,PN | Performed by: OBSTETRICS & GYNECOLOGY

## 2022-07-19 PROCEDURE — 99213 PR OFFICE/OUTPT VISIT, EST, LEVL III, 20-29 MIN: ICD-10-PCS | Mod: TH,S$PBB,, | Performed by: OBSTETRICS & GYNECOLOGY

## 2022-07-19 PROCEDURE — 99999 PR PBB SHADOW E&M-EST. PATIENT-LVL II: ICD-10-PCS | Mod: PBBFAC,,, | Performed by: OBSTETRICS & GYNECOLOGY

## 2022-07-19 NOTE — PROGRESS NOTES
@30w0d: Doing well, denies CTX, LOF, VB. +FM. Increased [place].   TDap order given today.  Low maternal weight gain, no change in appetite, no nausea, reports she is eating well. No longer taking Remeron.   Vaginal irritation. Used Azo cream, has an odor and a thick white discharge, will message if no improvement.   Growth scan ordered due to size less than dates.   RTC 2 weeks OB f/u. PTL/PPROM/PreE/FM precautions reviewed.

## 2022-07-20 ENCOUNTER — PATIENT MESSAGE (OUTPATIENT)
Dept: MATERNAL FETAL MEDICINE | Facility: CLINIC | Age: 32
End: 2022-07-20
Payer: MEDICAID

## 2022-07-20 ENCOUNTER — HOSPITAL ENCOUNTER (EMERGENCY)
Facility: OTHER | Age: 32
Discharge: HOME | End: 2022-07-21
Attending: OBSTETRICS & GYNECOLOGY
Payer: MEDICAID

## 2022-07-20 VITALS
TEMPERATURE: 98 F | HEART RATE: 65 BPM | OXYGEN SATURATION: 99 % | DIASTOLIC BLOOD PRESSURE: 57 MMHG | RESPIRATION RATE: 14 BRPM | SYSTOLIC BLOOD PRESSURE: 101 MMHG

## 2022-07-20 DIAGNOSIS — Z3A.30 30 WEEKS GESTATION OF PREGNANCY: ICD-10-CM

## 2022-07-20 DIAGNOSIS — O47.00 PRETERM UTERINE CONTRACTIONS: Primary | ICD-10-CM

## 2022-07-20 LAB
BACTERIA #/AREA URNS HPF: ABNORMAL /HPF
BILIRUB SERPL-MCNC: NORMAL MG/DL
BILIRUB UR QL STRIP: NEGATIVE
BLOOD URINE, POC: NORMAL
CLARITY UR: ABNORMAL
COLOR UR: YELLOW
COLOR, POC UA: YELLOW
FIBRONECTIN FETAL SPEC QL: POSITIVE
GLUCOSE UR QL STRIP: NEGATIVE
GLUCOSE UR QL STRIP: NORMAL
HGB UR QL STRIP: NEGATIVE
KETONES UR QL STRIP: NEGATIVE
KETONES UR QL STRIP: NORMAL
LEUKOCYTE ESTERASE UR QL STRIP: ABNORMAL
LEUKOCYTE ESTERASE URINE, POC: NORMAL
MICROSCOPIC COMMENT: ABNORMAL
NITRITE UR QL STRIP: NEGATIVE
NITRITE, POC UA: NORMAL
PH UR STRIP: 7 [PH] (ref 5–8)
PH, POC UA: 7
PROT UR QL STRIP: NEGATIVE
PROTEIN, POC: NORMAL
SP GR UR STRIP: 1.01 (ref 1–1.03)
SPECIFIC GRAVITY, POC UA: 1
SQUAMOUS #/AREA URNS HPF: 8 /HPF
URN SPEC COLLECT METH UR: ABNORMAL
UROBILINOGEN UR STRIP-ACNC: NEGATIVE EU/DL
UROBILINOGEN, POC UA: NORMAL
WBC #/AREA URNS HPF: 7 /HPF (ref 0–5)

## 2022-07-20 PROCEDURE — U0002 COVID-19 LAB TEST NON-CDC: HCPCS

## 2022-07-20 PROCEDURE — 82731 ASSAY OF FETAL FIBRONECTIN: CPT

## 2022-07-20 PROCEDURE — 81000 URINALYSIS NONAUTO W/SCOPE: CPT | Performed by: STUDENT IN AN ORGANIZED HEALTH CARE EDUCATION/TRAINING PROGRAM

## 2022-07-20 PROCEDURE — 59025 FETAL NON-STRESS TEST: CPT

## 2022-07-20 PROCEDURE — 25000003 PHARM REV CODE 250

## 2022-07-20 PROCEDURE — 99284 EMERGENCY DEPT VISIT MOD MDM: CPT | Mod: 25

## 2022-07-20 PROCEDURE — 81002 URINALYSIS NONAUTO W/O SCOPE: CPT

## 2022-07-20 RX ORDER — ACETAMINOPHEN 500 MG
1000 TABLET ORAL ONCE
Status: COMPLETED | OUTPATIENT
Start: 2022-07-20 | End: 2022-07-20

## 2022-07-20 RX ADMIN — ACETAMINOPHEN 1000 MG: 500 TABLET ORAL at 07:07

## 2022-07-20 NOTE — ED PROVIDER NOTES
Encounter Date: 2022       History     Chief Complaint   Patient presents with    Vaginal Pain     HPI   Jose Luis Crowder is a 31 y.o. [id number] at 30w1d presents complaining of pelvic pressure.     Patient states she has constant abdominal pressure and took tylenol 500mg at 1:00 pm today with no relief. She has drank 3 bottles of water today. Denies fevers, nausea, vomiting, urinary symptoms, change in bowel habits.     This IUP is complicated by uterine size date discrepancy, chlamydia infection in 1T, h/o lap li, MONE, N/V pregnancy, depression . Patient denies contractions, denies vaginal bleeding, denies LOF.    Fetal Movement: normal.        Review of patient's allergies indicates:  No Known Allergies  Past Medical History:   Diagnosis Date    Abnormal Pap smear of cervix yrs ago    repeat pap    Gall stones     Ovarian cyst      Past Surgical History:   Procedure Laterality Date    CHOLECYSTECTOMY  2017     Laparoscopic cholecystectomy -     DILATION AND CURETTAGE OF UTERUS USING SUCTION N/A 2019    Procedure: DILATION AND CURETTAGE, UTERUS, USING SUCTION (ADD ON );  Surgeon: Pelon Mead III, MD;  Location: [facility];  Service: OB/GYN;  Laterality: N/A;  (ADD ON )    TONSILLECTOMY, ADENOIDECTOMY       Family History   Problem Relation Age of Onset    Diabetes Maternal Grandmother     Diabetes Mother     Breast cancer Neg Hx     Colon cancer Neg Hx     Ovarian cancer Neg Hx      Social History     Tobacco Use    Smoking status: Never Smoker    Smokeless tobacco: Never Used   Substance Use Topics    Alcohol use: Yes     Comment: Social     Drug use: No     Review of Systems   Constitutional: Negative for chills, fever and unexpected weight change.   Respiratory: Negative for cough and shortness of breath.    Cardiovascular: Negative for chest pain and palpitations.   Gastrointestinal: Positive for abdominal pain. Negative for abdominal distention, constipation and  diarrhea.   Endocrine: Negative for cold intolerance and heat intolerance.   Genitourinary: Positive for pelvic pain. Negative for difficulty urinating, dyspareunia, dysuria, genital sores, urgency and vaginal pain.   Neurological: Negative for dizziness, syncope and weakness.   Hematological: Does not bruise/bleed easily.       Physical Exam     Initial Vitals   BP Pulse Resp Temp SpO2   07/20/22 1911 07/20/22 1911 07/20/22 1911 07/20/22 1911 07/20/22 2126   99/64 67 14 98.2 °F (36.8 °C) 97 %      MAP       --                Physical Exam    Constitutional: She appears well-developed and well-nourished. No distress.   HENT:   Head: Normocephalic and atraumatic.   Neck:   Normal range of motion.  Cardiovascular: Normal rate and intact distal pulses.   Pulmonary/Chest: Breath sounds normal. No respiratory distress. She has no wheezes.   Abdominal: Abdomen is soft. She exhibits no distension. There is no abdominal tenderness. There is no rebound.   Musculoskeletal:         General: No edema. Normal range of motion.      Cervical back: Normal range of motion.     Neurological: She is alert and oriented to person, place, and time.   Skin: Skin is warm and dry. No rash noted. No erythema.   Psychiatric: She has a normal mood and affect. Her behavior is normal. Judgment and thought content normal.     OB LABOR EXAM:     Membranes ruptured: No.   Method: Sterile vaginal exam per MD and Sterile speculum exam per MD.   Vaginal Bleeding: none present.               Comments: SVE cl th h > 1/50/-3 1 hour > 1/50/-3 1 hour recheck   FFN obtained after SVE exam        ED Course   Obtain Fetal nonstress test (NST)    Date/Time: 7/21/2022 1:23 AM  Performed by: Liusa Baxter MD  Authorized by: Luisa Baxter MD     Nonstress Test:     Variability:  6-25 BPM    Decelerations:  None    Accelerations:  10 bpm    Baseline:  145    Uterine Irritability: Yes      Contractions:  Irregular  Biophysical Profile:     Nonstress Test  Interpretation: reactive      Overall Impression:  Reassuring      Labs Reviewed   URINALYSIS - Abnormal; Notable for the following components:       Result Value    Appearance, UA Hazy (*)     Leukocytes, UA Trace (*)     All other components within normal limits   URINALYSIS MICROSCOPIC - Abnormal; Notable for the following components:    WBC, UA 7 (*)     Bacteria Few (*)     All other components within normal limits   FETAL FIBRONECTIN - Abnormal; Notable for the following components:    Fetal Fibronectin Positive (*)     All other components within normal limits   SARS-COV-2 RNA AMPLIFICATION, QUAL   POCT URINALYSIS, DIPSTICK OR TABLET REAGENT, AUTOMATED, WITH MICROSCOP          Imaging Results    None          Medications   acetaminophen tablet 1,000 mg (1,000 mg Oral Given 22 192)   betamethasone acetate-betamethasone sodium phosphate injection 12 mg (12 mg Intramuscular Given 22 0123)     Medical Decision Making:   ED Management:  VSS   NST RR   Denies CTX; toco irregular CTX with irritability > advised patient PO hydration, patient drank 3 small glasses of water from 0774-8751, attempted IV however patient declined > CTX spaced out and irritability improved with minimal PO hydration   Urine dip trace ketones   UA + trace leukocytes, few bacteria, + squamous cells   1g Tylenol   SVE cl th h > 1/50/3 1 hr recheck > 1/50/-3 on 1 hr recheck   FFN+     Discussed with patient recommendation for BMZ course given +FFN and mild cervical change. Uterine irritability and irregular CTX improved on discharge. Patient to f/u in clinic for 2/2 BMZ course on . Staff messaged. Strict PTL return precautions given.      Kristine Baxter MD  PGY 2  Obstetrics and Gynecology                      Clinical Impression:   Final diagnoses:  [O47.00]  uterine contractions (Primary)  [Z3A.30] 30 weeks gestation of pregnancy          ED Disposition Condition    Discharge Stable        ED Prescriptions     None         Follow-up Information    None          Luisa Baxter MD  Resident  07/21/22 0133       Luisa Baxter MD  Resident  07/21/22 0133

## 2022-07-21 ENCOUNTER — PROCEDURE VISIT (OUTPATIENT)
Dept: MATERNAL FETAL MEDICINE | Facility: CLINIC | Age: 32
End: 2022-07-21
Payer: MEDICAID

## 2022-07-21 DIAGNOSIS — O47.03 THREATENED PREMATURE LABOR IN THIRD TRIMESTER: Primary | ICD-10-CM

## 2022-07-21 DIAGNOSIS — Z3A.30 30 WEEKS GESTATION OF PREGNANCY: ICD-10-CM

## 2022-07-21 DIAGNOSIS — O26.849 UTERINE SIZE DATE DISCREPANCY PREGNANCY: ICD-10-CM

## 2022-07-21 LAB — SARS-COV-2 RDRP RESP QL NAA+PROBE: NEGATIVE

## 2022-07-21 PROCEDURE — 63600175 PHARM REV CODE 636 W HCPCS

## 2022-07-21 PROCEDURE — 96372 THER/PROPH/DIAG INJ SC/IM: CPT

## 2022-07-21 PROCEDURE — 59025 PR FETAL 2N-STRESS TEST: ICD-10-PCS | Mod: 26,,, | Performed by: OBSTETRICS & GYNECOLOGY

## 2022-07-21 PROCEDURE — 59025 FETAL NON-STRESS TEST: CPT | Mod: 26,,, | Performed by: OBSTETRICS & GYNECOLOGY

## 2022-07-21 PROCEDURE — 99283 EMERGENCY DEPT VISIT LOW MDM: CPT | Mod: 25,,, | Performed by: OBSTETRICS & GYNECOLOGY

## 2022-07-21 PROCEDURE — 76816 OB US FOLLOW-UP PER FETUS: CPT | Mod: PBBFAC | Performed by: OBSTETRICS & GYNECOLOGY

## 2022-07-21 PROCEDURE — 76816 US MFM PROCEDURE (VIEWPOINT): ICD-10-PCS | Mod: 26,S$PBB,, | Performed by: OBSTETRICS & GYNECOLOGY

## 2022-07-21 PROCEDURE — 99283 PR EMERGENCY DEPT VISIT,LEVEL III: ICD-10-PCS | Mod: 25,,, | Performed by: OBSTETRICS & GYNECOLOGY

## 2022-07-21 RX ORDER — BETAMETHASONE SODIUM PHOSPHATE AND BETAMETHASONE ACETATE 3; 3 MG/ML; MG/ML
6 INJECTION, SUSPENSION INTRA-ARTICULAR; INTRALESIONAL; INTRAMUSCULAR; SOFT TISSUE
Status: COMPLETED | OUTPATIENT
Start: 2022-07-22 | End: 2022-07-22

## 2022-07-21 RX ORDER — BETAMETHASONE SODIUM PHOSPHATE AND BETAMETHASONE ACETATE 3; 3 MG/ML; MG/ML
12 INJECTION, SUSPENSION INTRA-ARTICULAR; INTRALESIONAL; INTRAMUSCULAR; SOFT TISSUE ONCE
Status: COMPLETED | OUTPATIENT
Start: 2022-07-21 | End: 2022-07-21

## 2022-07-21 RX ADMIN — BETAMETHASONE SODIUM PHOSPHATE AND BETAMETHASONE ACETATE 12 MG: 3; 3 INJECTION, SUSPENSION INTRA-ARTICULAR; INTRALESIONAL; INTRAMUSCULAR at 01:07

## 2022-07-21 NOTE — DISCHARGE INSTRUCTIONS
Call clinic [phone] or L & D after hours at [phone] for vaginal bleeding, leakage of fluids, regular contractions every 5 mins for 2 hours, decreased fetal movements ( 10 kicks in 2 hours), headache not relieved by Tylenol, blurry vision, or temp of 100.4 or greater.  Begin doing fetal kick counts, at least 10 movements in 2 hours starting at 28 weeks gestation.  Keep next clinic appointment

## 2022-07-22 ENCOUNTER — CLINICAL SUPPORT (OUTPATIENT)
Dept: OBSTETRICS AND GYNECOLOGY | Facility: CLINIC | Age: 32
End: 2022-07-22
Payer: MEDICAID

## 2022-07-22 DIAGNOSIS — O47.03 THREATENED PREMATURE LABOR IN THIRD TRIMESTER: Primary | ICD-10-CM

## 2022-07-22 PROCEDURE — 99999 PR PBB SHADOW E&M-EST. PATIENT-LVL I: ICD-10-PCS | Mod: PBBFAC,,,

## 2022-07-22 PROCEDURE — 96372 THER/PROPH/DIAG INJ SC/IM: CPT | Mod: PBBFAC

## 2022-07-22 PROCEDURE — 99999 PR PBB SHADOW E&M-EST. PATIENT-LVL I: CPT | Mod: PBBFAC,,,

## 2022-07-22 RX ADMIN — BETAMETHASONE ACETATE AND BETAMETHASONE SODIUM PHOSPHATE 6 MG: 3; 3 INJECTION, SUSPENSION INTRA-ARTICULAR; INTRALESIONAL; INTRAMUSCULAR; SOFT TISSUE at 11:07

## 2022-07-22 NOTE — PROGRESS NOTES
Patient here for BETAMETHASONE injection. No pain noted, injection given. Patient tolerated well advised to wait in lobby 5 minutes and report any adverse reactions.       Site: [facility]

## 2022-07-26 ENCOUNTER — ROUTINE PRENATAL (OUTPATIENT)
Dept: OBSTETRICS AND GYNECOLOGY | Facility: CLINIC | Age: 32
End: 2022-07-26
Payer: MEDICAID

## 2022-07-26 DIAGNOSIS — O47.00 PRETERM UTERINE CONTRACTIONS: ICD-10-CM

## 2022-07-26 DIAGNOSIS — Z3A.31 31 WEEKS GESTATION OF PREGNANCY: ICD-10-CM

## 2022-07-26 DIAGNOSIS — Z34.83 ENCOUNTER FOR SUPERVISION OF OTHER NORMAL PREGNANCY IN THIRD TRIMESTER: Primary | ICD-10-CM

## 2022-07-26 DIAGNOSIS — F32.A DEPRESSION AFFECTING PREGNANCY: ICD-10-CM

## 2022-07-26 DIAGNOSIS — O99.340 DEPRESSION AFFECTING PREGNANCY: ICD-10-CM

## 2022-07-26 PROCEDURE — 99211 OFF/OP EST MAY X REQ PHY/QHP: CPT | Mod: PBBFAC,TH,PN | Performed by: OBSTETRICS & GYNECOLOGY

## 2022-07-26 PROCEDURE — 99999 PR PBB SHADOW E&M-EST. PATIENT-LVL I: CPT | Mod: PBBFAC,,, | Performed by: OBSTETRICS & GYNECOLOGY

## 2022-07-26 PROCEDURE — 99213 PR OFFICE/OUTPT VISIT, EST, LEVL III, 20-29 MIN: ICD-10-PCS | Mod: TH,S$PBB,, | Performed by: OBSTETRICS & GYNECOLOGY

## 2022-07-26 PROCEDURE — 99213 OFFICE O/P EST LOW 20 MIN: CPT | Mod: TH,S$PBB,, | Performed by: OBSTETRICS & GYNECOLOGY

## 2022-07-26 PROCEDURE — 99999 PR PBB SHADOW E&M-EST. PATIENT-LVL I: ICD-10-PCS | Mod: PBBFAC,,, | Performed by: OBSTETRICS & GYNECOLOGY

## 2022-07-26 NOTE — PROGRESS NOTES
@31w0d: Patient was seen in the [facility] after her last visit for irregular contractions. BMZ given, second dose given 7/22. FFN+. Still with increased pelvic pressure and ~2 BH contractions per hour. Denies vaginal bleeding or LOF. Good FM.   Cervix still 1 cm, thick and presenting part is high on gentle SVE today.   Work letter given for light duty. Continue adequate PO hydration and proceed to [facility] for any further pain, pressure, or labor symptoms. Pt voiced understanding.   RTC 1 week OB f/u. PTL/PPROM/PreE/FM precautions reviewed.

## 2022-08-02 ENCOUNTER — ROUTINE PRENATAL (OUTPATIENT)
Dept: OBSTETRICS AND GYNECOLOGY | Facility: CLINIC | Age: 32
End: 2022-08-02
Payer: MEDICAID

## 2022-08-02 VITALS — DIASTOLIC BLOOD PRESSURE: 76 MMHG | SYSTOLIC BLOOD PRESSURE: 112 MMHG | WEIGHT: 154 LBS | BODY MASS INDEX: 26.43 KG/M2

## 2022-08-02 DIAGNOSIS — F32.A DEPRESSION AFFECTING PREGNANCY: ICD-10-CM

## 2022-08-02 DIAGNOSIS — Z3A.32 32 WEEKS GESTATION OF PREGNANCY: ICD-10-CM

## 2022-08-02 DIAGNOSIS — D50.9 IRON DEFICIENCY ANEMIA, UNSPECIFIED IRON DEFICIENCY ANEMIA TYPE: ICD-10-CM

## 2022-08-02 DIAGNOSIS — O47.00 PRETERM UTERINE CONTRACTIONS: ICD-10-CM

## 2022-08-02 DIAGNOSIS — Z34.83 ENCOUNTER FOR SUPERVISION OF OTHER NORMAL PREGNANCY IN THIRD TRIMESTER: Primary | ICD-10-CM

## 2022-08-02 DIAGNOSIS — O99.340 DEPRESSION AFFECTING PREGNANCY: ICD-10-CM

## 2022-08-02 PROCEDURE — 99212 OFFICE O/P EST SF 10 MIN: CPT | Mod: PBBFAC,TH,PN | Performed by: OBSTETRICS & GYNECOLOGY

## 2022-08-02 PROCEDURE — 99999 PR PBB SHADOW E&M-EST. PATIENT-LVL II: ICD-10-PCS | Mod: PBBFAC,,, | Performed by: OBSTETRICS & GYNECOLOGY

## 2022-08-02 PROCEDURE — 99213 OFFICE O/P EST LOW 20 MIN: CPT | Mod: TH,S$PBB,, | Performed by: OBSTETRICS & GYNECOLOGY

## 2022-08-02 PROCEDURE — 99999 PR PBB SHADOW E&M-EST. PATIENT-LVL II: CPT | Mod: PBBFAC,,, | Performed by: OBSTETRICS & GYNECOLOGY

## 2022-08-02 PROCEDURE — 99213 PR OFFICE/OUTPT VISIT, EST, LEVL III, 20-29 MIN: ICD-10-PCS | Mod: TH,S$PBB,, | Performed by: OBSTETRICS & GYNECOLOGY

## 2022-08-02 NOTE — PROGRESS NOTES
@32w0d: Doing well, denies CTX, LOF, VB. +FM. Irregular [place] throughout the day, but no increased pelvic pain or pressure concerning for PTL. S/p BMZ course earlier this month.  Weight not increasing dramatically but fetal growth appropriate on most recent scan.  3T labs next visit.   Patient possibly interested in 39 week eIOL. Will continue to discuss.   RTC 2 weeks OB f/u. Labor/PreE/FM precautions reviewed.

## 2022-08-12 ENCOUNTER — HOSPITAL ENCOUNTER (EMERGENCY)
Facility: OTHER | Age: 32
Discharge: HOME | End: 2022-08-12
Attending: OBSTETRICS & GYNECOLOGY
Payer: MEDICAID

## 2022-08-12 VITALS
SYSTOLIC BLOOD PRESSURE: 90 MMHG | RESPIRATION RATE: 18 BRPM | DIASTOLIC BLOOD PRESSURE: 52 MMHG | OXYGEN SATURATION: 98 % | HEART RATE: 69 BPM | TEMPERATURE: 99 F

## 2022-08-12 DIAGNOSIS — O47.9 UTERINE CONTRACTIONS DURING PREGNANCY: Primary | ICD-10-CM

## 2022-08-12 DIAGNOSIS — Z3A.33 33 WEEKS GESTATION OF PREGNANCY: ICD-10-CM

## 2022-08-12 PROCEDURE — 59025 PR FETAL 2N-STRESS TEST: ICD-10-PCS | Mod: 26,,, | Performed by: OBSTETRICS & GYNECOLOGY

## 2022-08-12 PROCEDURE — 59025 FETAL NON-STRESS TEST: CPT | Mod: 26,,, | Performed by: OBSTETRICS & GYNECOLOGY

## 2022-08-12 PROCEDURE — 99284 EMERGENCY DEPT VISIT MOD MDM: CPT | Mod: 25

## 2022-08-12 PROCEDURE — 59025 FETAL NON-STRESS TEST: CPT

## 2022-08-12 PROCEDURE — 99284 PR EMERGENCY DEPT VISIT,LEVEL IV: ICD-10-PCS | Mod: 25,,, | Performed by: OBSTETRICS & GYNECOLOGY

## 2022-08-12 PROCEDURE — 25000003 PHARM REV CODE 250

## 2022-08-12 PROCEDURE — 99284 EMERGENCY DEPT VISIT MOD MDM: CPT | Mod: 25,,, | Performed by: OBSTETRICS & GYNECOLOGY

## 2022-08-12 RX ORDER — ACETAMINOPHEN 500 MG
1000 TABLET ORAL ONCE
Status: COMPLETED | OUTPATIENT
Start: 2022-08-12 | End: 2022-08-12

## 2022-08-12 RX ADMIN — ACETAMINOPHEN 1000 MG: 500 TABLET ORAL at 05:08

## 2022-08-12 NOTE — DISCHARGE INSTRUCTIONS
Contact your primary OB or after hours at [phone] if you experience any of the following:    Contractions every 5-10 minutes for 1 or more hours.   A sudden gush or constant leaking of fluid.  Heavy vaginal bleeding.   If you experience a constant headache, blurry vision, pain underneath your right rib, or sudden swelling of your hands, feet, and face.   If you are 28 weeks pregnant or greater, you can measure kick counts with a goal of 10 or more movements within 2 hours.     Remember to stay hydrated; drink 8-10 bottles of water a day.     Maintain all follow-up appointments.

## 2022-08-12 NOTE — ED PROVIDER NOTES
Encounter Date: 2022       History     Chief Complaint   Patient presents with    Contractions     HPI   Jose Luis Crowder is a 31 y.o. [id number] at 33w3d presents complaining of uterine contractions. Reports contractions were previously q 2-3 minutes, how decreased in frequency and feeling every 5-7 minutes. Denies dysuria or increased urgency/frequency/hesitancy.     This IUP is complicated by tPTL, + CT (neg CAYDEN), anemia, depression. Patient reports contractions, denies vaginal bleeding, denies LOF.   Fetal Movement: normal.   Review of patient's allergies indicates:  No Known Allergies  Past Medical History:   Diagnosis Date    Abnormal Pap smear of cervix yrs ago    repeat pap    Gall stones     Ovarian cyst      Past Surgical History:   Procedure Laterality Date    CHOLECYSTECTOMY  2017     Laparoscopic cholecystectomy -     DILATION AND CURETTAGE OF UTERUS USING SUCTION N/A 2019    Procedure: DILATION AND CURETTAGE, UTERUS, USING SUCTION (ADD ON );  Surgeon: Pelon Mead III, MD;  Location: [facility];  Service: OB/GYN;  Laterality: N/A;  (ADD ON )    TONSILLECTOMY, ADENOIDECTOMY       Family History   Problem Relation Age of Onset    Diabetes Maternal Grandmother     Diabetes Mother     Breast cancer Neg Hx     Colon cancer Neg Hx     Ovarian cancer Neg Hx      Social History     Tobacco Use    Smoking status: Never Smoker    Smokeless tobacco: Never Used   Substance Use Topics    Alcohol use: Yes     Comment: Social     Drug use: No     Review of Systems   Constitutional: Negative for chills and fever.   Eyes: Negative for photophobia and visual disturbance.   Respiratory: Negative for cough and shortness of breath.    Cardiovascular: Negative for chest pain, palpitations and leg swelling.   Gastrointestinal: Positive for abdominal pain. Negative for nausea and vomiting.   Genitourinary: Negative for difficulty urinating, dysuria, flank pain, frequency, hematuria, pelvic  pain, urgency, vaginal bleeding, vaginal discharge and vaginal pain.   Neurological: Negative for headaches.       Physical Exam     Initial Vitals [08/12/22 0457]   BP Pulse Resp Temp SpO2   108/71 70 18 98.5 °F (36.9 °C) 99 %      MAP       --         Physical Exam    Vitals reviewed.  Constitutional: She appears well-developed and well-nourished.   HENT:   Head: Normocephalic.   Eyes: Conjunctivae are normal. Pupils are equal, round, and reactive to light.   Neck:   Normal range of motion.  Cardiovascular: Normal rate.   Pulmonary/Chest: Effort normal. No respiratory distress.   Abdominal: Abdomen is soft. There is no abdominal tenderness.   No right CVA tenderness.  No left CVA tenderness. There is no rebound and no guarding.   Musculoskeletal:         General: Normal range of motion.      Cervical back: Normal range of motion.     Neurological: She is alert and oriented to person, place, and time.   Skin: Skin is warm and dry. Capillary refill takes less than 2 seconds.   Psychiatric: She has a normal mood and affect. Her behavior is normal. Judgment and thought content normal.     OB LABOR EXAM:   Pre-Term Labor: No.   Membranes ruptured: No.   Method: Sterile vaginal exam per MD.   Vaginal Bleeding: none present.     Dilatation: 1.   Station: -3.   Effacement: 50%.   Amniotic Fluid Color: no fluid.   Amniotic Fluid Amount: none noted.         ED Course   Obtain Fetal nonstress test (NST)    Date/Time: 8/12/2022 6:19 AM  Performed by: Ellen London MD  Authorized by: Ellen London MD     Nonstress Test:     Variability:  6-25 BPM    Decelerations:  None    Accelerations:  15 bpm    Baseline:  135    Contractions:  Irregular  Biophysical Profile:     Nonstress Test Interpretation: reactive      Overall Impression:  Reassuring  Post-procedure:     Patient tolerance:  Patient tolerated the procedure well with no immediate complications      Labs Reviewed   POCT URINALYSIS, DIPSTICK OR TABLET REAGENT, AUTOMATED,  WITH MICROSCOP          Imaging Results    None          Medications   acetaminophen tablet 1,000 mg (1,000 mg Oral Given 8/12/22 0519)     Medical Decision Making:   ED Management:  1. Uterine Contractions:   - VSS   - NST RR; TOCO irregular   - Urine Dip: negative   - Tylenol 1000 mg > pain improved  - FFN collected, not sent   - SVE: 1/50/-3 >> unchanged after two hours of observation  - Patient stable for discharge  - Return precautions given if she were to experience contractions 5 mins apart for 2 hrs, LOF, vaginal bleeding, or decreased fetal movement  - Patient verbalized understanding and agreed with the plan               Attending Attestation:   Physician Attestation Statement for Resident:  As the supervising MD   Physician Attestation Statement: I have personally seen and examined this patient.   I agree with the above history. -:   As the supervising MD I agree with the above PE.    As the supervising MD I agree with the above treatment, course, plan, and disposition.   -:   NST  I independently reviewed the fetal non-stress test with the following interpretation:  135 BPM baseline  Variability: moderate  Accelerations: present  Decelerations: absent  Contractions: occasional  Category 1    Clinical Interpretation:reactive    Patient evaluated and found to be stable, agree with resident's assessment and plan. Unchanged cervical exam. Cat 1 tracing. PTL precautions reviewed.    I was personally present during the critical portions of the procedure(s) performed by the resident and was immediately available in the ED to provide services and assistance as needed during the entire procedure.                         Clinical Impression:   Final diagnoses:  [O47.9] Uterine contractions during pregnancy (Primary)  [Z3A.33] 33 weeks gestation of pregnancy          ED Disposition Condition    Discharge Stable        ED Prescriptions     None        Follow-up Information    None     Valeria Mantilla, MD [facility] PGY1       Mariana Breaux MD  Resident  08/12/22 0709       Yazmin Simmons MD  08/16/22 8959

## 2022-08-18 ENCOUNTER — ROUTINE PRENATAL (OUTPATIENT)
Dept: OBSTETRICS AND GYNECOLOGY | Facility: CLINIC | Age: 32
End: 2022-08-18
Payer: MEDICAID

## 2022-08-18 VITALS — WEIGHT: 154.31 LBS | SYSTOLIC BLOOD PRESSURE: 98 MMHG | DIASTOLIC BLOOD PRESSURE: 68 MMHG | BODY MASS INDEX: 26.49 KG/M2

## 2022-08-18 DIAGNOSIS — O99.340 DEPRESSION AFFECTING PREGNANCY: ICD-10-CM

## 2022-08-18 DIAGNOSIS — Z34.83 ENCOUNTER FOR SUPERVISION OF OTHER NORMAL PREGNANCY IN THIRD TRIMESTER: Primary | ICD-10-CM

## 2022-08-18 DIAGNOSIS — O47.00 PRETERM UTERINE CONTRACTIONS: ICD-10-CM

## 2022-08-18 DIAGNOSIS — F32.A DEPRESSION AFFECTING PREGNANCY: ICD-10-CM

## 2022-08-18 DIAGNOSIS — Z3A.34 34 WEEKS GESTATION OF PREGNANCY: ICD-10-CM

## 2022-08-18 PROCEDURE — 99213 OFFICE O/P EST LOW 20 MIN: CPT | Mod: PBBFAC,TH,PN | Performed by: OBSTETRICS & GYNECOLOGY

## 2022-08-18 PROCEDURE — 99213 OFFICE O/P EST LOW 20 MIN: CPT | Mod: TH,S$PBB,, | Performed by: OBSTETRICS & GYNECOLOGY

## 2022-08-18 PROCEDURE — 99999 PR PBB SHADOW E&M-EST. PATIENT-LVL III: ICD-10-PCS | Mod: PBBFAC,,, | Performed by: OBSTETRICS & GYNECOLOGY

## 2022-08-18 PROCEDURE — 99213 PR OFFICE/OUTPT VISIT, EST, LEVL III, 20-29 MIN: ICD-10-PCS | Mod: TH,S$PBB,, | Performed by: OBSTETRICS & GYNECOLOGY

## 2022-08-18 PROCEDURE — 99999 PR PBB SHADOW E&M-EST. PATIENT-LVL III: CPT | Mod: PBBFAC,,, | Performed by: OBSTETRICS & GYNECOLOGY

## 2022-08-18 NOTE — PROGRESS NOTES
@34w2d: Doing well, denies CTX, LOF, VB. +FM. Still with BH. Went to [facility] for r/o PTL on 8/12, cervix unchanged (1/50/-3). Recommend PO hydration, Tylenol, and warm baths.   3T labs today, cervix check, GBS and consents in 2 weeks. TDap done.  Discuss contraception plan, eIOL, and breast feeding next visit.   RTC 2 weeks OB f/u. PTL/PPROM/PreE/FM precautions reviewed.

## 2022-08-30 ENCOUNTER — ROUTINE PRENATAL (OUTPATIENT)
Dept: OBSTETRICS AND GYNECOLOGY | Facility: CLINIC | Age: 32
End: 2022-08-30
Payer: MEDICAID

## 2022-08-30 VITALS — SYSTOLIC BLOOD PRESSURE: 90 MMHG | BODY MASS INDEX: 26.43 KG/M2 | WEIGHT: 154 LBS | DIASTOLIC BLOOD PRESSURE: 56 MMHG

## 2022-08-30 DIAGNOSIS — D50.9 IRON DEFICIENCY ANEMIA, UNSPECIFIED IRON DEFICIENCY ANEMIA TYPE: ICD-10-CM

## 2022-08-30 DIAGNOSIS — Z3A.36 36 WEEKS GESTATION OF PREGNANCY: ICD-10-CM

## 2022-08-30 DIAGNOSIS — Z34.83 ENCOUNTER FOR SUPERVISION OF OTHER NORMAL PREGNANCY IN THIRD TRIMESTER: Primary | ICD-10-CM

## 2022-08-30 DIAGNOSIS — F32.A DEPRESSION AFFECTING PREGNANCY: ICD-10-CM

## 2022-08-30 DIAGNOSIS — O99.340 DEPRESSION AFFECTING PREGNANCY: ICD-10-CM

## 2022-08-30 PROCEDURE — 87081 CULTURE SCREEN ONLY: CPT | Performed by: OBSTETRICS & GYNECOLOGY

## 2022-08-30 PROCEDURE — 99999 PR PBB SHADOW E&M-EST. PATIENT-LVL II: ICD-10-PCS | Mod: PBBFAC,,, | Performed by: OBSTETRICS & GYNECOLOGY

## 2022-08-30 PROCEDURE — 99213 PR OFFICE/OUTPT VISIT, EST, LEVL III, 20-29 MIN: ICD-10-PCS | Mod: TH,S$PBB,, | Performed by: OBSTETRICS & GYNECOLOGY

## 2022-08-30 PROCEDURE — 99999 PR PBB SHADOW E&M-EST. PATIENT-LVL II: CPT | Mod: PBBFAC,,, | Performed by: OBSTETRICS & GYNECOLOGY

## 2022-08-30 PROCEDURE — 99213 OFFICE O/P EST LOW 20 MIN: CPT | Mod: TH,S$PBB,, | Performed by: OBSTETRICS & GYNECOLOGY

## 2022-08-30 PROCEDURE — 99212 OFFICE O/P EST SF 10 MIN: CPT | Mod: PBBFAC,TH,PN | Performed by: OBSTETRICS & GYNECOLOGY

## 2022-08-30 NOTE — PROGRESS NOTES
@36w0d: Doing well, denies CTX, LOF, VB. +FM.   Discussed contraception, considering Nexplanon.  Consents signed, desires circ.   Continue iron, 3T labs tomorrow AM (patient has not eaten anything today and declines labs today).    Desires eIOL at 39 weeks. Will place case request for 9/21 @ 0000.   RTC 1 week OB f/u. Labor/PROMPreE/FM precautions reviewed.

## 2022-08-31 ENCOUNTER — LAB VISIT (OUTPATIENT)
Dept: LAB | Facility: HOSPITAL | Age: 32
End: 2022-08-31
Attending: OBSTETRICS & GYNECOLOGY
Payer: MEDICAID

## 2022-08-31 DIAGNOSIS — Z34.83 ENCOUNTER FOR SUPERVISION OF OTHER NORMAL PREGNANCY IN THIRD TRIMESTER: ICD-10-CM

## 2022-08-31 LAB
ERYTHROCYTE [DISTWIDTH] IN BLOOD BY AUTOMATED COUNT: 14.6 % (ref 11.5–14.5)
HCT VFR BLD AUTO: 35.2 % (ref 37–48.5)
HGB BLD-MCNC: 10.6 G/DL (ref 12–16)
HIV 1+2 AB+HIV1 P24 AG SERPL QL IA: NORMAL
MCH RBC QN AUTO: 26.9 PG (ref 27–31)
MCHC RBC AUTO-ENTMCNC: 30.1 G/DL (ref 32–36)
MCV RBC AUTO: 89 FL (ref 82–98)
PLATELET # BLD AUTO: 225 K/UL (ref 150–450)
PMV BLD AUTO: 12.7 FL (ref 9.2–12.9)
RBC # BLD AUTO: 3.94 M/UL (ref 4–5.4)
WBC # BLD AUTO: 7.45 K/UL (ref 3.9–12.7)

## 2022-08-31 PROCEDURE — 85027 COMPLETE CBC AUTOMATED: CPT | Performed by: OBSTETRICS & GYNECOLOGY

## 2022-08-31 PROCEDURE — 86592 SYPHILIS TEST NON-TREP QUAL: CPT | Performed by: OBSTETRICS & GYNECOLOGY

## 2022-08-31 PROCEDURE — 87389 HIV-1 AG W/HIV-1&-2 AB AG IA: CPT | Performed by: OBSTETRICS & GYNECOLOGY

## 2022-08-31 PROCEDURE — 36415 COLL VENOUS BLD VENIPUNCTURE: CPT | Mod: PN | Performed by: OBSTETRICS & GYNECOLOGY

## 2022-09-01 LAB — RPR SER QL: NORMAL

## 2022-09-02 LAB — BACTERIA SPEC AEROBE CULT: NORMAL

## 2022-09-07 ENCOUNTER — ROUTINE PRENATAL (OUTPATIENT)
Dept: OBSTETRICS AND GYNECOLOGY | Facility: CLINIC | Age: 32
End: 2022-09-07
Payer: MEDICAID

## 2022-09-07 VITALS
SYSTOLIC BLOOD PRESSURE: 104 MMHG | WEIGHT: 159.38 LBS | BODY MASS INDEX: 27.36 KG/M2 | DIASTOLIC BLOOD PRESSURE: 64 MMHG

## 2022-09-07 DIAGNOSIS — O99.340 DEPRESSION AFFECTING PREGNANCY: ICD-10-CM

## 2022-09-07 DIAGNOSIS — F32.A DEPRESSION AFFECTING PREGNANCY: ICD-10-CM

## 2022-09-07 DIAGNOSIS — O26.849 UTERINE SIZE DATE DISCREPANCY PREGNANCY: ICD-10-CM

## 2022-09-07 DIAGNOSIS — D50.9 IRON DEFICIENCY ANEMIA, UNSPECIFIED IRON DEFICIENCY ANEMIA TYPE: ICD-10-CM

## 2022-09-07 DIAGNOSIS — Z3A.37 37 WEEKS GESTATION OF PREGNANCY: ICD-10-CM

## 2022-09-07 DIAGNOSIS — Z34.83 ENCOUNTER FOR SUPERVISION OF OTHER NORMAL PREGNANCY IN THIRD TRIMESTER: Primary | ICD-10-CM

## 2022-09-07 DIAGNOSIS — O47.00 PRETERM UTERINE CONTRACTIONS: ICD-10-CM

## 2022-09-07 PROCEDURE — 99213 OFFICE O/P EST LOW 20 MIN: CPT | Mod: PBBFAC,TH | Performed by: OBSTETRICS & GYNECOLOGY

## 2022-09-07 PROCEDURE — 99213 OFFICE O/P EST LOW 20 MIN: CPT | Mod: TH,S$PBB,, | Performed by: OBSTETRICS & GYNECOLOGY

## 2022-09-07 PROCEDURE — 99213 PR OFFICE/OUTPT VISIT, EST, LEVL III, 20-29 MIN: ICD-10-PCS | Mod: TH,S$PBB,, | Performed by: OBSTETRICS & GYNECOLOGY

## 2022-09-07 PROCEDURE — 99999 PR PBB SHADOW E&M-EST. PATIENT-LVL III: ICD-10-PCS | Mod: PBBFAC,,, | Performed by: OBSTETRICS & GYNECOLOGY

## 2022-09-07 PROCEDURE — 99999 PR PBB SHADOW E&M-EST. PATIENT-LVL III: CPT | Mod: PBBFAC,,, | Performed by: OBSTETRICS & GYNECOLOGY

## 2022-09-07 NOTE — PROGRESS NOTES
@37w1d: Doing well, denies CTX, LOF, VB. +FM.   Induction request placed for 9/21 @ 0000. Consents signed.  More constipated since starting iron supplement. Increased nausea and sleep problems. Can restart Remeron, which she did but has not made much difference. Recommend increased PO hydration, stool softener/Miralax. Also notes HA. Normal BP today and no proteinuria. Will continue to monitor.  RTC 1 week OB f/u. Labor/PROM/PreE/FM precautions reviewed.

## 2022-09-13 ENCOUNTER — ROUTINE PRENATAL (OUTPATIENT)
Dept: OBSTETRICS AND GYNECOLOGY | Facility: CLINIC | Age: 32
End: 2022-09-13
Payer: MEDICAID

## 2022-09-13 VITALS
BODY MASS INDEX: 27.25 KG/M2 | DIASTOLIC BLOOD PRESSURE: 63 MMHG | WEIGHT: 158.75 LBS | SYSTOLIC BLOOD PRESSURE: 103 MMHG

## 2022-09-13 DIAGNOSIS — O47.00 PRETERM UTERINE CONTRACTIONS: ICD-10-CM

## 2022-09-13 DIAGNOSIS — D50.9 IRON DEFICIENCY ANEMIA, UNSPECIFIED IRON DEFICIENCY ANEMIA TYPE: ICD-10-CM

## 2022-09-13 DIAGNOSIS — Z3A.38 38 WEEKS GESTATION OF PREGNANCY: ICD-10-CM

## 2022-09-13 DIAGNOSIS — Z34.83 ENCOUNTER FOR SUPERVISION OF OTHER NORMAL PREGNANCY IN THIRD TRIMESTER: Primary | ICD-10-CM

## 2022-09-13 DIAGNOSIS — Z30.014 ENCOUNTER FOR INITIAL PRESCRIPTION OF INTRAUTERINE CONTRACEPTIVE DEVICE (IUD): ICD-10-CM

## 2022-09-13 PROCEDURE — 99213 OFFICE O/P EST LOW 20 MIN: CPT | Mod: TH,S$PBB,, | Performed by: OBSTETRICS & GYNECOLOGY

## 2022-09-13 PROCEDURE — 99212 OFFICE O/P EST SF 10 MIN: CPT | Mod: PBBFAC,TH,PN | Performed by: OBSTETRICS & GYNECOLOGY

## 2022-09-13 PROCEDURE — 99999 PR PBB SHADOW E&M-EST. PATIENT-LVL II: CPT | Mod: PBBFAC,,, | Performed by: OBSTETRICS & GYNECOLOGY

## 2022-09-13 PROCEDURE — 99999 PR PBB SHADOW E&M-EST. PATIENT-LVL II: ICD-10-PCS | Mod: PBBFAC,,, | Performed by: OBSTETRICS & GYNECOLOGY

## 2022-09-13 PROCEDURE — 99213 PR OFFICE/OUTPT VISIT, EST, LEVL III, 20-29 MIN: ICD-10-PCS | Mod: TH,S$PBB,, | Performed by: OBSTETRICS & GYNECOLOGY

## 2022-09-13 NOTE — PROGRESS NOTES
@38w0d: Doing well, denies CTX, LOF, VB. +FM.   Increased pain in the right upper quadrant, feels musculoskeletal, occasionally sharp, improves with soaking in the tub. Does not worsen with food. Declines muscle relaxer.  Induction schedule full, will change to 9/20 at 0500.   Induction instructions and hibiclens given.    RTC PRN. Labor/PROM/PreE/FM precautions reviewed.

## 2022-09-20 ENCOUNTER — ANESTHESIA EVENT (OUTPATIENT)
Dept: OBSTETRICS AND GYNECOLOGY | Facility: OTHER | Age: 32
End: 2022-09-20
Payer: MEDICAID

## 2022-09-20 ENCOUNTER — PATIENT MESSAGE (OUTPATIENT)
Dept: OBSTETRICS AND GYNECOLOGY | Facility: OTHER | Age: 32
End: 2022-09-20
Payer: MEDICAID

## 2022-09-20 ENCOUNTER — HOSPITAL ENCOUNTER (INPATIENT)
Facility: OTHER | Age: 32
LOS: 2 days | Discharge: HOME | End: 2022-09-22
Attending: OBSTETRICS & GYNECOLOGY | Admitting: OBSTETRICS & GYNECOLOGY
Payer: MEDICAID

## 2022-09-20 ENCOUNTER — ANESTHESIA (OUTPATIENT)
Dept: OBSTETRICS AND GYNECOLOGY | Facility: OTHER | Age: 32
End: 2022-09-20
Payer: MEDICAID

## 2022-09-20 DIAGNOSIS — Z34.90 ENCOUNTER FOR INDUCTION OF LABOR: ICD-10-CM

## 2022-09-20 LAB
ABO + RH BLD: NORMAL
BASOPHILS # BLD AUTO: 0.03 K/UL (ref 0–0.2)
BASOPHILS NFR BLD: 0.4 % (ref 0–1.9)
BLD GP AB SCN CELLS X3 SERPL QL: NORMAL
DIFFERENTIAL METHOD: ABNORMAL
EOSINOPHIL # BLD AUTO: 0.1 K/UL (ref 0–0.5)
EOSINOPHIL NFR BLD: 0.6 % (ref 0–8)
ERYTHROCYTE [DISTWIDTH] IN BLOOD BY AUTOMATED COUNT: 16.2 % (ref 11.5–14.5)
HCT VFR BLD AUTO: 33.5 % (ref 37–48.5)
HGB BLD-MCNC: 11 G/DL (ref 12–16)
IMM GRANULOCYTES # BLD AUTO: 0.07 K/UL (ref 0–0.04)
IMM GRANULOCYTES NFR BLD AUTO: 0.9 % (ref 0–0.5)
LYMPHOCYTES # BLD AUTO: 2.1 K/UL (ref 1–4.8)
LYMPHOCYTES NFR BLD: 26.9 % (ref 18–48)
MCH RBC QN AUTO: 27.6 PG (ref 27–31)
MCHC RBC AUTO-ENTMCNC: 32.8 G/DL (ref 32–36)
MCV RBC AUTO: 84 FL (ref 82–98)
MONOCYTES # BLD AUTO: 1 K/UL (ref 0.3–1)
MONOCYTES NFR BLD: 12.4 % (ref 4–15)
NEUTROPHILS # BLD AUTO: 4.6 K/UL (ref 1.8–7.7)
NEUTROPHILS NFR BLD: 58.8 % (ref 38–73)
NRBC BLD-RTO: 0 /100 WBC
PLATELET # BLD AUTO: 224 K/UL (ref 150–450)
PMV BLD AUTO: 12.8 FL (ref 9.2–12.9)
RBC # BLD AUTO: 3.98 M/UL (ref 4–5.4)
SARS-COV-2 RDRP RESP QL NAA+PROBE: NEGATIVE
WBC # BLD AUTO: 7.77 K/UL (ref 3.9–12.7)

## 2022-09-20 PROCEDURE — 63600175 PHARM REV CODE 636 W HCPCS

## 2022-09-20 PROCEDURE — U0002 COVID-19 LAB TEST NON-CDC: HCPCS

## 2022-09-20 PROCEDURE — 63600175 PHARM REV CODE 636 W HCPCS: Performed by: STUDENT IN AN ORGANIZED HEALTH CARE EDUCATION/TRAINING PROGRAM

## 2022-09-20 PROCEDURE — 59409 OBSTETRICAL CARE: CPT | Mod: AA,,, | Performed by: ANESTHESIOLOGY

## 2022-09-20 PROCEDURE — 86901 BLOOD TYPING SEROLOGIC RH(D): CPT | Performed by: STUDENT IN AN ORGANIZED HEALTH CARE EDUCATION/TRAINING PROGRAM

## 2022-09-20 PROCEDURE — 25000003 PHARM REV CODE 250: Performed by: GENERAL PRACTICE

## 2022-09-20 PROCEDURE — 59409 PRA ETRICAL CARE,VAG DELIV ONLY: ICD-10-PCS | Mod: AA,,, | Performed by: ANESTHESIOLOGY

## 2022-09-20 PROCEDURE — 25000003 PHARM REV CODE 250: Performed by: STUDENT IN AN ORGANIZED HEALTH CARE EDUCATION/TRAINING PROGRAM

## 2022-09-20 PROCEDURE — 27200710 HC EPIDURAL INFUSION PUMP SET: Performed by: ANESTHESIOLOGY

## 2022-09-20 PROCEDURE — 11000001 HC ACUTE MED/SURG PRIVATE ROOM

## 2022-09-20 PROCEDURE — 85025 COMPLETE CBC W/AUTO DIFF WBC: CPT | Performed by: STUDENT IN AN ORGANIZED HEALTH CARE EDUCATION/TRAINING PROGRAM

## 2022-09-20 PROCEDURE — 51702 INSERT TEMP BLADDER CATH: CPT

## 2022-09-20 PROCEDURE — C1751 CATH, INF, PER/CENT/MIDLINE: HCPCS | Performed by: ANESTHESIOLOGY

## 2022-09-20 PROCEDURE — 72100002 HC LABOR CARE, 1ST 8 HOURS

## 2022-09-20 PROCEDURE — 62326 NJX INTERLAMINAR LMBR/SAC: CPT | Performed by: STUDENT IN AN ORGANIZED HEALTH CARE EDUCATION/TRAINING PROGRAM

## 2022-09-20 PROCEDURE — 25000003 PHARM REV CODE 250

## 2022-09-20 RX ORDER — DIPHENOXYLATE HYDROCHLORIDE AND ATROPINE SULFATE 2.5; .025 MG/1; MG/1
1 TABLET ORAL 4 TIMES DAILY PRN
Status: DISCONTINUED | OUTPATIENT
Start: 2022-09-20 | End: 2022-09-21

## 2022-09-20 RX ORDER — MIRTAZAPINE 15 MG/1
15 TABLET, FILM COATED ORAL NIGHTLY
Status: DISCONTINUED | OUTPATIENT
Start: 2022-09-20 | End: 2022-09-22 | Stop reason: HOSPADM

## 2022-09-20 RX ORDER — TRANEXAMIC ACID 100 MG/ML
1000 INJECTION, SOLUTION INTRAVENOUS ONCE AS NEEDED
Status: DISCONTINUED | OUTPATIENT
Start: 2022-09-20 | End: 2022-09-21

## 2022-09-20 RX ORDER — OXYTOCIN/RINGER'S LACTATE 30/500 ML
95 PLASTIC BAG, INJECTION (ML) INTRAVENOUS CONTINUOUS
Status: DISPENSED | OUTPATIENT
Start: 2022-09-20 | End: 2022-09-20

## 2022-09-20 RX ORDER — MISOPROSTOL 200 UG/1
800 TABLET ORAL
Status: DISCONTINUED | OUTPATIENT
Start: 2022-09-20 | End: 2022-09-21

## 2022-09-20 RX ORDER — SODIUM CHLORIDE, SODIUM LACTATE, POTASSIUM CHLORIDE, CALCIUM CHLORIDE 600; 310; 30; 20 MG/100ML; MG/100ML; MG/100ML; MG/100ML
INJECTION, SOLUTION INTRAVENOUS CONTINUOUS
Status: DISCONTINUED | OUTPATIENT
Start: 2022-09-20 | End: 2022-09-21

## 2022-09-20 RX ORDER — FENTANYL CITRATE 50 UG/ML
INJECTION, SOLUTION INTRAMUSCULAR; INTRAVENOUS
Status: COMPLETED
Start: 2022-09-20 | End: 2022-09-20

## 2022-09-20 RX ORDER — ONDANSETRON 2 MG/ML
4 INJECTION INTRAMUSCULAR; INTRAVENOUS ONCE
Status: COMPLETED | OUTPATIENT
Start: 2022-09-20 | End: 2022-09-20

## 2022-09-20 RX ORDER — SODIUM CHLORIDE 9 MG/ML
125 INJECTION, SOLUTION INTRAVENOUS
Status: DISCONTINUED | OUTPATIENT
Start: 2022-09-20 | End: 2022-09-21

## 2022-09-20 RX ORDER — LIDOCAINE HYDROCHLORIDE AND EPINEPHRINE 15; 5 MG/ML; UG/ML
INJECTION, SOLUTION EPIDURAL
Status: DISCONTINUED | OUTPATIENT
Start: 2022-09-20 | End: 2022-09-21

## 2022-09-20 RX ORDER — ACETAMINOPHEN 500 MG
1000 TABLET ORAL ONCE
Status: COMPLETED | OUTPATIENT
Start: 2022-09-20 | End: 2022-09-20

## 2022-09-20 RX ORDER — ACETAMINOPHEN 325 MG/1
650 TABLET ORAL ONCE
Status: COMPLETED | OUTPATIENT
Start: 2022-09-20 | End: 2022-09-20

## 2022-09-20 RX ORDER — FENTANYL/BUPIVACAINE/NS/PF 2MCG/ML-.1
PLASTIC BAG, INJECTION (ML) INJECTION CONTINUOUS PRN
Status: DISCONTINUED | OUTPATIENT
Start: 2022-09-20 | End: 2022-09-21

## 2022-09-20 RX ORDER — SODIUM CITRATE AND CITRIC ACID MONOHYDRATE 334; 500 MG/5ML; MG/5ML
30 SOLUTION ORAL ONCE
Status: DISCONTINUED | OUTPATIENT
Start: 2022-09-20 | End: 2022-09-21

## 2022-09-20 RX ORDER — FAMOTIDINE 10 MG/ML
20 INJECTION INTRAVENOUS ONCE
Status: DISCONTINUED | OUTPATIENT
Start: 2022-09-20 | End: 2022-09-21

## 2022-09-20 RX ORDER — FENTANYL/BUPIVACAINE/NS/PF 2MCG/ML-.1
PLASTIC BAG, INJECTION (ML) INJECTION CONTINUOUS
Status: DISCONTINUED | OUTPATIENT
Start: 2022-09-20 | End: 2022-09-21

## 2022-09-20 RX ORDER — TERBUTALINE SULFATE 1 MG/ML
0.25 INJECTION SUBCUTANEOUS
Status: DISCONTINUED | OUTPATIENT
Start: 2022-09-20 | End: 2022-09-21

## 2022-09-20 RX ORDER — ONDANSETRON 8 MG/1
8 TABLET, ORALLY DISINTEGRATING ORAL EVERY 8 HOURS PRN
Status: DISCONTINUED | OUTPATIENT
Start: 2022-09-20 | End: 2022-09-21

## 2022-09-20 RX ORDER — METHYLERGONOVINE MALEATE 0.2 MG/ML
200 INJECTION INTRAVENOUS
Status: DISCONTINUED | OUTPATIENT
Start: 2022-09-20 | End: 2022-09-21

## 2022-09-20 RX ORDER — BUPIVACAINE HYDROCHLORIDE 2.5 MG/ML
INJECTION, SOLUTION EPIDURAL; INFILTRATION; INTRACAUDAL
Status: DISPENSED
Start: 2022-09-20 | End: 2022-09-20

## 2022-09-20 RX ORDER — LIDOCAINE HYDROCHLORIDE 10 MG/ML
10 INJECTION INFILTRATION; PERINEURAL ONCE AS NEEDED
Status: DISCONTINUED | OUTPATIENT
Start: 2022-09-20 | End: 2022-09-21

## 2022-09-20 RX ORDER — CARBOPROST TROMETHAMINE 250 UG/ML
250 INJECTION, SOLUTION INTRAMUSCULAR
Status: DISCONTINUED | OUTPATIENT
Start: 2022-09-20 | End: 2022-09-21

## 2022-09-20 RX ORDER — CEFAZOLIN SODIUM 2 G/50ML
2 SOLUTION INTRAVENOUS ONCE AS NEEDED
Status: DISCONTINUED | OUTPATIENT
Start: 2022-09-20 | End: 2022-09-21

## 2022-09-20 RX ORDER — SIMETHICONE 80 MG
1 TABLET,CHEWABLE ORAL 4 TIMES DAILY PRN
Status: DISCONTINUED | OUTPATIENT
Start: 2022-09-20 | End: 2022-09-21

## 2022-09-20 RX ORDER — FENTANYL CITRATE 50 UG/ML
INJECTION, SOLUTION INTRAMUSCULAR; INTRAVENOUS
Status: DISCONTINUED | OUTPATIENT
Start: 2022-09-20 | End: 2022-09-21

## 2022-09-20 RX ORDER — FENTANYL/BUPIVACAINE/NS/PF 2MCG/ML-.1
PLASTIC BAG, INJECTION (ML) INJECTION
Status: COMPLETED
Start: 2022-09-20 | End: 2022-09-20

## 2022-09-20 RX ORDER — OXYTOCIN/RINGER'S LACTATE 30/500 ML
0-30 PLASTIC BAG, INJECTION (ML) INTRAVENOUS CONTINUOUS
Status: DISCONTINUED | OUTPATIENT
Start: 2022-09-20 | End: 2022-09-21

## 2022-09-20 RX ORDER — PROCHLORPERAZINE EDISYLATE 5 MG/ML
5 INJECTION INTRAMUSCULAR; INTRAVENOUS EVERY 6 HOURS PRN
Status: DISCONTINUED | OUTPATIENT
Start: 2022-09-20 | End: 2022-09-21

## 2022-09-20 RX ORDER — OXYTOCIN/RINGER'S LACTATE 30/500 ML
334 PLASTIC BAG, INJECTION (ML) INTRAVENOUS ONCE
Status: COMPLETED | OUTPATIENT
Start: 2022-09-20 | End: 2022-09-21

## 2022-09-20 RX ORDER — CALCIUM CARBONATE 200(500)MG
500 TABLET,CHEWABLE ORAL 3 TIMES DAILY PRN
Status: DISCONTINUED | OUTPATIENT
Start: 2022-09-20 | End: 2022-09-21

## 2022-09-20 RX ADMIN — SODIUM CHLORIDE, SODIUM LACTATE, POTASSIUM CHLORIDE, AND CALCIUM CHLORIDE: .6; .31; .03; .02 INJECTION, SOLUTION INTRAVENOUS at 08:09

## 2022-09-20 RX ADMIN — BUPIVACAINE HYDROCHLORIDE 5 ML: 2.5 INJECTION, SOLUTION EPIDURAL; INFILTRATION; INTRACAUDAL; PERINEURAL at 11:09

## 2022-09-20 RX ADMIN — FENTANYL CITRATE 50 MCG: 50 INJECTION, SOLUTION INTRAMUSCULAR; INTRAVENOUS at 11:09

## 2022-09-20 RX ADMIN — ONDANSETRON 8 MG: 8 TABLET, ORALLY DISINTEGRATING ORAL at 07:09

## 2022-09-20 RX ADMIN — LIDOCAINE HYDROCHLORIDE,EPINEPHRINE BITARTRATE 3 ML: 15; .005 INJECTION, SOLUTION EPIDURAL; INFILTRATION; INTRACAUDAL; PERINEURAL at 11:09

## 2022-09-20 RX ADMIN — ACETAMINOPHEN 1000 MG: 500 TABLET, FILM COATED ORAL at 09:09

## 2022-09-20 RX ADMIN — Medication 250 ML: at 11:09

## 2022-09-20 RX ADMIN — Medication 10 ML/HR: at 11:09

## 2022-09-20 RX ADMIN — SODIUM CHLORIDE, SODIUM LACTATE, POTASSIUM CHLORIDE, AND CALCIUM CHLORIDE: .6; .31; .03; .02 INJECTION, SOLUTION INTRAVENOUS at 04:09

## 2022-09-20 RX ADMIN — ONDANSETRON 4 MG: 2 INJECTION INTRAMUSCULAR; INTRAVENOUS at 10:09

## 2022-09-20 RX ADMIN — SODIUM CHLORIDE, SODIUM LACTATE, POTASSIUM CHLORIDE, AND CALCIUM CHLORIDE: .6; .31; .03; .02 INJECTION, SOLUTION INTRAVENOUS at 11:09

## 2022-09-20 RX ADMIN — ACETAMINOPHEN 650 MG: 325 TABLET, FILM COATED ORAL at 10:09

## 2022-09-20 RX ADMIN — Medication 4 MILLI-UNITS/MIN: at 10:09

## 2022-09-20 NOTE — ANESTHESIA PROCEDURE NOTES
Epidural    Patient location during procedure: OB   Reason for block: primary anesthetic   Reason for block: labor analgesia requested by patient and obstetrician  Diagnosis: IUP   Start time: 9/20/2022 11:08 AM  Timeout: 9/20/2022 11:05 AM  End time: 9/20/2022 11:18 AM  Surgery related to: Vaginal Delivery    Staffing  Performing Provider: Ashley Perez DO  Authorizing Provider: Estela Omalley MD        Preanesthetic Checklist  Completed: patient identified, IV checked, site marked, risks and benefits discussed, surgical consent, monitors and equipment checked, pre-op evaluation, timeout performed, anesthesia consent given, hand hygiene performed and patient being monitored  Preparation  Patient position: sitting  Prep: ChloraPrep  Patient monitoring: Pulse Ox  Reason for block: primary anesthetic   Epidural  Skin Anesthetic: lidocaine 1%  Skin Wheal: 3 mL  Administration type: continuous  Approach: midline  Interspace: L3-4    Injection technique: NAVEEN air  Needle and Epidural Catheter  Needle type: Tuohy   Needle gauge: 17  Needle length: 3.5 inches  Needle insertion depth: 6 cm  Catheter type: springwRackup  Catheter size: 19 G  Catheter at skin depth: 10 cm  Insertion Attempts: 1  Test dose: 3 mL of lidocaine 1.5% with Epi 1-to-200,000  Additional Documentation: incremental injection, negative aspiration for heme and CSF, no paresthesia on injection, no signs/symptoms of IV or SA injection, no significant pain on injection and no significant complaints from patient  Needle localization: anatomical landmarks  Medications:  Volume per aspiration: 5 mL  Time between aspirations: 5 minutes   Assessment  Upper dermatomal levels - Left: T9  Right: T9   Dermatomal levels determined by alcohol wipe  Ease of block: easy  Patient's tolerance of the procedure: comfortable throughout block and no complaints No inadvertent dural puncture with Tuohy.  Dural puncture performed with spinal needle.

## 2022-09-20 NOTE — H&P
HISTORY AND PHYSICAL                                                OBSTETRICS          Subjective:       Jose Luis Crowder is a 31 y.o.  female with IUP at 39w0d weeks gestation who presents for IOL.    Patient reports irregular contractions, denies vaginal bleeding, denies LOF.   Fetal Movement: normal.    This IUP is complicated by history of postpartum depression, iron anemia deficiency, chlamydia infection (1T, neg CAYDEN).    Review of Systems   Constitutional:  Negative for chills, fatigue and fever.   Eyes:  Negative for visual disturbance.   Respiratory:  Negative for shortness of breath and wheezing.    Cardiovascular:  Negative for chest pain and palpitations.   Gastrointestinal:  Negative for abdominal pain, blood in stool, constipation, diarrhea, nausea and vomiting.   Genitourinary:  Negative for frequency, pelvic pain, vaginal bleeding, vaginal discharge and vaginal pain.   Musculoskeletal:  Negative for back pain and leg pain.   Integumentary:  Negative for acne and mole/lesion.   Neurological:  Negative for syncope, numbness and headaches.   Hematological:  Negative for adenopathy.   Psychiatric/Behavioral:  Negative for depression. The patient is not nervous/anxious.      PMHx:   Past Medical History:   Diagnosis Date    Abnormal Pap smear of cervix yrs ago    repeat pap    Gall stones     Ovarian cyst        PSHx:   Past Surgical History:   Procedure Laterality Date    CHOLECYSTECTOMY  2017     Laparoscopic cholecystectomy -     DILATION AND CURETTAGE OF UTERUS USING SUCTION N/A 2019    Procedure: DILATION AND CURETTAGE, UTERUS, USING SUCTION (ADD ON );  Surgeon: Pelon Mead III, MD;  [facility];  Service: OB/GYN;  Laterality: N/A;  (ADD ON )    TONSILLECTOMY, ADENOIDECTOMY         All: Review of patient's allergies indicates:  No Known Allergies    Meds:   Medications Prior to Admission   Medication Sig Dispense Refill Last Dose    diphth,pertus,acell,,tetanus  "(BOOSTRIX TDAP) 2.5-8-5 Lf-mcg-Lf/0.5mL Syrg injection Inject into the muscle. 0.5 mL 0     doxylamine-pyridoxine, vit B6, (DICLEGIS) 10-10 mg TbEC Take 2 tablets by mouth every evening. If no improvement in nausea, can take 1 pill in morning and 2 at bedtime. 60 tablet 1     mirtazapine (REMERON) 15 MG tablet Take 1 tablet (15 mg total) by mouth nightly. 30 tablet 2     ondansetron (ZOFRAN-ODT) 4 MG TbDL Take 1 tablet (4 mg total) by mouth every 6 (six) hours as needed (Nausea and vomiting). 30 tablet 3     -iron-FA-om-3s-dha-epa (VITAFOL GUMMIES) 3.33 mg iron- 0.33 mg Chew Take 3 chews a day 90 tablet 4     promethazine (PHENERGAN) 25 MG tablet Take 1 tablet (25 mg total) by mouth every 6 (six) hours as needed for Nausea. 30 tablet 3        SH:   Social History     Socioeconomic History    Marital status: Single   Tobacco Use    Smoking status: Never    Smokeless tobacco: Never   Substance and Sexual Activity    Alcohol use: Yes     Comment: Social     Drug use: No    Sexual activity: Yes     Partners: Male     Birth control/protection: Condom       FH:   Family History   Problem Relation Age of Onset    Diabetes Maternal Grandmother     Diabetes Mother     Breast cancer Neg Hx     Colon cancer Neg Hx     Ovarian cancer Neg Hx        OBHx:   OB History    Para Term  AB Living   3 1 1 0 1 1   SAB IAB Ectopic Multiple Live Births   1 0 0 0 1      # Outcome Date GA Lbr Neftali/2nd Weight Sex Delivery Anes PTL Lv   3 Current            2 SAB 17           1 Term 17 39w3d / 00:26 3.09 kg (6 lb 13 oz) F Vag-Vacuum EPI N GAIL      Name: KAREN JARAMILLO SKIJORDI      Apgar1: 5  Apgar5: 9       Objective:       /68   Pulse 66   Temp 98.1 °F (36.7 °C) (Oral)   Resp 16   Ht 5' 4" (1.626 m)   Wt 72.1 kg (159 lb)   LMP 2021   SpO2 97%   Breastfeeding No   BMI 27.29 kg/m²     Vitals:    22 0813 22 1022 22 1038 22 1039   BP:  101/66 101/68    BP Location:     "   Patient Position:       Pulse: 80  69 66   Resp:       Temp:       TempSrc:       SpO2: 96%   97%   Weight:       Height:           General:   alert, appears stated age and cooperative, no apparent distress   HENT:  normocephalic, atraumatic   Eyes:  extraocular movements and conjunctivae normal   Neck:  supple, range of motion normal, no thyromegaly   Lungs:   no respiratory distress   Heart:   regular rate   Abdomen:  soft, non-tender, non-distended but gravid, no rebound or guarding    Extremities negative edema, negative erythema   FHT: 130, moderate BTBV, +accels, -decels;  Cat 1 (reassuring)                 TOCO: Q 2-3 minutes   Presentations: cephalic by ultrasound   Cervix:     Dilation: 3cm    Effacement: 60    Station:  -3    Consistency: medium    Position: middle   Sterile Speculum Exam: Deferred    EFW by Leopold's: 7#    Recent Growth Scan: 1443g @30w2d    Lab Review  Blood Type AB POS  GBBS: negative  Rubella: Immune  RPR: negative  HIV: negative  HepB: negative       Assessment:       39w0d weeks gestation for IOL.    Active Hospital Problems    Diagnosis  POA    *Encounter for induction of labor [Z34.90]  Not Applicable      Resolved Hospital Problems   No resolved problems to display.          Plan:   Induction of Labor    Risks, benefits, alternatives and possible complications have been discussed in detail with the patient.   - Consents signed and to chart  - Admit to Labor and Delivery unit  - Epidural per Anesthesia  - Draw CBC, T&S  - Notify Staff  - Recheck in 4 hrs or PRN  - EFW 1443 g @ 30w2d  - Maternal pelvis 6#    Post-Partum Hemorrhage risk - low    H/o postpartum depression  -monitor mood     H/o Chlamydia  -in first trimester  -treated and neg CAYDEN    Anemia  -H/H:10/35  -asymptomatic  -will consider iron/colace post-partum    Andrzej Staples MD PGY-1  Obstetrics and Gynecology

## 2022-09-20 NOTE — PROGRESS NOTES
"LABOR NOTE    S:  Complaints: No.  Epidural working:  yes    O: BP 98/71   Pulse 61   Temp 97.9 °F (36.6 °C) (Oral)   Resp 16   Ht 5' 4" (1.626 m)   Wt 72.1 kg (159 lb)   LMP 2021   SpO2 98%   Breastfeeding No   BMI 27.29 kg/m²       FHT: 110 Cat 1 (reassuring)  CTX: q 2-4 minutes  SVE: 3/70/-2 AROM clear       ASSESSMENT:   31 y.o.  at 39w0d, IOL     FHT reassuring    Active Hospital Problems    Diagnosis  POA    *Encounter for induction of labor [Z34.90]  Not Applicable      Resolved Hospital Problems   No resolved problems to display.     0800: 3/60/-3 pitocin started   1400: 3/70/-2 AROM clear Pit 16     PLAN:  Continue Close Maternal/Fetal Monitoring  Pitocin Augmentation per protocol  Recheck 2 hours or PRN      Kristine Baxter MD  PGY 2  Obstetrics and Gynecology    "

## 2022-09-20 NOTE — H&P
HISTORY AND PHYSICAL                                                OBSTETRICS          Subjective:       Jose Luis Crowder is a 31 y.o.  female with IUP at 39w0d weeks gestation who presents for IOL.    Patient reports irregular contractions, denies vaginal bleeding, denies LOF.   Fetal Movement: normal.    This IUP is complicated by depression, and chlamydia infection (1T, neg CAYDEN).    Review of Systems   Constitutional:  Negative for chills and fever.   HENT:  Negative for nasal congestion.    Eyes:  Negative for visual disturbance.   Respiratory:  Negative for shortness of breath.    Cardiovascular:  Negative for chest pain.   Gastrointestinal:  Positive for abdominal pain (with contractions). Negative for constipation, diarrhea, nausea and vomiting.   Genitourinary:  Negative for frequency, pelvic pain and vaginal bleeding.   Musculoskeletal:  Negative for back pain.   Integumentary:  Negative for rash.   Neurological:  Negative for headaches.   Psychiatric/Behavioral:  Negative for depression.      PMHx:   Past Medical History:   Diagnosis Date    Abnormal Pap smear of cervix yrs ago    repeat pap    Gall stones     Ovarian cyst        PSHx:   Past Surgical History:   Procedure Laterality Date    CHOLECYSTECTOMY  2017     Laparoscopic cholecystectomy -     DILATION AND CURETTAGE OF UTERUS USING SUCTION N/A 2019    Procedure: DILATION AND CURETTAGE, UTERUS, USING SUCTION (ADD ON );  Surgeon: Pelon Mead III, MD;  Location: [facility];  Service: OB/GYN;  Laterality: N/A;  (ADD ON )    TONSILLECTOMY, ADENOIDECTOMY         All: Review of patient's allergies indicates:  No Known Allergies    Meds:   Medications Prior to Admission   Medication Sig Dispense Refill Last Dose    diphth,pertus,acell,,tetanus (BOOSTRIX TDAP) 2.5-8-5 Lf-mcg-Lf/0.5mL Syrg injection Inject into the muscle. 0.5 mL 0     doxylamine-pyridoxine, vit B6, (DICLEGIS) 10-10 mg TbEC Take 2 tablets by mouth every evening. If  "no improvement in nausea, can take 1 pill in morning and 2 at bedtime. 60 tablet 1     mirtazapine (REMERON) 15 MG tablet Take 1 tablet (15 mg total) by mouth nightly. 30 tablet 2     ondansetron (ZOFRAN-ODT) 4 MG TbDL Take 1 tablet (4 mg total) by mouth every 6 (six) hours as needed (Nausea and vomiting). 30 tablet 3     -iron-FA-om-3s-dha-epa (VITAFOL GUMMIES) 3.33 mg iron- 0.33 mg Chew Take 3 chews a day 90 tablet 4     promethazine (PHENERGAN) 25 MG tablet Take 1 tablet (25 mg total) by mouth every 6 (six) hours as needed for Nausea. 30 tablet 3        SH:   Social History     Socioeconomic History    Marital status: Single   Tobacco Use    Smoking status: Never    Smokeless tobacco: Never   Substance and Sexual Activity    Alcohol use: Yes     Comment: Social     Drug use: No    Sexual activity: Yes     Partners: Male     Birth control/protection: Condom       FH:   Family History   Problem Relation Age of Onset    Diabetes Maternal Grandmother     Diabetes Mother     Breast cancer Neg Hx     Colon cancer Neg Hx     Ovarian cancer Neg Hx        OBHx:   OB History    Para Term  AB Living   3 1 1 0 1 1   SAB IAB Ectopic Multiple Live Births   1 0 0 0 1      # Outcome Date GA Lbr Neftali/2nd Weight Sex Delivery Anes PTL Lv   3 Current            2 SAB 17           1 Term 17 39w3d / 00:26 3.09 kg (6 lb 13 oz) F Vag-Vacuum EPI N GAIL      Name: KAREN JARAMILLO      Apgar1: 5  Apgar5: 9       Objective:       /61   Pulse (!) 57   Temp 97.9 °F (36.6 °C) (Oral)   Resp 16   Ht 5' 4" (1.626 m)   Wt 72.1 kg (159 lb)   LMP 2021   SpO2 98%   Breastfeeding No   BMI 27.29 kg/m²     Vitals:    22 1455 22 1456 22 1500 22 1504   BP: 101/61      BP Location:       Patient Position:       Pulse: (!) 59 (!) 58 (!) 56 (!) 57   Resp:       Temp:       TempSrc:       SpO2:    98%   Weight:       Height:           General:   alert, appears stated age and " cooperative, no apparent distress   HENT:  normocephalic, atraumatic   Eyes:  extraocular movements and conjunctivae normal   Neck:  supple, range of motion normal   Lungs:   no respiratory distress   Heart:   regular rate   Abdomen:  soft, non-tender, non-distended but gravid, no rebound or guarding    Extremities negative edema, negative erythema   FHT: 140, moderate BTBV, +accels, -decels;  Cat 1 (reassuring)                 TOCO: Q 2-3 minutes   Presentations: cephalic by ultrasound   Cervix:     Dilation: 3cm    Effacement: 60    Station:  -3    Consistency: medium    Position: middle     Recent Growth Scan: 1443g, 27th percentile @ 30w2d    Lab Review  Blood Type AB POS  GBBS: negative  Rubella: Immune  RPR: negative  HIV: negative  HepB: negative       Assessment:       39w0d weeks gestation for IOL.    Active Hospital Problems    Diagnosis  POA    *Encounter for induction of labor [Z34.90]  Not Applicable      Resolved Hospital Problems   No resolved problems to display.          Plan:   IOL  - Risks, benefits, alternatives and possible complications have been discussed in detail with the patient.   - Consents signed previously in clinic and in chart  - Admit to Labor and Delivery unit  - Epidural per Anesthesia  - Draw CBC, T&S  - Notify Staff  - Recheck in 4 hrs or PRN  - EFW 1443 g @ 30w2d    Post-Partum Hemorrhage risk - low    2. H/o postpartum depression  - mood stable  - no medications  - will need postpartum mood check     3. H/o Chlamydia  -in first trimester  -treated and neg CAYDEN    Ericka Miguel MD  PGY-2 OB/GYN

## 2022-09-20 NOTE — PROGRESS NOTES
"LABOR NOTE    S:  Complaints: No.  Epidural working:  yes    O: /61   Pulse (!) 57   Temp 97.9 °F (36.6 °C) (Oral)   Resp 16   Ht 5' 4" (1.626 m)   Wt 72.1 kg (159 lb)   LMP 2021   SpO2 98%   Breastfeeding No   BMI 27.29 kg/m²       FHT: 110, moderate variability, - accels, - decels Cat 1 (reassuring)  CTX: q 2-4 minutes  SVE: 3/90/-      ASSESSMENT:   31 y.o.  at 39w0d, IOL     FHT reassuring    Active Hospital Problems    Diagnosis  POA    *Encounter for induction of labor [Z34.90]  Not Applicable      Resolved Hospital Problems   No resolved problems to display.     0800: 3/60/-3 pitocin started   1400: 3/70/-2 AROM clear Pit 16   1630: 3/90/-1, pit at 16 increased to 18    PLAN:  Continue Close Maternal/Fetal Monitoring  Pitocin Augmentation per protocol - continue to increase   Recheck 4 hours or PRN    Ingris Youssef MD  OBGYN PGY-4    "

## 2022-09-20 NOTE — ANESTHESIA PREPROCEDURE EVALUATION
[facility]  Anesthesia Pre-Operative Evaluation         Patient Name: Jose Luis Crowder  YOB: 1990  MRN: [id number]    2022      Jose Luis Crowder is a 31 y.o. female  @ 39w0d who presents w IUP. IUP c/b h/o D&C for SAB in previous pregnancy and  uterine contractions. Denies cHTN, asthma, DM, bleeding diathesis, anticoag, spinal d/o or prior spinal surgery.      OB History    Para Term  AB Living   3 1 1   1 1   SAB IAB Ectopic Multiple Live Births   1     0 1      # Outcome Date GA Lbr Neftali/2nd Weight Sex Delivery Anes PTL Lv   3 Current            2 SAB 17           1 Term 17 39w3d / 00:26 3.09 kg (6 lb 13 oz) F Vag-Vacuum EPI N GAIL       Review of patient's allergies indicates:  No Known Allergies    Wt Readings from Last 1 Encounters:   22 1602 72 kg (158 lb 11.7 oz)       BP Readings from Last 3 Encounters:   22 103/63   22 104/64   22 (!) 90/56       Patient Active Problem List   Diagnosis    Nausea and vomiting in pregnancy    RUQ pain    Iron deficiency anemia    S/P cholecystectomy    SAB (spontaneous )    Status post dilatation and suction curettage    Initial obstetric visit in first trimester    Encounter for supervision of normal pregnancy in third trimester    Nausea and vomiting during pregnancy    Depression affecting pregnancy    Chlamydia infection    Uterine size date discrepancy pregnancy    Threatened premature labor in third trimester     uterine contractions       Past Surgical History:   Procedure Laterality Date    CHOLECYSTECTOMY  2017     Laparoscopic cholecystectomy -     DILATION AND CURETTAGE OF UTERUS USING SUCTION N/A 2019    Procedure: DILATION AND CURETTAGE, UTERUS, USING SUCTION (ADD ON );  Surgeon: Pelon Mead III, MD;  Location: [facility] OR;  Service: OB/GYN;  Laterality: N/A;  (ADD ON )    TONSILLECTOMY, ADENOIDECTOMY         Social  History     Socioeconomic History    Marital status: Single   Tobacco Use    Smoking status: Never    Smokeless tobacco: Never   Substance and Sexual Activity    Alcohol use: Yes     Comment: Social     Drug use: No    Sexual activity: Yes     Partners: Male     Birth control/protection: Condom         Chemistry        Component Value Date/Time     (L) 2022 0916    K 3.8 2022 0916     2022 0916    CO2 22 (L) 2022 0916    BUN 9 2022 0916    CREATININE 0.8 2022 0916     (H) 2022 0916        Component Value Date/Time    CALCIUM 9.5 2022 0916    ALKPHOS 73 2022 0916    AST 17 2022 0916    ALT 8 (L) 2022 0916    BILITOT 0.6 2022 0916    ESTGFRAFRICA >60 2022 0916    EGFRNONAA >60 2022 0916            Lab Results   Component Value Date    WBC 7.45 2022    HGB 10.6 (L) 2022    HCT 35.2 (L) 2022    MCV 89 2022     2022       No results for input(s): PT, INR, PROTIME, APTT in the last 72 hours.            Pre-op Assessment    I have reviewed the Patient Summary Reports.    I have reviewed the NPO Status.   I have reviewed the Medications.     Review of Systems  Anesthesia Hx:  No problems with previous Anesthesia  Denies Family Hx of Anesthesia complications.   Denies Personal Hx of Anesthesia complications.   Social:  Non-Smoker    Hematology/Oncology:  Hematology Normal   Oncology Normal     EENT/Dental:EENT/Dental Normal   Cardiovascular:  Cardiovascular Normal     Pulmonary:  Pulmonary Normal    Renal/:  Renal/ Normal     Hepatic/GI:  Hepatic/GI Normal    Musculoskeletal:  Musculoskeletal Normal    OB/GYN/PEDS:  D&C d/t SAB in prior   contractions   Neurological:  Neurology Normal    Endocrine:  Endocrine Normal    Psych:   depression          Physical Exam  General: Well nourished, Cooperative, Alert and Oriented    Airway:  Mallampati: III   Mouth Opening:  Normal  TM Distance: Normal  Tongue: Normal    Dental:  Intact    Chest/Lungs:  Normal Respiratory Rate    Heart:  Rate: Normal        Anesthesia Plan  Type of Anesthesia, risks & benefits discussed:    Anesthesia Type: Spinal, Epidural, CSE, Gen ETT  Intra-op Monitoring Plan: Standard ASA Monitors  Post Op Pain Control Plan: multimodal analgesia, epidural analgesia and IV/PO Opioids PRN  Induction:  rapid sequence  Airway Plan: Video, Post-Induction  Informed Consent: Informed consent signed with the Patient and all parties understand the risks and agree with anesthesia plan.  All questions answered. Patient consented to blood products? Yes  ASA Score: 2  Day of Surgery Review of History & Physical: H&P Update referred to the surgeon/provider.    Ready For Surgery From Anesthesia Perspective.     .

## 2022-09-21 PROCEDURE — 59409 PR OBSTETRICAL CARE,VAG DELIV ONLY: ICD-10-PCS | Mod: GB,,, | Performed by: OBSTETRICS & GYNECOLOGY

## 2022-09-21 PROCEDURE — 25000003 PHARM REV CODE 250: Performed by: STUDENT IN AN ORGANIZED HEALTH CARE EDUCATION/TRAINING PROGRAM

## 2022-09-21 PROCEDURE — 11000001 HC ACUTE MED/SURG PRIVATE ROOM

## 2022-09-21 PROCEDURE — 0502F SUBSEQUENT PRENATAL CARE: CPT | Mod: ,,, | Performed by: OBSTETRICS & GYNECOLOGY

## 2022-09-21 PROCEDURE — 0502F PR SUBSEQUENT PRENATAL CARE: ICD-10-PCS | Mod: ,,, | Performed by: OBSTETRICS & GYNECOLOGY

## 2022-09-21 PROCEDURE — 51701 INSERT BLADDER CATHETER: CPT

## 2022-09-21 PROCEDURE — 72200005 HC VAGINAL DELIVERY LEVEL II

## 2022-09-21 PROCEDURE — 63600175 PHARM REV CODE 636 W HCPCS: Performed by: STUDENT IN AN ORGANIZED HEALTH CARE EDUCATION/TRAINING PROGRAM

## 2022-09-21 PROCEDURE — 72100003 HC LABOR CARE, EA. ADDL. 8 HRS

## 2022-09-21 PROCEDURE — 59409 OBSTETRICAL CARE: CPT | Mod: GB,,, | Performed by: OBSTETRICS & GYNECOLOGY

## 2022-09-21 RX ORDER — PRENATAL WITH FERROUS FUM AND FOLIC ACID 3080; 920; 120; 400; 22; 1.84; 3; 20; 10; 1; 12; 200; 27; 25; 2 [IU]/1; [IU]/1; MG/1; [IU]/1; MG/1; MG/1; MG/1; MG/1; MG/1; MG/1; UG/1; MG/1; MG/1; MG/1; MG/1
1 TABLET ORAL DAILY
Status: DISCONTINUED | OUTPATIENT
Start: 2022-09-21 | End: 2022-09-22 | Stop reason: HOSPADM

## 2022-09-21 RX ORDER — ACETAMINOPHEN 325 MG/1
650 TABLET ORAL EVERY 6 HOURS PRN
Status: DISCONTINUED | OUTPATIENT
Start: 2022-09-21 | End: 2022-09-22 | Stop reason: HOSPADM

## 2022-09-21 RX ORDER — SIMETHICONE 80 MG
1 TABLET,CHEWABLE ORAL EVERY 6 HOURS PRN
Status: DISCONTINUED | OUTPATIENT
Start: 2022-09-21 | End: 2022-09-22 | Stop reason: HOSPADM

## 2022-09-21 RX ORDER — BUPIVACAINE HYDROCHLORIDE 2.5 MG/ML
INJECTION, SOLUTION EPIDURAL; INFILTRATION; INTRACAUDAL
Status: DISPENSED
Start: 2022-09-21 | End: 2022-09-21

## 2022-09-21 RX ORDER — PROCHLORPERAZINE EDISYLATE 5 MG/ML
5 INJECTION INTRAMUSCULAR; INTRAVENOUS EVERY 6 HOURS PRN
Status: DISCONTINUED | OUTPATIENT
Start: 2022-09-21 | End: 2022-09-22 | Stop reason: HOSPADM

## 2022-09-21 RX ORDER — HYDROCODONE BITARTRATE AND ACETAMINOPHEN 10; 325 MG/1; MG/1
1 TABLET ORAL EVERY 4 HOURS PRN
Status: DISCONTINUED | OUTPATIENT
Start: 2022-09-21 | End: 2022-09-22 | Stop reason: HOSPADM

## 2022-09-21 RX ORDER — DOCUSATE SODIUM 100 MG/1
200 CAPSULE, LIQUID FILLED ORAL 2 TIMES DAILY PRN
Status: DISCONTINUED | OUTPATIENT
Start: 2022-09-21 | End: 2022-09-22 | Stop reason: HOSPADM

## 2022-09-21 RX ORDER — OXYTOCIN/RINGER'S LACTATE 30/500 ML
95 PLASTIC BAG, INJECTION (ML) INTRAVENOUS ONCE
Status: DISCONTINUED | OUTPATIENT
Start: 2022-09-21 | End: 2022-09-22 | Stop reason: HOSPADM

## 2022-09-21 RX ORDER — DIPHENHYDRAMINE HYDROCHLORIDE 50 MG/ML
25 INJECTION INTRAMUSCULAR; INTRAVENOUS EVERY 4 HOURS PRN
Status: DISCONTINUED | OUTPATIENT
Start: 2022-09-21 | End: 2022-09-22 | Stop reason: HOSPADM

## 2022-09-21 RX ORDER — SODIUM CHLORIDE 0.9 % (FLUSH) 0.9 %
10 SYRINGE (ML) INJECTION
Status: DISCONTINUED | OUTPATIENT
Start: 2022-09-21 | End: 2022-09-22 | Stop reason: HOSPADM

## 2022-09-21 RX ORDER — HYDROCODONE BITARTRATE AND ACETAMINOPHEN 5; 325 MG/1; MG/1
1 TABLET ORAL EVERY 4 HOURS PRN
Status: DISCONTINUED | OUTPATIENT
Start: 2022-09-21 | End: 2022-09-22 | Stop reason: HOSPADM

## 2022-09-21 RX ORDER — IBUPROFEN 600 MG/1
600 TABLET ORAL EVERY 6 HOURS
Status: DISCONTINUED | OUTPATIENT
Start: 2022-09-21 | End: 2022-09-22 | Stop reason: HOSPADM

## 2022-09-21 RX ORDER — HYDROCORTISONE 25 MG/G
CREAM TOPICAL 3 TIMES DAILY PRN
Status: DISCONTINUED | OUTPATIENT
Start: 2022-09-21 | End: 2022-09-22 | Stop reason: HOSPADM

## 2022-09-21 RX ORDER — ONDANSETRON 8 MG/1
8 TABLET, ORALLY DISINTEGRATING ORAL EVERY 8 HOURS PRN
Status: DISCONTINUED | OUTPATIENT
Start: 2022-09-21 | End: 2022-09-22 | Stop reason: HOSPADM

## 2022-09-21 RX ORDER — MEDROXYPROGESTERONE ACETATE 150 MG/ML
150 INJECTION, SUSPENSION INTRAMUSCULAR ONCE
Status: COMPLETED | OUTPATIENT
Start: 2022-09-21 | End: 2022-09-22

## 2022-09-21 RX ORDER — DIPHENHYDRAMINE HCL 25 MG
25 CAPSULE ORAL EVERY 4 HOURS PRN
Status: DISCONTINUED | OUTPATIENT
Start: 2022-09-21 | End: 2022-09-22 | Stop reason: HOSPADM

## 2022-09-21 RX ADMIN — IBUPROFEN 600 MG: 600 TABLET ORAL at 12:09

## 2022-09-21 RX ADMIN — DIPHENHYDRAMINE HYDROCHLORIDE 25 MG: 25 CAPSULE ORAL at 10:09

## 2022-09-21 RX ADMIN — IBUPROFEN 600 MG: 600 TABLET ORAL at 05:09

## 2022-09-21 RX ADMIN — DIPHENHYDRAMINE HYDROCHLORIDE 25 MG: 25 CAPSULE ORAL at 03:09

## 2022-09-21 RX ADMIN — ACETAMINOPHEN 650 MG: 325 TABLET, FILM COATED ORAL at 03:09

## 2022-09-21 RX ADMIN — ACETAMINOPHEN 650 MG: 325 TABLET, FILM COATED ORAL at 09:09

## 2022-09-21 RX ADMIN — HYDROCODONE BITARTRATE AND ACETAMINOPHEN 1 TABLET: 5; 325 TABLET ORAL at 11:09

## 2022-09-21 RX ADMIN — PRENATAL VIT W/ FE FUMARATE-FA TAB 27-0.8 MG 1 TABLET: 27-0.8 TAB at 09:09

## 2022-09-21 RX ADMIN — DOCUSATE SODIUM 200 MG: 100 CAPSULE, LIQUID FILLED ORAL at 09:09

## 2022-09-21 RX ADMIN — IBUPROFEN 600 MG: 600 TABLET ORAL at 11:09

## 2022-09-21 RX ADMIN — DOCUSATE SODIUM 200 MG: 100 CAPSULE, LIQUID FILLED ORAL at 08:09

## 2022-09-21 RX ADMIN — IBUPROFEN 600 MG: 600 TABLET ORAL at 06:09

## 2022-09-21 RX ADMIN — HYDROCODONE BITARTRATE AND ACETAMINOPHEN 1 TABLET: 10; 325 TABLET ORAL at 12:09

## 2022-09-21 RX ADMIN — HYDROCODONE BITARTRATE AND ACETAMINOPHEN 1 TABLET: 10; 325 TABLET ORAL at 05:09

## 2022-09-21 RX ADMIN — Medication 334 MILLI-UNITS/MIN: at 02:09

## 2022-09-21 NOTE — PLAN OF CARE
Problem: Adult Inpatient Plan of Care  Goal: Plan of Care Review  Outcome: Ongoing, Progressing  Goal: Patient-Specific Goal (Individualized)  Outcome: Ongoing, Progressing  Goal: Absence of Hospital-Acquired Illness or Injury  Outcome: Ongoing, Progressing  Goal: Optimal Comfort and Wellbeing  Outcome: Ongoing, Progressing  Goal: Readiness for Transition of Care  Outcome: Ongoing, Progressing     Problem:  Fall Injury Risk  Goal: Absence of Fall, Infant Drop and Related Injury  Outcome: Ongoing, Progressing     Problem: Infection  Goal: Absence of Infection Signs and Symptoms  Outcome: Ongoing, Progressing     Problem: Bleeding (Labor)  Goal: Hemostasis  Outcome: Ongoing, Progressing     Problem: Change in Fetal Wellbeing (Labor)  Goal: Stable Fetal Wellbeing  Outcome: Ongoing, Progressing     Problem: Delayed Labor Progression (Labor)  Goal: Effective Progression to Delivery  Outcome: Ongoing, Progressing     Problem: Infection (Labor)  Goal: Absence of Infection Signs and Symptoms  Outcome: Ongoing, Progressing     Problem: Labor Pain (Labor)  Goal: Acceptable Pain Control  Outcome: Ongoing, Progressing     Problem: Uterine Tachysystole (Labor)  Goal: Normal Uterine Contraction Pattern  Outcome: Ongoing, Progressing     Problem: Device-Related Complication Risk (Anesthesia/Analgesia, Neuraxial)  Goal: Safe Infusion Delivery Completion  Outcome: Ongoing, Progressing     Problem: Infection (Anesthesia/Analgesia, Neuraxial)  Goal: Absence of Infection Signs and Symptoms  Outcome: Ongoing, Progressing     Problem: Nausea and Vomiting (Anesthesia/Analgesia, Neuraxial)  Goal: Nausea and Vomiting Relief  Outcome: Ongoing, Progressing     Problem: Pain (Anesthesia/Analgesia, Neuraxial)  Goal: Effective Pain Control  Outcome: Ongoing, Progressing     Problem: Respiratory Compromise (Anesthesia/Analgesia, Neuraxial)  Goal: Effective Oxygenation and Ventilation  Outcome: Ongoing, Progressing     Problem:  Sensorimotor Impairment (Anesthesia/Analgesia, Neuraxial)  Goal: Baseline Motor Function  Outcome: Ongoing, Progressing     Problem: Urinary Retention (Anesthesia/Analgesia, Neuraxial)  Goal: Effective Urinary Elimination  Outcome: Ongoing, Progressing     Problem: Skin Injury Risk Increased  Goal: Skin Health and Integrity  Outcome: Ongoing, Progressing

## 2022-09-21 NOTE — NURSING
Mom educated on the importance of pumping whenever baby eats 8-12x in a 24 hr period. Mom requesting to not pump at this time, cramping too bad. Will cont to educate.

## 2022-09-21 NOTE — L&D DELIVERY NOTE
[facility] - Labor & Delivery  Vaginal Delivery   Operative Note    SUMMARY     Normal spontaneous vaginal delivery of live infant, was placed on mothers abdomen for skin to skin and bulb suctioning performed.  Infant delivered position HENRIK over intact perineum.  Nuchal cord: No.    Spontaneous delivery of placenta and IV pitocin given noting good uterine tone.    No lacerations noted.    Patient tolerated delivery well. Sponge needle and lap counted correctly x2.    Preethi Gonsalez MD  OBGYN, PGY-2      Indications:  (spontaneous vaginal delivery)  Pregnancy complicated by:   Patient Active Problem List   Diagnosis    Nausea and vomiting in pregnancy    RUQ pain    Iron deficiency anemia    S/P cholecystectomy    SAB (spontaneous )    Status post dilatation and suction curettage    Encounter for induction of labor    Encounter for supervision of normal pregnancy in third trimester    Nausea and vomiting during pregnancy    Depression affecting pregnancy    Chlamydia infection    Uterine size date discrepancy pregnancy    Threatened premature labor in third trimester     uterine contractions     (spontaneous vaginal delivery)     Admitting GA: 39w1d    Delivery Information for Jose Crowder    Birth information:  YOB: 2023   Time of birth: 2:25 AM   Sex: male   Head Delivery Date/Time: 2022  2:25 AM   Delivery type: Vaginal, Spontaneous   Gestational Age: 39w1d    Delivery Providers    Delivering clinician: Preethi Gonsalez MD   Provider Role    Mel Gottlieb RN Delivery Nurse    Estrella Trotter MD Resident    Katlyn Bhandari MD Attending              Measurements    Weight:   Length:          Apgars    Living status: Living  Apgars:  1 min.:  5 min.:  10 min.:  15 min.:  20 min.:    Skin color:  0  1       Heart rate:  2  2       Reflex irritability:  2  2       Muscle tone:  2  2       Respiratory effort:  2  2       Total:  8  9       Apgars assigned by: MUNDO YI RN          Operative Delivery    Forceps attempted?: No  Vacuum extractor attempted?: No         Shoulder Dystocia    Shoulder dystocia present?: No           Presentation    Presentation: Vertex  Position: Right Occiput Anterior           Interventions/Resuscitation    Method: Tactile Stimulation       Cord    Vessels: 3 vessels  Complications: None  Delayed Cord Clamping?: Yes  Cord Clamped Date/Time: 2022  2:28 AM  Cord Blood Disposition: Sent with Baby  Gases Sent?: No  Stem Cell Collection (by MD): No       Placenta    Placenta delivery date/time: 2022 0228  Placenta removal: Spontaneous  Placenta appearance: Intact  Placenta disposition: discarded           Labor Events:       labor: No     Labor Onset Date/Time: 2022 13:48     Dilation Complete Date/Time: 2022 02:00     Start Pushing Date/Time: 2022 02:21       Start Pushing Date/Time: 2022 02:21     Rupture Date/Time: 22  1417         Rupture type:            Fluid Amount:         Fluid Color: Clear        Fluid Odor:         Membrane Status: ARM (Artificial Rupture)                 steroids: Full Course     Antibiotics given for GBS: No     Induction: oxytocin     Indications for induction:  Elective     Augmentation:       Indications for augmentation:       Labor complications: None     Additional complications:          Cervical ripening:                     Delivery:      Episiotomy: None     Indication for Episiotomy:       Perineal Lacerations: None Repaired:      Periurethral Laceration:   Repaired:     Labial Laceration:   Repaired:     Sulcus Laceration:   Repaired:     Vaginal Laceration:   Repaired:     Cervical Laceration:   Repaired:     Repair suture: None     Repair # of packets:       Last Value - EBL - Nursing (mL):       Sum - EBL - Nursing (mL): 0     Last Value - EBL - Anesthesia (mL):        Calculated QBL (mL): 50        Vaginal Sweep Performed: No     Surgicount Correct: Yes        Other providers:       Anesthesia    Method: Epidural          Details (if applicable):  Trial of Labor      Categorization:      Priority:     Indications for :     Incision Type:       Additional  information:  Forceps:    Vacuum:    Breech:    Observed anomalies    Other (Comments):

## 2022-09-21 NOTE — PROGRESS NOTES
"LABOR NOTE     S:  Complaints: No.  Epidural working:  yes  Resident to bedside for cervical exam    O: /84   Pulse 69   Temp 98.1 °F (36.7 °C) (Oral)   Resp 18   Ht 5' 4" (1.626 m)   Wt 72.1 kg (159 lb)   LMP 2021   SpO2 99%   Breastfeeding No   BMI 27.29 kg/m²       FHT:  130, modBTBV, +accels, +early decels, Cat 1 (reassuring)  CTX: q 2-3 minutes  SVE: /-1      ASSESSMENT:   31 y.o.  at 39w0d, IOL     FHT reassuring    Active Hospital Problems    Diagnosis  POA    *Encounter for induction of labor [Z34.90]  Not Applicable      Resolved Hospital Problems   No resolved problems to display.     0800: 360/-3 pitocin started   1400: 370/-2 AROM clear Pit 16   1630: 3/90/-1, pit at 16 increased to 18  2045: /-1, pit at 20  2245: 890/0, pit at 20    PLAN:  Continue Close Maternal/Fetal Monitoring  Pitocin Augmentation per protocol    Recheck 2 hours or PRN    Preethi Gonsalez MD  OBGYN, PGY-2      "

## 2022-09-21 NOTE — NURSING
Mom cramping and unable to urinate. Straight cath performed with 350cc removed. Cramping lessened and relief obtained.

## 2022-09-21 NOTE — PLAN OF CARE
Problem: Adult Inpatient Plan of Care  Goal: Plan of Care Review  Outcome: Ongoing, Progressing  Flowsheets (Taken 9/21/2022 1751)  Plan of Care Reviewed With: patient       Pt free from falls, injury or any further trauma throughout shift. VSS. Fundus midline and firm, moderate lochia rubra. Continued medications as ordered. Pain adequately controlled with medications. Difficulty with urinating, straight cath performed per order. Assistance with feeding provided as needed. Pt in no distress. Will cont to monitor.

## 2022-09-21 NOTE — LACTATION NOTE
Pt plans to pump and bottle feed only. Currently feeding formula.     Symphony pump at bedside, pt states she has not started yet, not interested at this time. Encouraged to ask for assistance when ready to start pumping. Pt states she knows how to use pump. She plans to buy a Spectra. Rx and DME info given to also obtain pump from insurance. LC number on board.

## 2022-09-21 NOTE — PROGRESS NOTES
"LABOR NOTE - Late entry from     S:  Complaints: No.  Epidural working:  yes    O: /84   Pulse 69   Temp 98.1 °F (36.7 °C) (Oral)   Resp 18   Ht 5' 4" (1.626 m)   Wt 72.1 kg (159 lb)   LMP 2021   SpO2 99%   Breastfeeding No   BMI 27.29 kg/m²       FHT:  Cat 1 (reassuring)  CTX: q 2-4 minutes  SVE:       ASSESSMENT:   31 y.o.  at 39w0d, IOL     FHT reassuring    Active Hospital Problems    Diagnosis  POA    *Encounter for induction of labor [Z34.90]  Not Applicable      Resolved Hospital Problems   No resolved problems to display.     0800: 3/60/-3 pitocin started   1400: 3/70/-2 AROM clear Pit 16   1630: 3/90/-1, pit at 16 increased to 18  2045: /-1, pit at 20    PLAN:  Continue Close Maternal/Fetal Monitoring  Pitocin Augmentation per protocol    Recheck 2 hours or PRN    Preethi Gonsalez MD  OBGYN, PGY-2      "

## 2022-09-22 VITALS
WEIGHT: 159 LBS | SYSTOLIC BLOOD PRESSURE: 104 MMHG | TEMPERATURE: 98 F | OXYGEN SATURATION: 99 % | HEIGHT: 64 IN | BODY MASS INDEX: 27.14 KG/M2 | RESPIRATION RATE: 15 BRPM | DIASTOLIC BLOOD PRESSURE: 73 MMHG | HEART RATE: 61 BPM

## 2022-09-22 PROBLEM — O47.03 THREATENED PREMATURE LABOR IN THIRD TRIMESTER: Status: RESOLVED | Noted: 2022-07-21 | Resolved: 2022-09-22

## 2022-09-22 PROBLEM — Z34.93 ENCOUNTER FOR SUPERVISION OF NORMAL PREGNANCY IN THIRD TRIMESTER: Status: RESOLVED | Noted: 2022-03-29 | Resolved: 2022-09-22

## 2022-09-22 PROBLEM — O26.849 UTERINE SIZE DATE DISCREPANCY PREGNANCY: Status: RESOLVED | Noted: 2022-07-19 | Resolved: 2022-09-22

## 2022-09-22 PROBLEM — Z34.90 ENCOUNTER FOR INDUCTION OF LABOR: Status: RESOLVED | Noted: 2022-02-22 | Resolved: 2022-09-22

## 2022-09-22 PROBLEM — R10.11 RUQ PAIN: Status: RESOLVED | Noted: 2017-04-29 | Resolved: 2022-09-22

## 2022-09-22 PROBLEM — O21.9 NAUSEA AND VOMITING DURING PREGNANCY: Status: RESOLVED | Noted: 2022-03-29 | Resolved: 2022-09-22

## 2022-09-22 PROBLEM — O47.00 PRETERM UTERINE CONTRACTIONS: Status: RESOLVED | Noted: 2022-07-26 | Resolved: 2022-09-22

## 2022-09-22 LAB
BASOPHILS # BLD AUTO: ABNORMAL K/UL (ref 0–0.2)
BASOPHILS NFR BLD: 0 % (ref 0–1.9)
DIFFERENTIAL METHOD: ABNORMAL
EOSINOPHIL # BLD AUTO: ABNORMAL K/UL (ref 0–0.5)
EOSINOPHIL NFR BLD: 0 % (ref 0–8)
ERYTHROCYTE [DISTWIDTH] IN BLOOD BY AUTOMATED COUNT: 16.9 % (ref 11.5–14.5)
HCT VFR BLD AUTO: 34.2 % (ref 37–48.5)
HGB BLD-MCNC: 11 G/DL (ref 12–16)
IMM GRANULOCYTES # BLD AUTO: ABNORMAL K/UL (ref 0–0.04)
IMM GRANULOCYTES NFR BLD AUTO: ABNORMAL % (ref 0–0.5)
LYMPHOCYTES # BLD AUTO: ABNORMAL K/UL (ref 1–4.8)
LYMPHOCYTES NFR BLD: 4 % (ref 18–48)
MCH RBC QN AUTO: 27.6 PG (ref 27–31)
MCHC RBC AUTO-ENTMCNC: 32.2 G/DL (ref 32–36)
MCV RBC AUTO: 86 FL (ref 82–98)
MONOCYTES # BLD AUTO: ABNORMAL K/UL (ref 0.3–1)
MONOCYTES NFR BLD: 16 % (ref 4–15)
NEUTROPHILS # BLD AUTO: ABNORMAL K/UL (ref 1.8–7.7)
NEUTROPHILS NFR BLD: 72 % (ref 38–73)
NEUTS BAND NFR BLD MANUAL: 8 %
NRBC BLD-RTO: 0 /100 WBC
PLATELET # BLD AUTO: 204 K/UL (ref 150–450)
PLATELET BLD QL SMEAR: ABNORMAL
PMV BLD AUTO: 12.7 FL (ref 9.2–12.9)
RBC # BLD AUTO: 3.98 M/UL (ref 4–5.4)
TOXIC GRANULES BLD QL SMEAR: PRESENT
WBC # BLD AUTO: 20.81 K/UL (ref 3.9–12.7)

## 2022-09-22 PROCEDURE — 85007 BL SMEAR W/DIFF WBC COUNT: CPT | Performed by: OBSTETRICS & GYNECOLOGY

## 2022-09-22 PROCEDURE — 72100003 HC LABOR CARE, EA. ADDL. 8 HRS

## 2022-09-22 PROCEDURE — 99238 PR HOSPITAL DISCHARGE DAY,<30 MIN: ICD-10-PCS | Mod: ,,, | Performed by: STUDENT IN AN ORGANIZED HEALTH CARE EDUCATION/TRAINING PROGRAM

## 2022-09-22 PROCEDURE — 25000003 PHARM REV CODE 250

## 2022-09-22 PROCEDURE — 90686 IIV4 VACC NO PRSV 0.5 ML IM: CPT | Performed by: OBSTETRICS & GYNECOLOGY

## 2022-09-22 PROCEDURE — 63600175 PHARM REV CODE 636 W HCPCS: Performed by: OBSTETRICS & GYNECOLOGY

## 2022-09-22 PROCEDURE — 25000003 PHARM REV CODE 250: Performed by: STUDENT IN AN ORGANIZED HEALTH CARE EDUCATION/TRAINING PROGRAM

## 2022-09-22 PROCEDURE — 90471 IMMUNIZATION ADMIN: CPT | Performed by: OBSTETRICS & GYNECOLOGY

## 2022-09-22 PROCEDURE — 85027 COMPLETE CBC AUTOMATED: CPT | Performed by: OBSTETRICS & GYNECOLOGY

## 2022-09-22 PROCEDURE — 36415 COLL VENOUS BLD VENIPUNCTURE: CPT | Performed by: OBSTETRICS & GYNECOLOGY

## 2022-09-22 PROCEDURE — 99238 HOSP IP/OBS DSCHRG MGMT 30/<: CPT | Mod: ,,, | Performed by: STUDENT IN AN ORGANIZED HEALTH CARE EDUCATION/TRAINING PROGRAM

## 2022-09-22 RX ORDER — DOCUSATE SODIUM 100 MG/1
200 CAPSULE, LIQUID FILLED ORAL 2 TIMES DAILY
Qty: 60 CAPSULE | Refills: 0 | OUTPATIENT
Start: 2022-09-22 | End: 2022-10-03

## 2022-09-22 RX ORDER — IBUPROFEN 600 MG/1
600 TABLET ORAL EVERY 6 HOURS PRN
Qty: 30 TABLET | Refills: 1 | Status: SHIPPED | OUTPATIENT
Start: 2022-09-22 | End: 2023-05-31 | Stop reason: SDUPTHER

## 2022-09-22 RX ORDER — FAMOTIDINE 20 MG/1
20 TABLET, FILM COATED ORAL 2 TIMES DAILY
Status: DISCONTINUED | OUTPATIENT
Start: 2022-09-22 | End: 2022-09-22 | Stop reason: HOSPADM

## 2022-09-22 RX ADMIN — PRENATAL VIT W/ FE FUMARATE-FA TAB 27-0.8 MG 1 TABLET: 27-0.8 TAB at 08:09

## 2022-09-22 RX ADMIN — FAMOTIDINE 20 MG: 20 TABLET ORAL at 09:09

## 2022-09-22 RX ADMIN — IBUPROFEN 600 MG: 600 TABLET ORAL at 05:09

## 2022-09-22 RX ADMIN — DOCUSATE SODIUM 200 MG: 100 CAPSULE, LIQUID FILLED ORAL at 08:09

## 2022-09-22 RX ADMIN — ACETAMINOPHEN 650 MG: 325 TABLET, FILM COATED ORAL at 08:09

## 2022-09-22 RX ADMIN — INFLUENZA VIRUS VACCINE 0.5 ML: 15; 15; 15; 15 SUSPENSION INTRAMUSCULAR at 03:09

## 2022-09-22 RX ADMIN — MEDROXYPROGESTERONE ACETATE 150 MG: 150 INJECTION, SUSPENSION, EXTENDED RELEASE INTRAMUSCULAR at 03:09

## 2022-09-22 RX ADMIN — IBUPROFEN 600 MG: 600 TABLET ORAL at 03:09

## 2022-09-22 RX ADMIN — HYDROCODONE BITARTRATE AND ACETAMINOPHEN 1 TABLET: 10; 325 TABLET ORAL at 09:09

## 2022-09-22 NOTE — DISCHARGE SUMMARY
Delivery Discharge Summary  Obstetrics      Primary OB Clinician: Helena Meade MD      Admission date: 2022  Discharge date: 2022    Disposition: To home, self care    Discharge Diagnosis List:      Patient Active Problem List   Diagnosis    Iron deficiency anemia    S/P cholecystectomy    SAB (spontaneous )    Status post dilatation and suction curettage    Depression affecting pregnancy    Chlamydia infection     (spontaneous vaginal delivery)       Procedure:     Hospital Course:  Jose Luis Crowder is a 31 y.o. now , PPD #1 who was admitted on 2022 at 39w1d for IOL. This IUP is complicated by depression, and chlamydia infection (1T, neg CAYDEN). Patient was subsequently admitted to labor and delivery unit with signed consents.     Labor course was uncomplicated and resulted in  without complications. Please see delivery note for further details.     Her postpartum course was uncomplicated.     On discharge day, patient's pain is controlled with oral pain medications. Pt is tolerating ambulation without SOB or CP, and regular diet without N/V. Reports lochia is mild. Denies any HA, vision changes, F/C, LE swelling. Denies any breast pain/soreness.    Pt in stable condition and ready for discharge. She has been instructed to start and/or continue medications and follow up with her obstetrics provider as listed below.    Pertinent studies:  CBC  Recent Labs   Lab 22  0831   WBC 7.77   HGB 11.0*   HCT 33.5*   MCV 84             Immunization History   Administered Date(s) Administered    DTaP 1990, 1991, 06/10/1992, 09/10/1992, 1994    HPV Quadrivalent 2007, 2007, 2007    Hepatitis A, Pediatric/Adolescent, 2 Dose 2006    Hepatitis B 2001    Hepatitis B, Pediatric/Adolescent 2000, 2000    IPV 1990, 1991, 10/01/1991, 1994    Influenza 2015    Influenza - Quadrivalent - PF *Preferred* (6  months and older) 09/29/2016, 02/19/2019    MMR 04/17/1991, 09/13/1994    Meningococcal Conjugate (MCV4P) 09/09/2006    PPD Test 07/13/2000    Td (ADULT) 08/12/2003    Tdap 07/20/2011, 08/01/2015, 11/28/2016, 07/19/2022    Varicella 08/12/2003, 04/04/2007        Delivery:    Episiotomy: None   Lacerations: None   Repair suture: None   Repair # of packets:     Blood loss (ml):       Birth information:  YOB: 2023   Time of birth: 2:25 AM   Sex: male   Delivery type: Vaginal, Spontaneous   Gestational Age: 39w1d    Delivery Clinician:      Other providers:       Additional  information:  Forceps:    Vacuum:    Breech:    Observed anomalies      Living?:           APGARS  One minute Five minutes Ten minutes   Skin color:         Heart rate:         Grimace:         Muscle tone:         Breathing:         Totals: 8  9        Placenta: Delivered:       appearance    Patient Instructions:   Current Discharge Medication List        START taking these medications    Details   docusate sodium (COLACE) 100 MG capsule Take 2 capsules (200 mg total) by mouth 2 (two) times daily.  Qty: 60 capsule, Refills: 0      ibuprofen (ADVIL,MOTRIN) 600 MG tablet Take 1 tablet (600 mg total) by mouth every 6 (six) hours as needed for Pain.  Qty: 30 tablet, Refills: 1           CONTINUE these medications which have NOT CHANGED    Details   mirtazapine (REMERON) 15 MG tablet Take 1 tablet (15 mg total) by mouth nightly.  Qty: 30 tablet, Refills: 2    Associated Diagnoses: Depression affecting pregnancy      promethazine (PHENERGAN) 25 MG tablet Take 1 tablet (25 mg total) by mouth every 6 (six) hours as needed for Nausea.  Qty: 30 tablet, Refills: 3    Associated Diagnoses: Nausea and vomiting during pregnancy           STOP taking these medications       diphth,pertus,acell,,tetanus (BOOSTRIX TDAP) 2.5-8-5 Lf-mcg-Lf/0.5mL Syrg injection Comments:   Reason for Stopping:         doxylamine-pyridoxine, vit B6, (DICLEGIS) 10-10 mg  TbEC Comments:   Reason for Stopping:         etonogestreL-ethinyl estradioL (NUVARING) 0.12-0.015 mg/24 hr vaginal ring Comments:   Reason for Stopping:         ondansetron (ZOFRAN-ODT) 4 MG TbDL Comments:   Reason for Stopping:         -iron-FA-om-3s-dha-epa (VITAFOL GUMMIES) 3.33 mg iron- 0.33 mg Chew Comments:   Reason for Stopping:               Discharge Procedure Orders   BREAST PUMP FOR HOME USE     Order Specific Question Answer Comments   Type of pump: Electric    Weight: 72.1 kg (159 lb)    Length of need (1-99 months): 99      Diet Adult Regular     Ice to affected area     Other restrictions (specify):   Order Comments: Do not drive while on narcotics. Do not drive until you feel comfortable placing your foot on the break without pain/discomfort.     Pelvic Rest   Order Comments: Do not put anything in your vagina until evaluated by your doctor at your next visit. This includes douching, tampons, sex.     Notify your health care provider if you experience any of the following:  temperature >100.4     Notify your health care provider if you experience any of the following:  persistent nausea and vomiting or diarrhea     Notify your health care provider if you experience any of the following:  severe uncontrolled pain     Notify your health care provider if you experience any of the following:  redness, tenderness, or signs of infection (pain, swelling, redness, odor or green/yellow discharge around incision site)     Notify your health care provider if you experience any of the following:  difficulty breathing or increased cough     Notify your health care provider if you experience any of the following:  severe persistent headache     Notify your health care provider if you experience any of the following:  worsening rash     Notify your health care provider if you experience any of the following:  persistent dizziness, light-headedness, or visual disturbances     Notify your health care provider  if you experience any of the following:  increased confusion or weakness     Activity as tolerated            Ingris Youssef MD  OBGYN PGY-4

## 2022-09-22 NOTE — ANESTHESIA POSTPROCEDURE EVALUATION
Anesthesia Post Evaluation    Patient: Jose Luis Crowder    Procedure(s) Performed: * No procedures listed *    Final Anesthesia Type: epidural      Patient location during evaluation: floor  Patient participation: Yes- Able to Participate  Level of consciousness: awake and alert  Post-procedure vital signs: reviewed and stable  Pain management: adequate  Airway patency: patent    PONV status at discharge: No PONV  Anesthetic complications: no      Cardiovascular status: blood pressure returned to baseline, hemodynamically stable and stable  Respiratory status: unassisted, spontaneous ventilation and room air  Hydration status: euvolemic  Follow-up not needed.          Vitals Value Taken Time   /73 09/22/22 0854   Temp 36.6 °C (97.8 °F) 09/22/22 0854   Pulse 61 09/22/22 0854   Resp 15 09/22/22 0954   SpO2 99 % 09/22/22 0854         No case tracking events are documented in the log.      Pain/Vicenta Score: Pain Rating Prior to Med Admin: 8 (9/22/2022  9:54 AM)  Pain Rating Post Med Admin: 5 (9/22/2022 10:30 AM)

## 2022-09-22 NOTE — PLAN OF CARE
VSS. Pt ambulating and voiding without difficulty. Patient safety maintained, side rails up, bed low and locked position.  Pain well controlled with PRN pain medication. Fundus midline, firm, with moderate lochia. Significant other at bedside; parents responding to infant cues and bonding appropriately. Will continue to monitor and intervene as necessary.

## 2022-09-22 NOTE — PROGRESS NOTES
POSTPARTUM PROGRESS NOTE    Subjective:     PPD/POD#: 1   Procedure:    EGA: 39w1d   N/V: No   F/C: No   Abd Pain: Mild, well-controlled with oral pain medication   Lochia: Mild   Voiding: Yes   Ambulating: Yes   Bowel fnc: Yes   Breastfeeding: Yes   Contraception: DepoProvera prior to discharge   Circumcision: Consented.  Needs to be examined by OB attending.     Objective:      Temp:  [97.7 °F (36.5 °C)-98.2 °F (36.8 °C)] 97.7 °F (36.5 °C)  Pulse:  [61-73] 61  Resp:  [15-18] 18  SpO2:  [96 %-98 %] 96 %  BP: ()/(62-72) 92/62    Lung: Normal respiratory effort   Abdomen: Soft, appropriately tender   Uterus: Firm, no fundal tenderness   Incision: N/A   : Deferred   Extremities: No edema     Lab Review    No results for input(s): NA, K, CL, CO2, BUN, CREATININE, GLU, PROT, BILITOT, ALKPHOS, ALT, AST, MG, PHOS in the last 168 hours.    Recent Labs   Lab 22  0831   WBC 7.77   HGB 11.0*   HCT 33.5*   MCV 84            I/O    Intake/Output Summary (Last 24 hours) at 2022 0619  Last data filed at 2022 2000  Gross per 24 hour   Intake --   Output 1200 ml   Net -1200 ml        Assessment and Plan:   Postpartum care:  - Patient doing well.  - Continue routine management and advances.    Depression  - Mood stable  - On mirtazapine  - 1-2 week postpartum mood check    Charleen Pereira MD  PGY-1  Obstetrics & Gynecology

## 2022-09-22 NOTE — PLAN OF CARE
Problem: Adult Inpatient Plan of Care  Goal: Plan of Care Review  Outcome: Met  Goal: Patient-Specific Goal (Individualized)  Outcome: Met  Goal: Absence of Hospital-Acquired Illness or Injury  Outcome: Met  Goal: Optimal Comfort and Wellbeing  Outcome: Met  Goal: Readiness for Transition of Care  Outcome: Met     Problem:  Fall Injury Risk  Goal: Absence of Fall, Infant Drop and Related Injury  Outcome: Met     Problem: Infection  Goal: Absence of Infection Signs and Symptoms  Outcome: Met     Problem: Bleeding (Labor)  Goal: Hemostasis  Outcome: Met     Problem: Change in Fetal Wellbeing (Labor)  Goal: Stable Fetal Wellbeing  Outcome: Met     Problem: Delayed Labor Progression (Labor)  Goal: Effective Progression to Delivery  Outcome: Met     Problem: Infection (Labor)  Goal: Absence of Infection Signs and Symptoms  Outcome: Met     Problem: Labor Pain (Labor)  Goal: Acceptable Pain Control  Outcome: Met     Problem: Uterine Tachysystole (Labor)  Goal: Normal Uterine Contraction Pattern  Outcome: Met     Problem: Device-Related Complication Risk (Anesthesia/Analgesia, Neuraxial)  Goal: Safe Infusion Delivery Completion  Outcome: Met     Problem: Infection (Anesthesia/Analgesia, Neuraxial)  Goal: Absence of Infection Signs and Symptoms  Outcome: Met     Problem: Nausea and Vomiting (Anesthesia/Analgesia, Neuraxial)  Goal: Nausea and Vomiting Relief  Outcome: Met     Problem: Pain (Anesthesia/Analgesia, Neuraxial)  Goal: Effective Pain Control  Outcome: Met     Problem: Respiratory Compromise (Anesthesia/Analgesia, Neuraxial)  Goal: Effective Oxygenation and Ventilation  Outcome: Met     Problem: Sensorimotor Impairment (Anesthesia/Analgesia, Neuraxial)  Goal: Baseline Motor Function  Outcome: Met     Problem: Urinary Retention (Anesthesia/Analgesia, Neuraxial)  Goal: Effective Urinary Elimination  Outcome: Met     Problem: Skin Injury Risk Increased  Goal: Skin Health and Integrity  Outcome: Met      Problem: Breastfeeding  Goal: Effective Breastfeeding  Outcome: Met

## 2022-09-22 NOTE — LACTATION NOTE
Pt reports that she will start pumping when she is home from hospital. Pt has Manual pump to take for home use. Pt has lactation warmline number and community resources. Pt aware if she has any breastfeeding questions prior to discharge to have her mb nurse contact the lactation consultant.

## 2022-09-22 NOTE — PLAN OF CARE
Problem: Adult Inpatient Plan of Care  Goal: Plan of Care Review  Outcome: Met  Goal: Patient-Specific Goal (Individualized)  Outcome: Met  Goal: Absence of Hospital-Acquired Illness or Injury  Outcome: Met  Goal: Optimal Comfort and Wellbeing  Outcome: Met  Goal: Readiness for Transition of Care  Outcome: Met     Problem:  Fall Injury Risk  Goal: Absence of Fall, Infant Drop and Related Injury  Outcome: Met     Problem: Infection  Goal: Absence of Infection Signs and Symptoms  Outcome: Met     Problem: Bleeding (Labor)  Goal: Hemostasis  Outcome: Met     Problem: Change in Fetal Wellbeing (Labor)  Goal: Stable Fetal Wellbeing  Outcome: Met     Problem: Delayed Labor Progression (Labor)  Goal: Effective Progression to Delivery  Outcome: Met     Problem: Infection (Labor)  Goal: Absence of Infection Signs and Symptoms  Outcome: Met     Problem: Labor Pain (Labor)  Goal: Acceptable Pain Control  Outcome: Met     Problem: Uterine Tachysystole (Labor)  Goal: Normal Uterine Contraction Pattern  Outcome: Met     Problem: Device-Related Complication Risk (Anesthesia/Analgesia, Neuraxial)  Goal: Safe Infusion Delivery Completion  Outcome: Met     Problem: Infection (Anesthesia/Analgesia, Neuraxial)  Goal: Absence of Infection Signs and Symptoms  Outcome: Met     Problem: Nausea and Vomiting (Anesthesia/Analgesia, Neuraxial)  Goal: Nausea and Vomiting Relief  Outcome: Met     Problem: Pain (Anesthesia/Analgesia, Neuraxial)  Goal: Effective Pain Control  Outcome: Met     Problem: Respiratory Compromise (Anesthesia/Analgesia, Neuraxial)  Goal: Effective Oxygenation and Ventilation  Outcome: Met     Problem: Sensorimotor Impairment (Anesthesia/Analgesia, Neuraxial)  Goal: Baseline Motor Function  Outcome: Met     Problem: Urinary Retention (Anesthesia/Analgesia, Neuraxial)  Goal: Effective Urinary Elimination  Outcome: Met     Problem: Skin Injury Risk Increased  Goal: Skin Health and Integrity  Outcome: Met

## 2022-09-22 NOTE — NURSING
D/C instructions given to patient regarding follow up appts, Rx prescribed by MD and S&S of preE and PPH. Pt stated understanding. Pt in no distress awaiting transport for d/c.

## 2022-09-23 DIAGNOSIS — N94.89 UTERINE PAIN: Primary | ICD-10-CM

## 2022-09-23 DIAGNOSIS — Z30.014 ENCOUNTER FOR INITIAL PRESCRIPTION OF INTRAUTERINE CONTRACEPTIVE DEVICE (IUD): Primary | ICD-10-CM

## 2022-09-23 RX ORDER — KETOROLAC TROMETHAMINE 10 MG/1
10 TABLET, FILM COATED ORAL EVERY 6 HOURS PRN
Qty: 20 TABLET | Refills: 0 | Status: SHIPPED | OUTPATIENT
Start: 2022-09-23 | End: 2022-09-28

## 2022-10-02 ENCOUNTER — PATIENT MESSAGE (OUTPATIENT)
Dept: OBSTETRICS AND GYNECOLOGY | Facility: CLINIC | Age: 32
End: 2022-10-02
Payer: MEDICAID

## 2022-10-02 DIAGNOSIS — N30.00 ACUTE CYSTITIS WITHOUT HEMATURIA: Primary | ICD-10-CM

## 2022-10-02 RX ORDER — NITROFURANTOIN 25; 75 MG/1; MG/1
100 CAPSULE ORAL 2 TIMES DAILY
Qty: 14 CAPSULE | Refills: 0 | Status: SHIPPED | OUTPATIENT
Start: 2022-10-02 | End: 2022-10-09

## 2022-10-03 ENCOUNTER — POSTPARTUM VISIT (OUTPATIENT)
Dept: OBSTETRICS AND GYNECOLOGY | Facility: CLINIC | Age: 32
End: 2022-10-03
Payer: MEDICAID

## 2022-10-03 ENCOUNTER — HOSPITAL ENCOUNTER (EMERGENCY)
Facility: OTHER | Age: 32
Discharge: HOME | End: 2022-10-03
Attending: STUDENT IN AN ORGANIZED HEALTH CARE EDUCATION/TRAINING PROGRAM
Payer: MEDICAID

## 2022-10-03 VITALS
SYSTOLIC BLOOD PRESSURE: 120 MMHG | OXYGEN SATURATION: 100 % | HEART RATE: 68 BPM | TEMPERATURE: 99 F | RESPIRATION RATE: 16 BRPM | DIASTOLIC BLOOD PRESSURE: 77 MMHG

## 2022-10-03 VITALS — DIASTOLIC BLOOD PRESSURE: 91 MMHG | SYSTOLIC BLOOD PRESSURE: 126 MMHG

## 2022-10-03 DIAGNOSIS — R10.84 GENERALIZED ABDOMINAL PAIN: Primary | ICD-10-CM

## 2022-10-03 DIAGNOSIS — R10.11 RIGHT UPPER QUADRANT ABDOMINAL PAIN: Primary | ICD-10-CM

## 2022-10-03 LAB
ALBUMIN SERPL BCP-MCNC: 3.2 G/DL (ref 3.5–5.2)
ALP SERPL-CCNC: 120 U/L (ref 55–135)
ALT SERPL W/O P-5'-P-CCNC: 22 U/L (ref 10–44)
AMYLASE SERPL-CCNC: 58 U/L (ref 20–110)
ANION GAP SERPL CALC-SCNC: 7 MMOL/L (ref 8–16)
AST SERPL-CCNC: 18 U/L (ref 10–40)
BASOPHILS # BLD AUTO: 0.03 K/UL (ref 0–0.2)
BASOPHILS NFR BLD: 0.2 % (ref 0–1.9)
BILIRUB SERPL-MCNC: 0.4 MG/DL (ref 0.1–1)
BILIRUB SERPL-MCNC: NORMAL MG/DL
BLOOD URINE, POC: NORMAL
BUN SERPL-MCNC: 14 MG/DL (ref 6–20)
CALCIUM SERPL-MCNC: 9 MG/DL (ref 8.7–10.5)
CHLORIDE SERPL-SCNC: 110 MMOL/L (ref 95–110)
CO2 SERPL-SCNC: 21 MMOL/L (ref 23–29)
COLOR, POC UA: NORMAL
CREAT SERPL-MCNC: 0.7 MG/DL (ref 0.5–1.4)
DIFFERENTIAL METHOD: ABNORMAL
EOSINOPHIL # BLD AUTO: 0.2 K/UL (ref 0–0.5)
EOSINOPHIL NFR BLD: 1.3 % (ref 0–8)
ERYTHROCYTE [DISTWIDTH] IN BLOOD BY AUTOMATED COUNT: 16.5 % (ref 11.5–14.5)
EST. GFR  (NO RACE VARIABLE): >60 ML/MIN/1.73 M^2
GLUCOSE SERPL-MCNC: 103 MG/DL (ref 70–110)
GLUCOSE UR QL STRIP: NORMAL
HCT VFR BLD AUTO: 40.9 % (ref 37–48.5)
HGB BLD-MCNC: 13.1 G/DL (ref 12–16)
IMM GRANULOCYTES # BLD AUTO: 0.06 K/UL (ref 0–0.04)
IMM GRANULOCYTES NFR BLD AUTO: 0.5 % (ref 0–0.5)
KETONES UR QL STRIP: NORMAL
LEUKOCYTE ESTERASE URINE, POC: NORMAL
LIPASE SERPL-CCNC: 44 U/L (ref 4–60)
LYMPHOCYTES # BLD AUTO: 1.4 K/UL (ref 1–4.8)
LYMPHOCYTES NFR BLD: 11.2 % (ref 18–48)
MCH RBC QN AUTO: 27.2 PG (ref 27–31)
MCHC RBC AUTO-ENTMCNC: 32 G/DL (ref 32–36)
MCV RBC AUTO: 85 FL (ref 82–98)
MONOCYTES # BLD AUTO: 1 K/UL (ref 0.3–1)
MONOCYTES NFR BLD: 8.2 % (ref 4–15)
NEUTROPHILS # BLD AUTO: 9.6 K/UL (ref 1.8–7.7)
NEUTROPHILS NFR BLD: 78.6 % (ref 38–73)
NITRITE, POC UA: NORMAL
NRBC BLD-RTO: 0 /100 WBC
PH, POC UA: 7
PLATELET # BLD AUTO: 345 K/UL (ref 150–450)
PMV BLD AUTO: 12.2 FL (ref 9.2–12.9)
POTASSIUM SERPL-SCNC: 4 MMOL/L (ref 3.5–5.1)
PROT SERPL-MCNC: 7.9 G/DL (ref 6–8.4)
PROTEIN, POC: NORMAL
RBC # BLD AUTO: 4.81 M/UL (ref 4–5.4)
SODIUM SERPL-SCNC: 138 MMOL/L (ref 136–145)
SPECIFIC GRAVITY, POC UA: 1.01
UROBILINOGEN, POC UA: NORMAL
WBC # BLD AUTO: 12.25 K/UL (ref 3.9–12.7)

## 2022-10-03 PROCEDURE — 25500020 PHARM REV CODE 255: Performed by: STUDENT IN AN ORGANIZED HEALTH CARE EDUCATION/TRAINING PROGRAM

## 2022-10-03 PROCEDURE — 99999 PR PBB SHADOW E&M-EST. PATIENT-LVL II: ICD-10-PCS | Mod: PBBFAC,,, | Performed by: OBSTETRICS & GYNECOLOGY

## 2022-10-03 PROCEDURE — 63600175 PHARM REV CODE 636 W HCPCS

## 2022-10-03 PROCEDURE — A9698 NON-RAD CONTRAST MATERIALNOC: HCPCS | Performed by: STUDENT IN AN ORGANIZED HEALTH CARE EDUCATION/TRAINING PROGRAM

## 2022-10-03 PROCEDURE — 25000003 PHARM REV CODE 250

## 2022-10-03 PROCEDURE — 99285 EMERGENCY DEPT VISIT HI MDM: CPT | Mod: 25,27

## 2022-10-03 PROCEDURE — 82150 ASSAY OF AMYLASE: CPT | Performed by: STUDENT IN AN ORGANIZED HEALTH CARE EDUCATION/TRAINING PROGRAM

## 2022-10-03 PROCEDURE — 96375 TX/PRO/DX INJ NEW DRUG ADDON: CPT | Mod: 59

## 2022-10-03 PROCEDURE — 99284 EMERGENCY DEPT VISIT MOD MDM: CPT | Mod: 24,,, | Performed by: STUDENT IN AN ORGANIZED HEALTH CARE EDUCATION/TRAINING PROGRAM

## 2022-10-03 PROCEDURE — 99999 PR PBB SHADOW E&M-EST. PATIENT-LVL II: CPT | Mod: PBBFAC,,, | Performed by: OBSTETRICS & GYNECOLOGY

## 2022-10-03 PROCEDURE — 99212 OFFICE O/P EST SF 10 MIN: CPT | Mod: PBBFAC,25,TH | Performed by: OBSTETRICS & GYNECOLOGY

## 2022-10-03 PROCEDURE — 96374 THER/PROPH/DIAG INJ IV PUSH: CPT | Mod: 59

## 2022-10-03 PROCEDURE — 80053 COMPREHEN METABOLIC PANEL: CPT | Performed by: STUDENT IN AN ORGANIZED HEALTH CARE EDUCATION/TRAINING PROGRAM

## 2022-10-03 PROCEDURE — 85025 COMPLETE CBC W/AUTO DIFF WBC: CPT | Performed by: STUDENT IN AN ORGANIZED HEALTH CARE EDUCATION/TRAINING PROGRAM

## 2022-10-03 PROCEDURE — 83690 ASSAY OF LIPASE: CPT | Performed by: STUDENT IN AN ORGANIZED HEALTH CARE EDUCATION/TRAINING PROGRAM

## 2022-10-03 PROCEDURE — 81002 URINALYSIS NONAUTO W/O SCOPE: CPT

## 2022-10-03 PROCEDURE — 99284 PR EMERGENCY DEPT VISIT,LEVEL IV: ICD-10-PCS | Mod: 24,,, | Performed by: STUDENT IN AN ORGANIZED HEALTH CARE EDUCATION/TRAINING PROGRAM

## 2022-10-03 RX ORDER — HYDROCODONE BITARTRATE AND ACETAMINOPHEN 5; 325 MG/1; MG/1
1 TABLET ORAL EVERY 6 HOURS PRN
Status: DISCONTINUED | OUTPATIENT
Start: 2022-10-03 | End: 2022-10-03 | Stop reason: HOSPADM

## 2022-10-03 RX ORDER — HYDROCODONE BITARTRATE AND ACETAMINOPHEN 5; 325 MG/1; MG/1
1 TABLET ORAL EVERY 6 HOURS PRN
Qty: 15 TABLET | Refills: 0 | Status: SHIPPED | OUTPATIENT
Start: 2022-10-03 | End: 2023-11-21

## 2022-10-03 RX ORDER — DOCUSATE SODIUM 100 MG/1
100 CAPSULE, LIQUID FILLED ORAL 2 TIMES DAILY
Qty: 30 CAPSULE | Refills: 0 | Status: SHIPPED | OUTPATIENT
Start: 2022-10-03 | End: 2023-11-21

## 2022-10-03 RX ORDER — PANTOPRAZOLE SODIUM 40 MG/1
40 TABLET, DELAYED RELEASE ORAL DAILY
Qty: 30 TABLET | Refills: 11 | Status: SHIPPED | OUTPATIENT
Start: 2022-10-03 | End: 2023-11-21

## 2022-10-03 RX ORDER — FAMOTIDINE 10 MG/ML
20 INJECTION INTRAVENOUS ONCE
Status: COMPLETED | OUTPATIENT
Start: 2022-10-03 | End: 2022-10-03

## 2022-10-03 RX ORDER — HYDROMORPHONE HYDROCHLORIDE 1 MG/ML
0.5 INJECTION, SOLUTION INTRAMUSCULAR; INTRAVENOUS; SUBCUTANEOUS ONCE
Status: COMPLETED | OUTPATIENT
Start: 2022-10-03 | End: 2022-10-03

## 2022-10-03 RX ORDER — SIMETHICONE 125 MG
125 TABLET,CHEWABLE ORAL EVERY 6 HOURS PRN
Qty: 30 TABLET | Refills: 0 | Status: SHIPPED | OUTPATIENT
Start: 2022-10-03 | End: 2023-11-21

## 2022-10-03 RX ORDER — SIMETHICONE 80 MG
1 TABLET,CHEWABLE ORAL ONCE
Status: COMPLETED | OUTPATIENT
Start: 2022-10-03 | End: 2022-10-03

## 2022-10-03 RX ADMIN — FAMOTIDINE 20 MG: 10 INJECTION INTRAVENOUS at 03:10

## 2022-10-03 RX ADMIN — IOHEXOL 1000 ML: 9 SOLUTION ORAL at 03:10

## 2022-10-03 RX ADMIN — HYDROCODONE BITARTRATE AND ACETAMINOPHEN 1 TABLET: 5; 325 TABLET ORAL at 03:10

## 2022-10-03 RX ADMIN — SIMETHICONE 80 MG: 80 TABLET, CHEWABLE ORAL at 05:10

## 2022-10-03 RX ADMIN — IOHEXOL 75 ML: 350 INJECTION, SOLUTION INTRAVENOUS at 05:10

## 2022-10-03 RX ADMIN — HYDROMORPHONE HYDROCHLORIDE 0.5 MG: 1 INJECTION, SOLUTION INTRAMUSCULAR; INTRAVENOUS; SUBCUTANEOUS at 05:10

## 2022-10-03 NOTE — PROGRESS NOTES
Chief Complaint   Patient presents with    Abdominal Pain       HPI:   31 y.o.  here today for severe intractable abdominal pain. She is s/p uncomplicated  on 22 ([place], 6#11oz, Apgars 8/9). She reports the pain has been present since this past Friday. She finally went to the ED due to being unable to hold her baby secondary to pain. She describes the pain as a constant aching pain, however if she moves a certain way the pain becomes sharp. It is worse with inspiration. The majority of pain is in the RUQ but it spreads medially to the epigastrium and somewhat down to the umbilicus, but worse along the right side. She has tried Toradol, Ibuprofen, and Tylenol with minimal relief and then the pain comes right back. She is belching more than normal and pain improves slightly with belching. She denies fever or chills. She has had regular bowel movements since delivery, last one was yesterday. Ate pasta for dinner last night, pain was not aggravated by eating. Reports mild nausea currently but also got Dilaudid in the ED. States the pain only improved minimally with Dilaudid.    Pain felt worse after taking Toradol that I prescribed so she only took two doses and has not taken any more of it.    Patient reports a similar pain after the birth of her last child. She had issues with abdominal pain during her whole first pregnancy and had an emergency cholecystectomy in the PP period.     LMP Dates from Last 1 Encounters:   LMP: 2021       Labs / Significant Studies  Pap (2022): NILM/HPV neg    Admission on 10/03/2022, Discharged on 10/03/2022   Component Date Value Ref Range Status    Sodium 10/03/2022 138  136 - 145 mmol/L Final    Potassium 10/03/2022 4.0  3.5 - 5.1 mmol/L Final    Specimen slightly hemolyzed    Chloride 10/03/2022 110  95 - 110 mmol/L Final    CO2 10/03/2022 21 (L)  23 - 29 mmol/L Final    Glucose 10/03/2022 103  70 - 110 mg/dL Final    BUN 10/03/2022 14  6 - 20 mg/dL Final     Creatinine 10/03/2022 0.7  0.5 - 1.4 mg/dL Final    Calcium 10/03/2022 9.0  8.7 - 10.5 mg/dL Final    Total Protein 10/03/2022 7.9  6.0 - 8.4 g/dL Final    Albumin 10/03/2022 3.2 (L)  3.5 - 5.2 g/dL Final    Total Bilirubin 10/03/2022 0.4  0.1 - 1.0 mg/dL Final    Comment: For infants and newborns, interpretation of results should be based  on gestational age, weight and in agreement with clinical  observations.    Premature Infant recommended reference ranges:  Up to 24 hours.............<8.0 mg/dL  Up to 48 hours............<12.0 mg/dL  3-5 days..................<15.0 mg/dL  6-29 days.................<15.0 mg/dL      Alkaline Phosphatase 10/03/2022 120  55 - 135 U/L Final    AST 10/03/2022 18  10 - 40 U/L Final    ALT 10/03/2022 22  10 - 44 U/L Final    Anion Gap 10/03/2022 7 (L)  8 - 16 mmol/L Final    eGFR 10/03/2022 >60  >60 mL/min/1.73 m^2 Final    Color, UA 10/03/2022 Dark Yellow   Final    Spec Grav UA 10/03/2022 1.010   Final    pH, UA 10/03/2022 7   Final    WBC, UA 10/03/2022 trace   Final    Nitrite, UA 10/03/2022 neg   Final    Protein, POC 10/03/2022 trace   Final    Glucose, UA 10/03/2022 norm   Final    Ketones, UA 10/03/2022 neg   Final    Urobilinogen, UA 10/03/2022 norm   Final    Bilirubin, POC 10/03/2022 neg   Final    Blood, UA 10/03/2022 50 [place]o/uL   Final    WBC 10/03/2022 12.25  3.90 - 12.70 K/uL Final    RBC 10/03/2022 4.81  4.00 - 5.40 M/uL Final    Hemoglobin 10/03/2022 13.1  12.0 - 16.0 g/dL Final    Hematocrit 10/03/2022 40.9  37.0 - 48.5 % Final    MCV 10/03/2022 85  82 - 98 fL Final    MCH 10/03/2022 27.2  27.0 - 31.0 pg Final    MCHC 10/03/2022 32.0  32.0 - 36.0 g/dL Final    RDW 10/03/2022 16.5 (H)  11.5 - 14.5 % Final    Platelets 10/03/2022 345  150 - 450 K/uL Final    MPV 10/03/2022 12.2  9.2 - 12.9 fL Final    Immature Granulocytes 10/03/2022 0.5  0.0 - 0.5 % Final    Gran # (ANC) 10/03/2022 9.6 (H)  1.8 - 7.7 K/uL Final    Immature Grans (Abs) 10/03/2022 0.06 (H)  0.00 - 0.04  K/uL Final    Comment: Mild elevation in immature granulocytes is non specific and   can be seen in a variety of conditions including stress response,   acute inflammation, trauma and pregnancy. Correlation with other   laboratory and clinical findings is essential.      Lymph # 10/03/2022 1.4  1.0 - 4.8 K/uL Final    Mono # 10/03/2022 1.0  0.3 - 1.0 K/uL Final    Eos # 10/03/2022 0.2  0.0 - 0.5 K/uL Final    Baso # 10/03/2022 0.03  0.00 - 0.20 K/uL Final    nRBC 10/03/2022 0  0 /100 WBC Final    Gran % 10/03/2022 78.6 (H)  38.0 - 73.0 % Final    Lymph % 10/03/2022 11.2 (L)  18.0 - 48.0 % Final    Mono % 10/03/2022 8.2  4.0 - 15.0 % Final    Eosinophil % 10/03/2022 1.3  0.0 - 8.0 % Final    Basophil % 10/03/2022 0.2  0.0 - 1.9 % Final    Differential Method 10/03/2022 Automated   Final    Lipase 10/03/2022 44  4 - 60 U/L Final    Amylase 10/03/2022 58  20 - 110 U/L Final   Admission on 09/20/2022, Discharged on 09/22/2022   Component Date Value Ref Range Status    SARS-CoV-2 RNA, Amplification, Qual 09/20/2022 Negative  Negative Final    Comment: This test utilizes isothermal nucleic acid amplification   technology to detect the SARS-CoV-2 RdRp nucleic acid segment.   The analytical sensitivity (limit of detection) is 125 genome   equivalents/mL.     A POSITIVE result implies infection with the SARS-CoV-2 virus;  the patient is presumed to be contagious.    A NEGATIVE result means that SARS-CoV-2 nucleic acids are not  present above the limit of detection. A NEGATIVE result should be   treated as presumptive. It does not rule out the possibility of   COVID-19 and should not be the sole basis for treatment decisions.   If COVID-19 is strongly suspected based on clinical and exposure   history, re-testing using an alternate molecular assay should be   considered.       This test is only for use under the Food and Drug   Administration s Emergency Use Authorization (EUA).   Commercial kits are provided by [employer].   Performance characteristics of the EUA have been independently  verified by [facility]                           ment of  Pathology and Laboratory Medicine.   _________________________________________________________________  The ID NOW COVID-19 Letter of Authorization, along with the   authorized Fact Sheet for Healthcare Providers, the authorized Fact  Sheet for Patients, and authorized labeling are available on the FDA   website:  www.fda.gov/MedicalDevices/Safety/EmergencySituations/msv035226.htm      WBC 09/20/2022 7.77  3.90 - 12.70 K/uL Final    RBC 09/20/2022 3.98 (L)  4.00 - 5.40 M/uL Final    Hemoglobin 09/20/2022 11.0 (L)  12.0 - 16.0 g/dL Final    Hematocrit 09/20/2022 33.5 (L)  37.0 - 48.5 % Final    MCV 09/20/2022 84  82 - 98 fL Final    MCH 09/20/2022 27.6  27.0 - 31.0 pg Final    MCHC 09/20/2022 32.8  32.0 - 36.0 g/dL Final    RDW 09/20/2022 16.2 (H)  11.5 - 14.5 % Final    Platelets 09/20/2022 224  150 - 450 K/uL Final    MPV 09/20/2022 12.8  9.2 - 12.9 fL Final    Immature Granulocytes 09/20/2022 0.9 (H)  0.0 - 0.5 % Final    Gran # (ANC) 09/20/2022 4.6  1.8 - 7.7 K/uL Final    Immature Grans (Abs) 09/20/2022 0.07 (H)  0.00 - 0.04 K/uL Final    Comment: Mild elevation in immature granulocytes is non specific and   can be seen in a variety of conditions including stress response,   acute inflammation, trauma and pregnancy. Correlation with other   laboratory and clinical findings is essential.      Lymph # 09/20/2022 2.1  1.0 - 4.8 K/uL Final    Mono # 09/20/2022 1.0  0.3 - 1.0 K/uL Final    Eos # 09/20/2022 0.1  0.0 - 0.5 K/uL Final    Baso # 09/20/2022 0.03  0.00 - 0.20 K/uL Final    nRBC 09/20/2022 0  0 /100 WBC Final    Gran % 09/20/2022 58.8  38.0 - 73.0 % Final    Lymph % 09/20/2022 26.9  18.0 - 48.0 % Final    Mono % 09/20/2022 12.4  4.0 - 15.0 % Final    Eosinophil % 09/20/2022 0.6  0.0 - 8.0 % Final    Basophil % 09/20/2022 0.4  0.0 - 1.9 % Final     Differential Method 09/20/2022 Automated   Final    Group & Rh 09/20/2022 AB POS   Final    Indirect Kevin 09/20/2022 NEG   Final    WBC 09/22/2022 20.81 (H)  3.90 - 12.70 K/uL Final    RBC 09/22/2022 3.98 (L)  4.00 - 5.40 M/uL Final    Hemoglobin 09/22/2022 11.0 (L)  12.0 - 16.0 g/dL Final    Hematocrit 09/22/2022 34.2 (L)  37.0 - 48.5 % Final    MCV 09/22/2022 86  82 - 98 fL Final    MCH 09/22/2022 27.6  27.0 - 31.0 pg Final    MCHC 09/22/2022 32.2  32.0 - 36.0 g/dL Final    RDW 09/22/2022 16.9 (H)  11.5 - 14.5 % Final    Platelets 09/22/2022 204  150 - 450 K/uL Final    MPV 09/22/2022 12.7  9.2 - 12.9 fL Final    Immature Granulocytes 09/22/2022 Test Not Performed  0.0 - 0.5 % Corrected    CORRECTED RESULT; previously reported as 0.9 on 09/22/2022 at 07:19.    Gran # (ANC) 09/22/2022 Test Not Performed  1.8 - 7.7 K/uL Corrected    CORRECTED RESULT; previously reported as 15.3 on 09/22/2022 at 07:19.    Immature Grans (Abs) 09/22/2022 Test Not Performed  0.00 - 0.04 K/uL Corrected    Comment: Mild elevation in immature granulocytes is non specific and   can be seen in a variety of conditions including stress response,   acute inflammation, trauma and pregnancy. Correlation with other   laboratory and clinical findings is essential.  CORRECTED RESULT; previously reported as 0.18 on 09/22/2022 at 07:19.      Lymph # 09/22/2022 Test Not Performed  1.0 - 4.8 K/uL Corrected    CORRECTED RESULT; previously reported as 3.0 on 09/22/2022 at 07:19.    Mono # 09/22/2022 Test Not Performed  0.3 - 1.0 K/uL Corrected    CORRECTED RESULT; previously reported as 2.1 on 09/22/2022 at 07:19.    Eos # 09/22/2022 Test Not Performed  0.0 - 0.5 K/uL Corrected    CORRECTED RESULT; previously reported as 0.2 on 09/22/2022 at 07:19.    Baso # 09/22/2022 Test Not Performed  0.00 - 0.20 K/uL Corrected    CORRECTED RESULT; previously reported as 0.08 on 09/22/2022 at 07:19.    nRBC 09/22/2022 0  0 /100 WBC Final    Gran % 09/22/2022 72.0   38.0 - 73.0 % Corrected    CORRECTED RESULT; previously reported as 73.5 on 09/22/2022 at 07:19.    Lymph % 09/22/2022 4.0 (L)  18.0 - 48.0 % Corrected    CORRECTED RESULT; previously reported as 14.6 on 09/22/2022 at 07:19.    [place] % 09/22/2022 16.0 (H)  4.0 - 15.0 % Corrected    CORRECTED RESULT; previously reported as 9.9 on 09/22/2022 at 07:19.    Eosinophil % 09/22/2022 0.0  0.0 - 8.0 % Corrected    CORRECTED RESULT; previously reported as 0.7 on 09/22/2022 at 07:19.    Basophil % 09/22/2022 0.0  0.0 - 1.9 % Corrected    CORRECTED RESULT; previously reported as 0.4 on 09/22/2022 at 07:19.    Bands 09/22/2022 8.0  % Final    Platelet Estimate 09/22/2022 Appears normal   Final    Toxic Granulation 09/22/2022 Present   Final    Differential Method 09/22/2022 Manual   Corrected    Comment: CORRECTED RESULT; previously reported as Automated on 09/22/2022 at   07:19.     Lab Visit on 08/31/2022   Component Date Value Ref Range Status    HIV 1/2 Ag/Ab 08/31/2022 Non-reactive  Non-reactive Final    RPR 08/31/2022 Non-reactive  Non-reactive Final    WBC 08/31/2022 7.45  3.90 - 12.70 K/uL Final    RBC 08/31/2022 3.94 (L)  4.00 - 5.40 M/uL Final    Hemoglobin 08/31/2022 10.6 (L)  12.0 - 16.0 g/dL Final    Hematocrit 08/31/2022 35.2 (L)  37.0 - 48.5 % Final    MCV 08/31/2022 89  82 - 98 fL Final    MCH 08/31/2022 26.9 (L)  27.0 - 31.0 pg Final    MCHC 08/31/2022 30.1 (L)  32.0 - 36.0 g/dL Final    RDW 08/31/2022 14.6 (H)  11.5 - 14.5 % Final    Platelets 08/31/2022 225  150 - 450 K/uL Final    MPV 08/31/2022 12.7  9.2 - 12.9 fL Final   Routine Prenatal on 08/30/2022   Component Date Value Ref Range Status    Strep B Culture 08/30/2022 No Group B Streptococcus isolated   Final   Admission on 07/20/2022, Discharged on 07/21/2022   Component Date Value Ref Range Status    Color, UA 07/20/2022 Yellow   Final    Spec Grav UA 07/20/2022 1.005   Final    pH, UA 07/20/2022 7   Final    WBC, UA 07/20/2022 trace   Final    Nitrite,  UA 07/20/2022 neg   Final    Protein, POC 07/20/2022 trace   Final    Glucose, UA 07/20/2022 norm   Final    Ketones, UA 07/20/2022 + small   Final    Urobilinogen, UA 07/20/2022 norm   Final    Bilirubin, POC 07/20/2022 neg   Final    Blood, UA 07/20/2022 neg   Final    Specimen UA 07/20/2022 Urine, Clean Catch   Final    Color, UA 07/20/2022 Yellow  Yellow, Straw, Gladis Final    Appearance, UA 07/20/2022 Hazy (A)  Clear Final    pH, UA 07/20/2022 7.0  5.0 - 8.0 Final    Specific Gravity, UA 07/20/2022 1.010  1.005 - 1.030 Final    Protein, UA 07/20/2022 Negative  Negative Final    Comment: Recommend a 24 hour urine protein or a urine   protein/creatinine ratio if globulin induced proteinuria is  clinically suspected.      Glucose, UA 07/20/2022 Negative  Negative Final    Ketones, UA 07/20/2022 Negative  Negative Final    Bilirubin (UA) 07/20/2022 Negative  Negative Final    Occult Blood UA 07/20/2022 Negative  Negative Final    Nitrite, UA 07/20/2022 Negative  Negative Final    Urobilinogen, UA 07/20/2022 Negative  <2.0 EU/dL Final    Leukocytes, UA 07/20/2022 Trace (A)  Negative Final    WBC, UA 07/20/2022 7 (H)  0 - 5 /hpf Final    Bacteria 07/20/2022 Few (A)  None-Occ /hpf Final    Squam Epithel, UA 07/20/2022 8  /hpf Final    Microscopic Comment 07/20/2022 SEE COMMENT   Final    Comment: Other formed elements not mentioned in the report are not   present in the microscopic examination.       Fetal Fibronectin 07/20/2022 Positive (A)  Negative Final    SARS-CoV-2 RNA, Amplification, Qual 07/20/2022 Negative  Negative Final    Comment: This test utilizes isothermal nucleic acid amplification   technology to detect the SARS-CoV-2 RdRp nucleic acid segment.   The analytical sensitivity (limit of detection) is 125 genome   equivalents/mL.     A POSITIVE result implies infection with the SARS-CoV-2 virus;  the patient is presumed to be contagious.    A NEGATIVE result means that SARS-CoV-2 nucleic acids are  not  present above the limit of detection. A NEGATIVE result should be   treated as presumptive. It does not rule out the possibility of   COVID-19 and should not be the sole basis for treatment decisions.   If COVID-19 is strongly suspected based on clinical and exposure   history, re-testing using an alternate molecular assay should be   considered.       This test is only for use under the Food and Drug   Administration s Emergency Use Authorization (EUA).   Commercial kits are provided by [employer].   Performance characteristics of the EUA have been independently  verified by [facility]                           ment of  Pathology and Laboratory Medicine.   _________________________________________________________________  The ID NOW COVID-19 Letter of Authorization, along with the   authorized Fact Sheet for Healthcare Providers, the authorized Fact  Sheet for Patients, and authorized labeling are available on the FDA   website:  www.fda.gov/MedicalDevices/Safety/EmergencySituations/lwc594895.htm          Past Medical History:   Diagnosis Date    Abnormal Pap smear of cervix yrs ago    repeat pap    Gall stones     Ovarian cyst      Past Surgical History:   Procedure Laterality Date    CHOLECYSTECTOMY  05/01/2017     Laparoscopic cholecystectomy -     DILATION AND CURETTAGE OF UTERUS USING SUCTION N/A 2/19/2019    Procedure: DILATION AND CURETTAGE, UTERUS, USING SUCTION (ADD ON );  Surgeon: Pelon Mead III, MD;  Location: [facility];  Service: OB/GYN;  Laterality: N/A;  (ADD ON )    TONSILLECTOMY, ADENOIDECTOMY         Current Outpatient Medications:     docusate sodium (COLACE) 100 MG capsule, Take 1 capsule (100 mg total) by mouth 2 (two) times daily., Disp: 30 capsule, Rfl: 0    HYDROcodone-acetaminophen (NORCO) 5-325 mg per tablet, Take 1 tablet by mouth every 6 (six) hours as needed for Pain., Disp: 15 tablet, Rfl: 0    ibuprofen (ADVIL,MOTRIN) 600 MG tablet, Take 1  tablet (600 mg total) by mouth every 6 (six) hours as needed for Pain., Disp: 30 tablet, Rfl: 1    magnesium hydroxide (HARDING CHEWS) 311 MG Chew, Take 1 tablet (311 mg total) by mouth every 6 (six) hours as needed., Disp: 30 tablet, Rfl: 0    mirtazapine (REMERON) 15 MG tablet, Take 1 tablet (15 mg total) by mouth nightly., Disp: 30 tablet, Rfl: 2    nitrofurantoin, macrocrystal-monohydrate, (MACROBID) 100 MG capsule, Take 1 capsule (100 mg total) by mouth 2 (two) times daily. for 7 days, Disp: 14 capsule, Rfl: 0    pantoprazole (PROTONIX) 40 MG tablet, Take 1 tablet (40 mg total) by mouth once daily., Disp: 30 tablet, Rfl: 11    promethazine (PHENERGAN) 25 MG tablet, Take 1 tablet (25 mg total) by mouth every 6 (six) hours as needed for Nausea., Disp: 30 tablet, Rfl: 3    simethicone (MYLICON) 125 MG chewable tablet, Take 1 tablet (125 mg total) by mouth every 6 (six) hours as needed for Flatulence., Disp: 30 tablet, Rfl: 0  No current facility-administered medications for this visit.    Facility-Administered Medications Ordered in Other Visits:     doxycycline (VIBRAMYCIN) 200 mg in dextrose 5 % 250 mL IVPB, 200 mg, Intravenous, On Call Procedure, Qi Billy MD, 200 mg at 19 1607  Review of patient's allergies indicates:  No Known Allergies  OB History    Para Term  AB Living   3 2 2   1 2   SAB IAB Ectopic Multiple Live Births   1     0 2      # Outcome Date GA Lbr Neftali/2nd Weight Sex Delivery Anes PTL Lv   3 Term 22 39w1d 12:12 / 00:25 3.04 kg (6 lb 11.2 oz) M Vag-Spont EPI N GAIL   2 SAB 17           1 Term 17 39w3d / 00:26 3.09 kg (6 lb 13 oz) F Vag-Vacuum EPI N GAIL     Social History     Tobacco Use    Smoking status: Never    Smokeless tobacco: Never   Substance Use Topics    Alcohol use: Yes     Comment: Social     Drug use: No     Family History   Problem Relation Age of Onset    Diabetes Maternal Grandmother     Diabetes Mother     Breast cancer Neg Hx      Colon cancer Neg Hx     Ovarian cancer Neg Hx        Review of Systems   Negative except as in HPI      Physical Exam   Vitals:    10/03/22 0840   BP: (!) 126/91     There is no height or weight on file to calculate BMI.    Physical Exam  Constitutional:       General: She is not in acute distress.     Appearance: Normal appearance.   Genitourinary:      Bladder normal.      Vaginal bleeding (normal postpartum lochia) present.      No vaginal discharge or erythema.        Right Adnexa: not tender, not full and no mass present.     Left Adnexa: not tender, not full and no mass present.     No cervical motion tenderness or lesion.      Uterus is not tender.      No uterine mass detected.     Uterus exam comments: No CMT or peritoneal signs on bimanual exam, but low abdomen TTP, difficult to assess fundal tenderness, but no foul-smelling lochia, fever, or chills to suggest endometritis.      Bladder is not tender.       Pelvic exam was performed with patient in the lithotomy position.   HENT:      Head: Normocephalic and atraumatic.   Pulmonary:      Effort: Pulmonary effort is normal.      Breath sounds: Normal breath sounds.   Abdominal:      General: Bowel sounds are normal. There is no distension.      Palpations: Abdomen is soft.      Tenderness: There is abdominal tenderness. There is guarding. There is no rebound.      Comments: TTP along right side of abdomen, voluntary guarding but no rebound tenderness, nontender along the left side of the abdomen   Musculoskeletal:      Cervical back: Normal range of motion.   Neurological:      General: No focal deficit present.      Mental Status: She is alert and oriented to person, place, and time.   Skin:     General: Skin is warm and dry.   Psychiatric:         Mood and Affect: Mood normal.         Behavior: Behavior normal.         Thought Content: Thought content normal.   Exam conducted with a chaperone present.        Labs reviewed: CBC, CMP, Lipase, Amylase, CT  C/A/P w/ contrast, CXR    ASSESSMENT:   Patient Active Problem List   Diagnosis    Iron deficiency anemia    S/P cholecystectomy    SAB (spontaneous )    Status post dilatation and suction curettage    Depression affecting pregnancy    Chlamydia infection     (spontaneous vaginal delivery)    Generalized abdominal pain    Encounter for postpartum visit       PLAN:  Problem List Items Addressed This Visit          GI    Generalized abdominal pain - Primary    Current Assessment & Plan     PPD#12 from an uncomplicated , in the office today after being discharged from the ED in intractable abdominal pain with a negative workup including labs, amylase, lipase, CT scan, and CXR. No peritoneal signs or CMT, no purulent discharge or true fundal tenderness, however TTP in RUQ and epigastrium. Patient with history of cholecystectomy. States pain became worse with Toradol, possible gastritis from NSAID use vs stomach ulcer. Will prescribe Norco 5 and Protonix. Pain could also be ovarian in nature, ovaries not evaluated by CT scan so will get stat TVUS. If normal TVUS and no improvement with Norco/Protonix, consider GI cocktail in the ED vs admission for additional monitoring and pain control.          Relevant Medications    HYDROcodone-acetaminophen (NORCO) 5-325 mg per tablet    pantoprazole (PROTONIX) 40 MG tablet    Other Relevant Orders    US Pelvis Comp with Transvag NON-OB (xpd       Obstetric     (spontaneous vaginal delivery)    Encounter for postpartum visit        Total time spent on this encounter was 20 minutes.  This includes preparing to see the patient;  obtaining/reviewing separately obtained history;  performing a medical exam and/or evaluation;   counseling/educating the patient;  ordering medications, tests, of procedures;   referring/communicating with other health care professionals;  EMR documentation;  interpreting/communicating results to the patient;  and/or care  coordination.    Follow up if symptoms worsen or fail to improve.       Helena Meade MD  Department of Obstetrics & Gynecology  [facility]

## 2022-10-03 NOTE — ED PROVIDER NOTES
Encounter Date: 10/3/2022       History     Chief Complaint   Patient presents with    Abdominal Pain     HPI  Jose Luis Crowder is a 31 y.o. [id number] PPD#12 from an uncomplicated  who is now presenting for persistent severe right upper quadrant pain. Patient reports pain started a couple of days ago but pain is continuing to increase in intensity. Pain is localized under the right rib and radiates to midline and down to umbilicus. Reports pain worsens with inspiration. Denies pain on the left side. No temporal or association with meals.     Patient reports having a bowel movement earlier this day that was normal. Denies dysuria, hematuria, urinary frequency, or suprapubic pain. Denies fevers at home. Denies sick contacts. Pt has has a h/o of gallbladder issues and cholecystectomy.    Reports ibuprofen and OTC simethicone does not improve pain. Took one pill of macrobid but self-discontinued because it was for UTIs. Denies abnormal lochia, states similar to when period is ending. Denies abnormal or new discharge appearance. Not breastfeeding.     Review of patient's allergies indicates:  No Known Allergies  Past Medical History:   Diagnosis Date    Abnormal Pap smear of cervix yrs ago    repeat pap    Gall stones     Ovarian cyst      Past Surgical History:   Procedure Laterality Date    CHOLECYSTECTOMY  2017     Laparoscopic cholecystectomy -     DILATION AND CURETTAGE OF UTERUS USING SUCTION N/A 2019    Procedure: DILATION AND CURETTAGE, UTERUS, USING SUCTION (ADD ON );  Surgeon: Pelon Mead III, MD;  Location: [facility];  Service: OB/GYN;  Laterality: N/A;  (ADD ON )    TONSILLECTOMY, ADENOIDECTOMY       Family History   Problem Relation Age of Onset    Diabetes Maternal Grandmother     Diabetes Mother     Breast cancer Neg Hx     Colon cancer Neg Hx     Ovarian cancer Neg Hx      Social History     Tobacco Use    Smoking status: Never    Smokeless tobacco: Never   Substance Use Topics     Alcohol use: Yes     Comment: Social     Drug use: No     Review of Systems   Constitutional:  Negative for fever.   HENT:  Negative for rhinorrhea, sinus pain and sore throat.    Respiratory:  Negative for shortness of breath.    Cardiovascular:  Negative for chest pain.   Gastrointestinal:  Positive for abdominal pain. Negative for constipation, diarrhea, nausea, rectal pain and vomiting.   Genitourinary:  Positive for vaginal bleeding. Negative for difficulty urinating and pelvic pain.   Skin:  Negative for color change.   Neurological:  Negative for light-headedness and headaches.   Psychiatric/Behavioral:  Negative for confusion.    All other systems reviewed and are negative.    Physical Exam     Initial Vitals [10/03/22 0225]   BP Pulse Resp Temp SpO2   125/87 85 18 98.9 °F (37.2 °C) 100 %      MAP       --         Physical Exam    Constitutional: She appears well-developed and well-nourished. She is not diaphoretic. No distress.   HENT:   Head: Normocephalic and atraumatic.   Eyes: EOM are normal.   Cardiovascular:  Normal rate, regular rhythm and normal heart sounds.           Pulmonary/Chest: Breath sounds normal. She has no wheezes. She has no rales.   Abdominal: Abdomen is soft. She exhibits no mass. There is abdominal tenderness.   Abdominal exam: Abdomen mildly distended. No rash, no warmth, no erythema. Mild tympany RUQ, LUQ. Severe pain per patient of RUQ to light and deep palpation. Some guarding. Moderate tenderness to palpation of RLQ. Mild pain diffusely on left side. No CVA tenderness. There is no rebound.   Musculoskeletal:         General: Normal range of motion.     Neurological: She is alert and oriented to person, place, and time.   Skin: Skin is warm, dry and intact. No rash noted.   Psychiatric: She has a normal mood and affect. Her speech is normal and behavior is normal.       ED Course   Procedures  Lab Results   Component Value Date    WBC 12.25 10/03/2022    HGB 13.1 10/03/2022     HCT 40.9 10/03/2022    MCV 85 10/03/2022     10/03/2022       CMP  Sodium   Date Value Ref Range Status   10/03/2022 138 136 - 145 mmol/L Final     Potassium   Date Value Ref Range Status   10/03/2022 4.0 3.5 - 5.1 mmol/L Final     Comment:     Specimen slightly hemolyzed     Chloride   Date Value Ref Range Status   10/03/2022 110 95 - 110 mmol/L Final     CO2   Date Value Ref Range Status   10/03/2022 21 (L) 23 - 29 mmol/L Final     Glucose   Date Value Ref Range Status   10/03/2022 103 70 - 110 mg/dL Final     BUN   Date Value Ref Range Status   10/03/2022 14 6 - 20 mg/dL Final     Creatinine   Date Value Ref Range Status   10/03/2022 0.7 0.5 - 1.4 mg/dL Final     Calcium   Date Value Ref Range Status   10/03/2022 9.0 8.7 - 10.5 mg/dL Final     Total Protein   Date Value Ref Range Status   10/03/2022 7.9 6.0 - 8.4 g/dL Final     Albumin   Date Value Ref Range Status   10/03/2022 3.2 (L) 3.5 - 5.2 g/dL Final     Total Bilirubin   Date Value Ref Range Status   10/03/2022 0.4 0.1 - 1.0 mg/dL Final     Comment:     For infants and newborns, interpretation of results should be based  on gestational age, weight and in agreement with clinical  observations.    Premature Infant recommended reference ranges:  Up to 24 hours.............<8.0 mg/dL  Up to 48 hours............<12.0 mg/dL  3-5 days..................<15.0 mg/dL  6-29 days.................<15.0 mg/dL       Alkaline Phosphatase   Date Value Ref Range Status   10/03/2022 120 55 - 135 U/L Final     AST   Date Value Ref Range Status   10/03/2022 18 10 - 40 U/L Final     ALT   Date Value Ref Range Status   10/03/2022 22 10 - 44 U/L Final     Anion Gap   Date Value Ref Range Status   10/03/2022 7 (L) 8 - 16 mmol/L Final     eGFR if    Date Value Ref Range Status   01/28/2022 >60 >60 mL/min/1.73 m^2 Final     eGFR if non    Date Value Ref Range Status   01/28/2022 >60 >60 mL/min/1.73 m^2 Final     Comment:     Calculation used to  obtain the estimated glomerular filtration  rate (eGFR) is the CKD-EPI equation.            Imaging:   CXR with mild atelectasis   CT chest with no acute pathology   CT abdomen with no acute pathology, appendix visualized.    Bedside transabdominal ultrasound performed, which patient tolerated well, without noted discomfort, with appropriate size uterus and no evidence of retained POCs.         Medications   HYDROcodone-acetaminophen 5-325 mg per tablet 1 tablet (1 tablet Oral Given 10/3/22 0319)   famotidine (PF) injection 20 mg (20 mg Intravenous Given 10/3/22 0322)   iohexoL (OMNIPAQUE 9) oral solution 1,000 mL (1,000 mLs Oral Given 10/3/22 0355)   iohexoL (OMNIPAQUE 350) injection 75 mL (75 mLs Intravenous Given 10/3/22 0500)   simethicone chewable tablet 80 mg (80 mg Oral Given 10/3/22 0550)   HYDROmorphone injection 0.5 mg (0.5 mg Intravenous Given 10/3/22 0550)     Medical Decision Making:   ED Management:  31 y.o.  PPD#12 presenting for right sided upper quadrant pain   - Vitals stable; satting % on room air, no tachycardia  - CBC, CMP, amylase, lipase all normal and reviewed with patient; no leukocytosis, NLR 6.8  - Imaging with no acute pathology, reviewed with patient  --- Unlikely appendicitis in setting of afebrile without leukocytosis, and normal appendix visualized on CT  --- Unlikely pyelonephritis or cystitis in setting of afebrile state, no leukocytosis, no CVA tenderness, no dysuria, no hematuria  --- Unlikely pancreatitis in setting of no leukocytosis, pain no meal association, able to tolerate PO without n/v, and lipase/amylase WNL  - Improvement of pain with simethicone and dilaudid while in OB ED  - Pain likely 2/2 to gas/GI etiology. Bowel regimen prescribed.   - Follow up with primary OB scheduled  - STRICT return precautions given, patient verbalized understanding                Attending Attestation:   Physician Attestation Statement for Resident:  As the supervising MD    Physician Attestation Statement: I have personally seen and examined this patient.   I agree with the above history.  -:   As the supervising MD I agree with the above PE.     As the supervising MD I agree with the above treatment, course, plan, and disposition.   I was personally present during the critical portions of the procedure(s) performed by the resident and was immediately available in the ED to provide services and assistance as needed during the entire procedure.  I have reviewed and agree with the residents interpretation of the following: lab data and CT scans.  I have reviewed the following: old records at this facility.                           Clinical Impression:   Final diagnoses:  [R10.11] Right upper quadrant abdominal pain (Primary)      ED Disposition Condition    Discharge Stable          ED Prescriptions       Medication Sig Dispense Start Date End Date Auth. Provider    magnesium hydroxide (HARDING CHEWS) 311 MG Chew Take 1 tablet (311 mg total) by mouth every 6 (six) hours as needed. 30 tablet 10/3/2022 10/3/2023 Sneha Kaiser MD    docusate sodium (COLACE) 100 MG capsule Take 1 capsule (100 mg total) by mouth 2 (two) times daily. 30 capsule 10/3/2022 -- Sneha Kaiser MD    simethicone (MYLICON) 125 MG chewable tablet Take 1 tablet (125 mg total) by mouth every 6 (six) hours as needed for Flatulence. 30 tablet 10/3/2022 -- Sneha Kaiser MD          Follow-up Information    None          Sneha Kaiser MD  Resident  10/03/22 2553

## 2022-10-03 NOTE — ASSESSMENT & PLAN NOTE
PPD#12 from an uncomplicated , in the office today after being discharged from the ED in intractable abdominal pain with a negative workup including labs, amylase, lipase, CT scan, and CXR. No peritoneal signs or CMT, no purulent discharge or true fundal tenderness, however TTP in RUQ and epigastrium. Patient with history of cholecystectomy. States pain became worse with Toradol, possible gastritis from NSAID use vs stomach ulcer. Will prescribe Norco 5 and Protonix. Pain could also be ovarian in nature, ovaries not evaluated by CT scan so will get stat TVUS. If normal TVUS and no improvement with Norco/Protonix, consider GI cocktail in the ED vs admission for additional monitoring and pain control.

## 2022-10-03 NOTE — PROGRESS NOTES
Aching pain, moves a certain way becomes a stabbing pain, Been passing gas, belching more than normal. Pain feels worse after Toradol. Tylenol helped somewhat, Ibuprofen. Gets a little relief.

## 2022-10-04 ENCOUNTER — HOSPITAL ENCOUNTER (OUTPATIENT)
Dept: RADIOLOGY | Facility: OTHER | Age: 32
Discharge: HOME | End: 2022-10-04
Attending: OBSTETRICS & GYNECOLOGY
Payer: MEDICAID

## 2022-10-04 DIAGNOSIS — R10.84 GENERALIZED ABDOMINAL PAIN: ICD-10-CM

## 2022-10-04 PROCEDURE — 76856 US EXAM PELVIC COMPLETE: CPT | Mod: 26,,, | Performed by: RADIOLOGY

## 2022-10-04 PROCEDURE — 76856 US PELVIS COMP WITH TRANSVAG NON-OB (XPD): ICD-10-PCS | Mod: 26,,, | Performed by: RADIOLOGY

## 2022-10-04 PROCEDURE — 76830 US PELVIS COMP WITH TRANSVAG NON-OB (XPD): ICD-10-PCS | Mod: 26,,, | Performed by: RADIOLOGY

## 2022-10-04 PROCEDURE — 76830 TRANSVAGINAL US NON-OB: CPT | Mod: TC

## 2022-10-04 PROCEDURE — 76830 TRANSVAGINAL US NON-OB: CPT | Mod: 26,,, | Performed by: RADIOLOGY

## 2022-10-19 ENCOUNTER — PATIENT MESSAGE (OUTPATIENT)
Dept: OBSTETRICS AND GYNECOLOGY | Facility: CLINIC | Age: 32
End: 2022-10-19
Payer: MEDICAID

## 2022-10-28 ENCOUNTER — OFFICE VISIT (OUTPATIENT)
Dept: OBSTETRICS AND GYNECOLOGY | Facility: CLINIC | Age: 32
End: 2022-10-28
Payer: MEDICAID

## 2022-10-28 DIAGNOSIS — Z30.015 ENCOUNTER FOR INITIAL PRESCRIPTION OF VAGINAL RING HORMONAL CONTRACEPTIVE: ICD-10-CM

## 2022-10-28 DIAGNOSIS — Z30.9 ENCOUNTER FOR CONTRACEPTIVE MANAGEMENT, UNSPECIFIED TYPE: ICD-10-CM

## 2022-10-28 PROCEDURE — 1159F MED LIST DOCD IN RCRD: CPT | Mod: CPTII,95,, | Performed by: OBSTETRICS & GYNECOLOGY

## 2022-10-28 PROCEDURE — 1159F PR MEDICATION LIST DOCUMENTED IN MEDICAL RECORD: ICD-10-PCS | Mod: CPTII,95,, | Performed by: OBSTETRICS & GYNECOLOGY

## 2022-10-28 PROCEDURE — 1160F RVW MEDS BY RX/DR IN RCRD: CPT | Mod: CPTII,95,, | Performed by: OBSTETRICS & GYNECOLOGY

## 2022-10-28 PROCEDURE — 1160F PR REVIEW ALL MEDS BY PRESCRIBER/CLIN PHARMACIST DOCUMENTED: ICD-10-PCS | Mod: CPTII,95,, | Performed by: OBSTETRICS & GYNECOLOGY

## 2022-10-28 PROCEDURE — 99213 PR OFFICE/OUTPT VISIT, EST, LEVL III, 20-29 MIN: ICD-10-PCS | Mod: 95,,, | Performed by: OBSTETRICS & GYNECOLOGY

## 2022-10-28 PROCEDURE — 99213 OFFICE O/P EST LOW 20 MIN: CPT | Mod: 95,,, | Performed by: OBSTETRICS & GYNECOLOGY

## 2022-10-28 RX ORDER — SEGESTERONE ACETATE AND ETHINYL ESTRADIOL 103; 17.4 MG/1; MG/1
1 RING VAGINAL
Qty: 1 EACH | Refills: 0 | Status: SHIPPED | OUTPATIENT
Start: 2022-10-28 | End: 2023-10-26 | Stop reason: SDUPTHER

## 2022-10-28 RX ORDER — SEGESTERONE ACETATE AND ETHINYL ESTRADIOL 103; 17.4 MG/1; MG/1
1 RING VAGINAL
Qty: 1 EACH | Refills: 0 | Status: SHIPPED | OUTPATIENT
Start: 2022-10-28 | End: 2022-10-28

## 2022-10-28 NOTE — PROGRESS NOTES
The patient location is: [place]  The chief complaint leading to consultation is: contraception    Visit type: audiovisual    Face to Face time with patient: 15  20 minutes of total time spent on the encounter, which includes face to face time and non-face to face time preparing to see the patient (eg, review of tests), Obtaining and/or reviewing separately obtained history, Documenting clinical information in the electronic or other health record, Independently interpreting results (not separately reported) and communicating results to the patient/family/caregiver, or Care coordination (not separately reported).         Each patient to whom he or she provides medical services by telemedicine is:  (1) informed of the relationship between the physician and patient and the respective role of any other health care provider with respect to management of the patient; and (2) notified that he or she may decline to receive medical services by telemedicine and may withdraw from such care at any time.    Notes:     Subjective:       Patient ID: Jose Luis Crowder is a 31 y.o. female.    Chief Complaint:  No chief complaint on file.      History of Present Illness  HPI  32 y/o  presents for contraception management. She is 5 weeks postpartum s/p uncomplicated . Doing well. She is not breastfeeding. She received a dose of Depo Provera after delivery. No vaginal complaints. Interested in ring contraception rather than IUD.     GYN & OB History  Patient's last menstrual period was 2021.   Date of Last Pap: No result found    OB History    Para Term  AB Living   3 2 2   1 2   SAB IAB Ectopic Multiple Live Births   1     0 2      # Outcome Date GA Lbr Neftali/2nd Weight Sex Delivery Anes PTL Lv   3 Term 22 39w1d 12:12 / 00:25 3.04 kg (6 lb 11.2 oz) M Vag-Spont EPI N GAIL   2 SAB 17           1 Term 17 39w3d / 00:26 3.09 kg (6 lb 13 oz) F Vag-Vacuum EPI N GAIL       Review of Systems  Review of  Systems   Constitutional:  Negative for chills, diaphoresis, fatigue and fever.   Respiratory:  Negative for cough and shortness of breath.    Cardiovascular:  Negative for chest pain and palpitations.   Gastrointestinal:  Negative for abdominal pain, constipation, diarrhea, nausea and vomiting.   Genitourinary:  Negative for dysmenorrhea, dyspareunia, menorrhagia, menstrual problem, pelvic pain, vaginal bleeding, vaginal discharge and vaginal pain.   Neurological:  Negative for headaches.   Psychiatric/Behavioral:  Negative for depression. The patient is not nervous/anxious.          Objective:    Physical Exam:   Constitutional: She is oriented to person, place, and time. She appears well-developed and well-nourished. No distress.    HENT:   Head: Normocephalic and atraumatic.    Eyes: EOM are normal.      Pulmonary/Chest: Effort normal.                  Musculoskeletal: Normal range of motion.       Neurological: She is alert and oriented to person, place, and time.     Psychiatric: She has a normal mood and affect. Her behavior is normal. Judgment and thought content normal.        Assessment:        1. Postpartum care and examination    2. Encounter for contraceptive management, unspecified type    3. Encounter for initial prescription of vaginal ring hormonal contraceptive             Plan:      Diagnoses and all orders for this visit:    Postpartum care and examination  Contraception options discussed including OCPs, Depo Provera, vaginal ring, hormone patch, IUD, Nexplanon.   Patient interested in Annovera. Rx sent.   Pap up to date.   Moods stable.     Encounter for contraceptive management, unspecified type  -     Discontinue: segesterone ac-ethin estradioL (ANNOVERA) 0.15-0.013 mg/24 hour Ring; Place 1 each vaginally every 28 days.  -     segesterone ac-ethin estradioL (ANNOVERA) 0.15-0.013 mg/24 hour Ring; Place 1 each vaginally every 28 days.    Encounter for initial prescription of vaginal ring  hormonal contraceptive      No orders of the defined types were placed in this encounter.      No follow-ups on file.

## 2022-12-22 ENCOUNTER — PATIENT MESSAGE (OUTPATIENT)
Dept: OBSTETRICS AND GYNECOLOGY | Facility: CLINIC | Age: 32
End: 2022-12-22
Payer: MEDICAID

## 2022-12-23 RX ORDER — FLUCONAZOLE 150 MG/1
150 TABLET ORAL DAILY
Qty: 1 TABLET | Refills: 0 | Status: SHIPPED | OUTPATIENT
Start: 2022-12-23 | End: 2022-12-24

## 2023-01-24 ENCOUNTER — PATIENT MESSAGE (OUTPATIENT)
Dept: OBSTETRICS AND GYNECOLOGY | Facility: CLINIC | Age: 33
End: 2023-01-24
Payer: MEDICAID

## 2023-03-02 ENCOUNTER — OFFICE VISIT (OUTPATIENT)
Dept: OBSTETRICS AND GYNECOLOGY | Facility: CLINIC | Age: 33
End: 2023-03-02
Payer: MEDICAID

## 2023-03-02 VITALS
DIASTOLIC BLOOD PRESSURE: 66 MMHG | WEIGHT: 148.06 LBS | BODY MASS INDEX: 25.41 KG/M2 | SYSTOLIC BLOOD PRESSURE: 105 MMHG

## 2023-03-02 DIAGNOSIS — N89.8 VAGINAL DISCHARGE: Primary | ICD-10-CM

## 2023-03-02 PROCEDURE — 87591 N.GONORRHOEAE DNA AMP PROB: CPT | Performed by: OBSTETRICS & GYNECOLOGY

## 2023-03-02 PROCEDURE — 3078F DIAST BP <80 MM HG: CPT | Mod: CPTII,,, | Performed by: OBSTETRICS & GYNECOLOGY

## 2023-03-02 PROCEDURE — 3008F PR BODY MASS INDEX (BMI) DOCUMENTED: ICD-10-PCS | Mod: CPTII,,, | Performed by: OBSTETRICS & GYNECOLOGY

## 2023-03-02 PROCEDURE — 3074F PR MOST RECENT SYSTOLIC BLOOD PRESSURE < 130 MM HG: ICD-10-PCS | Mod: CPTII,,, | Performed by: OBSTETRICS & GYNECOLOGY

## 2023-03-02 PROCEDURE — 3078F PR MOST RECENT DIASTOLIC BLOOD PRESSURE < 80 MM HG: ICD-10-PCS | Mod: CPTII,,, | Performed by: OBSTETRICS & GYNECOLOGY

## 2023-03-02 PROCEDURE — 81514 NFCT DS BV&VAGINITIS DNA ALG: CPT | Performed by: OBSTETRICS & GYNECOLOGY

## 2023-03-02 PROCEDURE — 99213 OFFICE O/P EST LOW 20 MIN: CPT | Mod: S$PBB,,, | Performed by: OBSTETRICS & GYNECOLOGY

## 2023-03-02 PROCEDURE — 3074F SYST BP LT 130 MM HG: CPT | Mod: CPTII,,, | Performed by: OBSTETRICS & GYNECOLOGY

## 2023-03-02 PROCEDURE — 99999 PR PBB SHADOW E&M-EST. PATIENT-LVL III: ICD-10-PCS | Mod: PBBFAC,,, | Performed by: OBSTETRICS & GYNECOLOGY

## 2023-03-02 PROCEDURE — 99213 OFFICE O/P EST LOW 20 MIN: CPT | Mod: PBBFAC,PO | Performed by: OBSTETRICS & GYNECOLOGY

## 2023-03-02 PROCEDURE — 99213 PR OFFICE/OUTPT VISIT, EST, LEVL III, 20-29 MIN: ICD-10-PCS | Mod: S$PBB,,, | Performed by: OBSTETRICS & GYNECOLOGY

## 2023-03-02 PROCEDURE — 1159F PR MEDICATION LIST DOCUMENTED IN MEDICAL RECORD: ICD-10-PCS | Mod: CPTII,,, | Performed by: OBSTETRICS & GYNECOLOGY

## 2023-03-02 PROCEDURE — 3008F BODY MASS INDEX DOCD: CPT | Mod: CPTII,,, | Performed by: OBSTETRICS & GYNECOLOGY

## 2023-03-02 PROCEDURE — 1159F MED LIST DOCD IN RCRD: CPT | Mod: CPTII,,, | Performed by: OBSTETRICS & GYNECOLOGY

## 2023-03-02 PROCEDURE — 99999 PR PBB SHADOW E&M-EST. PATIENT-LVL III: CPT | Mod: PBBFAC,,, | Performed by: OBSTETRICS & GYNECOLOGY

## 2023-03-02 NOTE — PROGRESS NOTES
Chief Complaint   Patient presents with    Gynecologic Exam       HISTORY OF PRESENT ILLNESS:   Jose Luis Crowder is a 32 y.o. female  who presents for vaginal discharge, she started the annovera ring after she had her last baby and leaves it in continuously. She has noticed a change in discharge with odor and thinks it could be related to the ring or soap.       Past Medical History:   Diagnosis Date    Abnormal Pap smear of cervix yrs ago    repeat pap    Gall stones     Ovarian cyst        Past Surgical History:   Procedure Laterality Date    CHOLECYSTECTOMY  2017     Laparoscopic cholecystectomy -     DILATION AND CURETTAGE OF UTERUS USING SUCTION N/A 2019    Procedure: DILATION AND CURETTAGE, UTERUS, USING SUCTION (ADD ON );  Surgeon: Pelon Mead III, MD;  Location: [facility];  Service: OB/GYN;  Laterality: N/A;  (ADD ON )    TONSILLECTOMY, ADENOIDECTOMY         Social History     Socioeconomic History    Marital status: Single   Tobacco Use    Smoking status: Never    Smokeless tobacco: Never   Substance and Sexual Activity    Alcohol use: Yes     Comment: Social     Drug use: No    Sexual activity: Yes     Partners: Male     Birth control/protection: Condom       Family History   Problem Relation Age of Onset    Diabetes Maternal Grandmother     Diabetes Mother     Breast cancer Neg Hx     Colon cancer Neg Hx     Ovarian cancer Neg Hx        OB History    Para Term  AB Living   3 2 2   1 2   SAB IAB Ectopic Multiple Live Births   1     0 2      # Outcome Date GA Lbr Neftali/2nd Weight Sex Delivery Anes PTL Lv   3 Term 22 39w1d 12:12 / 00:25 3.04 kg (6 lb 11.2 oz) M Vag-Spont EPI N GAIL   2 SAB 17           1 Term 17 39w3d / 00:26 3.09 kg (6 lb 13 oz) F Vag-Vacuum EPI N GAIL       COMPREHENSIVE GYN HISTORY:  PAP History: Denies abnormal Paps  Infection History: Denies STDs. Denies PID.  Benign History:Denies uterine fibroids. Denies ovarian cysts. Denies endometriosis  Denies other conditions.  Cancer History: Denies cervical cancer. Denies uterine cancer or hyperplasia. Denies ovarian cancer. Denies vulvar cancer or pre-cancer. Denies vaginal cancer or pre-cancer. Denies breast cancer. Denies colon cancer.  Cycle: nl/mon  Has annovera, has done patches but marks her skin, and nuvaring and depo in the past     ROS:  ROS otherwise negative        /66   Wt 67.1 kg (148 lb 0.6 oz)   LMP 01/27/2023 Comment: spooting  Breastfeeding No   BMI 25.41 kg/m²     APPEARANCE: Well nourished, well developed, in no acute distress.  NECK: Neck symmetric   PELVIC:   VULVA: No lesions. Normal female genitalia.  URETHRAL MEATUS: Normal size and location, no lesions, no prolapse.  URETHRA: No masses, tenderness, prolapse or scarring.  VAGINA: Moist and well rugated, thin yellow discharge, no significant cystocele or rectocele.  CERVIX: No lesions and discharge.      1. Vaginal discharge        Plan:  Gc/ct and affrim done. Discussed that can treat and if comes back then may need longer term treatment vs try new birth control    Face to Face time with patient: 20 minutes of total time spent on the encounter, which includes face to face time and non-face to face time preparing to see the patient (eg, review of tests), Obtaining and/or reviewing separately obtained history, Documenting clinical information in the electronic or other health record, Independently interpreting results (not separately reported) and communicating results to the patient/family/caregiver, or Care coordination (not separately reported).

## 2023-03-04 LAB
BACTERIAL VAGINOSIS DNA: POSITIVE
C TRACH DNA SPEC QL NAA+PROBE: NOT DETECTED
CANDIDA GLABRATA DNA: NEGATIVE
CANDIDA KRUSEI DNA: NEGATIVE
CANDIDA RRNA VAG QL PROBE: NEGATIVE
N GONORRHOEA DNA SPEC QL NAA+PROBE: DETECTED
T VAGINALIS RRNA GENITAL QL PROBE: NEGATIVE

## 2023-03-06 ENCOUNTER — TELEPHONE (OUTPATIENT)
Dept: OBSTETRICS AND GYNECOLOGY | Facility: CLINIC | Age: 33
End: 2023-03-06

## 2023-03-06 ENCOUNTER — PATIENT MESSAGE (OUTPATIENT)
Dept: OBSTETRICS AND GYNECOLOGY | Facility: CLINIC | Age: 33
End: 2023-03-06

## 2023-03-06 ENCOUNTER — CLINICAL SUPPORT (OUTPATIENT)
Dept: OBSTETRICS AND GYNECOLOGY | Facility: CLINIC | Age: 33
End: 2023-03-06
Payer: MEDICAID

## 2023-03-06 ENCOUNTER — TELEPHONE (OUTPATIENT)
Dept: OBSTETRICS AND GYNECOLOGY | Facility: CLINIC | Age: 33
End: 2023-03-06
Payer: MEDICAID

## 2023-03-06 DIAGNOSIS — A64 STD (FEMALE): Primary | ICD-10-CM

## 2023-03-06 PROCEDURE — 96372 THER/PROPH/DIAG INJ SC/IM: CPT | Mod: PBBFAC,PO

## 2023-03-06 RX ORDER — LIDOCAINE HYDROCHLORIDE 10 MG/ML
1 INJECTION INFILTRATION; PERINEURAL
Status: COMPLETED | OUTPATIENT
Start: 2023-03-06 | End: 2023-03-06

## 2023-03-06 RX ORDER — METRONIDAZOLE 500 MG/1
500 TABLET ORAL EVERY 12 HOURS
Qty: 14 TABLET | Refills: 0 | Status: SHIPPED | OUTPATIENT
Start: 2023-03-06 | End: 2023-03-13

## 2023-03-06 RX ORDER — LIDOCAINE HYDROCHLORIDE 20 MG/ML
1 INJECTION, SOLUTION INFILTRATION; PERINEURAL
Status: DISCONTINUED | OUTPATIENT
Start: 2023-03-06 | End: 2023-11-21

## 2023-03-06 RX ORDER — CEFTRIAXONE 500 MG/1
500 INJECTION, POWDER, FOR SOLUTION INTRAMUSCULAR; INTRAVENOUS
Status: COMPLETED | OUTPATIENT
Start: 2023-03-06 | End: 2023-03-06

## 2023-03-06 RX ORDER — DOXYCYCLINE 100 MG/1
100 CAPSULE ORAL 2 TIMES DAILY
Qty: 14 CAPSULE | Refills: 1 | Status: SHIPPED | OUTPATIENT
Start: 2023-03-06 | End: 2023-03-06

## 2023-03-06 RX ADMIN — CEFTRIAXONE SODIUM 500 MG: 500 INJECTION, POWDER, FOR SOLUTION INTRAMUSCULAR; INTRAVENOUS at 02:03

## 2023-03-06 RX ADMIN — LIDOCAINE HYDROCHLORIDE 1 ML: 10 INJECTION, SOLUTION INFILTRATION; PERINEURAL at 02:03

## 2023-03-06 NOTE — TELEPHONE ENCOUNTER
Jose Salas,    This is a patient of Dr. Estevez who is here now for her Rocephin injection. Do you mind send the pended lidocaine order?    Thanks so much,    -Ying

## 2023-03-06 NOTE — TELEPHONE ENCOUNTER
Called patient to get her scheduled for her Rocephin injection per MD request due to patient's test results. Patient coming today, 03/06/23, at 1:30 PM for her Rocephin injection here at [facility]. Patient verbalized understanding. No other questions or concerns.

## 2023-03-06 NOTE — PROGRESS NOTES
Here for rocephin injection, patient without complaints at this time. Reviewed allergies. Injection given RUOQG. Advised to wait in lobby for 15 minutes after injection and report any adverse reactions. No pain noted prior to or after injection. Patient tolerated well.

## 2023-03-07 RX ORDER — METRONIDAZOLE 7.5 MG/G
1 GEL VAGINAL NIGHTLY
Qty: 70 G | Refills: 0 | Status: SHIPPED | OUTPATIENT
Start: 2023-03-07 | End: 2023-03-12

## 2023-03-26 ENCOUNTER — HOSPITAL ENCOUNTER (EMERGENCY)
Facility: HOSPITAL | Age: 33
Discharge: HOME | End: 2023-03-26
Attending: EMERGENCY MEDICINE
Payer: MEDICAID

## 2023-03-26 VITALS
SYSTOLIC BLOOD PRESSURE: 98 MMHG | TEMPERATURE: 99 F | RESPIRATION RATE: 14 BRPM | HEART RATE: 89 BPM | DIASTOLIC BLOOD PRESSURE: 67 MMHG | BODY MASS INDEX: 23.9 KG/M2 | HEIGHT: 64 IN | OXYGEN SATURATION: 98 % | WEIGHT: 140 LBS

## 2023-03-26 DIAGNOSIS — J02.0 STREP PHARYNGITIS: Primary | ICD-10-CM

## 2023-03-26 LAB
GROUP A STREP, MOLECULAR: POSITIVE
SARS-COV-2 RDRP RESP QL NAA+PROBE: NEGATIVE

## 2023-03-26 PROCEDURE — 96372 THER/PROPH/DIAG INJ SC/IM: CPT | Performed by: EMERGENCY MEDICINE

## 2023-03-26 PROCEDURE — U0002 COVID-19 LAB TEST NON-CDC: HCPCS | Performed by: EMERGENCY MEDICINE

## 2023-03-26 PROCEDURE — 63600175 PHARM REV CODE 636 W HCPCS: Performed by: EMERGENCY MEDICINE

## 2023-03-26 PROCEDURE — 87651 STREP A DNA AMP PROBE: CPT | Performed by: EMERGENCY MEDICINE

## 2023-03-26 PROCEDURE — 99284 PR EMERGENCY DEPT VISIT,LEVEL IV: ICD-10-PCS | Mod: CR,CS,, | Performed by: EMERGENCY MEDICINE

## 2023-03-26 PROCEDURE — 99284 EMERGENCY DEPT VISIT MOD MDM: CPT | Mod: CR,CS,, | Performed by: EMERGENCY MEDICINE

## 2023-03-26 PROCEDURE — 99284 EMERGENCY DEPT VISIT MOD MDM: CPT

## 2023-03-26 RX ORDER — PENICILLIN V POTASSIUM 500 MG/1
500 TABLET, FILM COATED ORAL 2 TIMES DAILY
Qty: 20 TABLET | Refills: 0 | Status: SHIPPED | OUTPATIENT
Start: 2023-03-26 | End: 2023-03-26 | Stop reason: ALTCHOICE

## 2023-03-26 RX ORDER — DEXAMETHASONE SODIUM PHOSPHATE 4 MG/ML
10 INJECTION, SOLUTION INTRA-ARTICULAR; INTRALESIONAL; INTRAMUSCULAR; INTRAVENOUS; SOFT TISSUE
Status: COMPLETED | OUTPATIENT
Start: 2023-03-26 | End: 2023-03-26

## 2023-03-26 RX ORDER — PENICILLIN V POTASSIUM 500 MG/1
500 TABLET, FILM COATED ORAL 2 TIMES DAILY
Qty: 20 TABLET | Refills: 0 | Status: SHIPPED | OUTPATIENT
Start: 2023-03-26 | End: 2023-04-05

## 2023-03-26 RX ADMIN — DEXAMETHASONE SODIUM PHOSPHATE 10 MG: 4 INJECTION INTRA-ARTICULAR; INTRALESIONAL; INTRAMUSCULAR; INTRAVENOUS; SOFT TISSUE at 06:03

## 2023-03-26 NOTE — ED PROVIDER NOTES
Encounter Date: 3/26/2023    SCRIBE #1 NOTE: I, Lakia Chase, am scribing for, and in the presence of,  Yobany Boyce MD. I have scribed the following portions of the note - Other sections scribed: HPI, PE.     History     Chief Complaint   Patient presents with    Sore Throat     X2 days. +HA, lightheadedness, chills, and myalgias.      Jose Luis Crowder is a 32 y.o. female with a PMHx of cholelithiasis, who presents to the ED c/o sore throat x 1 day. Patient reports waking up yesterday morning with a dry and sore throat. Associated symptoms include x 1 episode of emesis yesterday. In addition, patient endorses having intermittent diaphoresis and chills. Per triage, pt endorsed lightheadedness. Prior to this, patient notes having a headache and myalgias onset x 3 days. She denies cough, congestion, post-nasal drip, and/or fever. Of note, an at home covid test was taken resulting negative yesterday evening. Denies exposure to sick individuals.       The history is provided by the patient and medical records. No  was used.   Review of patient's allergies indicates:  No Known Allergies  Past Medical History:   Diagnosis Date    Abnormal Pap smear of cervix yrs ago    repeat pap    Gall stones     Ovarian cyst      Past Surgical History:   Procedure Laterality Date    CHOLECYSTECTOMY  05/01/2017     Laparoscopic cholecystectomy -     DILATION AND CURETTAGE OF UTERUS USING SUCTION N/A 2/19/2019    Procedure: DILATION AND CURETTAGE, UTERUS, USING SUCTION (ADD ON );  Surgeon: Pelon Mead III, MD;  Location: [facility];  Service: OB/GYN;  Laterality: N/A;  (ADD ON )    TONSILLECTOMY, ADENOIDECTOMY       Family History   Problem Relation Age of Onset    Diabetes Maternal Grandmother     Diabetes Mother     Breast cancer Neg Hx     Colon cancer Neg Hx     Ovarian cancer Neg Hx      Social History     Tobacco Use    Smoking status: Never    Smokeless tobacco: Never   Substance Use Topics     Alcohol use: Yes     Comment: Social     Drug use: No     Review of Systems    All other systems reviewed and negative except as noted in HPI    Physical Exam     Initial Vitals [03/26/23 1717]   BP Pulse Resp Temp SpO2   98/60 103 17 99.2 °F (37.3 °C) 98 %      MAP       --         Physical Exam    Nursing note and vitals reviewed.    General: AO x 3, NAD. Well nourished. Well Developed  Head: Normocephalic and Atraumatic  HEENT: PERRLA. EOMI. OP Clear; Cobblestone and erythema oral pharynx; Left sided tonsillar exudate. Uvula midline  Neck: Supple, Nontender in midline. No meningismus  Cardiovascular: RRR. No m/r/g. 2+ Distal Pulses  Pulm/Chest: Chest nontender. Lungs clear to auscultation. No respiratory distress.  Abdomen: Nontender. Nondistended. No rigidity, rebound, or guarding  MSK: extremities atraumatic x 4. Gait normal  Ext: no clubbing, cyanosis, or edema  Neuro: GCS 15. CN II-XII intact. Intact symmetric sensation, strength, DTR x 4 extremities  Skin: no bullae, petechiae, or purpura. Warm, dry, and intact.  Psych: normal mood and affect.      ED Course   Procedures  Labs Reviewed   GROUP A STREP, MOLECULAR - Abnormal; Notable for the following components:       Result Value    Group A Strep, Molecular Positive (*)     All other components within normal limits   SARS-COV-2 RNA AMPLIFICATION, QUAL          Imaging Results    None          Medications   dexAMETHasone injection 10 mg (10 mg Intramuscular Given 3/26/23 1815)     Medical Decision Making:   History:   Old Medical Records: I decided to obtain old medical records.  Old Records Summarized: other records.       <> Summary of Records: Reviewed records from baseline.   Initial Assessment:   Examination concern for viral v. Bacterial pharyngitis  Clinical Tests:   Lab Tests: Ordered and Reviewed  ED Management:  Rapid strep positive. Airway intact. IM PCN unavailable. Will treat w/ po PCN        Scribe Attestation:   Scribe #1: I performed the above  scribed service and the documentation accurately describes the services I performed. I attest to the accuracy of the note.      ED Course as of 03/26/23 2118   Sun Mar 26, 2023   1808 Examination is not concerning for meningitis despite presence of viral syndrome type symptoms and headache.  Patient has mild hypotension but this is her baseline.  Past hemoglobin has been 11 and patient has not had significant anemia since resolution of pregnancies.  Repeat lab assays not clinically indicated.  Will check rapid strep, will check rapid COVID.  Presentation not concerning for epiglottitis, PTA, or other acute airway impingement process. [DS]      ED Course User Index  [DS] Yobany Boyce MD                   Clinical Impression:   Final diagnoses:  [J02.0] Strep pharyngitis (Primary)        ED Disposition Condition    Discharge Stable          ED Prescriptions       Medication Sig Dispense Start Date End Date Auth. Provider    penicillin v potassium (VEETID) 500 MG tablet  (Status: Discontinued) Take 1 tablet (500 mg total) by mouth 2 (two) times a day. for 10 days 20 tablet 3/26/2023 3/26/2023 Yobany Boyce MD    penicillin v potassium (VEETID) 500 MG tablet Take 1 tablet (500 mg total) by mouth 2 (two) times a day. for 10 days 20 tablet 3/26/2023 4/5/2023 Yobany Boyce MD          Follow-up Information    None          Yobany Boyce MD  03/26/23 2118

## 2023-03-27 NOTE — DISCHARGE INSTRUCTIONS
Take over the counter acetaminophen 1000mg or ibuprofen 600mg every 6 hours as needed for pain and inflammation    Return to the emergency department immediately if any new or concerning symptoms occur    Follow-up with your primary care in 1 week if your symptoms are not resolved

## 2023-03-30 ENCOUNTER — PATIENT OUTREACH (OUTPATIENT)
Dept: EMERGENCY MEDICINE | Facility: HOSPITAL | Age: 33
End: 2023-03-30
Payer: MEDICAID

## 2023-03-30 NOTE — PROGRESS NOTES
Gunner Gifford LPN  ED Navigator  Emergency Department    [place] [facility] [place]  Role: Community Health Worker    Date: 03/30/2023  Patient Name: Jose Luis Crowder  MRN: [id number]  PCP: [facility]    Assessment:     Jose Luis Crowder is a 32 y.o. female who has presented to ED for sore throat. Patient has visited the ED 1 times in the past 3 months. Patient did not contact PCP.     ED Navigator Initial Assessment    ED Navigator Enrollment Documentation  Consent to Services  Does patient consent to completing the assessment?: Yes  Contact  Method of Initial Contact: Phone  Transportation  Does the patient have issues with Transportation?: No  Does the patient have transportation to and from healthcare appointments?: Yes  Insurance Coverage  Do you have coverage/adequate coverage?: Yes  [facility]kind [facility]  Is patient able to afford co-pays/deductibles?: Yes  Is patient able to afford HME or supplies?: Yes  Does patient have an established [facility] PCP?: No  Does patient need assistance finding a PCP?: Yes  Does the patient have a lack of adequate coverage?: No  Specialist Appointment  Did the patient come to the ED to see a specialist?: No  Does the patient have a pending specialist referral?: No  Does the patient have a specialist appointment made?: No  PCP Follow Up Appointment  Has the patient had an appointment with a primary care provider in the past year?: No  Does the patient have a follow up appontment with a PCP?: No  When was the last time you saw your PCP?: 3/30/21  Why does the patient not have a follow up scheduled?: No established [facility]/outside PCP  Would you like an [facility] PCP?: Yes  Medications  Is patient able to afford medication?: Yes  Is patient unable to get medication due to lack of transportation?: No  Psychological  Does the patient have psycho-social concerns?: No  Food  Does the patient have concerns about food?:  No  Communication/Education  Does the patient have limited English proficiency/English not primary language?: No  Does patient have low literacy and/or low health literacy?: No  Does patient have concerns with care?: No  Does patient have dissatisfaction with care?: No  Other Financial Concerns  Does the patient have immediate financial distress?: No  Does the patient have general financial concerns?: No  Other Social Barriers/Concerns  Does the patient have any additional barriers or concerns?: Unable to afford utilities  Primary Barrier  Barriers identified: Financial barrier (health insurance, employment, etc.), Structural barrier (service availability, waiting times, etc.)  Root Cause of ED Utilization: Lack of Access to Primary Care  Plan to address Lack of Access to Primary Care: Provided [facility] PCP assistance line [phone]  Next steps: Provided Education  Was education/educational materials provided surrounding PCP services/creating a medical home?: Yes Was education verbal or written?: Written     Was education/educational materials provided surrounding low cost, healthy foods?: Yes Was education verbal or written?: Written     Was education/educational materials provided surrounding other items? If so, use comment to explain.: Yes Was education verbal or written?: Written   Plan: Provided information for [facility] On [facility] 24/7 Nurse triage line, [phone] or 1-[facility] ([phone])  Expected Date of Follow Up 1: 4/27/23         Social History     Socioeconomic History    Marital status: Single   Tobacco Use    Smoking status: Never    Smokeless tobacco: Never   Substance and Sexual Activity    Alcohol use: Yes     Comment: Social     Drug use: No    Sexual activity: Yes     Partners: Male     Birth control/protection: Condom     Social Determinants of Health     Financial Resource Strain: Medium Risk    Difficulty of Paying Living Expenses: Somewhat hard   Food Insecurity: No Food Insecurity     Worried About Running Out of Food in the Last Year: Never true    Ran Out of Food in the Last Year: Never true   Transportation Needs: No Transportation Needs    Lack of Transportation (Medical): No    Lack of Transportation (Non-Medical): No   Physical Activity: Inactive    Days of Exercise per Week: 0 days    Minutes of Exercise per Session: 0 min   Stress: No Stress Concern Present    Feeling of Stress : Not at all   Social Connections: Socially Isolated    Frequency of Communication with Friends and Family: More than three times a week    Frequency of Social Gatherings with Friends and Family: More than three times a week    Attends Mandaeism Services: Never    Active Member of Clubs or Organizations: No    Attends Club or Organization Meetings: Never    Marital Status: Never    Housing Stability: Unknown    Unable to Pay for Housing in the Last Year: No    Unstable Housing in the Last Year: No       Plan:   Call placed to Pt to f/u from recent ER visit for sore throat. Pt states she has taken her medications and starting to feel better. Pt denies having f/u with a a PCP d/t needing to find a new one. Pt states she has not seen one in over 2 years. Patient was given Medicaid PCP Information Line ([phone]). Patient was given education on (The Right Care at the Right Level information, [facility] information, and Heart healthy diet tips). Patient was given education on Rent/Mortgage payment assistance for their region. Patient was given utility assistance resources for their area.  Patient was given food bank/pantry resources for their region. Pt advised that she can reach out for assistance with any questions or concerns via Email or phone. Pt verbalized understanding.   Gunner Gifford

## 2023-04-04 ENCOUNTER — PATIENT MESSAGE (OUTPATIENT)
Dept: OBSTETRICS AND GYNECOLOGY | Facility: CLINIC | Age: 33
End: 2023-04-04
Payer: MEDICAID

## 2023-04-05 ENCOUNTER — PATIENT MESSAGE (OUTPATIENT)
Dept: OBSTETRICS AND GYNECOLOGY | Facility: CLINIC | Age: 33
End: 2023-04-05
Payer: MEDICAID

## 2023-04-05 DIAGNOSIS — B37.31 YEAST VAGINITIS: Primary | ICD-10-CM

## 2023-04-06 ENCOUNTER — PATIENT MESSAGE (OUTPATIENT)
Dept: OBSTETRICS AND GYNECOLOGY | Facility: CLINIC | Age: 33
End: 2023-04-06
Payer: MEDICAID

## 2023-04-06 RX ORDER — FLUCONAZOLE 150 MG/1
150 TABLET ORAL
Qty: 2 TABLET | Refills: 0 | Status: SHIPPED | OUTPATIENT
Start: 2023-04-06 | End: 2023-04-10

## 2023-05-02 ENCOUNTER — PATIENT OUTREACH (OUTPATIENT)
Dept: EMERGENCY MEDICINE | Facility: HOSPITAL | Age: 33
End: 2023-05-02
Payer: MEDICAID

## 2023-05-02 NOTE — PROGRESS NOTES
Call placed to Pt to f/u from initial enrollment. Pt states all is well and confirms getting the enrollment email with resource information. Pt advised that the ED Navigator will continue to f/u and she may reach out as needed via email or phone. Pt verbalized understanding.

## 2023-05-31 ENCOUNTER — HOSPITAL ENCOUNTER (EMERGENCY)
Facility: HOSPITAL | Age: 33
Discharge: HOME | End: 2023-05-31
Attending: EMERGENCY MEDICINE
Payer: MEDICAID

## 2023-05-31 VITALS
HEART RATE: 90 BPM | BODY MASS INDEX: 23.9 KG/M2 | OXYGEN SATURATION: 98 % | WEIGHT: 140 LBS | HEIGHT: 64 IN | SYSTOLIC BLOOD PRESSURE: 104 MMHG | RESPIRATION RATE: 16 BRPM | DIASTOLIC BLOOD PRESSURE: 70 MMHG | TEMPERATURE: 99 F

## 2023-05-31 DIAGNOSIS — J02.0 STREP PHARYNGITIS: Primary | ICD-10-CM

## 2023-05-31 LAB
B-HCG UR QL: NEGATIVE
CTP QC/QA: YES
GROUP A STREP, MOLECULAR: POSITIVE
SARS-COV-2 RDRP RESP QL NAA+PROBE: NEGATIVE

## 2023-05-31 PROCEDURE — 87651 STREP A DNA AMP PROBE: CPT | Performed by: PHYSICIAN ASSISTANT

## 2023-05-31 PROCEDURE — 25000003 PHARM REV CODE 250: Performed by: PHYSICIAN ASSISTANT

## 2023-05-31 PROCEDURE — U0002 COVID-19 LAB TEST NON-CDC: HCPCS | Performed by: PHYSICIAN ASSISTANT

## 2023-05-31 PROCEDURE — 99284 EMERGENCY DEPT VISIT MOD MDM: CPT

## 2023-05-31 PROCEDURE — 63600175 PHARM REV CODE 636 W HCPCS: Performed by: PHYSICIAN ASSISTANT

## 2023-05-31 PROCEDURE — 99284 PR EMERGENCY DEPT VISIT,LEVEL IV: ICD-10-PCS | Mod: ,,, | Performed by: EMERGENCY MEDICINE

## 2023-05-31 PROCEDURE — 99284 EMERGENCY DEPT VISIT MOD MDM: CPT | Mod: ,,, | Performed by: EMERGENCY MEDICINE

## 2023-05-31 PROCEDURE — 96372 THER/PROPH/DIAG INJ SC/IM: CPT | Performed by: PHYSICIAN ASSISTANT

## 2023-05-31 PROCEDURE — 81025 URINE PREGNANCY TEST: CPT | Performed by: PHYSICIAN ASSISTANT

## 2023-05-31 RX ORDER — ACETAMINOPHEN 500 MG
1000 TABLET ORAL
Status: COMPLETED | OUTPATIENT
Start: 2023-05-31 | End: 2023-05-31

## 2023-05-31 RX ORDER — AMOXICILLIN AND CLAVULANATE POTASSIUM 875; 125 MG/1; MG/1
1 TABLET, FILM COATED ORAL 2 TIMES DAILY
Qty: 14 TABLET | Refills: 0 | Status: SHIPPED | OUTPATIENT
Start: 2023-05-31 | End: 2023-11-21 | Stop reason: ALTCHOICE

## 2023-05-31 RX ORDER — IBUPROFEN 600 MG/1
600 TABLET ORAL EVERY 6 HOURS PRN
Qty: 30 TABLET | Refills: 1 | Status: SHIPPED | OUTPATIENT
Start: 2023-05-31 | End: 2023-11-21

## 2023-05-31 RX ORDER — IBUPROFEN 600 MG/1
600 TABLET ORAL
Status: COMPLETED | OUTPATIENT
Start: 2023-05-31 | End: 2023-05-31

## 2023-05-31 RX ORDER — AMOXICILLIN AND CLAVULANATE POTASSIUM 875; 125 MG/1; MG/1
1 TABLET, FILM COATED ORAL
Status: COMPLETED | OUTPATIENT
Start: 2023-05-31 | End: 2023-05-31

## 2023-05-31 RX ORDER — DEXAMETHASONE SODIUM PHOSPHATE 4 MG/ML
8 INJECTION, SOLUTION INTRA-ARTICULAR; INTRALESIONAL; INTRAMUSCULAR; INTRAVENOUS; SOFT TISSUE
Status: COMPLETED | OUTPATIENT
Start: 2023-05-31 | End: 2023-05-31

## 2023-05-31 RX ORDER — ONDANSETRON 8 MG/1
8 TABLET, ORALLY DISINTEGRATING ORAL
Status: COMPLETED | OUTPATIENT
Start: 2023-05-31 | End: 2023-05-31

## 2023-05-31 RX ADMIN — DIPHENHYDRAMINE HYDROCHLORIDE 10 ML: 25 SOLUTION ORAL at 02:05

## 2023-05-31 RX ADMIN — IBUPROFEN 600 MG: 600 TABLET, FILM COATED ORAL at 02:05

## 2023-05-31 RX ADMIN — AMOXICILLIN AND CLAVULANATE POTASSIUM 1 TABLET: 875; 125 TABLET, FILM COATED ORAL at 03:05

## 2023-05-31 RX ADMIN — ONDANSETRON 8 MG: 8 TABLET, ORALLY DISINTEGRATING ORAL at 02:05

## 2023-05-31 RX ADMIN — ACETAMINOPHEN 1000 MG: 500 TABLET ORAL at 02:05

## 2023-05-31 RX ADMIN — DEXAMETHASONE SODIUM PHOSPHATE 8 MG: 4 INJECTION INTRA-ARTICULAR; INTRALESIONAL; INTRAMUSCULAR; INTRAVENOUS; SOFT TISSUE at 01:05

## 2023-05-31 NOTE — ED PROVIDER NOTES
Encounter Date: 5/31/2023       History     Chief Complaint   Patient presents with    Sore Throat     Onset yesterday       32-year-old female with no significant past medical history presents for sore throat for 1 day.  She reports associated chills, painful swallowing, hoarseness and some nausea.  She had strep a few months ago and the symptoms are similar to this.  Her young daughter also had similar symptoms.  She denies cough, drooling inability to tolerate p.o. or neck swelling.    Review of patient's allergies indicates:  No Known Allergies  Past Medical History:   Diagnosis Date    Abnormal Pap smear of cervix yrs ago    repeat pap    Gall stones     Ovarian cyst      Past Surgical History:   Procedure Laterality Date    CHOLECYSTECTOMY  05/01/2017     Laparoscopic cholecystectomy -     DILATION AND CURETTAGE OF UTERUS USING SUCTION N/A 2/19/2019    Procedure: DILATION AND CURETTAGE, UTERUS, USING SUCTION (ADD ON );  Surgeon: Pelon Mead III, MD;  Location: [facility];  Service: OB/GYN;  Laterality: N/A;  (ADD ON )    TONSILLECTOMY, ADENOIDECTOMY       Family History   Problem Relation Age of Onset    Diabetes Maternal Grandmother     Diabetes Mother     Breast cancer Neg Hx     Colon cancer Neg Hx     Ovarian cancer Neg Hx      Social History     Tobacco Use    Smoking status: Never    Smokeless tobacco: Never   Substance Use Topics    Alcohol use: Yes     Comment: Social     Drug use: No     Review of Systems   Constitutional:  Positive for chills. Negative for fever.   HENT:  Positive for sore throat, trouble swallowing and voice change.    Respiratory:  Negative for cough.    Gastrointestinal:  Positive for nausea. Negative for vomiting.   Allergic/Immunologic: Negative for immunocompromised state.     Physical Exam     Initial Vitals [05/31/23 1250]   BP Pulse Resp Temp SpO2   106/69 104 15 98.8 °F (37.1 °C) 98 %      MAP       --         Physical Exam    Nursing note and vitals  reviewed.  Constitutional: She appears well-developed and well-nourished.   HENT:   Head: Normocephalic and atraumatic.   Mouth/Throat: Uvula is midline and mucous membranes are normal. No trismus in the jaw. No uvula swelling. Posterior oropharyngeal edema and posterior oropharyngeal erythema present. No oropharyngeal exudate or tonsillar abscesses.   Voice is hoarse but not muffled.   Eyes: EOM are normal. Pupils are equal, round, and reactive to light.   Neck: Neck supple.   Normal range of motion.  Cardiovascular:  Normal rate, regular rhythm, normal heart sounds and intact distal pulses.     Exam reveals no gallop and no friction rub.       No murmur heard.  Pulmonary/Chest: Breath sounds normal. No respiratory distress. She has no wheezes. She has no rhonchi. She has no rales. She exhibits no tenderness.   Musculoskeletal:         General: Normal range of motion.      Cervical back: Normal range of motion and neck supple.     Lymphadenopathy:     She has cervical adenopathy.   Neurological: She is alert.   Skin: Skin is warm.       ED Course   Procedures  Labs Reviewed   GROUP A STREP, MOLECULAR - Abnormal; Notable for the following components:       Result Value    Group A Strep, Molecular Positive (*)     All other components within normal limits   SARS-COV-2 RNA AMPLIFICATION, QUAL   HIV 1 / 2 ANTIBODY   HEPATITIS C ANTIBODY   POCT URINE PREGNANCY          Imaging Results    None          Medications   dexAMETHasone injection 8 mg (8 mg Intramuscular Given 5/31/23 1345)   acetaminophen tablet 1,000 mg (1,000 mg Oral Given 5/31/23 1420)   ibuprofen tablet 600 mg (600 mg Oral Given 5/31/23 1420)   ondansetron disintegrating tablet 8 mg (8 mg Oral Given 5/31/23 1421)   (Magic mouthwash) 1:1:1 diphenhydrAMINE(Benadryl) 12.5mg/5ml liq, aluminum & magnesium hydroxide-simethicone (Maalox), LIDOcaine viscous 2% (10 mLs Swish & Spit Given 5/31/23 1433)   amoxicillin-clavulanate 875-125mg per tablet 1 tablet (1  tablet Oral Given 5/31/23 1541)     Medical Decision Making:   History:   Old Medical Records: I decided to obtain old medical records.  Old Records Summarized: records from previous admission(s).       <> Summary of Records: Patient seen in the ED 03/26/2023 for strep pharyngitis  Initial Assessment:   32-year-old female presenting for sore throat.  Mildly tachycardic with a heart rate of 104 with otherwise normal vitals.  She appears uncomfortable but nontoxic.  Differential Diagnosis:   Strep pharyngitis  COVID-19  Viral pharyngitis   No clinical signs of tonsillar abscess  Clinical Tests:   Lab Tests: Ordered and Reviewed  ED Management:  Will check labs, give analgesics and reassess.    Strep test is positive.  Treated with steroids, Augmentin.  Will discharge with instructions to follow up with PCP return to the ED for worsening symptoms. Stressed the importance of follow-up, strict ED return precautions given.  Patient voiced understanding and is comfortable with discharge.            ED Course as of 05/31/23 1619   Wed May 31, 2023   1423 Preg Test, Ur: Negative [CC]   1518 Group A Strep, Molecular(!): Positive [CC]   1536 SARS-CoV-2 RNA, Amplification, Qual: Negative [CC]      ED Course User Index  [CC] Opal Peralta PA-C                 Clinical Impression:   Final diagnoses:  [J02.0] Strep pharyngitis (Primary)        ED Disposition Condition    Discharge Stable          ED Prescriptions       Medication Sig Dispense Start Date End Date Auth. Provider    amoxicillin-clavulanate 875-125mg (AUGMENTIN) 875-125 mg per tablet Take 1 tablet by mouth 2 (two) times daily. 14 tablet 5/31/2023 -- Opal Peralta PA-C    diphenhydrAMINE-aluminum-magnesium hydroxide-simethicone-LIDOcaine HCl 2% Swish and spit 15 mLs every 4 (four) hours as needed (Sore throat). 2 each 5/31/2023 -- Opal Peralta PA-C    ibuprofen (ADVIL,MOTRIN) 600 MG tablet Take 1 tablet (600 mg total) by mouth every 6 (six) hours  as needed for Pain. 30 tablet 5/31/2023 -- Opal Peralta PA-C          Follow-up Information       Follow up With Specialties Details Why Contact [facility] - Family Medicine Family Medicine Schedule an appointment as soon as possible for a visit in 1 week  [address] [facility] [address]  [phone]      [facility] - Emergency Dept Emergency Medicine Go to  If symptoms worsen [address]  [facility] [id number] [phone]             Opal Peralta PA-C  05/31/23 0416

## 2023-05-31 NOTE — ED NOTES
Jose Luis Crowder, an 32 y.o. female presents to the ED with c/o sore throat since yesterday. Pt states she and most of her family had strept throat a couple of months ago and thinks she may have it again. Endorses SOB, HA, weakness and chills. Denies any other s/s.      Review of patient's allergies indicates:  No Known Allergies  Chief Complaint   Patient presents with    Sore Throat     Onset yesterday       Past Medical History:   Diagnosis Date    Abnormal Pap smear of cervix yrs ago    repeat pap    Gall stones     Ovarian cyst

## 2023-05-31 NOTE — Clinical Note
"Gildae "Jose Luis" Lakesha was seen and treated in our emergency department on 5/31/2023.  She may return to work on 06/03/2023.       If you have any questions or concerns, please don't hesitate to call.      Opal Kaur-MARIO York"

## 2023-06-06 ENCOUNTER — PATIENT OUTREACH (OUTPATIENT)
Dept: EMERGENCY MEDICINE | Facility: HOSPITAL | Age: 33
End: 2023-06-06
Payer: MEDICAID

## 2023-06-06 NOTE — PROGRESS NOTES
Call placed Pt to f/u from her last encounter and recent ER visit for Strep. Pt states she is doing ok and lost the last few days of meds d/t packing to move. Pt will reach out to her PCP to see if the can send in a few more to her Pharm.

## 2023-10-26 DIAGNOSIS — Z30.9 ENCOUNTER FOR CONTRACEPTIVE MANAGEMENT, UNSPECIFIED TYPE: ICD-10-CM

## 2023-10-26 RX ORDER — SEGESTERONE ACETATE AND ETHINYL ESTRADIOL 103; 17.4 MG/1; MG/1
1 RING VAGINAL
Qty: 1 EACH | Refills: 0 | Status: SHIPPED | OUTPATIENT
Start: 2023-10-26

## 2023-11-21 ENCOUNTER — OFFICE VISIT (OUTPATIENT)
Dept: OBSTETRICS AND GYNECOLOGY | Facility: CLINIC | Age: 33
End: 2023-11-21
Payer: MEDICAID

## 2023-11-21 VITALS
DIASTOLIC BLOOD PRESSURE: 68 MMHG | WEIGHT: 137.81 LBS | HEIGHT: 64 IN | BODY MASS INDEX: 23.53 KG/M2 | SYSTOLIC BLOOD PRESSURE: 105 MMHG

## 2023-11-21 DIAGNOSIS — N76.0 ACUTE VAGINITIS: Primary | ICD-10-CM

## 2023-11-21 PROCEDURE — 99212 OFFICE O/P EST SF 10 MIN: CPT | Mod: S$PBB,,, | Performed by: STUDENT IN AN ORGANIZED HEALTH CARE EDUCATION/TRAINING PROGRAM

## 2023-11-21 PROCEDURE — 1160F PR REVIEW ALL MEDS BY PRESCRIBER/CLIN PHARMACIST DOCUMENTED: ICD-10-PCS | Mod: CPTII,,, | Performed by: STUDENT IN AN ORGANIZED HEALTH CARE EDUCATION/TRAINING PROGRAM

## 2023-11-21 PROCEDURE — 3078F DIAST BP <80 MM HG: CPT | Mod: CPTII,,, | Performed by: STUDENT IN AN ORGANIZED HEALTH CARE EDUCATION/TRAINING PROGRAM

## 2023-11-21 PROCEDURE — 99212 PR OFFICE/OUTPT VISIT, EST, LEVL II, 10-19 MIN: ICD-10-PCS | Mod: S$PBB,,, | Performed by: STUDENT IN AN ORGANIZED HEALTH CARE EDUCATION/TRAINING PROGRAM

## 2023-11-21 PROCEDURE — 3074F PR MOST RECENT SYSTOLIC BLOOD PRESSURE < 130 MM HG: ICD-10-PCS | Mod: CPTII,,, | Performed by: STUDENT IN AN ORGANIZED HEALTH CARE EDUCATION/TRAINING PROGRAM

## 2023-11-21 PROCEDURE — 99999 PR PBB SHADOW E&M-EST. PATIENT-LVL III: CPT | Mod: PBBFAC,,, | Performed by: STUDENT IN AN ORGANIZED HEALTH CARE EDUCATION/TRAINING PROGRAM

## 2023-11-21 PROCEDURE — 87491 CHLMYD TRACH DNA AMP PROBE: CPT | Performed by: STUDENT IN AN ORGANIZED HEALTH CARE EDUCATION/TRAINING PROGRAM

## 2023-11-21 PROCEDURE — 1159F PR MEDICATION LIST DOCUMENTED IN MEDICAL RECORD: ICD-10-PCS | Mod: CPTII,,, | Performed by: STUDENT IN AN ORGANIZED HEALTH CARE EDUCATION/TRAINING PROGRAM

## 2023-11-21 PROCEDURE — 3008F BODY MASS INDEX DOCD: CPT | Mod: CPTII,,, | Performed by: STUDENT IN AN ORGANIZED HEALTH CARE EDUCATION/TRAINING PROGRAM

## 2023-11-21 PROCEDURE — 99213 OFFICE O/P EST LOW 20 MIN: CPT | Mod: PBBFAC | Performed by: STUDENT IN AN ORGANIZED HEALTH CARE EDUCATION/TRAINING PROGRAM

## 2023-11-21 PROCEDURE — 1159F MED LIST DOCD IN RCRD: CPT | Mod: CPTII,,, | Performed by: STUDENT IN AN ORGANIZED HEALTH CARE EDUCATION/TRAINING PROGRAM

## 2023-11-21 PROCEDURE — 99999 PR PBB SHADOW E&M-EST. PATIENT-LVL III: ICD-10-PCS | Mod: PBBFAC,,, | Performed by: STUDENT IN AN ORGANIZED HEALTH CARE EDUCATION/TRAINING PROGRAM

## 2023-11-21 PROCEDURE — 3074F SYST BP LT 130 MM HG: CPT | Mod: CPTII,,, | Performed by: STUDENT IN AN ORGANIZED HEALTH CARE EDUCATION/TRAINING PROGRAM

## 2023-11-21 PROCEDURE — 3078F PR MOST RECENT DIASTOLIC BLOOD PRESSURE < 80 MM HG: ICD-10-PCS | Mod: CPTII,,, | Performed by: STUDENT IN AN ORGANIZED HEALTH CARE EDUCATION/TRAINING PROGRAM

## 2023-11-21 PROCEDURE — 1160F RVW MEDS BY RX/DR IN RCRD: CPT | Mod: CPTII,,, | Performed by: STUDENT IN AN ORGANIZED HEALTH CARE EDUCATION/TRAINING PROGRAM

## 2023-11-21 PROCEDURE — 81514 NFCT DS BV&VAGINITIS DNA ALG: CPT | Performed by: STUDENT IN AN ORGANIZED HEALTH CARE EDUCATION/TRAINING PROGRAM

## 2023-11-21 PROCEDURE — 3008F PR BODY MASS INDEX (BMI) DOCUMENTED: ICD-10-PCS | Mod: CPTII,,, | Performed by: STUDENT IN AN ORGANIZED HEALTH CARE EDUCATION/TRAINING PROGRAM

## 2023-11-21 NOTE — PROGRESS NOTES
"  Subjective:      Patient ID: Jose Luis Crowder is a 33 y.o. female.    Chief Complaint:  Vaginal Discharge (Patient c/o vaginal d/c after her cycle has just ended. She also c/o odor coming from the area, but it is "not foul")    History of Present Illness  32 yo  here for evaluation of discharge/    LMP -11/10, lasts longer than usual because she's on Anovera and doesn't get a period that often. Has noticed a discharge and odor since then, states it's white-joyce with a different odor but not fishy in nature.   Patient is sexually active, but not in last 2 months. Denies any new sexual partners. Patient does not douche or use vaginal hygiene. Uses a Dove Oat soap        GYN & OB History  Patient's last menstrual period was 2023 (exact date).   Date of Last Pap: No result found    OB History    Para Term  AB Living   3 2 2   1 2   SAB IAB Ectopic Multiple Live Births   1     0 2      # Outcome Date GA Lbr Neftali/2nd Weight Sex Delivery Anes PTL Lv   3 Term 22 39w1d 12:12 / 00:25 3.04 kg (6 lb 11.2 oz) M Vag-Spont EPI N GAIL   2 SAB 17           1 Term 17 39w3d / 00:26 3.09 kg (6 lb 13 oz) F Vag-Vacuum EPI N GAIL       Review of Systems  Review of Systems   Genitourinary:  Positive for vaginal discharge.   All other systems reviewed and are negative.         Objective:     Physical Exam:   Constitutional: She appears well-developed and well-nourished.    HENT:   Head: Normocephalic and atraumatic.    Eyes: EOM are normal.      Pulmonary/Chest: Effort normal.        Abdominal: Soft.     Genitourinary:    Vagina, right adnexa and left adnexa normal.      Pelvic exam was performed with patient in the lithotomy position.   The external female genitalia was normal.   Genitalia hair distrobution normal .   There is no lesion on the right labia. There is no lesion on the left labia. Cervix is normal. Cervix exhibits friability (ectropion noted).    Genitourinary Comments: Vaginal ring in " place.  Chaperone present for duration of exam             Musculoskeletal: Normal range of motion.       Neurological: She is alert.    Skin: Skin is warm and dry.    Psychiatric: She has a normal mood and affect.         Assessment:     1. Acute vaginitis          Plan:     1. Acute vaginitis  - Discussed vulvovaginal hygiene, changes in discharge related to menstrual cycles and normal vs. Abnormal discharge  - Pt has hx of gonorrhea treated last March  - Will follow up with results     - C. trachomatis/N. gonorrhoeae by AMP DNA [facility]; Cervix  - Vaginosis Screen by DNA Probe    Jr Bernabe III, MD  [facility] [place] [facility]   [address]  [place] [id number] [phone]

## 2023-11-24 LAB
BACTERIAL VAGINOSIS DNA: POSITIVE
C TRACH DNA SPEC QL NAA+PROBE: DETECTED
CANDIDA GLABRATA DNA: NEGATIVE
CANDIDA KRUSEI DNA: NEGATIVE
CANDIDA RRNA VAG QL PROBE: NEGATIVE
N GONORRHOEA DNA SPEC QL NAA+PROBE: NOT DETECTED
T VAGINALIS RRNA GENITAL QL PROBE: NEGATIVE

## 2023-11-27 DIAGNOSIS — N76.0 BACTERIAL VAGINOSIS: ICD-10-CM

## 2023-11-27 DIAGNOSIS — B96.89 BACTERIAL VAGINOSIS: ICD-10-CM

## 2023-11-27 DIAGNOSIS — A74.9 CHLAMYDIA INFECTION: Primary | ICD-10-CM

## 2023-11-27 RX ORDER — DOXYCYCLINE 100 MG/1
100 CAPSULE ORAL EVERY 12 HOURS
Qty: 14 CAPSULE | Refills: 0 | Status: SHIPPED | OUTPATIENT
Start: 2023-11-27 | End: 2023-12-04

## 2023-11-27 RX ORDER — METRONIDAZOLE 500 MG/1
TABLET ORAL
Qty: 14 TABLET | Refills: 0 | Status: SHIPPED | OUTPATIENT
Start: 2023-11-27 | End: 2023-11-30

## 2023-11-28 DIAGNOSIS — R11.0 NAUSEA: Primary | ICD-10-CM

## 2023-11-28 RX ORDER — ONDANSETRON 4 MG/1
4 TABLET, ORALLY DISINTEGRATING ORAL EVERY 6 HOURS PRN
Qty: 12 TABLET | Refills: 0 | Status: SHIPPED | OUTPATIENT
Start: 2023-11-28 | End: 2023-12-29 | Stop reason: ALTCHOICE

## 2023-11-30 DIAGNOSIS — N76.0 BACTERIAL VAGINOSIS: Primary | ICD-10-CM

## 2023-11-30 DIAGNOSIS — B96.89 BACTERIAL VAGINOSIS: Primary | ICD-10-CM

## 2023-11-30 RX ORDER — METRONIDAZOLE 7.5 MG/G
1 GEL VAGINAL DAILY
Qty: 30 G | Refills: 0 | Status: SHIPPED | OUTPATIENT
Start: 2023-11-30 | End: 2023-12-05

## 2023-12-28 ENCOUNTER — OFFICE VISIT (OUTPATIENT)
Dept: OBSTETRICS AND GYNECOLOGY | Facility: CLINIC | Age: 33
End: 2023-12-28
Payer: MEDICAID

## 2023-12-28 VITALS
BODY MASS INDEX: 23.69 KG/M2 | WEIGHT: 138.75 LBS | SYSTOLIC BLOOD PRESSURE: 102 MMHG | DIASTOLIC BLOOD PRESSURE: 64 MMHG | HEIGHT: 64 IN

## 2023-12-28 DIAGNOSIS — N89.8 VAGINAL DISCHARGE: ICD-10-CM

## 2023-12-28 DIAGNOSIS — Z01.411 ENCOUNTER FOR WELL WOMAN EXAM WITH ABNORMAL FINDINGS: Primary | ICD-10-CM

## 2023-12-28 PROCEDURE — 1160F RVW MEDS BY RX/DR IN RCRD: CPT | Mod: CPTII,,, | Performed by: OBSTETRICS & GYNECOLOGY

## 2023-12-28 PROCEDURE — 3078F PR MOST RECENT DIASTOLIC BLOOD PRESSURE < 80 MM HG: ICD-10-PCS | Mod: CPTII,,, | Performed by: OBSTETRICS & GYNECOLOGY

## 2023-12-28 PROCEDURE — 99395 PREV VISIT EST AGE 18-39: CPT | Mod: S$PBB,,, | Performed by: OBSTETRICS & GYNECOLOGY

## 2023-12-28 PROCEDURE — 3078F DIAST BP <80 MM HG: CPT | Mod: CPTII,,, | Performed by: OBSTETRICS & GYNECOLOGY

## 2023-12-28 PROCEDURE — 3074F SYST BP LT 130 MM HG: CPT | Mod: CPTII,,, | Performed by: OBSTETRICS & GYNECOLOGY

## 2023-12-28 PROCEDURE — 3074F PR MOST RECENT SYSTOLIC BLOOD PRESSURE < 130 MM HG: ICD-10-PCS | Mod: CPTII,,, | Performed by: OBSTETRICS & GYNECOLOGY

## 2023-12-28 PROCEDURE — 99999 PR PBB SHADOW E&M-EST. PATIENT-LVL III: ICD-10-PCS | Mod: PBBFAC,,, | Performed by: OBSTETRICS & GYNECOLOGY

## 2023-12-28 PROCEDURE — 1160F PR REVIEW ALL MEDS BY PRESCRIBER/CLIN PHARMACIST DOCUMENTED: ICD-10-PCS | Mod: CPTII,,, | Performed by: OBSTETRICS & GYNECOLOGY

## 2023-12-28 PROCEDURE — 1159F PR MEDICATION LIST DOCUMENTED IN MEDICAL RECORD: ICD-10-PCS | Mod: CPTII,,, | Performed by: OBSTETRICS & GYNECOLOGY

## 2023-12-28 PROCEDURE — 99395 PR PREVENTIVE VISIT,EST,18-39: ICD-10-PCS | Mod: S$PBB,,, | Performed by: OBSTETRICS & GYNECOLOGY

## 2023-12-28 PROCEDURE — 3008F BODY MASS INDEX DOCD: CPT | Mod: CPTII,,, | Performed by: OBSTETRICS & GYNECOLOGY

## 2023-12-28 PROCEDURE — 81514 NFCT DS BV&VAGINITIS DNA ALG: CPT | Performed by: OBSTETRICS & GYNECOLOGY

## 2023-12-28 PROCEDURE — 3008F PR BODY MASS INDEX (BMI) DOCUMENTED: ICD-10-PCS | Mod: CPTII,,, | Performed by: OBSTETRICS & GYNECOLOGY

## 2023-12-28 PROCEDURE — 1159F MED LIST DOCD IN RCRD: CPT | Mod: CPTII,,, | Performed by: OBSTETRICS & GYNECOLOGY

## 2023-12-28 PROCEDURE — 99213 OFFICE O/P EST LOW 20 MIN: CPT | Mod: PBBFAC,PN | Performed by: OBSTETRICS & GYNECOLOGY

## 2023-12-28 PROCEDURE — 99999 PR PBB SHADOW E&M-EST. PATIENT-LVL III: CPT | Mod: PBBFAC,,, | Performed by: OBSTETRICS & GYNECOLOGY

## 2023-12-28 PROCEDURE — 87491 CHLMYD TRACH DNA AMP PROBE: CPT | Performed by: OBSTETRICS & GYNECOLOGY

## 2023-12-28 NOTE — PROGRESS NOTES
Subjective     Patient ID: Jose Luis Crowder is a 33 y.o. female.    Chief Complaint: Annual Exam    HPI    33 y.o. female  here for annual.     She describes her periods as regular, normal flow.  denies break through bleeding.   denies vaginal itching or irritation.  denies vaginal discharge.  Treated for CT and BV last month.    She is sexually active.  She uses annovera ring for contraception.    History of abnormal pap: Yes - remote history, repeat neg. Patient has had HPV vaccine.   Last Pap:  - NILM/HPV neg   Last MMG: N/A  Last Colonoscopy: N/A  Last DXA: N/A    Family History   Problem Relation Age of Onset    Diabetes Maternal Grandmother     Diabetes Mother     Breast cancer Neg Hx     Colon cancer Neg Hx     Ovarian cancer Neg Hx        Review of Systems   Constitutional:  Negative for activity change, appetite change, chills, fatigue, fever and unexpected weight change.   Respiratory:  Negative for cough, chest tightness, shortness of breath and wheezing.    Cardiovascular:  Negative for chest pain and palpitations.   Gastrointestinal:  Negative for abdominal distention, abdominal pain, constipation, diarrhea, nausea and vomiting.   Endocrine: Negative for cold intolerance and heat intolerance.   Genitourinary:  Negative for difficulty urinating, dyspareunia, dysuria, frequency, genital sores, hematuria, menstrual problem, pelvic pain, urgency, vaginal bleeding, vaginal discharge and vaginal pain.   Neurological:  Negative for dizziness, syncope, weakness and light-headedness.   Hematological:  Does not bruise/bleed easily.   Psychiatric/Behavioral:  Negative for suicidal ideas. The patient is not nervous/anxious.           Objective     Physical Exam  Vitals reviewed.   Constitutional:       Appearance: She is well-developed.   HENT:      Head: Normocephalic and atraumatic.   Eyes:      Extraocular Movements: Extraocular movements intact.   Neck:      Thyroid: No thyromegaly.   Cardiovascular:       Rate and Rhythm: Normal rate.   Pulmonary:      Effort: Pulmonary effort is normal. No respiratory distress.   Chest:   Breasts:     Breasts are symmetrical.      Right: No inverted nipple, mass, nipple discharge, skin change or tenderness.      Left: No inverted nipple, mass, nipple discharge, skin change or tenderness.   Abdominal:      General: There is no distension.      Palpations: Abdomen is soft. There is no mass.      Tenderness: There is no abdominal tenderness. There is no guarding or rebound.   Genitourinary:     Comments: Normal external female genitalia; vagina rugated, normal; cervix normal, no masses; uterus small mobile nontender; no adnexal masses palpated; rectal deferred  Musculoskeletal:      Cervical back: Neck supple.   Neurological:      Mental Status: She is alert and oriented to person, place, and time.   Psychiatric:         Behavior: Behavior normal.         Thought Content: Thought content normal.         Judgment: Judgment normal.            Assessment and Plan     Jose Luis was seen today for annual exam.    Diagnoses and all orders for this visit:    Encounter for well woman exam with abnormal findings  - Pap guidelines discussed. Pap up to date.   - Breast cancer screening not yet indicated.   - Colon cancer screening not yet indicated.   - Bone density screening not yet indicated.  - Contraception - Continue ring.  - STD screening - cultures done.     -     C. trachomatis/N. gonorrhoeae by AMP DNA  -     Vaginosis Screen by DNA Probe    Vaginal discharge  -     C. trachomatis/N. gonorrhoeae by AMP DNA  -     Vaginosis Screen by DNA Probe      Orders Placed This Encounter   Procedures    C. trachomatis/N. gonorrhoeae by AMP DNA    Vaginosis Screen by DNA Probe       Follow up in about 1 year (around 12/28/2024) for annual.

## 2023-12-29 PROBLEM — J10.1 INFLUENZA B: Status: ACTIVE | Noted: 2023-12-29

## 2023-12-29 LAB
BACTERIAL VAGINOSIS DNA: NEGATIVE
C TRACH DNA SPEC QL NAA+PROBE: NOT DETECTED
CANDIDA GLABRATA DNA: NEGATIVE
CANDIDA KRUSEI DNA: NEGATIVE
CANDIDA RRNA VAG QL PROBE: POSITIVE
N GONORRHOEA DNA SPEC QL NAA+PROBE: NOT DETECTED
T VAGINALIS RRNA GENITAL QL PROBE: NEGATIVE

## 2024-01-02 RX ORDER — FLUCONAZOLE 150 MG/1
150 TABLET ORAL ONCE
Qty: 1 TABLET | Refills: 0 | Status: SHIPPED | OUTPATIENT
Start: 2024-01-02 | End: 2024-01-02

## 2024-06-04 ENCOUNTER — OFFICE VISIT (OUTPATIENT)
Dept: URGENT CARE | Facility: CLINIC | Age: 34
End: 2024-06-04
Payer: MEDICAID

## 2024-06-04 VITALS
SYSTOLIC BLOOD PRESSURE: 117 MMHG | HEIGHT: 64 IN | WEIGHT: 129 LBS | OXYGEN SATURATION: 99 % | TEMPERATURE: 98 F | HEART RATE: 62 BPM | DIASTOLIC BLOOD PRESSURE: 82 MMHG | RESPIRATION RATE: 18 BRPM | BODY MASS INDEX: 22.02 KG/M2

## 2024-06-04 DIAGNOSIS — M43.6 TORTICOLLIS: Primary | ICD-10-CM

## 2024-06-04 PROCEDURE — 99203 OFFICE O/P NEW LOW 30 MIN: CPT | Mod: S$GLB,,, | Performed by: FAMILY MEDICINE

## 2024-06-04 RX ORDER — KETOROLAC TROMETHAMINE 30 MG/ML
30 INJECTION, SOLUTION INTRAMUSCULAR; INTRAVENOUS
Status: COMPLETED | OUTPATIENT
Start: 2024-06-04 | End: 2024-06-04

## 2024-06-04 RX ORDER — NAPROXEN 500 MG/1
500 TABLET ORAL 2 TIMES DAILY WITH MEALS
Qty: 30 TABLET | Refills: 0 | Status: SHIPPED | OUTPATIENT
Start: 2024-06-04

## 2024-06-04 RX ORDER — CYCLOBENZAPRINE HCL 10 MG
10 TABLET ORAL NIGHTLY
Qty: 10 TABLET | Refills: 0 | Status: SHIPPED | OUTPATIENT
Start: 2024-06-04 | End: 2024-06-14

## 2024-06-04 RX ADMIN — KETOROLAC TROMETHAMINE 30 MG: 30 INJECTION, SOLUTION INTRAMUSCULAR; INTRAVENOUS at 10:06

## 2024-06-04 NOTE — PROGRESS NOTES
"Subjective:      Patient ID: Jose Luis Crowder is a 33 y.o. female.    Vitals:  height is 5' 4" (1.626 m) and weight is 58.5 kg (128 lb 15.5 oz). Her oral temperature is 98.3 °F (36.8 °C). Her blood pressure is 117/82 and her pulse is 62. Her respiration is 18 and oxygen saturation is 99%.     Chief Complaint: Neck Pain    Pt arrives for neck pain which radiates to upper back pain. Symptoms started 1 week ago . At home tx included tylenol and advil with no relief.    Neck Pain   This is a new problem. The current episode started in the past 7 days. The problem has been rapidly worsening. The pain is associated with nothing. The pain is present in the left side and right side. The quality of the pain is described as aching. The pain is at a severity of 8/10. The pain is moderate. Stiffness is present All day. She has tried acetaminophen for the symptoms. The treatment provided no relief.       Neck: Positive for neck pain.      Objective:     Physical Exam   Constitutional: She is oriented to person, place, and time. She appears well-developed. She is cooperative.   HENT:   Head: Normocephalic and atraumatic.   Ears:   Right Ear: Hearing, tympanic membrane, external ear and ear canal normal.   Left Ear: Hearing, tympanic membrane, external ear and ear canal normal.   Nose: Nose normal. No mucosal edema or nasal deformity. No epistaxis. Right sinus exhibits no maxillary sinus tenderness and no frontal sinus tenderness. Left sinus exhibits no maxillary sinus tenderness and no frontal sinus tenderness.   Mouth/Throat: Uvula is midline, oropharynx is clear and moist and mucous membranes are normal. No trismus in the jaw. Normal dentition. No uvula swelling.   Eyes: Conjunctivae and lids are normal.   Neck: Trachea normal and phonation normal. Neck supple. decreased range of motion present. pain with movement present.   Cardiovascular: Normal rate, regular rhythm, normal heart sounds and normal pulses.   Pulmonary/Chest: " Effort normal and breath sounds normal.   Abdominal: Normal appearance and bowel sounds are normal. Soft.   Neurological: She is alert and oriented to person, place, and time. She exhibits normal muscle tone.   Skin: Skin is warm, dry and intact.   Psychiatric: Her speech is normal and behavior is normal. Judgment and thought content normal.   Nursing note and vitals reviewed.      Assessment:     1. Torticollis        Plan:       Torticollis  -     ketorolac injection 30 mg  -     naproxen (NAPROSYN) 500 MG tablet; Take 1 tablet (500 mg total) by mouth 2 (two) times daily with meals.  Dispense: 30 tablet; Refill: 0  -     cyclobenzaprine (FLEXERIL) 10 MG tablet; Take 1 tablet (10 mg total) by mouth every evening. for 10 days  Dispense: 10 tablet; Refill: 0      Thank you for choosing [facility]!     Our goal in the Urgent Care is to always provide outstanding medical care. You may receive a survey by mail or e-mail in the next week regarding your experience today. We would greatly appreciate you completing and returning the survey. Your feedback provides us with a way to recognize our staff who provide very good care, and it helps us learn how to improve when your experience was below our aspiration of excellence.       We appreciate you trusting us with your medical care. We hope you feel better soon. We will be happy to take care of you for all of your future medical needs.  You must understand that you've received an Urgent Care treatment only and that you may be released before all your medical problems are known or treated. You, the patient, will arrange for follow up care as instructed.  Follow up with your PCP or specialty clinic as directed in the next 1-2 weeks if not improved or as needed.  You can call [phone] to schedule an appointment with the appropriate provider.  Another option is to follow up with [facility] ([email or web address])  virtually for quick simple medical advice.  If your condition worsens we recommend that you receive another evaluation at the emergency room immediately or contact your primary medical clinics after hours call service to discuss your concerns.  Please return here or go to the Emergency Department for any concerns or worsening of condition.      *If you were prescribed a narcotic or controlled medication, do not drive or operate heavy equipment or machinery while taking these medications.          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

## 2024-06-18 ENCOUNTER — OFFICE VISIT (OUTPATIENT)
Dept: OBSTETRICS AND GYNECOLOGY | Facility: CLINIC | Age: 34
End: 2024-06-18
Payer: MEDICAID

## 2024-06-18 VITALS — HEIGHT: 64 IN | WEIGHT: 140.31 LBS | BODY MASS INDEX: 23.95 KG/M2

## 2024-06-18 DIAGNOSIS — N76.0 ACUTE VAGINITIS: Primary | ICD-10-CM

## 2024-06-18 DIAGNOSIS — Z30.8 ENCOUNTER FOR OTHER CONTRACEPTIVE MANAGEMENT: ICD-10-CM

## 2024-06-18 PROCEDURE — 81514 NFCT DS BV&VAGINITIS DNA ALG: CPT

## 2024-06-18 PROCEDURE — 99212 OFFICE O/P EST SF 10 MIN: CPT | Mod: PBBFAC

## 2024-06-18 PROCEDURE — 99214 OFFICE O/P EST MOD 30 MIN: CPT | Mod: S$PBB,,,

## 2024-06-18 PROCEDURE — 3008F BODY MASS INDEX DOCD: CPT | Mod: CPTII,,,

## 2024-06-18 PROCEDURE — 99999 PR PBB SHADOW E&M-EST. PATIENT-LVL II: CPT | Mod: PBBFAC,,,

## 2024-06-18 PROCEDURE — 87591 N.GONORRHOEAE DNA AMP PROB: CPT

## 2024-06-18 PROCEDURE — 87491 CHLMYD TRACH DNA AMP PROBE: CPT

## 2024-06-18 PROCEDURE — 1159F MED LIST DOCD IN RCRD: CPT | Mod: CPTII,,,

## 2024-06-18 RX ORDER — LEVONORGESTREL AND ETHINYL ESTRADIOL 0.15-0.03
1 KIT ORAL DAILY
Qty: 90 TABLET | Refills: 3 | Status: SHIPPED | OUTPATIENT
Start: 2024-06-18 | End: 2025-06-18

## 2024-06-18 NOTE — PROGRESS NOTES
Ms Jose Luis Crowder  is a 33 y.o.  female G 3 P 2 that presents with complaint of vaginal discharge and irritation for  the last few  weeks. She reports white discharge.   She reports mild itching, reports odor.  She reports h/o vaginitis. Reports using leftover metrogel and diflucan but symptoms did not fully disappear.      Denies nausea, vomiting, diarrhea, constipation, dysuria, dyspareunia, abdominal pain. Reports history of STDs CT , ,  and GC . She would like STD screening at this visit. No other concerns or complaints at this visit.      She would also like to talk about switching her birth control due to recurrent discharge and bacterial infections.  She is currently using Annovera (vaginal ring).  We discussed all hormonal and nonhormonal options. She would like something where she doesn't have a cycle monthly.  Patient decided to start on Seasonale birth control pills.    Past Medical History:   Diagnosis Date    Abnormal Pap smear of cervix yrs ago    repeat pap    Gall stones     Ovarian cyst      Past Surgical History:   Procedure Laterality Date    CHOLECYSTECTOMY  2017     Laparoscopic cholecystectomy -     DILATION AND CURETTAGE OF UTERUS USING SUCTION N/A 2019    Procedure: DILATION AND CURETTAGE, UTERUS, USING SUCTION (ADD ON );  Surgeon: Pelon Mead III, MD;  Location: [facility];  Service: OB/GYN;  Laterality: N/A;  (ADD ON )    TONSILLECTOMY, ADENOIDECTOMY       Social History     Tobacco Use    Smoking status: Never    Smokeless tobacco: Never   Substance Use Topics    Alcohol use: Yes     Comment: Social     Drug use: No     Family History   Problem Relation Name Age of Onset    Diabetes Maternal Grandmother      Diabetes Mother      Breast cancer Neg Hx      Colon cancer Neg Hx      Ovarian cancer Neg Hx       OB History    Para Term  AB Living   3 2 2   1 2   SAB IAB Ectopic Multiple Live Births   1     0 2      # Outcome Date GA Lbr Neftali/2nd  "Weight Sex Type Anes PTL Lv   3 Term 09/21/22 39w1d 12:12 / 00:25 3.04 kg (6 lb 11.2 oz) M Vag-Spont EPI N GAIL   2 SAB 06/24/17           1 Term 01/27/17 39w3d / 00:26 3.09 kg (6 lb 13 oz) F Vag-Vacuum EPI N GAIL       Ht 5' 4" (1.626 m)   Wt 63.6 kg (140 lb 5.2 oz)   LMP 06/02/2024 (Exact Date)   Breastfeeding No   BMI 24.09 kg/m²     ROS:  GENERAL: No fever, chills, fatigability or weight loss.  VULVAR: No pain, no lesions and reports  mild itching.  VAGINAL: No relaxation, no abnormal bleeding and no lesions. + vaginal discharge  ABDOMEN: No abdominal pain. Denies nausea. Denies vomiting. No diarrhea. No constipation  BREAST: Denies pain. No lumps. No discharge.  URINARY: No incontinence, no nocturia, no frequency and no dysuria.  CARDIOVASCULAR: No chest pain. No shortness of breath. No leg cramps.  NEUROLOGICAL: No headaches. No vision changes.    PHYSICAL EXAM:  VULVA: normal appearing vulva with no masses, tenderness or lesions   VAGINA: normal appearing vagina with normal color. + copious white discharge, no lesions   CERVIX: normal appearing cervix without discharge or lesions   UTERUS: uterus is normal size, shape, consistency and nontender   ADNEXA: normal adnexa in size, nontender and no masses    ASSESSMENT and PLAN:    ICD-10-CM ICD-9-CM    1. Acute vaginitis  N76.0 616.10 C. trachomatis/N. gonorrhoeae by AMP DNA      Vaginosis Screen by DNA Probe      2. Encounter for other contraceptive management  Z30.8 V25.8 levonorgestrel-ethinyl estradiol (SEASONALE) 0.15 mg-30 mcg (91) per tablet            Vaginitis Prevention:  - Avoiding feminine products such as deoderant soaps, body wash, bubble bath, douches, scented toilet paper, deoderant tampons or pads, feminine wipes, chronic pad use, etc. If you wash with soap make sure its hypo allergic (free of added scents or colors)   - Avoiding other vulvovaginal irritants such as long hot baths, humidity, tight, synthetic clothing, chlorine and sitting " around in wet bathing suits  - Wearing cotton underwear, avoiding thong underwear and no underwear to bed-wear loose cotton bottoms to bed   - Taking showers instead of baths and use a hair dryer on cool setting afterwards to dry  - Wearing cotton to exercise and shower immediately after exercise and change clothes  - Using polyurethane condoms without spermicide if sexually active and symptoms are triggered by intercourse  - Use laundry detergent without added perfumes or colors   - Do not have sexual intercourse until symptoms have gone away   - Increase your intake of probiotics--you can get these by eating yogurt/greek yogurt or by buying over the counter probiotic supplements     Probiotic Information:   Any probiotic you choose needs to have ALL of the following components (Each needs to be >10 colony forming units [CFUs]):   - L. Acidophilus  - L. Rhamnosus  - L. Fermentum       FOLLOW UP: PRN lack of improvement.    NHUNG LeP-BC

## 2024-06-19 LAB
C TRACH DNA SPEC QL NAA+PROBE: NOT DETECTED
N GONORRHOEA DNA SPEC QL NAA+PROBE: NOT DETECTED

## 2024-06-20 ENCOUNTER — PATIENT MESSAGE (OUTPATIENT)
Dept: OBSTETRICS AND GYNECOLOGY | Facility: CLINIC | Age: 34
End: 2024-06-20
Payer: MEDICAID

## 2024-06-20 DIAGNOSIS — B96.89 BV (BACTERIAL VAGINOSIS): Primary | ICD-10-CM

## 2024-06-20 DIAGNOSIS — N76.0 BV (BACTERIAL VAGINOSIS): Primary | ICD-10-CM

## 2024-06-20 RX ORDER — METRONIDAZOLE 500 MG/1
500 TABLET ORAL 2 TIMES DAILY
Qty: 14 TABLET | Refills: 0 | Status: SHIPPED | OUTPATIENT
Start: 2024-06-20 | End: 2024-06-27

## 2024-06-25 ENCOUNTER — PATIENT MESSAGE (OUTPATIENT)
Dept: OBSTETRICS AND GYNECOLOGY | Facility: CLINIC | Age: 34
End: 2024-06-25
Payer: MEDICAID

## 2024-06-26 RX ORDER — METRONIDAZOLE 7.5 MG/G
1 GEL VAGINAL DAILY
Qty: 60 G | Refills: 0 | Status: SHIPPED | OUTPATIENT
Start: 2024-06-26 | End: 2024-07-01

## 2024-10-17 ENCOUNTER — PATIENT MESSAGE (OUTPATIENT)
Dept: OBSTETRICS AND GYNECOLOGY | Facility: CLINIC | Age: 34
End: 2024-10-17
Payer: MEDICAID

## 2024-10-17 DIAGNOSIS — Z30.9 ENCOUNTER FOR CONTRACEPTIVE MANAGEMENT, UNSPECIFIED TYPE: Primary | ICD-10-CM

## 2024-10-18 RX ORDER — SEGESTERONE ACETATE AND ETHINYL ESTRADIOL 103; 17.4 MG/1; MG/1
1 RING VAGINAL
Qty: 1 EACH | Refills: 0 | Status: SHIPPED | OUTPATIENT
Start: 2024-10-18

## 2024-10-29 ENCOUNTER — HOSPITAL ENCOUNTER (EMERGENCY)
Facility: OTHER | Age: 34
Discharge: HOME | End: 2024-10-29
Attending: EMERGENCY MEDICINE
Payer: MEDICAID

## 2024-10-29 VITALS
BODY MASS INDEX: 23.9 KG/M2 | TEMPERATURE: 98 F | HEIGHT: 64 IN | OXYGEN SATURATION: 99 % | DIASTOLIC BLOOD PRESSURE: 79 MMHG | WEIGHT: 140 LBS | SYSTOLIC BLOOD PRESSURE: 108 MMHG | HEART RATE: 70 BPM | RESPIRATION RATE: 20 BRPM

## 2024-10-29 DIAGNOSIS — G43.909 MIGRAINE WITHOUT STATUS MIGRAINOSUS, NOT INTRACTABLE, UNSPECIFIED MIGRAINE TYPE: Primary | ICD-10-CM

## 2024-10-29 LAB
B-HCG UR QL: NEGATIVE
CTP QC/QA: YES

## 2024-10-29 PROCEDURE — 81025 URINE PREGNANCY TEST: CPT | Performed by: EMERGENCY MEDICINE

## 2024-10-29 PROCEDURE — 96372 THER/PROPH/DIAG INJ SC/IM: CPT | Performed by: EMERGENCY MEDICINE

## 2024-10-29 PROCEDURE — 63600175 PHARM REV CODE 636 W HCPCS: Performed by: EMERGENCY MEDICINE

## 2024-10-29 PROCEDURE — 99284 EMERGENCY DEPT VISIT MOD MDM: CPT | Mod: 25

## 2024-10-29 RX ORDER — SUMATRIPTAN 6 MG/.5ML
6 INJECTION, SOLUTION SUBCUTANEOUS
Status: COMPLETED | OUTPATIENT
Start: 2024-10-29 | End: 2024-10-29

## 2024-10-29 RX ORDER — KETOROLAC TROMETHAMINE 30 MG/ML
15 INJECTION, SOLUTION INTRAMUSCULAR; INTRAVENOUS
Status: COMPLETED | OUTPATIENT
Start: 2024-10-29 | End: 2024-10-29

## 2024-10-29 RX ORDER — PROCHLORPERAZINE EDISYLATE 5 MG/ML
5 INJECTION INTRAMUSCULAR; INTRAVENOUS
Status: DISCONTINUED | OUTPATIENT
Start: 2024-10-29 | End: 2024-10-29

## 2024-10-29 RX ORDER — IBUPROFEN 600 MG/1
600 TABLET ORAL EVERY 8 HOURS PRN
Qty: 20 TABLET | Refills: 0 | Status: SHIPPED | OUTPATIENT
Start: 2024-10-29

## 2024-10-29 RX ORDER — PROCHLORPERAZINE EDISYLATE 5 MG/ML
5 INJECTION INTRAMUSCULAR; INTRAVENOUS
Status: COMPLETED | OUTPATIENT
Start: 2024-10-29 | End: 2024-10-29

## 2024-10-29 RX ORDER — SUMATRIPTAN SUCCINATE 25 MG/1
50 TABLET ORAL
Qty: 15 TABLET | Refills: 0 | Status: SHIPPED | OUTPATIENT
Start: 2024-10-29 | End: 2024-11-28

## 2024-10-29 RX ORDER — KETOROLAC TROMETHAMINE 30 MG/ML
10 INJECTION, SOLUTION INTRAMUSCULAR; INTRAVENOUS
Status: DISCONTINUED | OUTPATIENT
Start: 2024-10-29 | End: 2024-10-29

## 2024-10-29 RX ADMIN — SUMATRIPTAN SUCCINATE 6 MG: 6 INJECTION SUBCUTANEOUS at 07:10

## 2024-10-29 RX ADMIN — KETOROLAC TROMETHAMINE 15 MG: 30 INJECTION, SOLUTION INTRAMUSCULAR; INTRAVENOUS at 08:10

## 2024-10-29 RX ADMIN — PROCHLORPERAZINE EDISYLATE 5 MG: 5 INJECTION INTRAMUSCULAR; INTRAVENOUS at 08:10

## 2024-11-13 ENCOUNTER — PATIENT MESSAGE (OUTPATIENT)
Dept: OBSTETRICS AND GYNECOLOGY | Facility: CLINIC | Age: 34
End: 2024-11-13
Payer: MEDICAID

## 2024-11-14 ENCOUNTER — E-VISIT (OUTPATIENT)
Dept: OBSTETRICS AND GYNECOLOGY | Facility: CLINIC | Age: 34
End: 2024-11-14
Payer: MEDICAID

## 2024-11-14 DIAGNOSIS — B37.31 VAGINAL YEAST INFECTION: Primary | ICD-10-CM

## 2024-11-14 RX ORDER — FLUCONAZOLE 150 MG/1
150 TABLET ORAL ONCE
Qty: 1 TABLET | Refills: 1 | Status: SHIPPED | OUTPATIENT
Start: 2024-11-14 | End: 2024-11-14

## 2024-11-14 NOTE — PROGRESS NOTES
Patient ID: Jose Luis Crowder is a 34 y.o. female.    Chief Complaint: General Illness (Entered automatically based on patient selection in [employer].)    The patient initiated a request through [employer] on 11/14/2024 for evaluation and management with a chief complaint of General Illness (Entered automatically based on patient selection in [employer].)     I evaluated the questionnaire submission on 11/14/24.    Ohs Peq Evisit General    11/14/2024  1:21 PM CST - Filed by Patient   Do you agree to participate in an E-Visit? Yes   If you have any of the following symptoms, please present to your local emergency room or call 911:  I acknowledge   Choose the state of your primary residence [place]   Select all that apply: None of the above   What is the main issue you would like addressed today? Yeast infection   Please describe your symptoms Itchy and discharge   Where is your problem located? Vagina   How severe are your symptoms? Moderate   Have you had these symptoms before? Yes   How long have you been having these symptoms? For a few days   Please list any medications or treatments you have used for your condition and indicate if it was effective or not. N/a   What makes this feel better? N/a   What makes this feel worse? N/a   Are these symptoms related to a condition that you currently have? No   Please describe any probable cause for these symptoms Antibiotics   Provide any additional information you feel is important.    Please attach any relevant images or files    Are you able to take your vital signs? No         Encounter Diagnosis   Name Primary?    Vaginal yeast infection Yes        No orders of the defined types were placed in this encounter.     Medications Ordered This Encounter   Medications    fluconazole (DIFLUCAN) 150 MG Tab     Sig: Take 1 tablet (150 mg total) by mouth once. for 1 dose     Dispense:  1 tablet     Refill:  1        Follow up if symptoms worsen or fail to improve.      E-Visit Time  Tracking:

## 2024-12-10 ENCOUNTER — OFFICE VISIT (OUTPATIENT)
Dept: URGENT CARE | Facility: CLINIC | Age: 34
End: 2024-12-10
Payer: MEDICAID

## 2024-12-10 VITALS
BODY MASS INDEX: 23.71 KG/M2 | HEIGHT: 64 IN | WEIGHT: 138.88 LBS | OXYGEN SATURATION: 98 % | TEMPERATURE: 99 F | DIASTOLIC BLOOD PRESSURE: 67 MMHG | HEART RATE: 64 BPM | RESPIRATION RATE: 18 BRPM | SYSTOLIC BLOOD PRESSURE: 100 MMHG

## 2024-12-10 DIAGNOSIS — Z20.828 EXPOSURE TO THE FLU: Primary | ICD-10-CM

## 2024-12-10 DIAGNOSIS — R68.89 FLU-LIKE SYMPTOMS: ICD-10-CM

## 2024-12-10 LAB
CTP QC/QA: YES
POC MOLECULAR INFLUENZA A AGN: NEGATIVE
POC MOLECULAR INFLUENZA B AGN: NEGATIVE

## 2024-12-10 PROCEDURE — 99213 OFFICE O/P EST LOW 20 MIN: CPT | Mod: S$GLB,,, | Performed by: NURSE PRACTITIONER

## 2024-12-10 PROCEDURE — 87502 INFLUENZA DNA AMP PROBE: CPT | Mod: QW,S$GLB,, | Performed by: NURSE PRACTITIONER

## 2024-12-10 RX ORDER — BENZONATATE 100 MG/1
100 CAPSULE ORAL 3 TIMES DAILY PRN
Qty: 30 CAPSULE | Refills: 0 | Status: SHIPPED | OUTPATIENT
Start: 2024-12-10 | End: 2024-12-20

## 2024-12-10 RX ORDER — OSELTAMIVIR PHOSPHATE 75 MG/1
75 CAPSULE ORAL 2 TIMES DAILY
Qty: 10 CAPSULE | Refills: 0 | Status: SHIPPED | OUTPATIENT
Start: 2024-12-10 | End: 2024-12-15

## 2024-12-10 NOTE — LETTER
December 10, 2024      [facility] [address]  [place] [address]  Phone: [phone]  Fax: [phone]       Patient: Jose Luis Crowder   YOB: 1990  Date of Visit: 12/10/2024    To Whom It May Concern:    Antonia Crowder  was at [facility] on 12/10/2024. The patient may return to work/school on 12/11/2024 with no restrictions. If you have any questions or concerns, or if I can be of further assistance, please do not hesitate to contact me.    Sincerely,    Denise Bermudez RT

## 2024-12-10 NOTE — PROGRESS NOTES
"Subjective:      Patient ID: Jose Luis Crowder is a 34 y.o. female.    Vitals:  height is 5' 4" (1.626 m) and weight is 63 kg (138 lb 14.2 oz). Her oral temperature is 98.7 °F (37.1 °C). Her blood pressure is 100/67 and her pulse is 64. Her respiration is 18 and oxygen saturation is 98%.     Chief Complaint: Cough    Pt present with sore throat, headaches, body aches, sneezing. Sx started 2 days ago. Tx include hot tea with relief. Exposed to flu  Provider note below:  This is a 34 y.o. female who presents today with a chief complaint of sore throat, headaches, body aches, sneezing started 2 days ago, patient reports her daughter tested positive for flu earlier today, denies fever, body aches or chills, denies cough, wheezing or shortness of breath, denies nausea, vomiting, diarrhea or abdominal pain, denies chest pain or dizziness positional lightheadedness, denies sore throat or trouble swallowing, denies loss of taste or smell, or any other symptoms       Cough  This is a new problem. The current episode started in the past 7 days. The problem has been gradually worsening. The problem occurs constantly. The cough is Non-productive. Associated symptoms include headaches, myalgias and a sore throat. Pertinent negatives include no ear congestion, fever, heartburn, nasal congestion or weight loss. Nothing aggravates the symptoms. She has tried nothing for the symptoms. The treatment provided no relief. There is no history of asthma.     Constitution: Negative for fever.   HENT:  Positive for sore throat.    Respiratory:  Positive for cough.    Gastrointestinal:  Negative for heartburn.   Musculoskeletal:  Positive for muscle ache.   Neurological:  Positive for headaches.      Objective:     Physical Exam   Constitutional: She is oriented to person, place, and time. She appears well-developed. She is cooperative.  Non-toxic appearance. She does not appear ill. No distress.   HENT:   Head: Normocephalic and atraumatic. "   Ears:   Right Ear: Hearing, tympanic membrane, external ear and ear canal normal.   Left Ear: Hearing, tympanic membrane, external ear and ear canal normal.   Nose: Nose normal. No mucosal edema, rhinorrhea or nasal deformity. No epistaxis. Right sinus exhibits no maxillary sinus tenderness and no frontal sinus tenderness. Left sinus exhibits no maxillary sinus tenderness and no frontal sinus tenderness.   Mouth/Throat: Uvula is midline, oropharynx is clear and moist and mucous membranes are normal. No trismus in the jaw. Normal dentition. No uvula swelling. No oropharyngeal exudate, posterior oropharyngeal edema or posterior oropharyngeal erythema.   Eyes: Conjunctivae and lids are normal. No scleral icterus. Extraocular movement intact vision grossly intact gaze aligned appropriately   Neck: Trachea normal and phonation normal. Neck supple. No edema present. No erythema present. No neck rigidity present.   Cardiovascular: Normal rate, regular rhythm, normal heart sounds and normal pulses.   Pulmonary/Chest: Effort normal and breath sounds normal. No stridor. No respiratory distress. She has no decreased breath sounds. She has no wheezes. She has no rhonchi. She has no rales.   Abdominal: Normal appearance.   Musculoskeletal: Normal range of motion.         General: No deformity. Normal range of motion.   Neurological: She is alert and oriented to person, place, and time. She exhibits normal muscle tone. Coordination normal.   Skin: Skin is warm, dry, intact, not diaphoretic and not pale.   Psychiatric: Her speech is normal and behavior is normal. Judgment and thought content normal.   Nursing note and vitals reviewed.    Results for orders placed or performed in visit on 12/10/24   POCT Influenza A/B MOLECULAR    Collection Time: 12/10/24  1:48 PM   Result Value Ref Range    POC Molecular Influenza A Ag Negative Negative    POC Molecular Influenza B Ag Negative Negative     Acceptable Yes           Patient in no acute distress.  Vitals reassuring.  Discussed results/diagnosis/plan in depth with patient in clinic. Strict precautions given to patient to monitor for worsening signs and symptoms. Advised to follow up with primary.All questions answered. Strict ER precautions given. If your symptoms worsens or fail to improve you should go to the Emergency Room. Discharge and follow-up instructions given verbally/printed. Discharge and follow-up instructions discussed with the patient who expressed understanding and willingness to comply with my recommendations.Patient voiced understanding and in agreement with current treatment plan.     Please be advised this text was dictated with [id number] software and may contain errors due to translation.   Assessment:     1. Exposure to the flu    2. Flu-like symptoms        Plan:       Exposure to the flu  -     POCT Influenza A/B MOLECULAR    Flu-like symptoms    Other orders  -     oseltamivir (TAMIFLU) 75 MG capsule; Take 1 capsule (75 mg total) by mouth 2 (two) times daily. for 5 days  Dispense: 10 capsule; Refill: 0  -     benzonatate (TESSALON) 100 MG capsule; Take 1 capsule (100 mg total) by mouth 3 (three) times daily as needed for Cough.  Dispense: 30 capsule; Refill: 0          Medical Decision Making:   Clinical Tests:   Lab Tests: Reviewed  Urgent Care Management:  Patient in no acute distress.  Vitals reassuring.  On exam, patient is nontoxic appearing and afebrile.  Lungs CTA.  Negative flu test results discussed with patient in detail.  Patient daughter tested positive for flu.  Tamiflu discussed, patient agreed treatment with Tamiflu.  Medication prescribed and over-the-counter medication discussed with patient at length.  Proper hydration advised.  I reiterated the importance of further evaluation if no improvement symptoms and follow-up with primary. Patient voiced understanding and in agreement with current treatment plan.             Patient  Instructions   PLEASE READ YOUR DISCHARGE INSTRUCTIONS ENTIRELY AS IT CONTAINS IMPORTANT INFORMATION.      You have been diagnosed with Influenza.     You are contagious for 24 hours after you start the Tamilfu or 24 hours after your last fever, whichever happens last.    Please drink plenty of fluids.    Please get plenty of rest.    Please return here or go to the Emergency Department for any concerns or worsening of condition.    Tamiflu prescription has been discussed and if prescribed, please take to completion unless you cannot tolerate the side effects.       Take tylenol (acetominophen) for fever, chills or body aches every 4 hours. do not exceed 4000 mg/ day.    Take Motrin (Ibuprofen) every 4 hours for fever, chills, pain or inflammation.    Use an antihistmine such as claritin or zyrtec to dry you out. Use pseudoephedrine (behind the counter) to decongest (beware this can raise your blood pressure). Use mucinex (guaifenisin) to break up mucous    Use over the counter flonase: one spray each nostril twice daily OR two sprays each nostril once daily.   If you find this dries your nose out or your nose bleeds, try using over the counter nasal saline a few minutes prior to using the flonase to moisten the lining of your nose.     Please return or see your primary care doctor if you develop new or worsening symptoms.     Please arrange follow up with your primary medical clinic as soon as possible. You must understand that you've received an Urgent Care treatment only and that you may be released before all of your medical problems are known or treated. You, the patient, will arrange for follow up as instructed. If your symptoms worsen or fail to improve you should go to the Emergency Room.  WE CANNOT RULE OUT ALL POSSIBLE CAUSES OF YOUR SYMPTOMS IN THE URGENT CARE SETTING PLEASE GO TO THE ER IF YOU FEELS YOUR CONDITION IS WORSENING OR YOU WOULD LIKE EMERGENT EVALUATION.
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
## 2025-01-17 ENCOUNTER — OFFICE VISIT (OUTPATIENT)
Dept: OBSTETRICS AND GYNECOLOGY | Facility: CLINIC | Age: 35
End: 2025-01-17
Payer: MEDICAID

## 2025-01-17 VITALS
HEIGHT: 64 IN | DIASTOLIC BLOOD PRESSURE: 73 MMHG | SYSTOLIC BLOOD PRESSURE: 112 MMHG | WEIGHT: 140.13 LBS | BODY MASS INDEX: 23.92 KG/M2

## 2025-01-17 DIAGNOSIS — N89.8 VAGINAL DISCHARGE: Primary | ICD-10-CM

## 2025-01-17 DIAGNOSIS — N89.8 VAGINAL ODOR: ICD-10-CM

## 2025-01-17 DIAGNOSIS — Z11.3 SCREENING FOR STDS (SEXUALLY TRANSMITTED DISEASES): ICD-10-CM

## 2025-01-17 PROCEDURE — 1159F MED LIST DOCD IN RCRD: CPT | Mod: CPTII,,,

## 2025-01-17 PROCEDURE — 3078F DIAST BP <80 MM HG: CPT | Mod: CPTII,,,

## 2025-01-17 PROCEDURE — 87491 CHLMYD TRACH DNA AMP PROBE: CPT

## 2025-01-17 PROCEDURE — 3008F BODY MASS INDEX DOCD: CPT | Mod: CPTII,,,

## 2025-01-17 PROCEDURE — 99999 PR PBB SHADOW E&M-EST. PATIENT-LVL III: CPT | Mod: PBBFAC,,,

## 2025-01-17 PROCEDURE — 81515 NFCT DS BV&VAGINITIS DNA ALG: CPT

## 2025-01-17 PROCEDURE — 3074F SYST BP LT 130 MM HG: CPT | Mod: CPTII,,,

## 2025-01-17 PROCEDURE — 99213 OFFICE O/P EST LOW 20 MIN: CPT | Mod: PBBFAC

## 2025-01-17 PROCEDURE — 99213 OFFICE O/P EST LOW 20 MIN: CPT | Mod: S$PBB,,,

## 2025-01-19 LAB
BACTERIAL VAGINOSIS DNA: DETECTED
CANDIDA GLABRATA/KRUSEI: NOT DETECTED
CANDIDA RRNA VAG QL PROBE: NOT DETECTED
TRICHOMONAS VAGINALIS: NOT DETECTED

## 2025-01-20 ENCOUNTER — PATIENT MESSAGE (OUTPATIENT)
Dept: OBSTETRICS AND GYNECOLOGY | Facility: CLINIC | Age: 35
End: 2025-01-20
Payer: MEDICAID

## 2025-01-20 RX ORDER — FLUCONAZOLE 150 MG/1
150 TABLET ORAL ONCE
Qty: 2 TABLET | Refills: 0 | Status: SHIPPED | OUTPATIENT
Start: 2025-01-20 | End: 2025-01-20

## 2025-01-20 RX ORDER — METRONIDAZOLE 500 MG/1
500 TABLET ORAL 2 TIMES DAILY
Qty: 14 TABLET | Refills: 0 | Status: SHIPPED | OUTPATIENT
Start: 2025-01-20 | End: 2025-01-20

## 2025-01-20 RX ORDER — METRONIDAZOLE 7.5 MG/G
1 GEL VAGINAL DAILY
Qty: 7 APPLICATOR | Refills: 0 | Status: SHIPPED | OUTPATIENT
Start: 2025-01-20 | End: 2025-01-27

## 2025-01-20 NOTE — PROGRESS NOTES
"  Chief Complaint: Vaginal Discharge     HPI:      Jose Luis Crowder is a 34 y.o.  who presents with complaint of vaginal discharge and discomfort for the last few days. Patient's last menstrual period was 2025 (exact date). Using ring for contraception. Replaced her ring on .  Has been having brown spotty discharge since.  Endorses an unpleasant odor. No itching. No pelvic pain. She is not currently sexually active but endorses unprotected intercourse in November.     ROS:   GENERAL: Denies weight gain or weight loss. Feeling well overall.   SKIN: Denies rash or lesions.   ABDOMEN: Denies abdominal pain, constipation, diarrhea, nausea, vomiting, change in appetite or rectal bleeding.   URINARY: Denies frequency, dysuria, hematuria.  GYN: See HPI.    Physical Exam:      PHYSICAL EXAM:   Vitals:    25 1429   BP: 112/73   Weight: 63.6 kg (140 lb 1.6 oz)   Height: 5' 4" (1.626 m)   PainSc: 0-No pain     Body mass index is 24.05 kg/m².    General: No acute distress, alert and engaged  VULVA: normal appearing vulva with no masses, tenderness or lesions   VAGINA: normal appearing vagina with normal color. Old brown discharge, no lesions   CERVIX: normal appearing cervix without discharge or lesions   UTERUS: uterus is normal size, shape, consistency and nontender   ADNEXA: normal adnexa in size, nontender and no masses    Assessment/Plan:     Vaginal discharge  -     C. trachomatis/N. gonorrhoeae by AMP DNA [facility]; Vagina  -     Vaginosis Screen by DNA Probe    Vaginal odor  -     C. trachomatis/N. gonorrhoeae by AMP [facility]; Vagina  -     Vaginosis Screen by DNA Probe    Screening for STDs (sexually transmitted diseases)  -     C. trachomatis/N. gonorrhoeae by AMP [facility]; Vagina    Affirm/GCC collected. Will f/u results.    Patient was counseled today on vaginitis prevention including :  a. avoiding feminine products such as deoderant soaps, body wash, bubble bath, douches, scented toilet " paper, deoderant tampons or pads, feminine wipes, chronic pad use, etc.  b. avoiding other vulvovaginal irritants such as long hot baths, humidity, tight, synthetic clothing, chlorine and sitting around in wet bathing suits  c. wearing cotton underwear, avoiding thong underwear and no underwear to bed  d. taking showers instead of baths and use a hair dryer on cool setting afterwards to dry  e. wearing cotton to exercise and shower immediately after exercise and change clothes  f. using polyurethane condoms without spermicide if sexually active and symptoms are triggered by intercourse    Use of MyChart and Patient Portal recommended to patient today.    FOLLOW UP: PRN lack of improvement.    Teresa Guevara (Maggie), JANNET  Obstetrics and Gynecology  [facility]

## 2025-01-24 LAB
C TRACH DNA SPEC QL NAA+PROBE: NOT DETECTED
N GONORRHOEA DNA SPEC QL NAA+PROBE: NOT DETECTED

## 2025-03-17 ENCOUNTER — ON-DEMAND VIRTUAL (OUTPATIENT)
Dept: URGENT CARE | Facility: CLINIC | Age: 35
End: 2025-03-17
Payer: MEDICAID

## 2025-03-17 DIAGNOSIS — G43.809 OTHER MIGRAINE WITHOUT STATUS MIGRAINOSUS, NOT INTRACTABLE: Primary | ICD-10-CM

## 2025-03-17 RX ORDER — RIZATRIPTAN BENZOATE 10 MG/1
10 TABLET ORAL
Qty: 20 TABLET | Refills: 0 | Status: SHIPPED | OUTPATIENT
Start: 2025-03-17 | End: 2025-03-21 | Stop reason: ALTCHOICE

## 2025-03-17 NOTE — PROGRESS NOTES
Subjective:      Patient ID: Jose Luis Crowder is a 34 y.o. female.    Vitals:  vitals were not taken for this visit.     Chief Complaint: Headache      Visit Type: TELE AUDIOVISUAL - This visit was conducted virtually based on  subjective information and limited objective exam    Present with the patient at the time of consultation: TELEMED PRESENT WITH PATIENT: None  LOCATION OF PATIENT [place]  Two patient identifiers used to verify patient- saying out date of birth and full name.       Past Medical History:   Diagnosis Date    Abnormal Pap smear of cervix yrs ago    repeat pap    Gall stones     Ovarian cyst      Past Surgical History:   Procedure Laterality Date    CHOLECYSTECTOMY  05/01/2017     Laparoscopic cholecystectomy -     DILATION AND CURETTAGE OF UTERUS USING SUCTION N/A 2/19/2019    Procedure: DILATION AND CURETTAGE, UTERUS, USING SUCTION (ADD ON );  Surgeon: Pelon Mead III, MD;  Location: [facility];  Service: OB/GYN;  Laterality: N/A;  (ADD ON )    TONSILLECTOMY, ADENOIDECTOMY       Review of patient's allergies indicates:  No Known Allergies  Medications Ordered Prior to Encounter[1]  Family History   Problem Relation Name Age of Onset    Diabetes Maternal Grandmother      Diabetes Mother      Breast cancer Neg Hx      Colon cancer Neg Hx      Ovarian cancer Neg Hx         Medications Ordered                [id number] - [place] - [address]  AT [facility] [address]   [address] [place] [address]    Telephone: [phone]   Fax: [phone]   Hours: Open 24 hours                         E-Prescribed (1 of 1)              rizatriptan (MAXALT) 10 MG tablet    Sig: Take 1 tablet (10 mg total) by mouth as needed for Migraine.       Start: 3/17/25     Quantity: 20 tablet Refills: 0                           Ohs Peq Odvv Intake    3/17/2025 12:14 PM CDT - Filed by Patient   What is your current physical address in the event of a medical  emergency? N/a   Are you able to take your vital signs? No   Please attach any relevant images or files    Is your employer contracted with [facility]? No         35 yo female with c/o migraine headaches. She states she went to ed two days ago but left without being seen due to wait. She states she has had migraines in past and used imitrex and took her remaining 6 pills. .she had an appt with pcp in 5 days. She states did not help. She states vision disturbances        Constitution: Negative.   HENT: Negative.     Cardiovascular: Negative.    Respiratory: Negative.     Gastrointestinal: Negative.    Endocrine: negative.   Genitourinary: Negative.  Negative for frequency and urgency.   Musculoskeletal: Negative.    Skin: Negative.    Allergic/Immunologic: Negative.    Neurological: Negative.    Hematologic/Lymphatic: Negative.    Psychiatric/Behavioral: Negative.          Objective:   The physical exam was conducted virtually.    AAO x 3 ; no acute distress noted; appearance normal; mood and behavior normal; thought process normal  Head- normocephalic  Nose- appears normal, no discharge or erythema  Eyes- pupils appear normal in size, no drainage, no erythema  Ears- normal appearing; no discharge, no erythema  Mouth- appears normal  Oropharynx- no erythema, lesions  Lungs- breathing at a normal rate, no acute distress noted  Heart- no reports of tachycardia, palpitations, chest pain  Abdomen- non distended, non tender reported by patient  Skin- warm and dry, no erythema or edema noted by patient or visualized  Psych- as above; no si/hi      Assessment:     1. Other migraine without status migrainosus, not intractable        Plan:     Will try change in triptan   Advised f/u in person if persists  Unable to send alternative medication on virtual- prior auth or controlled    Thank you for choosing [facility]!    Our goal in the [facility] is to always provide  outstanding medical care. You may receive a survey by mail or e-mail in the next week regarding your experience today. We would greatly appreciate you completing and returning the survey. Your feedback provides us with a way to recognize our staff who provide very good care, and it helps us learn how to improve when your experience was below our aspiration of excellence.         We appreciate you trusting us with your medical care. We hope you feel better soon. We will be happy to take care of you for all of your future medical needs.    You must understand that you've received an Urgent Care treatment only and that you may be released before all your medical problems are known or treated. You, the patient, will arrange for follow up care as instructed.    Follow up with your PCP or specialty clinic as directed in the next 1-2 weeks if not improved or as needed.  You can call [phone] to schedule an appointment with the appropriate provider.    If your condition worsens we recommend that you receive another evaluation in person, with your primary care provider, urgent care or at the emergency room immediately or contact your primary medical clinics after hours call service to discuss your concerns.         Other migraine without status migrainosus, not intractable  -     rizatriptan (MAXALT) 10 MG tablet; Take 1 tablet (10 mg total) by mouth as needed for Migraine.  Dispense: 20 tablet; Refill: 0                         [1]   Current Outpatient Medications on File Prior to Visit   Medication Sig Dispense Refill    ibuprofen (ADVIL,MOTRIN) 600 MG tablet Take 1 tablet (600 mg total) by mouth every 8 (eight) hours as needed for Pain. 20 tablet 0    naproxen (NAPROSYN) 500 MG tablet Take 1 tablet (500 mg total) by mouth 2 (two) times daily with meals. 30 tablet 0    segesterone ac-ethin estradioL (ANNOVERA) 0.15-0.013 mg/24 hour Ring Place 1 Ring vaginally every 28 days. 1 each 0    sumatriptan (IMITREX) 25 MG Tab  Take 2 tablets (50 mg total) by mouth every 2 (two) hours as needed (Migraine). 2 doses per day maximum (4 pills) 15 tablet 0     Current Facility-Administered Medications on File Prior to Visit   Medication Dose Route Frequency Provider Last Rate Last Admin    doxycycline (VIBRAMYCIN) 200 mg in dextrose 5 % 250 mL IVPB  200 mg Intravenous On Call Procedure Qi Billy MD   200 mg at 02/19/19 9292

## 2025-03-21 ENCOUNTER — LAB VISIT (OUTPATIENT)
Dept: PRIMARY CARE CLINIC | Facility: CLINIC | Age: 35
End: 2025-03-21
Payer: MEDICAID

## 2025-03-21 ENCOUNTER — OFFICE VISIT (OUTPATIENT)
Dept: PRIMARY CARE CLINIC | Facility: CLINIC | Age: 35
End: 2025-03-21
Payer: MEDICAID

## 2025-03-21 VITALS
SYSTOLIC BLOOD PRESSURE: 111 MMHG | OXYGEN SATURATION: 100 % | HEIGHT: 64 IN | TEMPERATURE: 98 F | DIASTOLIC BLOOD PRESSURE: 79 MMHG | BODY MASS INDEX: 25.35 KG/M2 | WEIGHT: 148.5 LBS | HEART RATE: 68 BPM

## 2025-03-21 DIAGNOSIS — Z11.59 ENCOUNTER FOR HEPATITIS C SCREENING TEST FOR LOW RISK PATIENT: ICD-10-CM

## 2025-03-21 DIAGNOSIS — Z13.1 SCREENING FOR DIABETES MELLITUS: ICD-10-CM

## 2025-03-21 DIAGNOSIS — Z11.4 SCREENING FOR HIV (HUMAN IMMUNODEFICIENCY VIRUS): ICD-10-CM

## 2025-03-21 DIAGNOSIS — Z90.49 HISTORY OF CHOLECYSTECTOMY: ICD-10-CM

## 2025-03-21 DIAGNOSIS — Z00.00 ANNUAL PHYSICAL EXAM: Primary | ICD-10-CM

## 2025-03-21 DIAGNOSIS — Z13.220 SCREENING CHOLESTEROL LEVEL: ICD-10-CM

## 2025-03-21 DIAGNOSIS — Z13.29 SCREENING FOR THYROID DISORDER: ICD-10-CM

## 2025-03-21 DIAGNOSIS — R11.2 NAUSEA AND VOMITING IN ADULT: ICD-10-CM

## 2025-03-21 DIAGNOSIS — Z80.0 FAMILY HISTORY OF COLON CANCER: ICD-10-CM

## 2025-03-21 DIAGNOSIS — G43.809 OTHER MIGRAINE WITHOUT STATUS MIGRAINOSUS, NOT INTRACTABLE: ICD-10-CM

## 2025-03-21 DIAGNOSIS — Z00.00 ANNUAL PHYSICAL EXAM: ICD-10-CM

## 2025-03-21 LAB
25(OH)D3+25(OH)D2 SERPL-MCNC: 9 NG/ML (ref 30–96)
ALBUMIN SERPL BCP-MCNC: 3.7 G/DL (ref 3.5–5.2)
ALP SERPL-CCNC: 71 U/L (ref 40–150)
ALT SERPL W/O P-5'-P-CCNC: 13 U/L (ref 10–44)
ANION GAP SERPL CALC-SCNC: 10 MMOL/L (ref 8–16)
AST SERPL-CCNC: 24 U/L (ref 10–40)
B-HCG UR QL: NEGATIVE
BASOPHILS # BLD AUTO: 0.04 K/UL (ref 0–0.2)
BASOPHILS NFR BLD: 0.6 % (ref 0–1.9)
BILIRUB SERPL-MCNC: 0.4 MG/DL (ref 0.1–1)
BILIRUB UR QL STRIP: NEGATIVE
BUN SERPL-MCNC: 6 MG/DL (ref 6–20)
CALCIUM SERPL-MCNC: 9.2 MG/DL (ref 8.7–10.5)
CHLORIDE SERPL-SCNC: 106 MMOL/L (ref 95–110)
CHOLEST SERPL-MCNC: 124 MG/DL (ref 120–199)
CHOLEST/HDLC SERPL: 2.6 {RATIO} (ref 2–5)
CLARITY UR REFRACT.AUTO: ABNORMAL
CO2 SERPL-SCNC: 22 MMOL/L (ref 23–29)
COLOR UR AUTO: YELLOW
CREAT SERPL-MCNC: 0.7 MG/DL (ref 0.5–1.4)
CTP QC/QA: YES
DIFFERENTIAL METHOD BLD: ABNORMAL
EOSINOPHIL # BLD AUTO: 0 K/UL (ref 0–0.5)
EOSINOPHIL NFR BLD: 0.6 % (ref 0–8)
ERYTHROCYTE [DISTWIDTH] IN BLOOD BY AUTOMATED COUNT: 12.7 % (ref 11.5–14.5)
EST. GFR  (NO RACE VARIABLE): >60 ML/MIN/1.73 M^2
ESTIMATED AVG GLUCOSE: 91 MG/DL (ref 68–131)
GLUCOSE SERPL-MCNC: 78 MG/DL (ref 70–110)
GLUCOSE UR QL STRIP: NEGATIVE
HBA1C MFR BLD: 4.8 % (ref 4–5.6)
HCT VFR BLD AUTO: 39.4 % (ref 37–48.5)
HCV AB SERPL QL IA: NORMAL
HDLC SERPL-MCNC: 47 MG/DL (ref 40–75)
HDLC SERPL: 37.9 % (ref 20–50)
HGB BLD-MCNC: 12.3 G/DL (ref 12–16)
HGB UR QL STRIP: NEGATIVE
HIV 1+2 AB+HIV1 P24 AG SERPL QL IA: NORMAL
IMM GRANULOCYTES # BLD AUTO: 0.01 K/UL (ref 0–0.04)
IMM GRANULOCYTES NFR BLD AUTO: 0.2 % (ref 0–0.5)
KETONES UR QL STRIP: NEGATIVE
LDLC SERPL CALC-MCNC: 62 MG/DL (ref 63–159)
LEUKOCYTE ESTERASE UR QL STRIP: NEGATIVE
LYMPHOCYTES # BLD AUTO: 2.7 K/UL (ref 1–4.8)
LYMPHOCYTES NFR BLD: 42.2 % (ref 18–48)
MCH RBC QN AUTO: 28.1 PG (ref 27–31)
MCHC RBC AUTO-ENTMCNC: 31.2 G/DL (ref 32–36)
MCV RBC AUTO: 90 FL (ref 82–98)
MONOCYTES # BLD AUTO: 0.5 K/UL (ref 0.3–1)
MONOCYTES NFR BLD: 7.8 % (ref 4–15)
NEUTROPHILS # BLD AUTO: 3.1 K/UL (ref 1.8–7.7)
NEUTROPHILS NFR BLD: 48.6 % (ref 38–73)
NITRITE UR QL STRIP: NEGATIVE
NONHDLC SERPL-MCNC: 77 MG/DL
NRBC BLD-RTO: 0 /100 WBC
PH UR STRIP: 8 [PH] (ref 5–8)
PLATELET # BLD AUTO: 245 K/UL (ref 150–450)
PMV BLD AUTO: 12.6 FL (ref 9.2–12.9)
POTASSIUM SERPL-SCNC: 4.3 MMOL/L (ref 3.5–5.1)
PROT SERPL-MCNC: 7.8 G/DL (ref 6–8.4)
PROT UR QL STRIP: NEGATIVE
RBC # BLD AUTO: 4.38 M/UL (ref 4–5.4)
SODIUM SERPL-SCNC: 138 MMOL/L (ref 136–145)
SP GR UR STRIP: 1.02 (ref 1–1.03)
T3FREE SERPL-MCNC: 3.2 PG/ML (ref 2.3–4.2)
T4 FREE SERPL-MCNC: 1 NG/DL (ref 0.71–1.51)
TRIGL SERPL-MCNC: 75 MG/DL (ref 30–150)
TSH SERPL DL<=0.005 MIU/L-ACNC: 1.43 UIU/ML (ref 0.4–4)
URN SPEC COLLECT METH UR: ABNORMAL
VIT B12 SERPL-MCNC: 235 PG/ML (ref 210–950)
WBC # BLD AUTO: 6.38 K/UL (ref 3.9–12.7)

## 2025-03-21 PROCEDURE — 99214 OFFICE O/P EST MOD 30 MIN: CPT | Mod: PBBFAC,PN | Performed by: NURSE PRACTITIONER

## 2025-03-21 PROCEDURE — 80053 COMPREHEN METABOLIC PANEL: CPT | Performed by: NURSE PRACTITIONER

## 2025-03-21 PROCEDURE — 99999 PR PBB SHADOW E&M-EST. PATIENT-LVL IV: CPT | Mod: PBBFAC,,, | Performed by: NURSE PRACTITIONER

## 2025-03-21 PROCEDURE — 86803 HEPATITIS C AB TEST: CPT | Performed by: NURSE PRACTITIONER

## 2025-03-21 PROCEDURE — 80061 LIPID PANEL: CPT | Performed by: NURSE PRACTITIONER

## 2025-03-21 PROCEDURE — 85025 COMPLETE CBC W/AUTO DIFF WBC: CPT | Performed by: NURSE PRACTITIONER

## 2025-03-21 PROCEDURE — 82607 VITAMIN B-12: CPT | Performed by: NURSE PRACTITIONER

## 2025-03-21 PROCEDURE — 84481 FREE ASSAY (FT-3): CPT | Performed by: NURSE PRACTITIONER

## 2025-03-21 PROCEDURE — 84443 ASSAY THYROID STIM HORMONE: CPT | Performed by: NURSE PRACTITIONER

## 2025-03-21 PROCEDURE — 81003 URINALYSIS AUTO W/O SCOPE: CPT | Performed by: NURSE PRACTITIONER

## 2025-03-21 PROCEDURE — 82306 VITAMIN D 25 HYDROXY: CPT | Performed by: NURSE PRACTITIONER

## 2025-03-21 PROCEDURE — 83036 HEMOGLOBIN GLYCOSYLATED A1C: CPT | Performed by: NURSE PRACTITIONER

## 2025-03-21 PROCEDURE — 87389 HIV-1 AG W/HIV-1&-2 AB AG IA: CPT | Performed by: NURSE PRACTITIONER

## 2025-03-21 PROCEDURE — 84439 ASSAY OF FREE THYROXINE: CPT | Performed by: NURSE PRACTITIONER

## 2025-03-21 RX ORDER — SUMATRIPTAN SUCCINATE 50 MG/1
50 TABLET ORAL
Qty: 30 TABLET | Refills: 0 | Status: SHIPPED | OUTPATIENT
Start: 2025-03-21 | End: 2025-04-20

## 2025-03-21 RX ORDER — NAPROXEN 500 MG/1
500 TABLET ORAL 2 TIMES DAILY
Qty: 30 TABLET | Refills: 0 | Status: SHIPPED | OUTPATIENT
Start: 2025-03-21

## 2025-03-21 RX ORDER — ONDANSETRON 4 MG/1
8 TABLET, ORALLY DISINTEGRATING ORAL 2 TIMES DAILY PRN
Qty: 14 TABLET | Refills: 0 | Status: SHIPPED | OUTPATIENT
Start: 2025-03-21

## 2025-03-21 RX ORDER — CALCIUM CARBONATE 300MG(750)
1 TABLET,CHEWABLE ORAL NIGHTLY PRN
Qty: 30 TABLET | Refills: 0 | Status: SHIPPED | OUTPATIENT
Start: 2025-03-21

## 2025-03-21 NOTE — PROGRESS NOTES
Subjective:       Patient ID: Jose Luis Crowder is a 34 y.o. female.    Chief Complaint: Back Pain (Whole back ) and Migraine (Weeks)    History of Present Illness  CHIEF COMPLAINT:  Patient presents today for migraines    HISTORY OF PRESENT ILLNESS:  She reports experiencing migraines, with a recent episode lasting from Friday to Tuesday. The migraines began after her second pregnancy. Previously prescribed sumatriptan was ineffective. She has been taking ibuprofen and naproxen for symptom management. She experiences nausea associated with the headaches. She also reports chest tightness and back pain throughout her entire back.    FAMILY HISTORY:  Paternal grandmother, paternal uncle, and paternal uncle's daughter  from colon cancer. Mother is borderline diabetic. Grandmother has diabetes.    SURGICAL HISTORY:  Cholecystectomy in 2017.    GYNECOLOGICAL HISTORY:  Last gynecologist visit was 3-4 months ago. She has regular menstrual cycles and is currently on birth control.    ALLERGIES:  No known allergies.     Past Medical History:   Diagnosis Date    Abnormal Pap smear of cervix yrs ago    repeat pap    Gall stones     Ovarian cyst         Past Surgical History:   Procedure Laterality Date    CHOLECYSTECTOMY  2017     Laparoscopic cholecystectomy -     DILATION AND CURETTAGE OF UTERUS USING SUCTION N/A 2019    Procedure: DILATION AND CURETTAGE, UTERUS, USING SUCTION (ADD ON );  Surgeon: Pelon Mead III, MD;  Location: [facility];  Service: OB/GYN;  Laterality: N/A;  (ADD ON )    TONSILLECTOMY, ADENOIDECTOMY          Family History   Problem Relation Name Age of Onset    Diabetes Maternal Grandmother      Diabetes Mother      Breast cancer Neg Hx      Colon cancer Neg Hx      Ovarian cancer Neg Hx         Tobacco Use History[1]    Social History     Social History Narrative    Not on file       Review of patient's allergies indicates:  No Known Allergies     Review of Systems   Respiratory:   "Negative for chest tightness and shortness of breath.    Cardiovascular:  Negative for chest pain and palpitations.   Gastrointestinal:  Negative for nausea and vomiting.   Musculoskeletal:  Positive for back pain.   Neurological:  Negative for dizziness, light-headedness and headaches.   Psychiatric/Behavioral:  The patient is nervous/anxious.          Objective:      Limited physical examination due to nature of virtual/telemedicine visit.  Vitals:    03/21/25 1352   BP: 111/79   Pulse: 68   Temp: 98.2 °F (36.8 °C)   TempSrc: Oral   SpO2: 100%   Weight: 67.4 kg (148 lb 7.7 oz)   Height: 5' 4" (1.626 m)           Physical Exam  Constitutional:       Appearance: Normal appearance.   HENT:      Right Ear: External ear normal.      Left Ear: External ear normal.      Nose: No congestion or rhinorrhea.   Eyes:      Conjunctiva/sclera: Conjunctivae normal.   Pulmonary:      Effort: Pulmonary effort is normal. No respiratory distress.   Abdominal:      General: Bowel sounds are normal. There is no distension.      Tenderness: There is no abdominal tenderness. There is no guarding.   Musculoskeletal:         General: Normal range of motion.      Cervical back: Normal range of motion.   Skin:     General: Skin is warm and dry.      Capillary Refill: Capillary refill takes less than 2 seconds.   Neurological:      Mental Status: She is alert and oriented to person, place, and time.         Assessment:       1. Annual physical exam    2. Other migraine without status migrainosus, not intractable    3. Nausea and vomiting in adult    4. Encounter for medication refill    5. Family history of colon cancer    6. History of cholecystectomy    7. Screening cholesterol level    8. Screening for HIV (human immunodeficiency virus)    9. Encounter for hepatitis C screening test for low risk patient    10. Screening for thyroid disorder    11. Screening for diabetes mellitus        Plan:       Annual physical exam  Counseled patient " on importance of health prevention screening, immunizations, and overall wellness.   Immunizations reviewed.    -     CBC Auto Differential; Future; Expected date: 03/21/2025  -     Comprehensive Metabolic Panel; Future; Expected date: 03/21/2025  -     Vitamin D; Future; Expected date: 03/21/2025  -     Vitamin B12; Future; Expected date: 03/21/2025  -     POCT urine pregnancy  -     Urinalysis, Reflex to Urine Culture Urine, Clean Catch    Other migraine without status migrainosus, not intractable  -     magnesium oxide 400 mg magnesium Tab; Take 1 tablet by mouth nightly as needed (migraine headaches).  Dispense: 30 tablet; Refill: 0  -     naproxen (NAPROSYN) 500 MG tablet; Take 1 tablet (500 mg total) by mouth 2 (two) times daily.  Dispense: 30 tablet; Refill: 0  -     sumatriptan (IMITREX) 50 MG tablet; Take 1 tablet (50 mg total) by mouth every 2 (two) hours as needed for Migraine. If you get some relief, or if the migraine comes back after being relieved, another dose may be taken 2 hours after the last dose. Do not take more than 200 mg in any 24-hour period.  Dispense: 30 tablet; Refill: 0    Nausea and vomiting in adult  -     ondansetron (ZOFRAN-ODT) 4 MG TbDL; Take 2 tablets (8 mg total) by mouth 2 (two) times daily as needed (nausea, vomiting).  Dispense: 14 tablet; Refill: 0    Family history of colon cancer  Comments:  paternal grandmother, uncle, cousin    History of cholecystectomy  Comments:  2017    Screening cholesterol level  -     Lipid Panel; Future; Expected date: 03/21/2025    Screening for HIV (human immunodeficiency virus)  -     HIV 1/2 Ag/Ab (4th Gen); Future; Expected date: 03/21/2025    Encounter for hepatitis C screening test for low risk patient  -     Hepatitis C Antibody; Future; Expected date: 03/21/2025    Screening for thyroid disorder  -     T3, Free; Future; Expected date: 03/21/2025  -     T4, Free; Future; Expected date: 03/21/2025  -     TSH; Future; Expected date:  03/21/2025    Screening for diabetes mellitus  -     Hemoglobin A1C; Future; Expected date: 03/21/2025    Assessment & Plan  Other migraine without status migrainosus, not intractable:  - Noted the patient's history of migraines, which started after the birth of her second child, with a recent episode lasting from Friday to Tuesday.  - Evaluated previous treatment with sumatriptan and ibuprofen, which was not effective.  - Explained that migraine management requires a combination of medications and lifestyle changes.  - Prescribed a regimen including Sumatriptan 50 mg tablets, Naproxen, and Tylenol for migraine management.  - Prescribed Magnesium 400 mg at night as needed for sleep and headaches.  - Recommend lifestyle changes including stress reduction and regular exercise.  - Instructed the patient to contact the office if migraine symptoms persist or worsen.    Nausea and vomiting in adult :  - Noted the patient experiences nausea with migraines.  - Prescribed Zofran as needed for nausea associated with migraines.    FAMILY HISTORY OF COLON CANCER:  - Documented family history of colon cancer in grandmother, uncle, and cousin on father's side.  - Acknowledged family history of colon cancer and its implications for future screening.    FAMILY HISTORY OF DIABETES:  - Documented family history of diabetes, with grandmother having diabetes and mother being borderline diabetic.  - Acknowledged family history of diabetes and planned to screen for it.  - Ordered labs to screen for diabetes and other conditions.    History of cholecystectomy:  - Noted the patient had gallbladder removed in 2017.  - Acknowledged gallbladder removal and its implications for diet.  - Recommend dietary modifications due to gallbladder removal.    HORMONAL CONTRACEPTIVES:  - Documented current use of hormonal contraceptives.  - Inquired about last GYN visit.    GENERAL HEALTH MANAGEMENT:  - Discussed importance of regular primary care visits  and annual health screenings.  - Educated on impact of diet on overall health, particularly spicy and seafood consumption.  - Informed about potential link between stress, anxiety, and physical symptoms.  - Explained importance of hydration and regular exercise for overall health.  - Patient to reduce consumption of spicy foods and seafood.  - Recommend implementing stress reduction techniques, such as walking 30 minutes 2 times per week.  - Patient to stay hydrated.  - Recommend limiting fast food intake.  - Ordered CBC, anemia screening, diabetes screening, lipid panel, Hepatitis C test, HIV test, Vitamin D level, thyroid function test, B12 level, and urinalysis to rule out kidney or bladder infection.    FOLLOW-UP:  - Follow up in 2 weeks for virtual appointment to discuss lab results and address stress and anxiety.     Medication List with Changes/Refills   New Medications    MAGNESIUM OXIDE 400 MG MAGNESIUM TAB    Take 1 tablet by mouth nightly as needed (migraine headaches).    NAPROXEN (NAPROSYN) 500 MG TABLET    Take 1 tablet (500 mg total) by mouth 2 (two) times daily.    ONDANSETRON (ZOFRAN-ODT) 4 MG TBDL    Take 2 tablets (8 mg total) by mouth 2 (two) times daily as needed (nausea, vomitting).    SUMATRIPTAN (IMITREX) 50 MG TABLET    Take 1 tablet (50 mg total) by mouth every 2 (two) hours as needed for Migraine. If you get some relief, or if the migraine comes back after being relieved, another dose may be taken 2 hours after the last dose. Do not take more than 200 mg in any 24-hour period.   Current Medications    IBUPROFEN (ADVIL,MOTRIN) 600 MG TABLET    Take 1 tablet (600 mg total) by mouth every 8 (eight) hours as needed for Pain.    SEGESTERONE AC-ETHIN ESTRADIOL (ANNOVERA) 0.15-0.013 MG/24 HOUR RING    Place 1 Ring vaginally every 28 days.   Discontinued Medications    NAPROXEN (NAPROSYN) 500 MG TABLET    Take 1 tablet (500 mg total) by mouth 2 (two) times daily with meals.    RIZATRIPTAN (MAXALT)  10 MG TABLET    Take 1 tablet (10 mg total) by mouth as needed for Migraine.    SUMATRIPTAN (IMITREX) 25 MG TAB    Take 2 tablets (50 mg total) by mouth every 2 (two) hours as needed (Migraine). 2 doses per day maximum (4 pills)        Follow up in about 2 weeks (around 4/4/2025) for Mirtha Mcarthur, MSN, APRN, FNP-C.        ALISA Almanza, MSN, FNP-C     This note was generated with the assistance of ambient listening technology. Verbal consent was obtained by the patient and accompanying visitor(s) for the recording of patient appointment to facilitate this note. I attest to having reviewed and edited the generated note for accuracy, though some syntax or spelling errors may persist. Please contact the author of this note for any clarification.          [1]   Social History  Tobacco Use   Smoking Status Never   Smokeless Tobacco Never

## 2025-03-21 NOTE — PATIENT INSTRUCTIONS
Please get your labs done at the laboratory today, once you have checked out for this appointment, we will get you schedule for  labs to be done today.    I will communicate your laboratory and/or imaging results with you through your [employer] account that was set up through the portal, it was a pleasure meeting and taking care of you today.

## 2025-03-22 ENCOUNTER — RESULTS FOLLOW-UP (OUTPATIENT)
Dept: PRIMARY CARE CLINIC | Facility: CLINIC | Age: 35
End: 2025-03-22

## 2025-03-22 DIAGNOSIS — E55.9 VITAMIN D DEFICIENCY: Primary | ICD-10-CM

## 2025-03-22 RX ORDER — ERGOCALCIFEROL 1.25 MG/1
50000 CAPSULE ORAL
Qty: 4 CAPSULE | Refills: 2 | Status: SHIPPED | OUTPATIENT
Start: 2025-03-22 | End: 2025-06-08

## 2025-03-26 ENCOUNTER — ON-DEMAND VIRTUAL (OUTPATIENT)
Dept: URGENT CARE | Facility: CLINIC | Age: 35
End: 2025-03-26
Payer: MEDICAID

## 2025-03-26 DIAGNOSIS — H10.9 CONJUNCTIVITIS, UNSPECIFIED CONJUNCTIVITIS TYPE, UNSPECIFIED LATERALITY: Primary | ICD-10-CM

## 2025-03-26 RX ORDER — POLYMYXIN B SULFATE AND TRIMETHOPRIM 1; 10000 MG/ML; [USP'U]/ML
1 SOLUTION OPHTHALMIC 3 TIMES DAILY
Qty: 10 ML | Refills: 0 | Status: SHIPPED | OUTPATIENT
Start: 2025-03-26 | End: 2025-04-02

## 2025-03-26 NOTE — PROGRESS NOTES
Subjective:      Patient ID: Jose Luis Crowder is a 34 y.o. female.    Vitals:  vitals were not taken for this visit.     Chief Complaint: Conjunctivitis      Visit Type: TELE AUDIOVISUAL    Patient Location[place]    Present with the patient at the time of consultation: TELEMED PRESENT WITH PATIENT: None    Past Medical History:   Diagnosis Date    Abnormal Pap smear of cervix yrs ago    repeat pap    Gall stones     Ovarian cyst      Past Surgical History:   Procedure Laterality Date    CHOLECYSTECTOMY  05/01/2017     Laparoscopic cholecystectomy -     DILATION AND CURETTAGE OF UTERUS USING SUCTION N/A 2/19/2019    Procedure: DILATION AND CURETTAGE, UTERUS, USING SUCTION (ADD ON );  Surgeon: Pelon Mead III, MD;  Location: [facility];  Service: OB/GYN;  Laterality: N/A;  (ADD ON )    TONSILLECTOMY, ADENOIDECTOMY       Review of patient's allergies indicates:  No Known Allergies  Medications Ordered Prior to Encounter[1]  Family History   Problem Relation Name Age of Onset    Diabetes Maternal Grandmother      Diabetes Mother      Breast cancer Neg Hx      Colon cancer Neg Hx      Ovarian cancer Neg Hx         Medications Ordered                [id number] - [place] - [address] AT [facility] [address]   [address], [place] [address]    Telephone: [phone]   Fax: [phone]   Hours: Not open 24 hours                         E-Prescribed (1 of 1)              polymyxin B sulf-trimethoprim (POLYTRIM) 10,000 unit- 1 mg/mL Drop    Sig: Place 1 drop into the left eye 3 (three) times daily. for 7 days       Start: 3/26/25     Quantity: 10 mL Refills: 0                           Ohs Peq Odvv Intake    3/26/2025  3:13 PM CDT - Filed by Patient   What is your current physical address in the event of a medical emergency? [address]   Are you able to take your vital signs? No   Please attach any relevant images or files    Is your  employer contracted with [facility]? No       Virtual visit, limited exam  Chart reviewed    Pt presents with c/o Right eye with itching, redness, and drainage noted today. Son Recently dx with pink eye.  Does not wear contacts, denies fever, vision changes, or cold symptoms.     Conjunctivitis  This is a new problem. Pertinent negatives include no chills, congestion, fatigue, fever, headaches, sore throat or visual change.       Constitution: Negative for chills, fatigue and fever.   HENT:  Negative for congestion and sore throat.    Eyes:  Positive for eye discharge, eye itching and eye redness. Negative for eye trauma, eye pain, double vision, blurred vision and eyelid swelling.   Respiratory:  Negative for shortness of breath.    Neurological:  Negative for dizziness and headaches.        Objective:   The physical exam was conducted virtually.  Physical Exam   Constitutional: She is oriented to person, place, and time. No distress.   HENT:   Head: Normocephalic.   Ears:   Right Ear: External ear normal.   Left Ear: External ear normal.   Nose: No rhinorrhea or congestion.   Eyes: Right eye exhibits discharge. Left eye exhibits no discharge.   Neck: Neck supple.   Pulmonary/Chest: Effort normal. No respiratory distress.   Neurological: She is alert and oriented to person, place, and time.   Skin: Skin is no rash.       Assessment:     1. Conjunctivitis, unspecified conjunctivitis type, unspecified laterality        Plan:   Use drops as directed  Replace mascara or eyeliner  If symptoms worsening or not improved follow up in person    Conjunctivitis, unspecified conjunctivitis type, unspecified laterality  -     polymyxin B sulf-trimethoprim (POLYTRIM) 10,000 unit- 1 mg/mL Drop; Place 1 drop into the left eye 3 (three) times daily. for 7 days  Dispense: 10 mL; Refill: 0    We appreciate you trusting us with your medical care. We hope you feel better soon. We will be happy to take care of you for all of  your future medical needs.     You must understand that you've received Virtual treatment only and that you may be released before all your medical problems are known or treated. You, the patient, will arrange for follow up care as instructed.     Follow up with your PCP or specialty clinic as directed in the next 1-2 weeks if not improved or as needed. You can call [phone] to schedule an appointment with the appropriate provider.     If your condition worsens we recommend that you receive another evaluation in person, with your primary care provider, urgent care or at the emergency room immediately or contact your primary medical clinics after hours call service to discuss your concerns.                     [1]   Current Outpatient Medications on File Prior to Visit   Medication Sig Dispense Refill    ergocalciferol (ERGOCALCIFEROL) 50,000 unit Cap Take 1 capsule (50,000 Units total) by mouth every 7 days. for 12 doses 4 capsule 2    ibuprofen (ADVIL,MOTRIN) 600 MG tablet Take 1 tablet (600 mg total) by mouth every 8 (eight) hours as needed for Pain. 20 tablet 0    magnesium oxide 400 mg magnesium Tab Take 1 tablet by mouth nightly as needed (migraine headaches). 30 tablet 0    naproxen (NAPROSYN) 500 MG tablet Take 1 tablet (500 mg total) by mouth 2 (two) times daily. 30 tablet 0    ondansetron (ZOFRAN-ODT) 4 MG TbDL Take 2 tablets (8 mg total) by mouth 2 (two) times daily as needed (nausea, vomitting). 14 tablet 0    segesterone ac-ethin estradioL (ANNOVERA) 0.15-0.013 mg/24 hour Ring Place 1 Ring vaginally every 28 days. 1 each 0    sumatriptan (IMITREX) 50 MG tablet Take 1 tablet (50 mg total) by mouth every 2 (two) hours as needed for Migraine. If you get some relief, or if the migraine comes back after being relieved, another dose may be taken 2 hours after the last dose. Do not take more than 200 mg in any 24-hour period. 30 tablet 0     Current Facility-Administered Medications on File Prior to Visit    Medication Dose Route Frequency Provider Last Rate Last Admin    doxycycline (VIBRAMYCIN) 200 mg in dextrose 5 % 250 mL IVPB  200 mg Intravenous On Call Procedure Qi Bilyl MD   200 mg at 02/19/19 0683

## 2025-03-26 NOTE — PATIENT INSTRUCTIONS

## 2025-04-02 ENCOUNTER — ON-DEMAND VIRTUAL (OUTPATIENT)
Dept: URGENT CARE | Facility: CLINIC | Age: 35
End: 2025-04-02
Payer: MEDICAID

## 2025-04-02 DIAGNOSIS — J02.0 STREP THROAT: Primary | ICD-10-CM

## 2025-04-02 RX ORDER — FLUCONAZOLE 150 MG/1
150 TABLET ORAL DAILY
Qty: 2 TABLET | Refills: 0 | Status: SHIPPED | OUTPATIENT
Start: 2025-04-02 | End: 2025-04-04

## 2025-04-02 RX ORDER — AMOXICILLIN 500 MG/1
500 CAPSULE ORAL EVERY 12 HOURS
Qty: 20 CAPSULE | Refills: 0 | Status: SHIPPED | OUTPATIENT
Start: 2025-04-02 | End: 2025-04-12

## 2025-04-02 NOTE — PROGRESS NOTES
Subjective:      Patient ID: Jose Luis Crowder is a 34 y.o. female.    Vitals:  vitals were not taken for this visit.     Chief Complaint: Sore Throat      Visit Type: TELE AUDIOVISUAL    Patient Location: Home     Present with the patient at the time of consultation: TELEMED PRESENT WITH PATIENT: family member    Past Medical History:   Diagnosis Date    Abnormal Pap smear of cervix yrs ago    repeat pap    Gall stones     Ovarian cyst      Past Surgical History:   Procedure Laterality Date    CHOLECYSTECTOMY  05/01/2017     Laparoscopic cholecystectomy -     DILATION AND CURETTAGE OF UTERUS USING SUCTION N/A 2/19/2019    Procedure: DILATION AND CURETTAGE, UTERUS, USING SUCTION (ADD ON );  Surgeon: Pelon Mead III, MD;  Location: [facility] OR;  Service: OB/GYN;  Laterality: N/A;  (ADD ON )    TONSILLECTOMY, ADENOIDECTOMY       Review of patient's allergies indicates:  No Known Allergies  Medications Ordered Prior to Encounter[1]  Family History   Problem Relation Name Age of Onset    Diabetes Maternal Grandmother      Diabetes Mother      Breast cancer Neg Hx      Colon cancer Neg Hx      Ovarian cancer Neg Hx         Medications Ordered                [id number] - [place] - [address] [place] [facility] [place] [facility]  [place]   [address], [place] [address]    Telephone: [phone]   Fax: [phone]   Hours: Not open 24 hours                         E-Prescribed (2 of 2)              amoxicillin (AMOXIL) 500 MG capsule    Sig: Take 1 capsule (500 mg total) by mouth every 12 (twelve) hours. for 10 days       Start: 4/2/25     Quantity: 20 capsule Refills: 0                         fluconazole (DIFLUCAN) 150 MG Tab    Sig: Take 1 tablet (150 mg total) by mouth once daily. for 2 days       Start: 4/2/25     Quantity: 2 tablet Refills: 0                           Ohs Peq Odvv Intake    4/2/2025  5:59 AM CDT - Filed by Patient   What is your current physical address in the  event of a medical emergency?    Are you able to take your vital signs? No   Please attach any relevant images or files    Is your employer contracted with [facility]? No         Son has strep throat, patient with fever last night. Has had sore throat which is very red when she looks at it. Does not have tonsils.    Sore Throat         Constitution: Positive for fatigue and fever.   HENT:  Positive for sore throat.    Eyes: Negative.    Respiratory: Negative.     Genitourinary: Negative.         Objective:   The physical exam was conducted virtually.  Physical Exam   Constitutional:      Comments:Voice change     Abdominal: Normal appearance.   Neurological: She is alert.       Assessment:     1. Strep throat        Plan:       Strep throat    Other orders  -     amoxicillin (AMOXIL) 500 MG capsule; Take 1 capsule (500 mg total) by mouth every 12 (twelve) hours. for 10 days  Dispense: 20 capsule; Refill: 0  -     fluconazole (DIFLUCAN) 150 MG Tab; Take 1 tablet (150 mg total) by mouth once daily. for 2 days  Dispense: 2 tablet; Refill: 0                          [1]   Current Outpatient Medications on File Prior to Visit   Medication Sig Dispense Refill    ergocalciferol (ERGOCALCIFEROL) 50,000 unit Cap Take 1 capsule (50,000 Units total) by mouth every 7 days. for 12 doses 4 capsule 2    ibuprofen (ADVIL,MOTRIN) 600 MG tablet Take 1 tablet (600 mg total) by mouth every 8 (eight) hours as needed for Pain. 20 tablet 0    magnesium oxide 400 mg magnesium Tab Take 1 tablet by mouth nightly as needed (migraine headaches). 30 tablet 0    naproxen (NAPROSYN) 500 MG tablet Take 1 tablet (500 mg total) by mouth 2 (two) times daily. 30 tablet 0    ondansetron (ZOFRAN-ODT) 4 MG TbDL Take 2 tablets (8 mg total) by mouth 2 (two) times daily as needed (nausea, vomitting). 14 tablet 0    polymyxin B sulf-trimethoprim (POLYTRIM) 10,000 unit- 1 mg/mL Drop Place 1 drop into the left eye 3 (three) times daily. for 7 days  10 mL 0    segesterone ac-ethin estradioL (ANNOVERA) 0.15-0.013 mg/24 hour Ring Place 1 Ring vaginally every 28 days. 1 each 0    sumatriptan (IMITREX) 50 MG tablet Take 1 tablet (50 mg total) by mouth every 2 (two) hours as needed for Migraine. If you get some relief, or if the migraine comes back after being relieved, another dose may be taken 2 hours after the last dose. Do not take more than 200 mg in any 24-hour period. 30 tablet 0     Current Facility-Administered Medications on File Prior to Visit   Medication Dose Route Frequency Provider Last Rate Last Admin    doxycycline (VIBRAMYCIN) 200 mg in dextrose 5 % 250 mL IVPB  200 mg Intravenous On Call Procedure Qi Billy MD   200 mg at 02/19/19 [id number]

## 2025-04-02 NOTE — PATIENT INSTRUCTIONS
You have been diagnosed with bacterial tonsillitis due to sore throat, fever, swollen tonsillar nodes and exposure.    You are contagious and should stay home until you have been on your antibiotic for 48 hours and fever is gone.   I have prescribed and antibiotic. Please start today and take until finished.   You can also take over the counter meds such as Flonase, Claritin, Dayquil etc.   Can take ibuprofen or tylenol for fever.     If your symptoms persist please follow up with your pcp.  If you experience any severe symptoms go to the ER.     Thank you for choosing [facility]!    Our goal in the [facility] to always provide outstanding medical care. You may receive a survey by mail or e-mail in the next week regarding your experience today. We would greatly appreciate you completing and returning the survey. Your feedback provides us with a way to recognize our staff who provide very good care, and it helps us learn how to improve when your experience was below our aspiration of excellence.         We appreciate you trusting us with your medical care. We hope you feel better soon. We will be happy to take care of you for all of your future medical needs.    You must understand that you've received Virtual  treatment only and that you may be released before all your medical problems are known or treated. You, the patient, will arrange for follow up care as instructed.    Follow up with your PCP or specialty clinic as directed in the next 1-2 weeks if not improved or as needed.  You can call [phone] to schedule an appointment with the appropriate provider.    If your condition worsens we recommend that you receive another evaluation in person, with your primary care provider, urgent care or at the emergency room immediately or contact your primary medical clinics after hours call service to discuss your concerns.

## 2025-04-07 ENCOUNTER — OFFICE VISIT (OUTPATIENT)
Dept: PRIMARY CARE CLINIC | Facility: CLINIC | Age: 35
End: 2025-04-07
Payer: MEDICAID

## 2025-04-07 DIAGNOSIS — G43.809 OTHER MIGRAINE WITHOUT STATUS MIGRAINOSUS, NOT INTRACTABLE: Primary | ICD-10-CM

## 2025-04-07 DIAGNOSIS — Z71.2 ENCOUNTER TO DISCUSS TEST RESULTS: ICD-10-CM

## 2025-04-07 DIAGNOSIS — E55.9 VITAMIN D DEFICIENCY: ICD-10-CM

## 2025-04-07 PROCEDURE — 98005 SYNCH AUDIO-VIDEO EST LOW 20: CPT | Mod: 95,,, | Performed by: NURSE PRACTITIONER

## 2025-04-07 PROCEDURE — 1160F RVW MEDS BY RX/DR IN RCRD: CPT | Mod: CPTII,95,, | Performed by: NURSE PRACTITIONER

## 2025-04-07 PROCEDURE — 1159F MED LIST DOCD IN RCRD: CPT | Mod: CPTII,95,, | Performed by: NURSE PRACTITIONER

## 2025-04-07 PROCEDURE — 3044F HG A1C LEVEL LT 7.0%: CPT | Mod: CPTII,95,, | Performed by: NURSE PRACTITIONER

## 2025-04-07 NOTE — PROGRESS NOTES
The patient location is: [place].     The chief complaint leading to consultation is: migraine follow up and discuss test results.    Visit type: audiovisual    Face to Face time with patient: 6  15 minutes of total time spent on the encounter, which includes face to face time and non-face to face time preparing to see the patient (eg, review of tests), Obtaining and/or reviewing separately obtained history, Documenting clinical information in the electronic or other health record, Independently interpreting results (not separately reported) and communicating results to the patient/family/caregiver, or Care coordination (not separately reported).       Each patient to whom he or she provides medical services by telemedicine is:  (1) informed of the relationship between the physician and patient and the respective role of any other health care provider with respect to management of the patient; and (2) notified that he or she may decline to receive medical services by telemedicine and may withdraw from such care at any time.    Notes:      Subjective:       Patient ID: Jose Luis Crowder is a 34 y.o. female.    Chief Complaint: migraine follow up and discuss test results.    History of Present Illness    CHIEF COMPLAINT:  Patient presents today for follow up of migraines.    MIGRAINE:  She reports experiencing approximately three headaches since last visit, denying associated nausea or vomiting. She takes magnesium nightly and alternates between sumatriptan and naproxen for acute headache management.    LABS:  Vitamin D level was low. Cholesterol, electrolytes, kidney function, and liver function tests were normal. Urinalysis was normal. She is not anemic.     Past Medical History:   Diagnosis Date    Abnormal Pap smear of cervix yrs ago    repeat pap    Gall stones     Ovarian cyst         Past Surgical History:   Procedure Laterality Date    CHOLECYSTECTOMY  05/01/2017     Laparoscopic cholecystectomy - Dr.WALSH ABBOTT  AND CURETTAGE OF UTERUS USING SUCTION N/A 2/19/2019    Procedure: DILATION AND CURETTAGE, UTERUS, USING SUCTION (ADD ON );  Surgeon: Pelon Mead III, MD;  Location: [facility];  Service: OB/GYN;  Laterality: N/A;  (ADD ON )    TONSILLECTOMY, ADENOIDECTOMY          Family History   Problem Relation Name Age of Onset    Diabetes Maternal Grandmother      Diabetes Mother      Breast cancer Neg Hx      Colon cancer Neg Hx      Ovarian cancer Neg Hx         Tobacco Use History[1]    Social History     Social History Narrative    Not on file       Review of patient's allergies indicates:  No Known Allergies     Review of Systems   Constitutional:  Negative for activity change and unexpected weight change.   HENT:  Negative for hearing loss, rhinorrhea and trouble swallowing.    Eyes:  Negative for discharge and visual disturbance.   Respiratory:  Negative for chest tightness and wheezing.    Cardiovascular:  Negative for chest pain and palpitations.   Gastrointestinal:  Negative for blood in stool, constipation, diarrhea and vomiting.   Endocrine: Negative for polydipsia and polyuria.   Genitourinary:  Negative for difficulty urinating, dysuria, hematuria and menstrual problem.   Musculoskeletal:  Negative for arthralgias, joint swelling and neck pain.   Neurological:  Negative for weakness and headaches.   Psychiatric/Behavioral:  Negative for confusion and dysphoric mood.          Objective:        There were no vitals filed for this visit.     Limited physical examination due to nature of virtual/telemedicine visit.    Physical Exam  Constitutional:       Appearance: Normal appearance.   Pulmonary:      Effort: Pulmonary effort is normal. No respiratory distress.   Neurological:      Mental Status: She is alert and oriented to person, place, and time.         Assessment:       1. Other migraine without status migrainosus, not intractable    2. Vitamin D deficiency    3. Encounter to discuss test results        Plan:        Other migraine without status migrainosus, not intractable  Improving, continuing current management with sumatriptan Naproxen and magnesium.     Vitamin D deficiency  Stable, continue with vitamin dc replacement as previously ordered.    Encounter to discuss test results  03/21/2025  Review and discussion of test results.     Assessment & Plan    Other migraine without status migrainosus, not intractable:  - Noted that the patient has experienced headaches 3 times since the last visit, with no associated nausea or vomiting.  - Evaluated the effectiveness of the prescribed medication (sumatriptan) in managing the patient's migraines.  - Reviewed the current treatment plan and its effectiveness.  - Continued the current treatment plan, which includes alternating naproxen, sumatriptan, and acetaminophen for migraine management.  - Advised to continue magnesium supplementation at night as needed.    VITAMIN D DEFICIENCY:  - Reviewed lab results, noting low vitamin D levels while other tests were normal.  - Acknowledged the vitamin D deficiency based on the lab results.    GENERAL CARE:  - Confirmed no need for medication refills at this time.  - Instructed the patient to contact the office with any concerns.        Medication List with Changes/Refills   Current Medications    AMOXICILLIN (AMOXIL) 500 MG CAPSULE    Take 1 capsule (500 mg total) by mouth every 12 (twelve) hours. for 10 days    ERGOCALCIFEROL (ERGOCALCIFEROL) 50,000 UNIT CAP    Take 1 capsule (50,000 Units total) by mouth every 7 days. for 12 doses    IBUPROFEN (ADVIL,MOTRIN) 600 MG TABLET    Take 1 tablet (600 mg total) by mouth every 8 (eight) hours as needed for Pain.    MAGNESIUM OXIDE 400 MG MAGNESIUM TAB    Take 1 tablet by mouth nightly as needed (migraine headaches).    NAPROXEN (NAPROSYN) 500 MG TABLET    Take 1 tablet (500 mg total) by mouth 2 (two) times daily.    ONDANSETRON (ZOFRAN-ODT) 4 MG TBDL    Take 2 tablets (8 mg total) by mouth  2 (two) times daily as needed (nausea, vomitting).    SEGESTERONE AC-ETHIN ESTRADIOL (ANNOVERA) 0.15-0.013 MG/24 HOUR RING    Place 1 Ring vaginally every 28 days.    SUMATRIPTAN (IMITREX) 50 MG TABLET    Take 1 tablet (50 mg total) by mouth every 2 (two) hours as needed for Migraine. If you get some relief, or if the migraine comes back after being relieved, another dose may be taken 2 hours after the last dose. Do not take more than 200 mg in any 24-hour period.        Follow up if symptoms worsen or fail to improve.      ALISA Almanza, MSN, FNP-C     This note was generated with the assistance of ambient listening technology. Verbal consent was obtained by the patient and accompanying visitor(s) for the recording of patient appointment to facilitate this note. I attest to having reviewed and edited the generated note for accuracy, though some syntax or spelling errors may persist. Please contact the author of this note for any clarification.             [1]   Social History  Tobacco Use   Smoking Status Never   Smokeless Tobacco Never

## 2025-05-22 ENCOUNTER — OFFICE VISIT (OUTPATIENT)
Dept: OBSTETRICS AND GYNECOLOGY | Facility: CLINIC | Age: 35
End: 2025-05-22
Payer: MEDICAID

## 2025-05-22 VITALS
WEIGHT: 138 LBS | DIASTOLIC BLOOD PRESSURE: 62 MMHG | HEIGHT: 64 IN | BODY MASS INDEX: 23.56 KG/M2 | SYSTOLIC BLOOD PRESSURE: 100 MMHG

## 2025-05-22 DIAGNOSIS — N76.0 ACUTE VAGINITIS: ICD-10-CM

## 2025-05-22 DIAGNOSIS — Z12.4 CERVICAL CANCER SCREENING: ICD-10-CM

## 2025-05-22 DIAGNOSIS — Z01.419 WELL WOMAN EXAM WITH ROUTINE GYNECOLOGICAL EXAM: Primary | ICD-10-CM

## 2025-05-22 DIAGNOSIS — Z30.014 ENCOUNTER FOR INITIAL PRESCRIPTION OF INTRAUTERINE CONTRACEPTIVE DEVICE (IUD): ICD-10-CM

## 2025-05-22 DIAGNOSIS — Z11.3 SCREEN FOR STD (SEXUALLY TRANSMITTED DISEASE): ICD-10-CM

## 2025-05-22 DIAGNOSIS — Z72.89 OTHER PROBLEMS RELATED TO LIFESTYLE: ICD-10-CM

## 2025-05-22 PROCEDURE — 99999 PR PBB SHADOW E&M-EST. PATIENT-LVL III: CPT | Mod: PBBFAC,,,

## 2025-05-22 PROCEDURE — 99213 OFFICE O/P EST LOW 20 MIN: CPT | Mod: PBBFAC

## 2025-05-22 NOTE — PROGRESS NOTES
CC: Annual    HPI: Pt is a 34 y.o.  female who presents for routine annual exam. She was using vaginal ring for contraception. Stopped using. Feels like it falls out. Not currently sexually acitve. She does want STD screening, swabs and blood work. Some vaginal itching and discharge. Tried monistat- cycle came- did not finish. 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 Is interested in trying different BC. She denies hx of HTN, migraines with auras, smoking, VTE, stroke, liver disease, breast cancer.     FH:   Breast cancer: none  Colon cancer: paternal grandmother, aunt, cousin dx in 40s  Ovarian cancer: none  Uterine cancer: none    HPV vaccine: yes     Last pap smear: 22- NILM, HPV neg   History of abnormal pap smears: many years ago  Colonoscopy: n/a   DEXA: n/a   Mammogram: n/a   STD history: chlamydia around   Birth control: n/a  OB history:   Tobacco use: none     ROS:  GENERAL: Feeling well overall. Denies fever or chills.   SKIN: Denies rash or lesions.   HEAD: Denies head injury or headache.   NODES: Denies enlarged lymph nodes.   CHEST: Denies chest pain or shortness of breath.   CARDIOVASCULAR: Denies palpitations or left sided chest pain.   ABDOMEN: No abdominal pain, constipation, diarrhea, nausea, vomiting or rectal bleeding.   URINARY: No dysuria, hematuria, or burning on urination.  REPRODUCTIVE: See HPI.   BREASTS: Denies pain, lumps, or nipple discharge.   HEMATOLOGIC: No easy bruisability or excessive bleeding.   MUSCULOSKELETAL: Denies joint pain or swelling.   NEUROLOGIC: Denies syncope or weakness.   PSYCHIATRIC: Denies depression, anxiety or mood swings.    PE: Female chaperone present for exam  APPEARANCE: Well nourished, well developed, Black or  female in no acute distress.  NODES: no cervical, supraclavicular, or inguinal lymphadenopathy  BREASTS: Symmetrical, no skin changes or visible lesions. No palpable masses, nipple discharge or adenopathy  bilaterally.  ABDOMEN: Soft. No tenderness or masses. No distention. No hernias palpated.   VULVA: No lesions. Normal external female genitalia.  URETHRAL MEATUS: Normal size and location, no lesions, no prolapse.  URETHRA: No masses, tenderness, or prolapse.  VAGINA: Moist. No lesions or lacerations noted. No abnormal discharge present. No odor present.   CERVIX: No lesions or discharge. No cervical motion tenderness.   UTERUS: Normal size, regular shape, mobile, non-tender.  ADNEXA: No tenderness. No fullness or masses palpated in the adnexal regions.   ANUS PERINEUM: Normal.    Diagnosis:  1. Well woman exam with routine gynecological exam    2. Acute vaginitis    3. Screen for STD (sexually transmitted disease)    4. Other problems related to lifestyle    5. Encounter for initial prescription of intrauterine contraceptive device (IUD)    6. Cervical cancer screening        Plan:     Orders Placed This Encounter    C. trachomatis/N. gonorrhoeae by AMP DNA [facility]; Cervicovaginal    Vaginosis Screen by DNA Probe    HIV 1/2 Ag/Ab (4th Gen)    Treponema Pallidium Antibodies IgG, IgM    Hepatitis B Surface Antigen    Hepatitis C Antibody    Device Authorization Order    Liquid-Based Pap Smear, Screening     - Pap updated   - GC/CT and AFFIRM collected  - STD blood work ordered   - Patient was counseled today on contraceptive options: barrier, hormonal (OCPs, Depo-Provera, NuvaRing, Nexplanon), IUDs (Mirena, ParaGard), etc.   - The R/B/A of hormonal and non-hormonal IUDs have been fully discussed with the patient. After informed counseling, the patient has decided to proceed with the Mirena and prior authorization was sent off today. She will return to the clinic for insertion on CD 1-7     RTO for MIRENA IUD insertion     Patient was counseled today on the new ACS guidelines for cervical cytology screening as well as the current recommendations for breast cancer screening. She was counseled to follow up with her PCP  for other routine health maintenance. Counseling session lasted approximately 10 minutes, and all her questions were answered.  For women over the age of 65, you can stop having cervical cancer screenings if you have never had abnormal cervical cells or cervical cancer, and youve had three negative Pap tests in a row. (You also can stop screening if youve had two negative Pap and HPV tests in a row in the past 10 years, with at least one test in the past 5 years.),    Follow-up with me in 1 year for routine exam; pap in 3-5 years.       Bre Abraham, NP-C  OBGYN

## 2025-05-23 ENCOUNTER — LAB VISIT (OUTPATIENT)
Dept: LAB | Facility: OTHER | Age: 35
End: 2025-05-23
Payer: MEDICAID

## 2025-05-23 DIAGNOSIS — Z72.89 OTHER PROBLEMS RELATED TO LIFESTYLE: ICD-10-CM

## 2025-05-23 DIAGNOSIS — Z11.3 SCREEN FOR STD (SEXUALLY TRANSMITTED DISEASE): ICD-10-CM

## 2025-05-23 LAB
HBV SURFACE AG SERPL QL IA: NORMAL
HCV AB SERPL QL IA: NEGATIVE
HIV 1+2 AB+HIV1 P24 AG SERPL QL IA: NEGATIVE
T PALLIDUM IGG+IGM SER QL: NEGATIVE

## 2025-05-23 PROCEDURE — 86593 SYPHILIS TEST NON-TREP QUANT: CPT

## 2025-05-23 PROCEDURE — 87389 HIV-1 AG W/HIV-1&-2 AB AG IA: CPT

## 2025-05-23 PROCEDURE — 86803 HEPATITIS C AB TEST: CPT

## 2025-05-23 PROCEDURE — 36415 COLL VENOUS BLD VENIPUNCTURE: CPT

## 2025-05-23 PROCEDURE — 87340 HEPATITIS B SURFACE AG IA: CPT

## 2025-05-26 ENCOUNTER — RESULTS FOLLOW-UP (OUTPATIENT)
Dept: OBSTETRICS AND GYNECOLOGY | Facility: CLINIC | Age: 35
End: 2025-05-26

## 2025-06-10 ENCOUNTER — PATIENT MESSAGE (OUTPATIENT)
Dept: OBSTETRICS AND GYNECOLOGY | Facility: CLINIC | Age: 35
End: 2025-06-10
Payer: MEDICAID

## 2025-06-11 ENCOUNTER — PROCEDURE VISIT (OUTPATIENT)
Dept: OBSTETRICS AND GYNECOLOGY | Facility: CLINIC | Age: 35
End: 2025-06-11
Payer: MEDICAID

## 2025-06-11 VITALS
SYSTOLIC BLOOD PRESSURE: 105 MMHG | HEIGHT: 64 IN | DIASTOLIC BLOOD PRESSURE: 70 MMHG | WEIGHT: 136.88 LBS | BODY MASS INDEX: 23.37 KG/M2

## 2025-06-11 DIAGNOSIS — Z30.430 ENCOUNTER FOR IUD INSERTION: Primary | ICD-10-CM

## 2025-06-11 LAB
B-HCG UR QL: NEGATIVE
CTP QC/QA: YES

## 2025-06-11 PROCEDURE — 81025 URINE PREGNANCY TEST: CPT | Mod: PBBFAC

## 2025-06-11 PROCEDURE — 99999PBSHW PR PBB SHADOW TECHNICAL ONLY FILED TO HB: Mod: PBBFAC,,,

## 2025-06-11 PROCEDURE — 58300 INSERT INTRAUTERINE DEVICE: CPT | Mod: PBBFAC

## 2025-06-11 PROCEDURE — 99999PBSHW POCT URINE PREGNANCY: Mod: PBBFAC,,,

## 2025-06-11 PROCEDURE — 58300 INSERT INTRAUTERINE DEVICE: CPT | Mod: S$PBB,,,

## 2025-06-11 RX ADMIN — LEVONORGESTREL 1 INTRA UTERINE DEVICE: 52 INTRAUTERINE DEVICE INTRAUTERINE at 02:06

## 2025-06-11 NOTE — PROCEDURES
Insertion of IUD    Date/Time: 6/11/2025 2:40 PM    Performed by: Bre Abraham NP  Authorized by: Bre Abraham NP    Consent:     Consent obtained:  Prior to procedure the appropriate consent was completed and verified    Consent given by:  Patient    Procedure risks and benefits discussed: yes      Patient questions answered: yes      Patient agrees, verbalizes understanding, and wants to proceed: yes     Device to be inserted was verified by patient: yes    Educational handouts given: yes    Insertion Procedure:   1 Intra Uterine Device levonorgestreL 52 mg       Pelvic exam performed: yes      Negative GC/chlamydia test: yes (5/22/25)      Negative urine pregnancy test: yes      Cervix cleaned and prepped: yes (lidocaine jelly 2%)      Speculum placed in vagina: yes      Tenaculum applied to cervix: yes      Uterus sounded: yes      Uterus sound depth (cm):  7.5    IUD inserted with no complications: yes      IUD type:  Mirena    Strings trimmed: yes (~3 cm)    Post-procedure:     Patient tolerated procedure well: yes      Patient will follow up after next period: yes    Comments:      Mirena

## 2025-06-17 ENCOUNTER — PATIENT MESSAGE (OUTPATIENT)
Dept: OBSTETRICS AND GYNECOLOGY | Facility: CLINIC | Age: 35
End: 2025-06-17
Payer: MEDICAID

## (undated) DEVICE — ADHESIVE DERMABOND ADVANCED

## (undated) DEVICE — GLOVE BIOGEL SKINSENSE PI 7.5

## (undated) DEVICE — NDL HYPO REG 25G X 1 1/2

## (undated) DEVICE — KIT WING PAD POSITIONING

## (undated) DEVICE — TROCAR ENDOPATH XCEL 11MM 10CM

## (undated) DEVICE — SOL 9P NACL IRR PIC IL

## (undated) DEVICE — ELECTRODE REM PLYHSV RETURN 9

## (undated) DEVICE — CLOSURE SKIN STERI STRIP 1/2X4

## (undated) DEVICE — SEE MEDLINE ITEM 157110

## (undated) DEVICE — DRESSING LEUKOPLAST FLEX 1X3IN

## (undated) DEVICE — Device

## (undated) DEVICE — TROCAR ENDOPATH XCEL 5X100MM

## (undated) DEVICE — VACURETTE 7MM CURVED

## (undated) DEVICE — SUT VICRYL 0 27 CT-2

## (undated) DEVICE — APPLIER CLIP ENDO LIGAMAX 5MM

## (undated) DEVICE — CANNULA ENDOPATH XCEL 5X100MM

## (undated) DEVICE — IRRIGATOR ENDOSCOPY DISP.

## (undated) DEVICE — PENCIL ELECTROSURG HOLST W/BLD

## (undated) DEVICE — NDL INSUF ULTRA VERESS 120MM

## (undated) DEVICE — SUT MCRYL PLUS 4-0 PS2 27IN

## (undated) DEVICE — BAG TISSUE RETRIEVAL 225ML

## (undated) DEVICE — VACURETTE 11MM CURVED

## (undated) DEVICE — HANDLE CURETTE W/TUBING

## (undated) DEVICE — SEE MEDLINE ITEM 152622

## (undated) DEVICE — SYR B-D DISP CONTROL 10CC100/C

## (undated) DEVICE — SEE MEDLINE ITEM 156925

## (undated) DEVICE — SOL CLEARIFY VISUALIZATION LAP

## (undated) DEVICE — PAD PREP 50/CA

## (undated) DEVICE — CONTAINER SPECIMEN STRL 4OZ

## (undated) DEVICE — JELLY SURGILUBE 5GR